# Patient Record
Sex: FEMALE | Race: BLACK OR AFRICAN AMERICAN | NOT HISPANIC OR LATINO | Employment: FULL TIME | URBAN - METROPOLITAN AREA
[De-identification: names, ages, dates, MRNs, and addresses within clinical notes are randomized per-mention and may not be internally consistent; named-entity substitution may affect disease eponyms.]

---

## 2017-01-12 ENCOUNTER — HOSPITAL ENCOUNTER (OUTPATIENT)
Dept: RADIOLOGY | Facility: OTHER | Age: 57
Discharge: HOME | End: 2017-01-12
Attending: FAMILY MEDICINE
Payer: COMMERCIAL

## 2017-01-12 ENCOUNTER — OFFICE VISIT (OUTPATIENT)
Dept: INTERNAL MEDICINE | Facility: CLINIC | Age: 57
End: 2017-01-12
Attending: FAMILY MEDICINE
Payer: COMMERCIAL

## 2017-01-12 ENCOUNTER — TELEPHONE (OUTPATIENT)
Dept: INTERNAL MEDICINE | Facility: CLINIC | Age: 57
End: 2017-01-12

## 2017-01-12 VITALS
HEIGHT: 64 IN | WEIGHT: 157.44 LBS | DIASTOLIC BLOOD PRESSURE: 70 MMHG | BODY MASS INDEX: 26.88 KG/M2 | SYSTOLIC BLOOD PRESSURE: 134 MMHG | OXYGEN SATURATION: 98 % | HEART RATE: 70 BPM

## 2017-01-12 DIAGNOSIS — G89.29 CHRONIC LEFT-SIDED LOW BACK PAIN WITHOUT SCIATICA: ICD-10-CM

## 2017-01-12 DIAGNOSIS — M54.50 CHRONIC LEFT-SIDED LOW BACK PAIN WITHOUT SCIATICA: ICD-10-CM

## 2017-01-12 DIAGNOSIS — G89.29 CHRONIC PAIN OF LEFT KNEE: ICD-10-CM

## 2017-01-12 DIAGNOSIS — M25.562 CHRONIC PAIN OF LEFT KNEE: ICD-10-CM

## 2017-01-12 DIAGNOSIS — E66.3 OVERWEIGHT: ICD-10-CM

## 2017-01-12 DIAGNOSIS — Z00.00 ANNUAL PHYSICAL EXAM: Primary | ICD-10-CM

## 2017-01-12 DIAGNOSIS — G89.29 CHRONIC PAIN OF LEFT KNEE: Primary | ICD-10-CM

## 2017-01-12 DIAGNOSIS — R73.03 PREDIABETES: ICD-10-CM

## 2017-01-12 DIAGNOSIS — M25.562 CHRONIC PAIN OF LEFT KNEE: Primary | ICD-10-CM

## 2017-01-12 PROCEDURE — 73560 X-RAY EXAM OF KNEE 1 OR 2: CPT | Mod: TC,LT

## 2017-01-12 PROCEDURE — 73560 X-RAY EXAM OF KNEE 1 OR 2: CPT | Mod: 26,LT,, | Performed by: RADIOLOGY

## 2017-01-12 PROCEDURE — 99999 PR PBB SHADOW E&M-EST. PATIENT-LVL IV: CPT | Mod: PBBFAC,,, | Performed by: FAMILY MEDICINE

## 2017-01-12 PROCEDURE — 99396 PREV VISIT EST AGE 40-64: CPT | Mod: S$GLB,,, | Performed by: FAMILY MEDICINE

## 2017-01-12 RX ORDER — CYCLOBENZAPRINE HCL 10 MG
5-10 TABLET ORAL NIGHTLY PRN
Qty: 30 TABLET | Refills: 0 | Status: SHIPPED | OUTPATIENT
Start: 2017-01-12 | End: 2017-01-22

## 2017-01-12 RX ORDER — MELOXICAM 15 MG/1
15 TABLET ORAL DAILY PRN
Qty: 30 TABLET | Refills: 0 | Status: SHIPPED | OUTPATIENT
Start: 2017-01-12 | End: 2020-06-12

## 2017-01-12 NOTE — MR AVS SNAPSHOT
[facility]  [address]  [facility] [address]  Phone: [phone]  Fax: [phone]                  Soraya Briggs   2017 9:20 AM   Office Visit    Description:  Female : 1960   Provider:  Marily Coleman MD   Department:  [facility]           Reason for Visit     Establish Care     Knee Pain           Diagnoses this Visit        Comments    Annual physical exam    -  Primary     Prediabetes         Chronic pain of left knee         Overweight         Chronic left-sided low back pain without sciatica                To Do List           Future Appointments        Provider Department Dept Phone    2017 10:15 AM [facility] XR3 450 LB LIMIT [facility] [phone]      Goals (5 Years of Data)     None       These Medications        Disp Refills Start End    meloxicam (MOBIC) 15 MG tablet 30 tablet 0 2017     Take 1 tablet (15 mg total) by mouth daily as needed. - Oral    Pharmacy: [address] - [address] Ph #: [phone]       cyclobenzaprine (FLEXERIL) 10 MG tablet 30 tablet 0 2017    Take 0.5-1 tablets (5-10 mg total) by mouth nightly as needed for Muscle spasms. - Oral    Pharmacy: [address] Ph #: [phone]         [facility]     [facility] On [facility] Nurse Care Line -  Assistance  Registered nurses in the [facility] On Call Center provide clinical advisement, health education, appointment booking, and other advisory services.  Call for this free service at [phone].             Medications           Message regarding Medications     Verify the changes and/or additions to your medication regime listed below are the same as discussed with your clinician today.  If any of these changes or additions are incorrect, please notify your healthcare provider.        START taking these  "NEW medications        Refills    meloxicam (MOBIC) 15 MG tablet 0    Sig: Take 1 tablet (15 mg total) by mouth daily as needed.    Class: Normal    Route: Oral    cyclobenzaprine (FLEXERIL) 10 MG tablet 0    Sig: Take 0.5-1 tablets (5-10 mg total) by mouth nightly as needed for Muscle spasms.    Class: Normal    Route: Oral           Verify that the below list of medications is an accurate representation of the medications you are currently taking.  If none reported, the list may be blank. If incorrect, please contact your healthcare provider. Carry this list with you in case of emergency.           Current Medications     esomeprazole (NEXIUM) 20 MG capsule Take 1 capsule (20 mg total) by mouth before breakfast.    fluticasone (FLONASE) 50 mcg/actuation nasal spray 1 spray by Each Nare route once daily.    latanoprost 0.005 % ophthalmic solution 1 drop every evening.    cyclobenzaprine (FLEXERIL) 10 MG tablet Take 0.5-1 tablets (5-10 mg total) by mouth nightly as needed for Muscle spasms.    meloxicam (MOBIC) 15 MG tablet Take 1 tablet (15 mg total) by mouth daily as needed.           Clinical Reference Information           Vital Signs - Last Recorded  Most recent update: 1/12/2017  9:37 AM by Patricia Nagel MA    BP Pulse Ht Wt LMP SpO2    134/70 (BP Location: Left arm, Patient Position: Sitting, BP Method: Manual) 70 5' 4" (1.626 m) 71.4 kg (157 lb 6.5 oz) 07/21/2006 98%    BMI                27.02 kg/m2          Blood Pressure          Most Recent Value    BP  134/70      Allergies as of 1/12/2017     No Known Allergies      Immunizations Administered on Date of Encounter - 1/12/2017     None      Orders Placed During Today's Visit      Normal Orders This Visit    Ambulatory consult to Nutrition Services     Ambulatory Referral to Physical/Occupational Therapy     Ambulatory Referral to Physical/Occupational Therapy     C. trachomatis/N. gonorrhoeae by AMP DNA Urine     Future Labs/Procedures Expected by " Expires    CBC auto differential  1/12/2017 1/12/2018    Comprehensive metabolic panel  1/12/2017 1/12/2018    Hemoglobin A1c  1/12/2017 1/12/2018    Hepatitis panel, acute  1/12/2017 3/13/2018    HIV-1 and HIV-2 antibodies  1/12/2017 1/12/2018    Lipid panel  1/12/2017 1/12/2018    RPR  1/12/2017 3/13/2018    T4, free  1/12/2017 3/13/2018    TSH  1/12/2017 1/12/2018    Vitamin D  1/12/2017 1/12/2018    X-Ray Knee 1 or 2 View Left  1/12/2017 1/12/2018      Instructions      Understanding Osteoarthritis  There are about 100 different types of arthritis. In general, arthritis means problems with the joints. A joint is a place in the body where two bones meet. Arthritis may also affect other body tissue near the joints including muscles, tendons, and ligaments. And, in some forms of arthritis, the whole body is involved.  Osteoarthritis (OA) is sometimes called degenerative joint disease or wear-and-tear arthritis. It's the most common type of arthritis. In OA, the cartilage wears away. Cartilage covers the ends of bones and acts as a cushion. If enough cartilage wears away, bone rubs against bone. The joint changes in OA cause pain, stiffness, and difficulty movement.    How joints work  The joint help the bones move easily. Cartilage is smooth tissue that cushions the ends of bones, letting them slide against each other. The synovial membrane surrounds the joint. It makes a fluid that lubricates the joint.          When a joint wears out  Through long use or injury, or because of a family tendency, the cartilage can become rough and damaged. It starts to wear unevenly. The ends of the bones then rub together, causing stiffness, pain, and sometimes swelling. Bony spurs may grow, enlarging the joint. The muscles around the joint may weaken  © 3711-1035 [employer]. [address] [place] [address], [place] [address]. All rights reserved. This information is not intended as a substitute for professional medical  care. Always follow your healthcare professional's instructions.        Reducing Knee Pain and Swelling    Many treatments can help reduce pain and swelling in your knee. Your healthcare provider or physical therapist may suggest one or more of the following treatments:  · Icing your knee helps reduce swelling. You may be asked to ice your knee once a day or more. Apply ice for about 15 to 20 minutes at a time, with at least 40 minutes between sessions. Always keep a towel between the ice and your skin.   · Keeping your leg raised above your heart helps excess fluid flow out of your knee joint. This reduces swelling.  · Compression means wrapping an elastic bandage or neoprene sleeve snugly around your knees. This keeps fluid from collecting in your knee joint.  · Electrical stimulation, done by a physical therapist or , can help reduce excess fluid in your knee joint.  · Anti-inflammatory medicines may be prescribed by your healthcare provider. You may take pills or receive injections in your knee.  · Isometric (natividad) exercises strengthen the muscles that support your knee joint. They also help reduce excess fluid in your knee.  · Massage helps fluid drain away from your knee.  © 6512-1349 [employer]. [address] [place] [address], [place] [address]. All rights reserved. This information is not intended as a substitute for professional medical care. Always follow your healthcare professional's instructions.

## 2017-01-12 NOTE — TELEPHONE ENCOUNTER
Please inform xray does not show arthritis, please schedule follow up with orthopedics for further work up

## 2017-01-12 NOTE — PROGRESS NOTES
"Subjective:      Patient ID: Soraya Briggs is a 56 y.o. female.    Chief Complaint: Establish Care and Knee Pain (left since October)    HPI Comments: She is here for annual exam. She does have prediabetes. She had a hard day at work 4 months ago and noticed that her left knee started to swell after that day at work. She is a patient care tech. She did rest for one week and the swelling went down. Recently has noticed swelling and pain in the knee after her work shift. She is lifting about  pounds with assistance.     Knee Pain      Extremity Weakness    Pertinent negatives include no fever.     Review of Systems   Constitutional: Negative for fever.   HENT: Negative.    Respiratory: Negative.    Cardiovascular: Negative for chest pain.   Gastrointestinal: Negative.    Genitourinary: Negative for dysuria.   Musculoskeletal: Positive for extremity weakness.   Neurological: Negative for headaches.     I personally reviewed Past Medical History, Past Surgical history,  Past Social History and Family History    Objective:     Visit Vitals    /70 (BP Location: Left arm, Patient Position: Sitting, BP Method: Manual)    Pulse 70    Ht 5' 4" (1.626 m)    Wt 71.4 kg (157 lb 6.5 oz)    LMP 07/21/2006    SpO2 98%    BMI 27.02 kg/m2       Physical Exam   Constitutional: She is oriented to person, place, and time. She appears well-developed and well-nourished. No distress.   HENT:   Head: Normocephalic and atraumatic.   Right Ear: External ear normal.   Left Ear: External ear normal.   Mouth/Throat: Oropharynx is clear and moist.   Eyes: Conjunctivae and EOM are normal. Pupils are equal, round, and reactive to light. Right eye exhibits no discharge. Left eye exhibits no discharge. No scleral icterus.   Neck: Normal range of motion. Neck supple.   Cardiovascular: Normal rate, regular rhythm, normal heart sounds and intact distal pulses.  Exam reveals no gallop.    No murmur heard.  Pulmonary/Chest: Effort " normal and breath sounds normal. No respiratory distress. She has no wheezes. She has no rales. She exhibits no tenderness.   Abdominal: Soft. Bowel sounds are normal. She exhibits no distension and no mass. There is no tenderness. There is no rebound and no guarding.   Musculoskeletal:        Left knee: She exhibits decreased range of motion and swelling. She exhibits no ecchymosis, no deformity, normal alignment, no LCL laxity, no bony tenderness, normal meniscus and no MCL laxity. No tenderness found. No medial joint line, no lateral joint line, no MCL, no LCL and no patellar tendon tenderness noted.   Neurological: She is alert and oriented to person, place, and time.   Skin: Skin is warm and dry.   Psychiatric: She has a normal mood and affect. Her behavior is normal. Judgment and thought content normal.   Vitals reviewed.      Soraya ANGELES was seen today for establish care and knee pain.    Diagnoses and all orders for this visit:    Annual physical exam  -     Ambulatory consult to Nutrition Services  -     CBC auto differential; Future  -     Comprehensive metabolic panel; Future  -     Lipid panel; Future  -     Hemoglobin A1c; Future  -     Vitamin D; Future  -     TSH; Future  -     T4, free; Future  -     HIV-1 and HIV-2 antibodies; Future  -     RPR; Future  -     Hepatitis panel, acute; Future  -     C. trachomatis/N. gonorrhoeae by AMP DNA Urine    Prediabetes  -     Hemoglobin A1c; Future      Chronic pain of left knee  Chronic left-sided low back pain without sciatica  -     X-Ray Knee 1 or 2 View Left; Future  -     Ambulatory Referral to Physical/Occupational Therapyutrition Services  -   Patient to try knee brace and PT, flexeril and mobic, call in two weeks if no improvement or worsening, consider orthopedics follow up  -     leg brace (KNEE BRACE) Misc; 1 application by Misc.(Non-Drug; Combo Route) route daily as needed.    Overweight  -     Ambulatory consult to Nutrition  Services            Other orders  -     meloxicam (MOBIC) 15 MG tablet; Take 1 tablet (15 mg total) by mouth daily as needed.  -     cyclobenzaprine (FLEXERIL) 10 MG tablet; Take 0.5-1 tablets (5-10 mg total) by mouth nightly as needed for Muscle spasms.

## 2017-01-12 NOTE — PATIENT INSTRUCTIONS
Understanding Osteoarthritis  There are about 100 different types of arthritis. In general, arthritis means problems with the joints. A joint is a place in the body where two bones meet. Arthritis may also affect other body tissue near the joints including muscles, tendons, and ligaments. And, in some forms of arthritis, the whole body is involved.  Osteoarthritis (OA) is sometimes called degenerative joint disease or wear-and-tear arthritis. It's the most common type of arthritis. In OA, the cartilage wears away. Cartilage covers the ends of bones and acts as a cushion. If enough cartilage wears away, bone rubs against bone. The joint changes in OA cause pain, stiffness, and difficulty movement.    How joints work  The joint help the bones move easily. Cartilage is smooth tissue that cushions the ends of bones, letting them slide against each other. The synovial membrane surrounds the joint. It makes a fluid that lubricates the joint.          When a joint wears out  Through long use or injury, or because of a family tendency, the cartilage can become rough and damaged. It starts to wear unevenly. The ends of the bones then rub together, causing stiffness, pain, and sometimes swelling. Bony spurs may grow, enlarging the joint. The muscles around the joint may weaken  © 7979-4909 [employer]. [address] [place] [address], [place] [id number]. All rights reserved. This information is not intended as a substitute for professional medical care. Always follow your healthcare professional's instructions.        Reducing Knee Pain and Swelling    Many treatments can help reduce pain and swelling in your knee. Your healthcare provider or physical therapist may suggest one or more of the following treatments:  · Icing your knee helps reduce swelling. You may be asked to ice your knee once a day or more. Apply ice for about 15 to 20 minutes at a time, with at least 40 minutes between sessions. Always keep a towel  between the ice and your skin.   · Keeping your leg raised above your heart helps excess fluid flow out of your knee joint. This reduces swelling.  · Compression means wrapping an elastic bandage or neoprene sleeve snugly around your knees. This keeps fluid from collecting in your knee joint.  · Electrical stimulation, done by a physical therapist or , can help reduce excess fluid in your knee joint.  · Anti-inflammatory medicines may be prescribed by your healthcare provider. You may take pills or receive injections in your knee.  · Isometric (natividad) exercises strengthen the muscles that support your knee joint. They also help reduce excess fluid in your knee.  · Massage helps fluid drain away from your knee.  © 3696-3017 [employer]. [address] [place] [address], [place] [address]. All rights reserved. This information is not intended as a substitute for professional medical care. Always follow your healthcare professional's instructions.

## 2017-01-13 ENCOUNTER — TELEPHONE (OUTPATIENT)
Dept: INTERNAL MEDICINE | Facility: CLINIC | Age: 57
End: 2017-01-13

## 2017-01-13 RX ORDER — ERGOCALCIFEROL 1.25 MG/1
50000 CAPSULE ORAL
Qty: 12 CAPSULE | Refills: 0 | Status: SHIPPED | OUTPATIENT
Start: 2017-01-13 | End: 2017-02-12

## 2017-01-16 NOTE — TELEPHONE ENCOUNTER
Pt advised of Dr. Ardon's advice, verbalized understanding and has no further questions or concerns at this time.     Appointment scheduled with Orthopedics

## 2017-01-19 ENCOUNTER — HOSPITAL ENCOUNTER (OUTPATIENT)
Dept: RADIOLOGY | Facility: HOSPITAL | Age: 57
Discharge: HOME | End: 2017-01-19
Attending: ORTHOPAEDIC SURGERY
Payer: COMMERCIAL

## 2017-01-19 ENCOUNTER — OFFICE VISIT (OUTPATIENT)
Dept: ORTHOPEDICS | Facility: CLINIC | Age: 57
End: 2017-01-19
Payer: COMMERCIAL

## 2017-01-19 VITALS
HEART RATE: 85 BPM | SYSTOLIC BLOOD PRESSURE: 112 MMHG | WEIGHT: 154.75 LBS | BODY MASS INDEX: 26.42 KG/M2 | HEIGHT: 64 IN | DIASTOLIC BLOOD PRESSURE: 60 MMHG

## 2017-01-19 DIAGNOSIS — M25.562 LEFT KNEE PAIN, UNSPECIFIED CHRONICITY: Primary | ICD-10-CM

## 2017-01-19 DIAGNOSIS — M25.569 KNEE PAIN, UNSPECIFIED CHRONICITY, UNSPECIFIED LATERALITY: ICD-10-CM

## 2017-01-19 PROCEDURE — 1159F MED LIST DOCD IN RCRD: CPT | Mod: S$GLB,,, | Performed by: PHYSICIAN ASSISTANT

## 2017-01-19 PROCEDURE — 73565 X-RAY EXAM OF KNEES: CPT | Mod: TC

## 2017-01-19 PROCEDURE — 99999 PR PBB SHADOW E&M-EST. PATIENT-LVL III: CPT | Mod: PBBFAC,,, | Performed by: PHYSICIAN ASSISTANT

## 2017-01-19 PROCEDURE — 99213 OFFICE O/P EST LOW 20 MIN: CPT | Mod: S$GLB,,, | Performed by: PHYSICIAN ASSISTANT

## 2017-01-19 PROCEDURE — 73565 X-RAY EXAM OF KNEES: CPT | Mod: 26,,, | Performed by: RADIOLOGY

## 2017-01-19 NOTE — MR AVS SNAPSHOT
[facility]  [address]  [facility] [address]  Phone: [phone]                  Soraya Briggs   2017 11:30 AM   Appointment    Description:  Female : 1960   Provider:  Nimisha Bell PA-C   Department:  [facility] - Orthopedics                To Do List           Future Appointments        Provider Department Dept Phone    2017 11:30 AM Nimisha Bell PA-C [facility] [phone]      Goals (5 Years of Data)     None      [facility]     [facility] -  Assistance  Registered nurses in the [facility] On Call Center provide clinical advisement, health education, appointment booking, and other advisory services.  Call for this free service at [phone].             Medications           Message regarding Medications     Verify the changes and/or additions to your medication regime listed below are the same as discussed with your clinician today.  If any of these changes or additions are incorrect, please notify your healthcare provider.             Verify that the below list of medications is an accurate representation of the medications you are currently taking.  If none reported, the list may be blank. If incorrect, please contact your healthcare provider. Carry this list with you in case of emergency.           Current Medications     cyclobenzaprine (FLEXERIL) 10 MG tablet Take 0.5-1 tablets (5-10 mg total) by mouth nightly as needed for Muscle spasms.    ergocalciferol (ERGOCALCIFEROL) 50,000 unit Cap Take 1 capsule (50,000 Units total) by mouth every 7 days.    esomeprazole (NEXIUM) 20 MG capsule Take 1 capsule (20 mg total) by mouth before breakfast.    fluticasone (FLONASE) 50 mcg/actuation nasal spray 1 spray by Each Nare route once daily.    latanoprost 0.005 % ophthalmic solution 1 drop every evening.    leg brace (KNEE BRACE) Misc 1 application by Misc.(Non-Drug; Combo Route) route daily as needed.     meloxicam (MOBIC) 15 MG tablet Take 1 tablet (15 mg total) by mouth daily as needed.           Clinical Reference Information           Vital Signs - Last Recorded     LMP                   07/21/2006           Allergies as of 1/19/2017     No Known Allergies      Immunizations Administered on Date of Encounter - 1/19/2017     None

## 2017-01-19 NOTE — LETTER
January 19, 2017      Marily Coleman MD  [address]  [facility] [address]           [facility]  [address]  [place] [address]  Phone: [phone]          Patient: Soraya Briggs   MR Number: [id number]   YOB: 1960   Date of Visit: 1/19/2017       Dear Dr. Marily Coleman:    Thank you for referring Soraya Briggs to me for evaluation. Attached you will find relevant portions of my assessment and plan of care.    If you have questions, please do not hesitate to call me. I look forward to following Soraya Briggs along with you.    Sincerely,    Nimisha Bell PA-C    Enclosure  CC:  No Recipients    If you would like to receive this communication electronically, please contact [email or web address] or [phone] to request more information on [employer] access.    For providers and/or their staff who would like to refer a patient to [facility], please contact us through our one-stop-shop provider referral line, [facility], at [phone].    If you feel you have received this communication in error or would no longer like to receive these types of communications, please e-mail [email or web address]

## 2017-01-19 NOTE — PROGRESS NOTES
Subjective:      Patient ID: Soraya Briggs is a 56 y.o. female.    Chief Complaint: No chief complaint on file.    HPI  56 year old female presents with chief complaint of intermittent left knee pain since October 2016. She works as a nurse tech. She has intermittent pain and swelling that is worse with increased activity. She reports cracking and her knee giving way. She took mobic once and says it helped. She does not use assistive devices.   Review of Systems   Constitution: Negative for chills, fever and night sweats.   Cardiovascular: Negative for chest pain.   Respiratory: Negative for cough and shortness of breath.    Hematologic/Lymphatic: Does not bruise/bleed easily.   Skin: Negative for color change.   Gastrointestinal: Negative for heartburn.   Genitourinary: Negative for dysuria.   Neurological: Negative for numbness and paresthesias.   Psychiatric/Behavioral: Negative for altered mental status.   Allergic/Immunologic: Negative for persistent infections.         Objective:            Ortho/SPM Exam  General :   alert, appears stated age and cooperative   Gait: Normal. The patient can bear weight on the injured extremity.   Left Lower Extremity  Hip Palpation:  no tenderness over the greater  trochanter   Hip ROM: 100% of normal    Knee Effusion:  None.   Ecchymosis:  none   Knee ROM:  0 to 120 degrees without subpatellar   crepitance.   Patella:  Patella does track normally.  Patellar apprehension test: negative  Patellar compression test: negative   Tenderness: medial joint line   Stability:  Lachman's test: negative  Posterior drawer: negative  Medial collateral ligament: negative  Lateral collateral ligament: negative         Nubia's Test:  negative with no joint line tenderness   Sensation:   intact to light touch   Pulses: normal DP and PT pulses         X-ray: ordered and reviewed by myself.  Standing view the knee shows joint spaces to be maintained.  No bony abnormalities.          Assessment:       Encounter Diagnosis   Name Primary?    Left knee pain, unspecified chronicity Yes          Plan:       Discussed treatment options with patient. She will continue mobic for 2 weeks and as needed after that. Ice and elevate for swelling. HEP given. RTC if symptoms worsen or do not improve.

## 2017-01-20 ENCOUNTER — TELEPHONE (OUTPATIENT)
Dept: INTERNAL MEDICINE | Facility: CLINIC | Age: 57
End: 2017-01-20

## 2017-01-20 NOTE — TELEPHONE ENCOUNTER
Left message for patient     Your complete blood count is normal. You are not anemic.   Your liver, electrolytes, and kidney function are normal.   Your thyroid studies are normal.   You are low risk for a heart attack based on your cholesterol.     Your vitamin D is low. I will send a prescription to the pharmacy for a weekly supplement you will take once weekly for 12 weeks, then after the 12 weeks you will need to take a daily supplement available over the counter of 1000 IU      Please schedule follow up visit to discuss prediabetes diagnosis and treatment plan

## 2017-01-31 ENCOUNTER — OFFICE VISIT (OUTPATIENT)
Dept: INTERNAL MEDICINE | Facility: CLINIC | Age: 57
End: 2017-01-31
Attending: FAMILY MEDICINE
Payer: COMMERCIAL

## 2017-01-31 ENCOUNTER — LAB VISIT (OUTPATIENT)
Dept: LAB | Facility: OTHER | Age: 57
End: 2017-01-31
Attending: FAMILY MEDICINE
Payer: COMMERCIAL

## 2017-01-31 VITALS
BODY MASS INDEX: 26.57 KG/M2 | OXYGEN SATURATION: 97 % | HEART RATE: 82 BPM | HEIGHT: 64 IN | WEIGHT: 155.63 LBS | DIASTOLIC BLOOD PRESSURE: 72 MMHG | SYSTOLIC BLOOD PRESSURE: 122 MMHG

## 2017-01-31 DIAGNOSIS — R73.03 PREDIABETES: ICD-10-CM

## 2017-01-31 DIAGNOSIS — D64.9 NORMOCYTIC ANEMIA: Primary | ICD-10-CM

## 2017-01-31 DIAGNOSIS — D64.9 NORMOCYTIC ANEMIA: ICD-10-CM

## 2017-01-31 LAB
BASOPHILS # BLD AUTO: 0.03 K/UL
BASOPHILS NFR BLD: 0.6 %
DIFFERENTIAL METHOD: ABNORMAL
EOSINOPHIL # BLD AUTO: 0.3 K/UL
EOSINOPHIL NFR BLD: 5.7 %
ERYTHROCYTE [DISTWIDTH] IN BLOOD BY AUTOMATED COUNT: 13.4 %
FERRITIN SERPL-MCNC: 205 NG/ML
FOLATE SERPL-MCNC: 8.9 NG/ML
HCT VFR BLD AUTO: 38.6 %
HGB BLD-MCNC: 12.6 G/DL
IRON SERPL-MCNC: 61 UG/DL
LYMPHOCYTES # BLD AUTO: 2.8 K/UL
LYMPHOCYTES NFR BLD: 52.3 %
MCH RBC QN AUTO: 28.2 PG
MCHC RBC AUTO-ENTMCNC: 32.6 %
MCV RBC AUTO: 86 FL
MONOCYTES # BLD AUTO: 0.7 K/UL
MONOCYTES NFR BLD: 13.1 %
NEUTROPHILS # BLD AUTO: 1.5 K/UL
NEUTROPHILS NFR BLD: 28.1 %
PLATELET # BLD AUTO: 246 K/UL
PMV BLD AUTO: 10.6 FL
RBC # BLD AUTO: 4.47 M/UL
SATURATED IRON: 18 %
TOTAL IRON BINDING CAPACITY: 330 UG/DL
TRANSFERRIN SERPL-MCNC: 223 MG/DL
VIT B12 SERPL-MCNC: 502 PG/ML
WBC # BLD AUTO: 5.43 K/UL

## 2017-01-31 PROCEDURE — 83540 ASSAY OF IRON: CPT

## 2017-01-31 PROCEDURE — 82607 VITAMIN B-12: CPT

## 2017-01-31 PROCEDURE — 85025 COMPLETE CBC W/AUTO DIFF WBC: CPT

## 2017-01-31 PROCEDURE — 82746 ASSAY OF FOLIC ACID SERUM: CPT

## 2017-01-31 PROCEDURE — 83036 HEMOGLOBIN GLYCOSYLATED A1C: CPT

## 2017-01-31 PROCEDURE — 1159F MED LIST DOCD IN RCRD: CPT | Mod: S$GLB,,, | Performed by: FAMILY MEDICINE

## 2017-01-31 PROCEDURE — 99999 PR PBB SHADOW E&M-EST. PATIENT-LVL III: CPT | Mod: PBBFAC,,, | Performed by: FAMILY MEDICINE

## 2017-01-31 PROCEDURE — 99214 OFFICE O/P EST MOD 30 MIN: CPT | Mod: S$GLB,,, | Performed by: FAMILY MEDICINE

## 2017-01-31 PROCEDURE — 82728 ASSAY OF FERRITIN: CPT

## 2017-01-31 PROCEDURE — 36415 COLL VENOUS BLD VENIPUNCTURE: CPT

## 2017-01-31 RX ORDER — METFORMIN HYDROCHLORIDE 500 MG/1
500 TABLET ORAL
Qty: 30 TABLET | Refills: 1 | Status: SHIPPED | OUTPATIENT
Start: 2017-01-31 | End: 2017-05-05 | Stop reason: SDUPTHER

## 2017-01-31 NOTE — MR AVS SNAPSHOT
[facility]  [address]  [facility] [address]  Phone: [phone]  Fax: [phone]                  Soraya Briggs   2017 9:20 AM   Office Visit    Description:  Female : 1960   Provider:  Marily Coleman MD   Department:  [facility]           Reason for Visit     Pre-diabetes           Diagnoses this Visit        Comments    Normocytic anemia    -  Primary     Prediabetes                To Do List           Future Appointments        Provider Department Dept Phone    2017 10:45 AM LAB, [facility] [phone]    3/1/2017 9:30 AM SPECIMEN, [facility] [phone]    3/1/2017 10:00 AM NURSE, [facility] INTERNAL MEDICINE [facility] [phone]      Goals (5 Years of Data)     None      Follow-Up and Disposition     Return in about 4 weeks (around 2017), or if symptoms worsen or fail to improve.       These Medications        Disp Refills Start End    metformin (GLUCOPHAGE) 500 MG tablet 30 tablet 1 2017    Take 1 tablet (500 mg total) by mouth daily with breakfast. - Oral    Pharmacy: [facility] - [address] #: [phone]         [facility]     [facility] On Call Nurse Care Line -  Assistance  Registered nurses in the [facility] provide clinical advisement, health education, appointment booking, and other advisory services.  Call for this free service at [phone].             Medications           Message regarding Medications     Verify the changes and/or additions to your medication regime listed below are the same as discussed with your clinician today.  If any of these changes or additions are incorrect, please notify your healthcare provider.        START taking these NEW medications        Refills    metformin (GLUCOPHAGE) 500 MG tablet 1    Sig: Take 1 tablet (500 mg  "total) by mouth daily with breakfast.    Class: Normal    Route: Oral           Verify that the below list of medications is an accurate representation of the medications you are currently taking.  If none reported, the list may be blank. If incorrect, please contact your healthcare provider. Carry this list with you in case of emergency.           Current Medications     ergocalciferol (ERGOCALCIFEROL) 50,000 unit Cap Take 1 capsule (50,000 Units total) by mouth every 7 days.    esomeprazole (NEXIUM) 20 MG capsule Take 1 capsule (20 mg total) by mouth before breakfast.    fluticasone (FLONASE) 50 mcg/actuation nasal spray 1 spray by Each Nare route once daily.    latanoprost 0.005 % ophthalmic solution 1 drop every evening.    leg brace (KNEE BRACE) Misc 1 application by Misc.(Non-Drug; Combo Route) route daily as needed.    meloxicam (MOBIC) 15 MG tablet Take 1 tablet (15 mg total) by mouth daily as needed.    metformin (GLUCOPHAGE) 500 MG tablet Take 1 tablet (500 mg total) by mouth daily with breakfast.           Clinical Reference Information           Vital Signs - Last Recorded  Most recent update: 1/31/2017  9:42 AM by Rosa M Roman MA    BP Pulse Ht Wt LMP SpO2    122/72 82 5' 4" (1.626 m) 70.6 kg (155 lb 10.3 oz) 07/21/2006 97%    BMI                26.72 kg/m2          Blood Pressure          Most Recent Value    BP  122/72      Allergies as of 1/31/2017     No Known Allergies      Immunizations Administered on Date of Encounter - 1/31/2017     None      Orders Placed During Today's Visit     Future Labs/Procedures Expected by Expires    CBC auto differential  1/31/2017 1/31/2018    Ferritin  1/31/2017 1/31/2018    Folate  1/31/2017 1/31/2018    Hemoglobin A1c  1/31/2017 1/31/2018    Iron and TIBC  1/31/2017 1/31/2018    Occult blood x 1, stool  1/31/2017 1/31/2018    Vitamin B12  1/31/2017 1/31/2018      Instructions      Prediabetes  You have been diagnosed with prediabetes. This means that the level of " sugar (glucose) in your blood is too high. If you have prediabetes, you are at risk for developing type 2 diabetes. Type 2 diabetes is diagnosed when the level of glucose in the blood reaches a certain high level. With prediabetes, it hasnt reached this point yet, but it is higher than normal. It is vital to make lifestyle changes to lower your blood sugar, improve your health, and prevent diabetes. This sheet will tell you more.     Why Worry About Prediabetes?  Prediabetes is a disease where the bodys cells have trouble using glucose in the blood for energy. As a result, too much glucose stays in the blood and can affect how your heart and blood vessels work. Without changes in diet and lifestyle, the problem can get worse. Once you have type 2 diabetes, it is chronic (ongoing) and needs to be managed for the rest of your life. Diabetes can harm the body and your health by damaging organs, such as your eyes and kidneys. It makes you more likely to have heart disease. And it can damage nerves and blood vessels.  Risk Factors For Prediabetes  The exact cause of prediabetes is not clear. But certain risk factors make a person more likely to have it. These include:  · A family history of type 2 diabetes  · Being overweight  · Being over age 40  · Having had gestational diabetes  · Not being physically active  · Being , -American, , , or   Diagnosing Prediabetes  Prediabetes has no symptoms. The only way to find it is with a blood test. You may have had one or both of these blood tests:  · Fasting glucose test. Blood is taken and tested after you have fasted (not eaten) for at least 8 hours. A normal test result is 99 milligrams per deciliter (mg/dL) or lower. Prediabetes is 100 mg/dL to 125 mg/dL. Diabetes is 126 mg/dL or higher.  · Glucose tolerance test. Your blood sugar is measured before and after you drink a very sugary liquid. A normal test result is  139 milligrams per deciliter (mg/dL) or lower. Prediabetes is 140 mg/dL to 199 mg/dL. Diabetes is 200 mg/dL or higher.  · Hemoglobin A1c (HbA1c). Your HbA1c is normal if it is below 5.7%. Prediabetes is 5.7% to 6.4%. Diabetes is 6.5% or higher.   Treating Prediabetes  The best way to treat prediabetes is to lose at least 5% to 7% of your current  weight and be more physically active by getting at least 30 minutes of exercise 5 days a week. These changes help the bodys cells use blood sugar better. Even a small amount of weight loss can help. Work with your health care provider to make a plan to eat well and be more active. Keep in mind that small changes can add up. Other changes in your lifestyle may make you less likely to develop diabetes. Your health care provider can talk with you about these.  Follow-Up  If it is untreated, prediabetes can turn into diabetes. This is a serious health condition. Take steps to stop this from happening. Follow the treatment plan you have been given. You may have your blood glucose tested again in about 12 to 18 months.    © 4391-7599 [employer]. [address] [place] [address], [place] [address]. All rights reserved. This information is not intended as a substitute for professional medical care. Always follow your healthcare professional's instructions.      30 minutes of jogging 5 days a week   A total of 150  Minutes weekly       MyPlate Worksheet: 1,200 Calories  Your calorie needs are about 1,200 calories a day. Below are the U.S. Department of Agriculture (USDA) guidelines for your daily recommended amount of each food group.  Vegetables  1½ cups Fruits  1 cup Grains  4 ounces Dairy  2½ cups Protein  3 ounces   Eat a variety of vegetables each day.  Aim for these amounts each week:  · 1 cup dark green vegetables  · 3 cups red or orange-colored vegetables  · ½ cup dry beans and peas  · 3½ cups starchy vegetables  · 2½ cups other vegetables Eat a variety of fruits each  "day.  Go easy on fruit juices.  Good choices of fruits include:  · Berries  · Bananas  · Apples  · Melon  · Dried fruit  · Frozen fruit  · Canned fruit Choose whole grains whenever you can.  Aim to eat at least 2 ounces of whole grains each day:  · Bread  · Cereal  · Rice  · Pasta  · Potatoes  · Tortillas Choose low-fat or fat-free milk, yogurt, or cheese each day.  Good choices include:  · Low-fat or fat-free milk or chocolate milk  · Low-fat or fat-free yogurt  · Low-fat or fat-free cottage cheese or other reduced-fat cheeses  · Calcium-fortified milk alternatives Choose low-fat or lean meats, poultry, fish and seafood each day.  Vary your protein. Choose more:  · Fish and other seafood  · Lean low-fat meat and poultry  · Eggs  · Beans, peas  · Tofu  · Unsalted nuts and seeds  Choose less high-fat and red meat.   Source: USDA MyPlate, www.choosemyplate.gov  Know your limits on oils (fats) and sugars:  · Your allowance for oils is 17 grams or about 4 teaspoons a day (oil includes vegetable oil, mayonnaise, soft margarine, salad dressing, nuts, olives, avocados, and some fish).  · Limit the extras (solid fats and sugars, also called "empty calories") to 100 calories a day.  · Cut back on salt (sodium). Stay under 2,300 mg sodium a day. If you have a health condition such as heart disease or high blood pressure, your doctor will likely tell you to limit sodium to no more than 1,500 mg a day.  Get moving and be active!  Aim for at least 30 minutes of physical activity most days of the week or 150 minutes of exercise a week.  MyPlate Servings Worksheet: 1,200 calories  This worksheet tells you how many servings you should get each day from each food group, and tells you how much food makes a serving. Use this as a guide as you plan your meals throughout the day. Track your progress daily by writing in what you actually ate.  Food Group  Daily MyPlate Goal  What You Ate Today    Vegetables 3 Half-cups or 3 Servings  One " serving is:  ½ cup cut-up raw or cooked vegetables  1 cup raw, leafy vegetables  ½ baked sweet potato  ½ cup vegetable juice  Note: At meals, fill half your plate with vegetables and fruit.     Fruits 2 Half-cups or 2 Servings  One serving is:  ½ cup fresh, frozen, or canned fruit  1 medium piece of fruit  1 cup of berries or melon  ½ cup dried fruit  ½ cup 100% fruit juice  Note: Make most choices fruit instead of juice.     Grains 4 Servings or 4 Ounces  One serving is:  1 slice bread  1 cup dry cereal  ½ cup cooked rice, pasta, or cereal  1 5-inch tortilla  Note: Choose whole grains for at least half of your servings each day.     Dairy 2 Servings or 2 Cups  One serving is:  1 cup milk  1½ ounces reduced-fat hard cheese  2 ounces processed cheese  1 cup low-fat yogurt  1/3 cup shredded cheese  Note: Choose low-fat or fat-free most often.     Protein 3 Servings or 3 Ounces  One serving is:  1 ounce cooked lean beef, pork, lamb, or ham  1 ounce cooked chicken or turkey (no skin)  1 ounce cooked fish or shellfish (not fried)  1 egg  ¼ cup egg substitute  ½ ounce nuts or seeds  1 tablespoon peanut or almond butter  ¼ cup cooked dry beans or peas  ½ cup tofu  2 tablespoons hummus     © 3632-4304 [employer]. [address] [place] [address]. All rights reserved. This information is not intended as a substitute for professional medical care. Always follow your healthcare professional's instructions.      Metformin Hydrochloride Oral tablet  What is this medicine?  METFORMIN (met FOR min) is used to treat type 2 diabetes. It helps to control blood sugar. Treatment is combined with diet and exercise. This medicine can be used alone or with other medicines for diabetes.  This medicine may be used for other purposes; ask your health care provider or pharmacist if you have questions.  What should I tell my health care provider before I take this medicine?  They need to know if you have any of these  conditions:  · anemia  · frequently drink alcohol-containing beverages  · become easily dehydrated  · heart attack  · heart failure that is treated with medications  · kidney disease  · liver disease  · polycystic ovary syndrome  · serious infection or injury  · vomiting  · an unusual or allergic reaction to metformin, other medicines, foods, dyes, or preservatives  · pregnant or trying to get pregnant  · breast-feeding  How should I use this medicine?  Take this medicine by mouth. Take it with meals. Swallow the tablets with a drink of water. Follow the directions on the prescription label. Take your medicine at regular intervals. Do not take your medicine more often than directed.  Talk to your pediatrician regarding the use of this medicine in children. While this drug may be prescribed for children as young as 10 years of age for selected conditions, precautions do apply.  Overdosage: If you think you have taken too much of this medicine contact a poison control center or emergency room at once.  NOTE: This medicine is only for you. Do not share this medicine with others.  What if I miss a dose?  If you miss a dose, take it as soon as you can. If it is almost time for your next dose, take only that dose. Do not take double or extra doses.  What may interact with this medicine?  Do not take this medicine with any of the following medications:  · dofetilide  · gatifloxacin  · certain contrast medicines given before X-rays, CT scans, MRI, or other procedures  This medicine may also interact with the following medications:  · acetazolamide  · certain medicines for HIV infection or hepatitis, like adefovir, emtricitabine, entecavir,lamivudine, or tenofovir  · cimetidine  · crizotinib  · digoxin  · diuretics  · female hormones, like estrogens or progestins and birth control pills  · glycopyrrolate  · isoniazid  · lamotrigine  · medicines for blood pressure, heart disease, irregular heart  beat  · memantine  · midodrine  · methazolamide  · morphine  · nicotinic acid  · phenothiazines like chlorpromazine, mesoridazine, prochlorperazine, thioridazine  · phenytoin  · procainamide  · propantheline  · quinidine  · quinine  · ranitidine  · ranolazine  · steroid medicines like prednisone or cortisone  · stimulant medicines for attention disorders, weight loss, or to stay awake  · thyroid medicines  · topiramate  · trimethoprim  · trospium  · vancomycin  · vandetanib  · zonisamide  This list may not describe all possible interactions. Give your health care provider a list of all the medicines, herbs, non-prescription drugs, or dietary supplements you use. Also tell them if you smoke, drink alcohol, or use illegal drugs. Some items may interact with your medicine.  What should I watch for while using this medicine?  Visit your doctor or health care professional for regular checks on your progress.  A test called the HbA1C (A1C) will be monitored. This is a simple blood test. It measures your blood sugar control over the last 2 to 3 months. You will receive this test every 3 to 6 months.  Learn how to check your blood sugar. Learn the symptoms of low and high blood sugar and how to manage them.  Always carry a quick-source of sugar with you in case you have symptoms of low blood sugar. Examples include hard sugar candy or glucose tablets. Make sure others know that you can choke if you eat or drink when you develop serious symptoms of low blood sugar, such as seizures or unconsciousness. They must get medical help at once.  Tell your doctor or health care professional if you have high blood sugar. You might need to change the dose of your medicine. If you are sick or exercising more than usual, you might need to change the dose of your medicine.  Do not skip meals. Ask your doctor or health care professional if you should avoid alcohol. Many nonprescription cough and cold products contain sugar or alcohol.  These can affect blood sugar.  This medicine may cause ovulation in premenopausal women who do not have regular monthly periods. This may increase your chances of becoming pregnant. You should not take this medicine if you become pregnant or think you may be pregnant. Talk with your doctor or health care professional about your birth control options while taking this medicine. Contact your doctor or health care professional right away if think you are pregnant.  If you are going to need surgery, a MRI, CT scan, or other procedure, tell your doctor that you are taking this medicine. You may need to stop taking this medicine before the procedure.  Wear a medical ID bracelet or chain, and carry a card that describes your disease and details of your medicine and dosage times.  What side effects may I notice from receiving this medicine?  Side effects that you should report to your doctor or health care professional as soon as possible:  · allergic reactions like skin rash, itching or hives, swelling of the face, lips, or tongue  · breathing problems  · feeling faint or lightheaded, falls  · muscle aches or pains  · signs and symptoms of low blood sugar such as feeling anxious, confusion, dizziness, increased hunger, unusually weak or tired, sweating, shakiness, cold, irritable, headache, blurred vision, fast heartbeat, loss of consciousness  · slow or irregular heartbeat  · unusual stomach pain or discomfort  · unusually tired or weak  Side effects that usually do not require medical attention (report to your doctor or health care professional if they continue or are bothersome):  · diarrhea  · headache  · heartburn  · metallic taste in mouth  · nausea  · stomach gas, upset  This list may not describe all possible side effects. Call your doctor for medical advice about side effects. You may report side effects to FDA at 2-016-FDA-6518.  Where should I keep my medicine?  Keep out of the reach of children.  Store at room  temperature between 15 and 30 degrees C (59 and 86 degrees F). Protect from moisture and light. Throw away any unused medicine after the expiration date.  NOTE:This sheet is a summary. It may not cover all possible information. If you have questions about this medicine, talk to your doctor, pharmacist, or health care provider. Copyright© 2016 [employer]

## 2017-01-31 NOTE — PROGRESS NOTES
"Subjective:      Patient ID: Soraya Briggs is a 56 y.o. female.    Chief Complaint: Pre-diabetes    HPI Comments: She is here for prediabetes management and anemia. She has not met with the nutritionist.     Review of Systems   Constitutional: Negative.    Respiratory: Negative.    Cardiovascular: Negative.    Gastrointestinal: Negative.    Genitourinary: Negative.      I personally reviewed Past Medical History, Past Surgical history,  Past Social History and Family History    Objective:     Visit Vitals    /72    Pulse 82    Ht 5' 4" (1.626 m)    Wt 70.6 kg (155 lb 10.3 oz)    LMP 07/21/2006    SpO2 97%    BMI 26.72 kg/m2       Physical Exam   Constitutional: She is oriented to person, place, and time. She appears well-developed and well-nourished. No distress.   HENT:   Head: Normocephalic and atraumatic.   Right Ear: External ear normal.   Left Ear: External ear normal.   Mouth/Throat: Oropharynx is clear and moist.   Eyes: Conjunctivae and EOM are normal. Pupils are equal, round, and reactive to light. Right eye exhibits no discharge. Left eye exhibits no discharge. No scleral icterus.   Neck: Normal range of motion. Neck supple.   Cardiovascular: Normal rate, regular rhythm, normal heart sounds and intact distal pulses.  Exam reveals no gallop.    No murmur heard.  Pulmonary/Chest: Effort normal and breath sounds normal. No respiratory distress. She has no wheezes. She has no rales. She exhibits no tenderness.   Neurological: She is alert and oriented to person, place, and time.   Vitals reviewed.      Soraya ANGELES was seen today for pre-diabetes.    Diagnoses and all orders for this visit:    Normocytic anemia  -     CBC auto differential; Future  -  -     Ferritin; Future  -     Iron and TIBC; Future  -     Occult blood x 1, stool; Future    Prediabetes  -  Will start metformin, monthly weight check and follow up with nutritionist     Other orders  -     metformin (GLUCOPHAGE) 500 MG tablet; " Take 1 tablet (500 mg total) by mouth daily with breakfast.

## 2017-01-31 NOTE — PATIENT INSTRUCTIONS
Prediabetes  You have been diagnosed with prediabetes. This means that the level of sugar (glucose) in your blood is too high. If you have prediabetes, you are at risk for developing type 2 diabetes. Type 2 diabetes is diagnosed when the level of glucose in the blood reaches a certain high level. With prediabetes, it hasnt reached this point yet, but it is higher than normal. It is vital to make lifestyle changes to lower your blood sugar, improve your health, and prevent diabetes. This sheet will tell you more.     Why Worry About Prediabetes?  Prediabetes is a disease where the bodys cells have trouble using glucose in the blood for energy. As a result, too much glucose stays in the blood and can affect how your heart and blood vessels work. Without changes in diet and lifestyle, the problem can get worse. Once you have type 2 diabetes, it is chronic (ongoing) and needs to be managed for the rest of your life. Diabetes can harm the body and your health by damaging organs, such as your eyes and kidneys. It makes you more likely to have heart disease. And it can damage nerves and blood vessels.  Risk Factors For Prediabetes  The exact cause of prediabetes is not clear. But certain risk factors make a person more likely to have it. These include:  · A family history of type 2 diabetes  · Being overweight  · Being over age 40  · Having had gestational diabetes  · Not being physically active  · Being , -American, , , or   Diagnosing Prediabetes  Prediabetes has no symptoms. The only way to find it is with a blood test. You may have had one or both of these blood tests:  · Fasting glucose test. Blood is taken and tested after you have fasted (not eaten) for at least 8 hours. A normal test result is 99 milligrams per deciliter (mg/dL) or lower. Prediabetes is 100 mg/dL to 125 mg/dL. Diabetes is 126 mg/dL or higher.  · Glucose tolerance test. Your blood sugar  is measured before and after you drink a very sugary liquid. A normal test result is 139 milligrams per deciliter (mg/dL) or lower. Prediabetes is 140 mg/dL to 199 mg/dL. Diabetes is 200 mg/dL or higher.  · Hemoglobin A1c (HbA1c). Your HbA1c is normal if it is below 5.7%. Prediabetes is 5.7% to 6.4%. Diabetes is 6.5% or higher.   Treating Prediabetes  The best way to treat prediabetes is to lose at least 5% to 7% of your current  weight and be more physically active by getting at least 30 minutes of exercise 5 days a week. These changes help the bodys cells use blood sugar better. Even a small amount of weight loss can help. Work with your health care provider to make a plan to eat well and be more active. Keep in mind that small changes can add up. Other changes in your lifestyle may make you less likely to develop diabetes. Your health care provider can talk with you about these.  Follow-Up  If it is untreated, prediabetes can turn into diabetes. This is a serious health condition. Take steps to stop this from happening. Follow the treatment plan you have been given. You may have your blood glucose tested again in about 12 to 18 months.    © 4872-1255 [employer]. [address] [place] [address], [place] [address]. All rights reserved. This information is not intended as a substitute for professional medical care. Always follow your healthcare professional's instructions.      30 minutes of jogging 5 days a week   A total of 150  Minutes weekly       MyPlate Worksheet: 1,200 Calories  Your calorie needs are about 1,200 calories a day. Below are the U.S. Department of Agriculture (USDA) guidelines for your daily recommended amount of each food group.  Vegetables  1½ cups Fruits  1 cup Grains  4 ounces Dairy  2½ cups Protein  3 ounces   Eat a variety of vegetables each day.  Aim for these amounts each week:  · 1 cup dark green vegetables  · 3 cups red or orange-colored vegetables  · ½ cup dry beans and  "peas  · 3½ cups starchy vegetables  · 2½ cups other vegetables Eat a variety of fruits each day.  Go easy on fruit juices.  Good choices of fruits include:  · Berries  · Bananas  · Apples  · Melon  · Dried fruit  · Frozen fruit  · Canned fruit Choose whole grains whenever you can.  Aim to eat at least 2 ounces of whole grains each day:  · Bread  · Cereal  · Rice  · Pasta  · Potatoes  · Tortillas Choose low-fat or fat-free milk, yogurt, or cheese each day.  Good choices include:  · Low-fat or fat-free milk or chocolate milk  · Low-fat or fat-free yogurt  · Low-fat or fat-free cottage cheese or other reduced-fat cheeses  · Calcium-fortified milk alternatives Choose low-fat or lean meats, poultry, fish and seafood each day.  Vary your protein. Choose more:  · Fish and other seafood  · Lean low-fat meat and poultry  · Eggs  · Beans, peas  · Tofu  · Unsalted nuts and seeds  Choose less high-fat and red meat.   Source: USDA MyPlate, www.choosemyplate.gov  Know your limits on oils (fats) and sugars:  · Your allowance for oils is 17 grams or about 4 teaspoons a day (oil includes vegetable oil, mayonnaise, soft margarine, salad dressing, nuts, olives, avocados, and some fish).  · Limit the extras (solid fats and sugars, also called "empty calories") to 100 calories a day.  · Cut back on salt (sodium). Stay under 2,300 mg sodium a day. If you have a health condition such as heart disease or high blood pressure, your doctor will likely tell you to limit sodium to no more than 1,500 mg a day.  Get moving and be active!  Aim for at least 30 minutes of physical activity most days of the week or 150 minutes of exercise a week.  MyPlate Servings Worksheet: 1,200 calories  This worksheet tells you how many servings you should get each day from each food group, and tells you how much food makes a serving. Use this as a guide as you plan your meals throughout the day. Track your progress daily by writing in what you actually " ate.  Food Group  Daily MyPlate Goal  What You Ate Today    Vegetables 3 Half-cups or 3 Servings  One serving is:  ½ cup cut-up raw or cooked vegetables  1 cup raw, leafy vegetables  ½ baked sweet potato  ½ cup vegetable juice  Note: At meals, fill half your plate with vegetables and fruit.     Fruits 2 Half-cups or 2 Servings  One serving is:  ½ cup fresh, frozen, or canned fruit  1 medium piece of fruit  1 cup of berries or melon  ½ cup dried fruit  ½ cup 100% fruit juice  Note: Make most choices fruit instead of juice.     Grains 4 Servings or 4 Ounces  One serving is:  1 slice bread  1 cup dry cereal  ½ cup cooked rice, pasta, or cereal  1 5-inch tortilla  Note: Choose whole grains for at least half of your servings each day.     Dairy 2 Servings or 2 Cups  One serving is:  1 cup milk  1½ ounces reduced-fat hard cheese  2 ounces processed cheese  1 cup low-fat yogurt  1/3 cup shredded cheese  Note: Choose low-fat or fat-free most often.     Protein 3 Servings or 3 Ounces  One serving is:  1 ounce cooked lean beef, pork, lamb, or ham  1 ounce cooked chicken or turkey (no skin)  1 ounce cooked fish or shellfish (not fried)  1 egg  ¼ cup egg substitute  ½ ounce nuts or seeds  1 tablespoon peanut or almond butter  ¼ cup cooked dry beans or peas  ½ cup tofu  2 tablespoons hummus     © 1612-9796 [employer]. [address] [place] [address], [place] [address]. All rights reserved. This information is not intended as a substitute for professional medical care. Always follow your healthcare professional's instructions.      Metformin Hydrochloride Oral tablet  What is this medicine?  METFORMIN (met FOR min) is used to treat type 2 diabetes. It helps to control blood sugar. Treatment is combined with diet and exercise. This medicine can be used alone or with other medicines for diabetes.  This medicine may be used for other purposes; ask your health care provider or pharmacist if you have questions.  What should I  tell my health care provider before I take this medicine?  They need to know if you have any of these conditions:  · anemia  · frequently drink alcohol-containing beverages  · become easily dehydrated  · heart attack  · heart failure that is treated with medications  · kidney disease  · liver disease  · polycystic ovary syndrome  · serious infection or injury  · vomiting  · an unusual or allergic reaction to metformin, other medicines, foods, dyes, or preservatives  · pregnant or trying to get pregnant  · breast-feeding  How should I use this medicine?  Take this medicine by mouth. Take it with meals. Swallow the tablets with a drink of water. Follow the directions on the prescription label. Take your medicine at regular intervals. Do not take your medicine more often than directed.  Talk to your pediatrician regarding the use of this medicine in children. While this drug may be prescribed for children as young as 10 years of age for selected conditions, precautions do apply.  Overdosage: If you think you have taken too much of this medicine contact a poison control center or emergency room at once.  NOTE: This medicine is only for you. Do not share this medicine with others.  What if I miss a dose?  If you miss a dose, take it as soon as you can. If it is almost time for your next dose, take only that dose. Do not take double or extra doses.  What may interact with this medicine?  Do not take this medicine with any of the following medications:  · dofetilide  · gatifloxacin  · certain contrast medicines given before X-rays, CT scans, MRI, or other procedures  This medicine may also interact with the following medications:  · acetazolamide  · certain medicines for HIV infection or hepatitis, like adefovir, emtricitabine, entecavir,lamivudine, or tenofovir  · cimetidine  · crizotinib  · digoxin  · diuretics  · female hormones, like estrogens or progestins and birth control  pills  · glycopyrrolate  · isoniazid  · lamotrigine  · medicines for blood pressure, heart disease, irregular heart beat  · memantine  · midodrine  · methazolamide  · morphine  · nicotinic acid  · phenothiazines like chlorpromazine, mesoridazine, prochlorperazine, thioridazine  · phenytoin  · procainamide  · propantheline  · quinidine  · quinine  · ranitidine  · ranolazine  · steroid medicines like prednisone or cortisone  · stimulant medicines for attention disorders, weight loss, or to stay awake  · thyroid medicines  · topiramate  · trimethoprim  · trospium  · vancomycin  · vandetanib  · zonisamide  This list may not describe all possible interactions. Give your health care provider a list of all the medicines, herbs, non-prescription drugs, or dietary supplements you use. Also tell them if you smoke, drink alcohol, or use illegal drugs. Some items may interact with your medicine.  What should I watch for while using this medicine?  Visit your doctor or health care professional for regular checks on your progress.  A test called the HbA1C (A1C) will be monitored. This is a simple blood test. It measures your blood sugar control over the last 2 to 3 months. You will receive this test every 3 to 6 months.  Learn how to check your blood sugar. Learn the symptoms of low and high blood sugar and how to manage them.  Always carry a quick-source of sugar with you in case you have symptoms of low blood sugar. Examples include hard sugar candy or glucose tablets. Make sure others know that you can choke if you eat or drink when you develop serious symptoms of low blood sugar, such as seizures or unconsciousness. They must get medical help at once.  Tell your doctor or health care professional if you have high blood sugar. You might need to change the dose of your medicine. If you are sick or exercising more than usual, you might need to change the dose of your medicine.  Do not skip meals. Ask your doctor or health care  professional if you should avoid alcohol. Many nonprescription cough and cold products contain sugar or alcohol. These can affect blood sugar.  This medicine may cause ovulation in premenopausal women who do not have regular monthly periods. This may increase your chances of becoming pregnant. You should not take this medicine if you become pregnant or think you may be pregnant. Talk with your doctor or health care professional about your birth control options while taking this medicine. Contact your doctor or health care professional right away if think you are pregnant.  If you are going to need surgery, a MRI, CT scan, or other procedure, tell your doctor that you are taking this medicine. You may need to stop taking this medicine before the procedure.  Wear a medical ID bracelet or chain, and carry a card that describes your disease and details of your medicine and dosage times.  What side effects may I notice from receiving this medicine?  Side effects that you should report to your doctor or health care professional as soon as possible:  · allergic reactions like skin rash, itching or hives, swelling of the face, lips, or tongue  · breathing problems  · feeling faint or lightheaded, falls  · muscle aches or pains  · signs and symptoms of low blood sugar such as feeling anxious, confusion, dizziness, increased hunger, unusually weak or tired, sweating, shakiness, cold, irritable, headache, blurred vision, fast heartbeat, loss of consciousness  · slow or irregular heartbeat  · unusual stomach pain or discomfort  · unusually tired or weak  Side effects that usually do not require medical attention (report to your doctor or health care professional if they continue or are bothersome):  · diarrhea  · headache  · heartburn  · metallic taste in mouth  · nausea  · stomach gas, upset  This list may not describe all possible side effects. Call your doctor for medical advice about side effects. You may report side  effects to FDA at 1-144-FDA-9768.  Where should I keep my medicine?  Keep out of the reach of children.  Store at room temperature between 15 and 30 degrees C (59 and 86 degrees F). Protect from moisture and light. Throw away any unused medicine after the expiration date.  NOTE:This sheet is a summary. It may not cover all possible information. If you have questions about this medicine, talk to your doctor, pharmacist, or health care provider. Copyright© 2016 [employer]

## 2017-02-01 ENCOUNTER — TELEPHONE (OUTPATIENT)
Dept: INTERNAL MEDICINE | Facility: CLINIC | Age: 57
End: 2017-02-01

## 2017-02-01 DIAGNOSIS — R73.03 PREDIABETES: Primary | ICD-10-CM

## 2017-02-01 LAB
ESTIMATED AVG GLUCOSE: 128 MG/DL
HBA1C MFR BLD HPLC: 6.1 %

## 2017-03-10 ENCOUNTER — TELEPHONE (OUTPATIENT)
Dept: INTERNAL MEDICINE | Facility: CLINIC | Age: 57
End: 2017-03-10

## 2017-03-10 ENCOUNTER — CLINICAL SUPPORT (OUTPATIENT)
Dept: INTERNAL MEDICINE | Facility: CLINIC | Age: 57
End: 2017-03-10
Payer: COMMERCIAL

## 2017-03-10 ENCOUNTER — LAB VISIT (OUTPATIENT)
Dept: LAB | Facility: OTHER | Age: 57
End: 2017-03-10
Attending: FAMILY MEDICINE
Payer: COMMERCIAL

## 2017-03-10 VITALS — BODY MASS INDEX: 25.92 KG/M2 | WEIGHT: 151 LBS

## 2017-03-10 DIAGNOSIS — D64.9 NORMOCYTIC ANEMIA: ICD-10-CM

## 2017-03-10 DIAGNOSIS — R73.03 PREDIABETES: ICD-10-CM

## 2017-03-10 PROCEDURE — 82272 OCCULT BLD FECES 1-3 TESTS: CPT

## 2017-03-10 NOTE — TELEPHONE ENCOUNTER
Patient came in for NV. Patient weight was 68.5kg. Please advise if there are any recommendations.

## 2017-03-11 LAB — OB PNL STL: NEGATIVE

## 2017-03-12 ENCOUNTER — TELEPHONE (OUTPATIENT)
Dept: INTERNAL MEDICINE | Facility: CLINIC | Age: 57
End: 2017-03-12

## 2017-03-12 NOTE — TELEPHONE ENCOUNTER
Please inform her weight has improved please keep up the great work   Please inform stool study is negative for blood

## 2017-03-14 NOTE — TELEPHONE ENCOUNTER
----- Message from Michelle Man sent at 3/13/2017  4:39 PM CDT -----  Contact: pt  x_  1st Request  _  2nd Request  _  3rd Request      Who:pt    Why: pt returning call back     What Number to Call Back: [phone]    When to Expect a call back: (Before the end of the day)   -- if call after 3:00 call back will be tomorrow.

## 2017-05-05 ENCOUNTER — LAB VISIT (OUTPATIENT)
Dept: LAB | Facility: OTHER | Age: 57
End: 2017-05-05
Attending: FAMILY MEDICINE
Payer: COMMERCIAL

## 2017-05-05 ENCOUNTER — OFFICE VISIT (OUTPATIENT)
Dept: INTERNAL MEDICINE | Facility: CLINIC | Age: 57
End: 2017-05-05
Attending: FAMILY MEDICINE
Payer: COMMERCIAL

## 2017-05-05 VITALS
DIASTOLIC BLOOD PRESSURE: 88 MMHG | SYSTOLIC BLOOD PRESSURE: 126 MMHG | WEIGHT: 153.69 LBS | BODY MASS INDEX: 26.24 KG/M2 | HEIGHT: 64 IN | HEART RATE: 58 BPM | OXYGEN SATURATION: 99 %

## 2017-05-05 DIAGNOSIS — H40.9 GLAUCOMA, UNSPECIFIED GLAUCOMA, UNSPECIFIED LATERALITY: ICD-10-CM

## 2017-05-05 DIAGNOSIS — E55.9 VITAMIN D DEFICIENCY: ICD-10-CM

## 2017-05-05 DIAGNOSIS — R73.03 PREDIABETES: Primary | ICD-10-CM

## 2017-05-05 DIAGNOSIS — E66.3 OVERWEIGHT: ICD-10-CM

## 2017-05-05 DIAGNOSIS — R73.03 PREDIABETES: ICD-10-CM

## 2017-05-05 LAB — 25(OH)D3+25(OH)D2 SERPL-MCNC: 28 NG/ML

## 2017-05-05 PROCEDURE — 99213 OFFICE O/P EST LOW 20 MIN: CPT | Mod: S$GLB,,, | Performed by: FAMILY MEDICINE

## 2017-05-05 PROCEDURE — 83036 HEMOGLOBIN GLYCOSYLATED A1C: CPT

## 2017-05-05 PROCEDURE — 36415 COLL VENOUS BLD VENIPUNCTURE: CPT

## 2017-05-05 PROCEDURE — 99999 PR PBB SHADOW E&M-EST. PATIENT-LVL III: CPT | Mod: PBBFAC,,, | Performed by: FAMILY MEDICINE

## 2017-05-05 PROCEDURE — 1160F RVW MEDS BY RX/DR IN RCRD: CPT | Mod: S$GLB,,, | Performed by: FAMILY MEDICINE

## 2017-05-05 PROCEDURE — 82306 VITAMIN D 25 HYDROXY: CPT

## 2017-05-05 RX ORDER — METFORMIN HYDROCHLORIDE 500 MG/1
500 TABLET ORAL
Qty: 90 TABLET | Refills: 1 | Status: SHIPPED | OUTPATIENT
Start: 2017-05-05 | End: 2020-06-12

## 2017-05-05 NOTE — PROGRESS NOTES
"Subjective:      Patient ID: Soraya Briggs is a 56 y.o. female.    Chief Complaint: Diabetes (3 mth )    HPI Comments: She is here for prediabetes follow up. She takes her metformin once a twice a week. She has had about a 2 pounds weight loss.     Breakfast  Peanut butter crackers  Grits/eggs/sausage       Lunch   Chicken Tenders  Fried Fish    Dinner  Chicken Tenders  Fried Fish     Drinks  Water 8-16 oz     Review of Systems   Respiratory: Negative.    Cardiovascular: Negative.    Gastrointestinal: Negative.    Genitourinary: Negative.      I personally reviewed Past Medical History, Past Surgical history,  Past Social History and Family History    Objective:   /88 (BP Location: Left arm, Patient Position: Sitting, BP Method: Manual)  Pulse (!) 58  Ht 5' 4" (1.626 m)  Wt 69.7 kg (153 lb 10.6 oz)  LMP 07/21/2006  SpO2 99%  BMI 26.38 kg/m2    Physical Exam   Constitutional: She is oriented to person, place, and time. She appears well-developed and well-nourished. No distress.   HENT:   Head: Normocephalic.   Right Ear: External ear normal.   Left Ear: External ear normal.   Mouth/Throat: Oropharynx is clear and moist.   Eyes: Conjunctivae and EOM are normal. Pupils are equal, round, and reactive to light. Right eye exhibits no discharge. Left eye exhibits no discharge. No scleral icterus.   Neck: Normal range of motion. Neck supple.   Cardiovascular: Normal rate, regular rhythm, normal heart sounds and intact distal pulses.  Exam reveals no gallop.    No murmur heard.  Pulmonary/Chest: Effort normal and breath sounds normal. No respiratory distress. She has no wheezes. She has no rales. She exhibits no tenderness.   Abdominal: Soft. Bowel sounds are normal. She exhibits no distension and no mass. There is no tenderness. There is no rebound and no guarding.   Musculoskeletal: Normal range of motion.   Neurological: She is alert and oriented to person, place, and time.   Skin: Skin is warm and dry. "   Psychiatric: She has a normal mood and affect. Her behavior is normal. Judgment and thought content normal.   Vitals reviewed.      Soraya ANGELES was seen today for diabetes.    Diagnoses and all orders for this visit:    Prediabetes  -patient to continue metformin   -     Ambulatory consult to Nutrition Services    Vitamin D deficiency  -     VITAMIN D; Future    Overweight  -     Ambulatory consult to Nutrition Services    Glaucoma, unspecified glaucoma, unspecified laterality  -     Ambulatory consult to Optometry    Other orders  -     Cancel: Lipid panel; Future  -     Cancel: CBC auto differential; Future  -     Cancel: Comprehensive metabolic panel; Future  -     Cancel: TSH; Future  -     Cancel: Hemoglobin A1c; Future  -     Cancel: Microalbumin/creatinine urine ratio  -     metformin (GLUCOPHAGE) 500 MG tablet; Take 1 tablet (500 mg total) by mouth daily with breakfast.

## 2017-05-05 NOTE — PATIENT INSTRUCTIONS
Your test results will be communicated to you via: My [facility], Telephone or Letter.  If you have not received your test results within one week. Please contact the clinic.      80 fl oz water daily     No soda, sweet tea, juices or lemonade     Continue with exercise. Add some type of resistance training 2-3 days a week. These can be body weight exercises, light weight or elastic bands. [employer] and [employer] are great sources for free work out plans and videos.      3 meals a day made up of the following:  Unlimited green vegetables, tomatoes, mushrooms, spaghetti squash, cauliflower, meat, poultry, seafood, eggs and hard cheeses.   Avoid fried foods  Dressings, seasonings, condiments, etc should have less than 2 g sugars.   beans or nuts can have 1 x a day.   1-2 servings of citrus fruits, berries, pineapple or melon a day (1/2 cup)  Milk and plain yogurt     [email or web address] Moderate carb intake.

## 2017-05-05 NOTE — MR AVS SNAPSHOT
[facility]  [address]  [facility] [address]  Phone: [phone]  Fax: [phone]                  Soraya Briggs   2017 12:40 PM   Office Visit    Description:  Female : 1960   Provider:  Marily Coleman MD   Department:  [facility]           Reason for Visit     Diabetes           Diagnoses this Visit        Comments    Prediabetes    -  Primary     Vitamin D deficiency         Overweight         Glaucoma, unspecified glaucoma, unspecified laterality                To Do List           Future Appointments        Provider Department Dept Phone    2017 12:40 PM Marily Coleman MD [facility] [phone]    2017 7:00 AM LAB, SAME DAY [facility] [phone]    5/10/2017 9:15 AM Priscila Lomeli OD [facility] Opt[facility] [phone]      Goals (5 Years of Data)     None      Follow-Up and Disposition     Return in about 4 weeks (around 2017), or if symptoms worsen or fail to improve.       These Medications        Disp Refills Start End    metformin (GLUCOPHAGE) 500 MG tablet 90 tablet 1 2017    Take 1 tablet (500 mg total) by mouth daily with breakfast. - Oral    Pharmacy: [facility] - [address] Ph #: [phone]         [facility]     [facility] Nurse Care Line -  Assistance  Unless otherwise directed by your provider, please contact [facility], our nurse care line that is available for / assistance.     Registered nurses in the [facility] Center provide: appointment scheduling, clinical advisement, health education, and other advisory services.  Call: [phone] (toll free)               Medications           Message regarding Medications     Verify the changes and/or additions to your medication regime listed below are the same as discussed with your clinician today.  If any of these  "changes or additions are incorrect, please notify your healthcare provider.             Verify that the below list of medications is an accurate representation of the medications you are currently taking.  If none reported, the list may be blank. If incorrect, please contact your healthcare provider. Carry this list with you in case of emergency.           Current Medications     esomeprazole (NEXIUM) 20 MG capsule Take 1 capsule (20 mg total) by mouth before breakfast.    fluticasone (FLONASE) 50 mcg/actuation nasal spray 1 spray by Each Nare route once daily.    latanoprost 0.005 % ophthalmic solution 1 drop every evening.    leg brace (KNEE BRACE) Misc 1 application by Misc.(Non-Drug; Combo Route) route daily as needed.    meloxicam (MOBIC) 15 MG tablet Take 1 tablet (15 mg total) by mouth daily as needed.    metformin (GLUCOPHAGE) 500 MG tablet Take 1 tablet (500 mg total) by mouth daily with breakfast.           Clinical Reference Information           Your Vitals Were     BP Pulse Height Weight Last Period SpO2    126/88 (BP Location: Left arm, Patient Position: Sitting, BP Method: Manual) 58 5' 4" (1.626 m) 69.7 kg (153 lb 10.6 oz) 07/21/2006 99%    BMI                26.38 kg/m2          Blood Pressure          Most Recent Value    BP  126/88      Allergies as of 5/5/2017     No Known Allergies      Immunizations Administered on Date of Encounter - 5/5/2017     None      Orders Placed During Today's Visit      Normal Orders This Visit    Ambulatory consult to Nutrition Services     Ambulatory consult to Optometry     Future Labs/Procedures Expected by Expires    VITAMIN D  5/5/2017 7/4/2018      Instructions    Your test results will be communicated to you via: [facility], Telephone or Letter.  If you have not received your test results within one week. Please contact the clinic.      80 fl oz water daily     No soda, sweet tea, juices or lemonade     Continue with exercise. Add some type of resistance " training 2-3 days a week. These can be body weight exercises, light weight or elastic bands. [employer] and [employer] are great sources for free work out plans and videos.      3 meals a day made up of the following:  Unlimited green vegetables, tomatoes, mushrooms, spaghetti squash, cauliflower, meat, poultry, seafood, eggs and hard cheeses.   Avoid fried foods  Dressings, seasonings, condiments, etc should have less than 2 g sugars.   beans or nuts can have 1 x a day.   1-2 servings of citrus fruits, berries, pineapple or melon a day (1/2 cup)  Milk and plain yogurt     [email or web address] Moderate carb intake.               Language Assistance Services     ATTENTION: Language assistance services are available, free of charge. Please call [phone].      ATENCIÓN: Si lele cruz, tiene a gómez disposición servicios gratuitos de asistencia lingüística. Llame al [phone].     CHÚ Ý: N?u b?n nói Ti?ng Vi?t, có các d?ch v? h? tr? ngôn ng? mi?n phí dành cho b?n. G?i s? [phone].         [facility] complies with applicable Federal civil rights laws and does not discriminate on the basis of race, color, national origin, age, disability, or sex.

## 2017-05-06 LAB
ESTIMATED AVG GLUCOSE: 131 MG/DL
HBA1C MFR BLD HPLC: 6.2 %

## 2017-05-07 ENCOUNTER — TELEPHONE (OUTPATIENT)
Dept: INTERNAL MEDICINE | Facility: CLINIC | Age: 57
End: 2017-05-07

## 2017-05-07 NOTE — TELEPHONE ENCOUNTER
Please inform prediabetes has worsened, we will need to focus on diet and lifestyle modification as previously discussed, also she needs to continue with a vitamin D supplements daily available over the counter of 1000IU

## 2017-05-10 ENCOUNTER — TELEPHONE (OUTPATIENT)
Dept: INTERNAL MEDICINE | Facility: CLINIC | Age: 57
End: 2017-05-10

## 2017-05-10 ENCOUNTER — OFFICE VISIT (OUTPATIENT)
Dept: OPTOMETRY | Facility: CLINIC | Age: 57
End: 2017-05-10
Attending: FAMILY MEDICINE
Payer: COMMERCIAL

## 2017-05-10 DIAGNOSIS — H52.13 MYOPIA WITH PRESBYOPIA OF BOTH EYES: ICD-10-CM

## 2017-05-10 DIAGNOSIS — E11.9 TYPE 2 DIABETES MELLITUS WITHOUT OPHTHALMIC MANIFESTATIONS: Primary | ICD-10-CM

## 2017-05-10 DIAGNOSIS — H25.13 NUCLEAR SCLEROSIS, BILATERAL: ICD-10-CM

## 2017-05-10 DIAGNOSIS — H40.1130 PRIMARY OPEN ANGLE GLAUCOMA OF BOTH EYES, UNSPECIFIED GLAUCOMA STAGE: ICD-10-CM

## 2017-05-10 DIAGNOSIS — H52.4 MYOPIA WITH PRESBYOPIA OF BOTH EYES: ICD-10-CM

## 2017-05-10 PROCEDURE — 76514 ECHO EXAM OF EYE THICKNESS: CPT | Mod: S$GLB,,, | Performed by: OPTOMETRIST

## 2017-05-10 PROCEDURE — 92004 COMPRE OPH EXAM NEW PT 1/>: CPT | Mod: S$GLB,,, | Performed by: OPTOMETRIST

## 2017-05-10 PROCEDURE — 99999 PR PBB SHADOW E&M-EST. PATIENT-LVL I: CPT | Mod: PBBFAC,,, | Performed by: OPTOMETRIST

## 2017-05-10 RX ORDER — LATANOPROST 50 UG/ML
1 SOLUTION/ DROPS OPHTHALMIC NIGHTLY
Qty: 3 BOTTLE | Refills: 3 | Status: SHIPPED | OUTPATIENT
Start: 2017-05-10 | End: 2020-09-15

## 2017-05-10 NOTE — TELEPHONE ENCOUNTER
Left voicemail on both numbers provided for pt with instructions to call office back in regards to medical advice.

## 2017-05-10 NOTE — PROGRESS NOTES
HPI     Patient's last dilated exam was: over a year  Pt states: dx apprx 3 years ago w/ glaucoma. Using Latanaprost ou, ran out   apprx 4 months ago. Wanted to establish care at [facility]. Floaters od,   longstanding. Patient denies flashes, pain and double vision. No change in   vision. Occasional pressure pain in her left eye.   Hemoglobin A1C       Date                     Value               Ref Range             Status                05/05/2017               6.2                 4.5 - 6.2 %           Final                     Last edited by Catalina Engle, PCT on 5/10/2017  9:25 AM.     ROS     Negative for: Constitutional, Gastrointestinal, Neurological, Skin,   Genitourinary, Musculoskeletal, HENT, Endocrine, Cardiovascular, Eyes,   Respiratory, Psychiatric, Allergic/Imm, Heme/Lymph    Last edited by Priscila Lomeli, OD on 5/10/2017 10:19 AM. (History)        Assessment /Plan     For exam results, see Encounter Report.    Type 2 diabetes mellitus without ophthalmic manifestations    Nuclear sclerosis, bilateral    Primary open angle glaucoma of both eyes, unspecified glaucoma stage  -     latanoprost 0.005 % ophthalmic solution; Place 1 drop into both eyes every evening.  Dispense: 3 Bottle; Refill: 3  -     Sharif Visual Field - OU - Extended - Both Eyes; Future    Myopia with presbyopia of both eyes              1.  No retinopathy--monitor yearly.  BS control.  2.  Educated on cataracts and affects on vision.  Monitor.  3.  Continue with Latanoprost QHS OU.  RTC 3 months for iop check, hvf, and oct.  4.  Bifocal rx given

## 2017-05-10 NOTE — LETTER
May 10, 2017      Marily Coleman MD  [address]  [place] [address]           [facility]  [address]  [place] [address]  Phone: [phone]  Fax: [phone]          Patient: Soraya Briggs   MR Number: [id number]   YOB: 1960   Date of Visit: 5/10/2017       Dear Dr. Marily Coleman:    Thank you for referring Soraya Briggs to me for evaluation. Attached you will find relevant portions of my assessment and plan of care.    If you have questions, please do not hesitate to call me. I look forward to following Soraya Briggs along with you.    Sincerely,    Priscila Lomeli, OD    Enclosure  CC:  No Recipients    If you would like to receive this communication electronically, please contact [email or web address] or [phone] to request more information on [employer] [employer] access.    For providers and/or their staff who would like to refer a patient to [facility], please contact us through our one-stop-shop provider referral line, [facility], at [phone].    If you feel you have received this communication in error or would no longer like to receive these types of communications, please e-mail [email or web address]

## 2017-05-10 NOTE — TELEPHONE ENCOUNTER
----- Message from Betty Perez sent at 5/10/2017  2:47 PM CDT -----  Contact: Self  X   1st Request  _  2nd Request  _  3rd Request        Who: LUIS HARPER [[id number]]    Why: Pt states she is returning a call to the clinical team. Please call, thanks!    What Number to Call Back: [phone]    When to Expect a call back: (Before the end of the day)   -- if the call is after 12:00, the call back will be tomorrow.      5:39 PM     Attempted to return patient's call. Left vm and mailed results.

## 2017-07-11 ENCOUNTER — TELEPHONE (OUTPATIENT)
Dept: INTERNAL MEDICINE | Facility: CLINIC | Age: 57
End: 2017-07-11

## 2017-07-11 DIAGNOSIS — R23.8 SCALP IRRITATION: Primary | ICD-10-CM

## 2017-07-11 DIAGNOSIS — D22.9 NUMEROUS MOLES: ICD-10-CM

## 2017-07-11 NOTE — TELEPHONE ENCOUNTER
----- Message from Divya Coates sent at 7/10/2017  3:09 PM CDT -----  Contact: pt  _  1st Request  _  2nd Request  _  3rd Request        Who: pt    Why: pt needs to speak to the nurse regarding making a dermatologist appt. Please call the pt     What Number to Call Back:[phone]    When to Expect a call back: (With in 24 hours)    8:58 AM  Patient is requesting referral to Derm for scalp irritation and numerous moles. Referral has been pended for approval.

## 2017-07-11 NOTE — TELEPHONE ENCOUNTER
appt has been scheduled with dermatology. Pt demonstrated verbal understanding of information and had no further questions or concerns at this time.

## 2017-08-10 ENCOUNTER — OFFICE VISIT (OUTPATIENT)
Dept: INTERNAL MEDICINE | Facility: CLINIC | Age: 57
End: 2017-08-10
Attending: INTERNAL MEDICINE
Payer: COMMERCIAL

## 2017-08-10 ENCOUNTER — CLINICAL SUPPORT (OUTPATIENT)
Dept: OPHTHALMOLOGY | Facility: CLINIC | Age: 57
End: 2017-08-10
Payer: COMMERCIAL

## 2017-08-10 ENCOUNTER — OFFICE VISIT (OUTPATIENT)
Dept: OPTOMETRY | Facility: CLINIC | Age: 57
End: 2017-08-10
Payer: COMMERCIAL

## 2017-08-10 VITALS
DIASTOLIC BLOOD PRESSURE: 78 MMHG | SYSTOLIC BLOOD PRESSURE: 136 MMHG | HEART RATE: 79 BPM | BODY MASS INDEX: 26.42 KG/M2 | WEIGHT: 154.75 LBS | HEIGHT: 64 IN

## 2017-08-10 DIAGNOSIS — H40.1131 PRIMARY OPEN ANGLE GLAUCOMA OF BOTH EYES, MILD STAGE: Primary | ICD-10-CM

## 2017-08-10 DIAGNOSIS — R07.89 LEFT-SIDED CHEST WALL PAIN: ICD-10-CM

## 2017-08-10 DIAGNOSIS — Z12.39 BREAST CANCER SCREENING: Primary | ICD-10-CM

## 2017-08-10 DIAGNOSIS — H40.1130 PRIMARY OPEN ANGLE GLAUCOMA OF BOTH EYES, UNSPECIFIED GLAUCOMA STAGE: ICD-10-CM

## 2017-08-10 PROCEDURE — 92012 INTRM OPH EXAM EST PATIENT: CPT | Mod: S$GLB,,, | Performed by: OPTOMETRIST

## 2017-08-10 PROCEDURE — 92133 CPTRZD OPH DX IMG PST SGM ON: CPT | Mod: S$GLB,,, | Performed by: OPTOMETRIST

## 2017-08-10 PROCEDURE — 92020 GONIOSCOPY: CPT | Mod: S$GLB,,, | Performed by: OPTOMETRIST

## 2017-08-10 PROCEDURE — 99999 PR PBB SHADOW E&M-EST. PATIENT-LVL III: CPT | Mod: PBBFAC,,, | Performed by: INTERNAL MEDICINE

## 2017-08-10 PROCEDURE — 3008F BODY MASS INDEX DOCD: CPT | Mod: S$GLB,,, | Performed by: INTERNAL MEDICINE

## 2017-08-10 PROCEDURE — 92083 EXTENDED VISUAL FIELD XM: CPT | Mod: S$GLB,,, | Performed by: OPTOMETRIST

## 2017-08-10 PROCEDURE — 99213 OFFICE O/P EST LOW 20 MIN: CPT | Mod: S$GLB,,, | Performed by: INTERNAL MEDICINE

## 2017-08-10 PROCEDURE — 99999 PR PBB SHADOW E&M-EST. PATIENT-LVL II: CPT | Mod: PBBFAC,,, | Performed by: OPTOMETRIST

## 2017-08-10 RX ORDER — IBUPROFEN 600 MG/1
600 TABLET ORAL 3 TIMES DAILY
Qty: 45 TABLET | Refills: 0 | Status: SHIPPED | OUTPATIENT
Start: 2017-08-10 | End: 2020-09-15

## 2017-08-10 RX ORDER — CYCLOBENZAPRINE HCL 5 MG
5 TABLET ORAL 3 TIMES DAILY PRN
Qty: 30 TABLET | Refills: 0 | Status: SHIPPED | OUTPATIENT
Start: 2017-08-10 | End: 2017-08-20

## 2017-08-10 NOTE — LETTER
August 10, 2017      [facility]  [address]  [facility] [address]  Phone: [phone]  Fax: [phone]       Patient: Soraya Briggs   YOB: 1960  Date of Visit: 08/10/2017    To Whom It May Concern:    Raul Briggs  was at [facility] on 08/10/2017. She may return to work with no restrictions. If you have any questions or concerns, or if I can be of further assistance, please do not hesitate to contact me.    Sincerely,    Lisbet Chapa MA

## 2017-08-10 NOTE — PROGRESS NOTES
HPI     Glaucoma    Additional comments: 3 month IOP ck, HVF, OCT           Comments   Last eye exam was 5/10/17 with Dr. Lomeli.  Patient states no vision changes since last exam. Occasionally sees   floaters OU-no new ones. OU sometimes hurt from dryness but AT's help.  Patient denies diplopia, headaches, flashes, and pain.    AT's prn OU  Latanoprost QHS OU       Last edited by Marta Law on 8/10/2017 10:16 AM. (History)            Assessment /Plan     For exam results, see Encounter Report.    Primary open angle glaucoma of both eyes, mild stage  -     OCT - Optic Nerve          1.  Continue Latanoprost QHS OU.    HVF: 8/10/17(Normal OU)  OCT: 8/10/17(Thinning TI OU)  DFE: 5/10/17  Photos:  Gonio: 8/10/17  Pachy: 525 OD, 533 OS  Initial IOPs:  12 OD, 12 OS(off drops x 4 months)  MHx: DM  FHx: Mother, Sister             RTC 4 months for IOP check.

## 2017-08-10 NOTE — PROGRESS NOTES
"Subjective:       Patient ID: Soraya Briggs is a 56 y.o. female.    Chief Complaint: Chest Pain (under Lt breast)    Here for urgent visit    57yo F with PMHx of preDM, former smoker who presents with a Hx of 6 days ago she developed left sided chest pain directly under left breast that began Friday night and noticed that the under wire of her bra was aggravating area. Hurts to inhale too hard and when sweeping. She denies CP with walking up stairs or other strenuous activity and it requires movement of torso, bending or twisting to trigger pain. She denies cough, F/C, SOB at rest, GRIFFITHS, dizziness,  Off weekedn and did not go to work so that it does not worsen things.         Review of Systems    Objective:      Vitals:    08/10/17 1414   BP: 136/78   Pulse: 79   Weight: 70.2 kg (154 lb 12.2 oz)   Height: 5' 4" (1.626 m)      Physical Exam   Constitutional: She is oriented to person, place, and time. She appears well-developed and well-nourished. No distress.   HENT:   Head: Normocephalic and atraumatic.   Mouth/Throat: Oropharynx is clear and moist. No oropharyngeal exudate.   Eyes: Conjunctivae and EOM are normal. Pupils are equal, round, and reactive to light. No scleral icterus.   Neck: No thyromegaly present.   Cardiovascular: Normal rate, regular rhythm and normal heart sounds.    No murmur heard.  Pulmonary/Chest: Effort normal and breath sounds normal. No respiratory distress. She has no decreased breath sounds. She has no wheezes. She has no rales. She exhibits tenderness. She exhibits no edema. Left breast exhibits no skin change.   Abdominal: Soft. She exhibits no distension. There is no tenderness.   Musculoskeletal: She exhibits no edema or tenderness.   Lymphadenopathy:     She has no cervical adenopathy.   Neurological: She is alert and oriented to person, place, and time.   Skin: Skin is warm and dry.   Psychiatric: She has a normal mood and affect. Her behavior is normal.       Assessment:     "   1. Breast cancer screening    2. Left-sided chest wall pain        Plan:       Soraya ANGELES was seen today for chest pain.    Diagnoses and all orders for this visit:    Left-sided chest wall pain  -pain atypical and reproducible  -     cyclobenzaprine (FLEXERIL) 5 MG tablet; Take 1 tablet (5 mg total) by mouth 3 (three) times daily as needed for Muscle spasms.  -     ibuprofen (ADVIL,MOTRIN) 600 MG tablet; Take 1 tablet (600 mg total) by mouth 3 (three) times daily.  Office /ED prompts discusse         Side effects of medication(s) were discussed in detail and patient voiced understanding.  Patient will call back for any issues or complications.

## 2017-09-19 ENCOUNTER — HOSPITAL ENCOUNTER (OUTPATIENT)
Dept: RADIOLOGY | Facility: OTHER | Age: 57
Discharge: HOME | End: 2017-09-19
Attending: INTERNAL MEDICINE
Payer: COMMERCIAL

## 2017-09-19 ENCOUNTER — INITIAL CONSULT (OUTPATIENT)
Dept: DERMATOLOGY | Facility: CLINIC | Age: 57
End: 2017-09-19
Payer: COMMERCIAL

## 2017-09-19 DIAGNOSIS — L21.9 SEBORRHEIC DERMATITIS: Primary | ICD-10-CM

## 2017-09-19 DIAGNOSIS — L82.1 SEBORRHEIC KERATOSES: ICD-10-CM

## 2017-09-19 DIAGNOSIS — Z12.31 VISIT FOR SCREENING MAMMOGRAM: ICD-10-CM

## 2017-09-19 PROCEDURE — 77063 BREAST TOMOSYNTHESIS BI: CPT | Mod: 26,,, | Performed by: RADIOLOGY

## 2017-09-19 PROCEDURE — 99999 PR PBB SHADOW E&M-EST. PATIENT-LVL II: CPT | Mod: PBBFAC,,, | Performed by: DERMATOLOGY

## 2017-09-19 PROCEDURE — 77067 SCR MAMMO BI INCL CAD: CPT | Mod: TC

## 2017-09-19 PROCEDURE — 99202 OFFICE O/P NEW SF 15 MIN: CPT | Mod: S$GLB,,, | Performed by: DERMATOLOGY

## 2017-09-19 PROCEDURE — 77067 SCR MAMMO BI INCL CAD: CPT | Mod: 26,,, | Performed by: RADIOLOGY

## 2017-09-19 PROCEDURE — 3008F BODY MASS INDEX DOCD: CPT | Mod: S$GLB,,, | Performed by: DERMATOLOGY

## 2017-09-19 RX ORDER — FLUOCINONIDE TOPICAL SOLUTION USP, 0.05% 0.5 MG/ML
SOLUTION TOPICAL DAILY
Qty: 60 ML | Refills: 1 | Status: SHIPPED | OUTPATIENT
Start: 2017-09-19 | End: 2020-09-15

## 2017-09-19 RX ORDER — KETOCONAZOLE 20 MG/ML
SHAMPOO, SUSPENSION TOPICAL
Qty: 120 ML | Refills: 5 | Status: SHIPPED | OUTPATIENT
Start: 2017-09-21 | End: 2020-09-15

## 2017-09-19 NOTE — LETTER
September 19, 2017      Marily Coleman MD  [address]  [facility] [address]           [facility]  [address]  [facility] [address]  Phone: [phone]  Fax: [phone]          Patient: Soraya Elmore   MR Number: [id number]   YOB: 1960   Date of Visit: 9/19/2017       Dear Dr. Marily Coleman:    Thank you for referring Soraya Elmore to me for evaluation. Attached you will find relevant portions of my assessment and plan of care.    If you have questions, please do not hesitate to call me. I look forward to following Soraya Elmore along with you.    Sincerely,    Ilana Snyder MD    Enclosure  CC:  No Recipients    If you would like to receive this communication electronically, please contact [email or web address] or [phone] to request more information on [employer] Link access.    For providers and/or their staff who would like to refer a patient to [facility], please contact us through our one-stop-shop provider referral line, [facility], at [phone].    If you feel you have received this communication in error or would no longer like to receive these types of communications, please e-mail [email or web address]

## 2017-09-19 NOTE — PROGRESS NOTES
"  Subjective:       Patient ID:  Soraya Elmore is a 56 y.o. female who presents for   Chief Complaint   Patient presents with    Lesion     Lesion  - Initial  Affected locations: abdomen and back  Duration: 3 years  Signs / symptoms: itching  Severity: mild to moderate  Timing: constant  Aggravated by: nothing  Relieving factors/Treatments tried: nothing  Improvement on treatment: no relief    Also c/o painful scalp.  Relieved by "pain pill"  Washes hair every 3 days; Intermittent flares; hurts worse when she wears hair in ponytail or a bun  Denies personal or family h/o skin cancer    Past Medical History:   Diagnosis Date    Diabetes mellitus     Gastroenteritis     Glaucoma (increased eye pressure)      Review of Systems   Skin: Positive for itching and rash. Negative for tendency to form keloidal scars.   Hematologic/Lymphatic: Does not bruise/bleed easily.        Objective:    Physical Exam   Constitutional: She appears well-developed and well-nourished.   Neurological: She is alert and oriented to person, place, and time.   Psychiatric: She has a normal mood and affect.   Skin:   Areas Examined (abnormalities noted in diagram):   Scalp / Hair Palpated and Inspected  Head / Face Inspection Performed  Neck Inspection Performed  Chest / Axilla Inspection Performed  Abdomen Inspection Performed  Back Inspection Performed  RUE Inspected  LUE Inspection Performed                   Diagram Legend     Erythematous scaling macule/papule c/w actinic keratosis       Vascular papule c/w angioma      Pigmented verrucoid papule/plaque c/w seborrheic keratosis      Yellow umbilicated papule c/w sebaceous hyperplasia      Irregularly shaped tan macule c/w lentigo     1-2 mm smooth white papules consistent with Milia      Movable subcutaneous cyst with punctum c/w epidermal inclusion cyst      Subcutaneous movable cyst c/w pilar cyst      Firm pink to brown papule c/w dermatofibroma      Pedunculated fleshy " papule(s) c/w skin tag(s)      Evenly pigmented macule c/w junctional nevus     Mildly variegated pigmented, slightly irregular-bordered macule c/w mildly atypical nevus      Flesh colored to evenly pigmented papule c/w intradermal nevus       Pink pearly papule/plaque c/w basal cell carcinoma      Erythematous hyperkeratotic cursted plaque c/w SCC      Surgical scar with no sign of skin cancer recurrence      Open and closed comedones      Inflammatory papules and pustules      Verrucoid papule consistent consistent with wart     Erythematous eczematous patches and plaques     Dystrophic onycholytic nail with subungual debris c/w onychomycosis     Umbilicated papule    Erythematous-base heme-crusted tan verrucoid plaque consistent with inflamed seborrheic keratosis     Erythematous Silvery Scaling Plaque c/w Psoriasis     See annotation      Assessment / Plan:        Seborrheic dermatitis  AAD brochure provided  I discussed the side effects of topical steroids including atrophy, telangiectasias, striae.  Avoid use on the face and contact with the eyes    -     fluocinonide (LIDEX) 0.05 % external solution; Apply topically once daily.  Dispense: 60 mL; Refill: 1  -     ketoconazole (NIZORAL) 2 % shampoo; Apply topically twice a week. Ok to alternate with normal shampoo  Dispense: 120 mL; Refill: 5  Discussed that her discomfort of her scalp is likely due to the tight hair style.  Avoid tight hair styles     Seborrheic keratoses  These are benign inherited growths without a malignant potential. Reassurance given to patient. No treatment is necessary.   AAD brochure provided           Return in about 2 months (around 11/19/2017).

## 2017-11-18 ENCOUNTER — HOSPITAL ENCOUNTER (EMERGENCY)
Facility: OTHER | Age: 57
Discharge: HOME | End: 2017-11-19
Attending: EMERGENCY MEDICINE
Payer: COMMERCIAL

## 2017-11-18 DIAGNOSIS — R19.7 ABDOMINAL PAIN, VOMITING, AND DIARRHEA: Primary | ICD-10-CM

## 2017-11-18 DIAGNOSIS — R10.9 ABDOMINAL PAIN, VOMITING, AND DIARRHEA: Primary | ICD-10-CM

## 2017-11-18 DIAGNOSIS — R11.10 ABDOMINAL PAIN, VOMITING, AND DIARRHEA: Primary | ICD-10-CM

## 2017-11-18 LAB
BILIRUB UR QL STRIP: ABNORMAL
CLARITY UR: CLEAR
COLOR UR: YELLOW
GLUCOSE UR QL STRIP: NEGATIVE
HGB UR QL STRIP: ABNORMAL
KETONES UR QL STRIP: ABNORMAL
LEUKOCYTE ESTERASE UR QL STRIP: NEGATIVE
NITRITE UR QL STRIP: NEGATIVE
PH UR STRIP: 7 [PH] (ref 5–8)
PROT UR QL STRIP: ABNORMAL
SP GR UR STRIP: 1.01 (ref 1–1.03)
URN SPEC COLLECT METH UR: ABNORMAL
UROBILINOGEN UR STRIP-ACNC: NEGATIVE EU/DL

## 2017-11-18 PROCEDURE — 96372 THER/PROPH/DIAG INJ SC/IM: CPT

## 2017-11-18 PROCEDURE — 25000003 PHARM REV CODE 250: Performed by: EMERGENCY MEDICINE

## 2017-11-18 PROCEDURE — 81003 URINALYSIS AUTO W/O SCOPE: CPT

## 2017-11-18 PROCEDURE — 99284 EMERGENCY DEPT VISIT MOD MDM: CPT | Mod: 25

## 2017-11-18 PROCEDURE — 63600175 PHARM REV CODE 636 W HCPCS: Performed by: EMERGENCY MEDICINE

## 2017-11-18 RX ORDER — ONDANSETRON 4 MG/1
4 TABLET, ORALLY DISINTEGRATING ORAL
Status: COMPLETED | OUTPATIENT
Start: 2017-11-18 | End: 2017-11-18

## 2017-11-18 RX ORDER — DICYCLOMINE HYDROCHLORIDE 10 MG/ML
20 INJECTION INTRAMUSCULAR
Status: COMPLETED | OUTPATIENT
Start: 2017-11-18 | End: 2017-11-18

## 2017-11-18 RX ADMIN — ONDANSETRON 4 MG: 4 TABLET, ORALLY DISINTEGRATING ORAL at 11:11

## 2017-11-18 RX ADMIN — DICYCLOMINE HYDROCHLORIDE 20 MG: 10 INJECTION INTRAMUSCULAR at 11:11

## 2017-11-19 VITALS
DIASTOLIC BLOOD PRESSURE: 53 MMHG | RESPIRATION RATE: 19 BRPM | SYSTOLIC BLOOD PRESSURE: 118 MMHG | TEMPERATURE: 99 F | BODY MASS INDEX: 25.61 KG/M2 | HEART RATE: 81 BPM | HEIGHT: 64 IN | OXYGEN SATURATION: 100 % | WEIGHT: 150 LBS

## 2017-11-19 RX ORDER — DICYCLOMINE HYDROCHLORIDE 20 MG/1
20 TABLET ORAL 2 TIMES DAILY
Qty: 20 TABLET | Refills: 0 | Status: SHIPPED | OUTPATIENT
Start: 2017-11-19 | End: 2017-12-19

## 2017-11-19 RX ORDER — ONDANSETRON 4 MG/1
4 TABLET, ORALLY DISINTEGRATING ORAL EVERY 6 HOURS PRN
Qty: 30 TABLET | Refills: 0 | Status: SHIPPED | OUTPATIENT
Start: 2017-11-19 | End: 2017-11-26

## 2017-11-19 NOTE — ED PROVIDER NOTES
Encounter Date: 11/18/2017    SCRIBE #1 NOTE: I, Viola Snyder, am scribing for, and in the presence of, Dr. Kumari .       History     Chief Complaint   Patient presents with    Abdominal Pain     lower abdominal burning, with several episodes of vomiting.      Time seen by provider: 11:03 PM    This is a 57 y.o. female who presents with complaint of  intermittent abdominal pain that began approximately seven hours ago.  Patient reports lower abdominal, nausea, vomiting, but denies fever, chills, congestion, cough, chest pain, blurred vision, myalgias, dysuria, rash, or headache. She describes the abdominal pain as cramping and burning. She reported taking antacids for symptoms with little relief. She has sick contacts with similar symptoms.       The history is provided by the patient.     Review of patient's allergies indicates:  No Known Allergies  Past Medical History:   Diagnosis Date    Diabetes mellitus     Gastroenteritis     Glaucoma (increased eye pressure)      Past Surgical History:   Procedure Laterality Date    COLONOSCOPY N/A 6/7/2016    Procedure: COLONOSCOPY;  Surgeon: Katerine Ramsey MD;  Location: [address];  Service: Endoscopy;  Laterality: N/A;    OOPHORECTOMY  1999    Unilateral oophorectomy     Family History   Problem Relation Age of Onset    Breast cancer Cousin 67    Hypertension Mother     Glaucoma Mother     Cataracts Mother     Diabetes Sister     Glaucoma Sister     Diabetes Sister     Colon cancer Neg Hx     Ovarian cancer Neg Hx     Stroke Neg Hx      Social History   Substance Use Topics    Smoking status: Former Smoker     Packs/day: 0.10     Years: 15.00     Types: Cigarettes     Quit date: 7/21/2005    Smokeless tobacco: Never Used    Alcohol use No     Review of Systems   Constitutional: Negative for chills and fever.   HENT: Negative for congestion.    Eyes: Negative for visual disturbance.   Respiratory: Negative for cough.    Cardiovascular:  Negative for chest pain.   Gastrointestinal: Positive for abdominal pain, diarrhea, nausea and vomiting.   Genitourinary: Negative for dysuria.   Musculoskeletal: Negative for myalgias.   Skin: Negative for rash.   Neurological: Negative for headaches.   Psychiatric/Behavioral: Negative for confusion.       Physical Exam     Initial Vitals [11/18/17 2221]   BP Pulse Resp Temp SpO2   126/63 96 18 98.9 °F (37.2 °C) 98 %      MAP       84         Physical Exam    Nursing note and vitals reviewed.  Constitutional: She appears well-developed and well-nourished. She is not diaphoretic. No distress.   HENT:   Head: Normocephalic and atraumatic.   Eyes: Conjunctivae and EOM are normal.   Neck: Normal range of motion.   Cardiovascular: Normal rate, regular rhythm, normal heart sounds and intact distal pulses. Exam reveals no gallop and no friction rub.    No murmur heard.  Pulmonary/Chest: Breath sounds normal. No stridor. No respiratory distress. She has no wheezes. She has no rhonchi. She has no rales.   Abdominal: Soft. Bowel sounds are normal. She exhibits no distension. There is no tenderness. There is no rebound and no guarding.   Musculoskeletal: Normal range of motion. She exhibits no edema or tenderness.   Neurological: She is alert and oriented to person, place, and time. She has normal strength. No cranial nerve deficit.   Skin: Skin is warm and dry. Capillary refill takes less than 2 seconds. No rash noted.   Psychiatric: She has a normal mood and affect.         ED Course   Procedures  Labs Reviewed   URINALYSIS - Abnormal; Notable for the following:        Result Value    Protein, UA Trace (*)     Ketones, UA Trace (*)     Bilirubin (UA) 1+ (*)     Occult Blood UA Trace (*)     All other components within normal limits             Medical Decision Making:   ED Management:  57-year-old female lower abdominal pain and vomiting and diarrhea.  We've had multiple people seen for the same symptoms as I suspect is a  virus.  Patient's vital signs are otherwise normal.  Her exam is benign.  Do not suspect acute appendicitis or diverticulitis.  Do not feel any blood work or CT scan is indicated at this time.  Urinalysis was reviewed which is negative.  Discharged patient home with Bentyl as well as Zofran.              Attending Attestation:           Physician Attestation for Scribe:  Physician Attestation Statement for Scribe #1: I, Dr. Kumari , reviewed documentation, as scribed by Viola Snyder  in my presence, and it is both accurate and complete.                 ED Course      Clinical Impression:     1. Abdominal pain, vomiting, and diarrhea                                 Emmett Kumari, DO  11/19/17 0124

## 2017-11-19 NOTE — ED NOTES
Pt complains of abd pain with emesis and diarrhea starting today. Abd is soft no specific tenderness in any specific area

## 2018-02-06 ENCOUNTER — TELEPHONE (OUTPATIENT)
Dept: DERMATOLOGY | Facility: CLINIC | Age: 58
End: 2018-02-06

## 2018-02-06 NOTE — TELEPHONE ENCOUNTER
Returned pt call. Pt states she has medicaid now and will need to reschedule. Informed next available in 5/22 with a NP. Also gave phone numbers to outside dermatologist that accept medicaid.    ----- Message from Mansi Springer sent at 2/6/2018  2:17 PM CST -----  Contact: patient  Please call above patient need to speak with the nurse missed a call from the office

## 2019-06-03 ENCOUNTER — OFFICE VISIT (OUTPATIENT)
Dept: INTERNAL MEDICINE | Facility: CLINIC | Age: 59
End: 2019-06-03
Attending: FAMILY MEDICINE
Payer: COMMERCIAL

## 2019-06-03 ENCOUNTER — LAB VISIT (OUTPATIENT)
Dept: LAB | Facility: OTHER | Age: 59
End: 2019-06-03
Attending: FAMILY MEDICINE
Payer: COMMERCIAL

## 2019-06-03 VITALS
WEIGHT: 158.94 LBS | SYSTOLIC BLOOD PRESSURE: 138 MMHG | DIASTOLIC BLOOD PRESSURE: 70 MMHG | HEIGHT: 64 IN | BODY MASS INDEX: 27.13 KG/M2 | HEART RATE: 75 BPM | OXYGEN SATURATION: 99 %

## 2019-06-03 DIAGNOSIS — Z00.00 ANNUAL PHYSICAL EXAM: ICD-10-CM

## 2019-06-03 DIAGNOSIS — R73.03 PREDIABETES: ICD-10-CM

## 2019-06-03 DIAGNOSIS — K21.9 GASTROESOPHAGEAL REFLUX DISEASE, ESOPHAGITIS PRESENCE NOT SPECIFIED: ICD-10-CM

## 2019-06-03 DIAGNOSIS — Z00.00 ANNUAL PHYSICAL EXAM: Primary | ICD-10-CM

## 2019-06-03 DIAGNOSIS — Z12.31 VISIT FOR SCREENING MAMMOGRAM: ICD-10-CM

## 2019-06-03 DIAGNOSIS — H25.13 NUCLEAR SCLEROSIS OF BOTH EYES: ICD-10-CM

## 2019-06-03 LAB
25(OH)D3+25(OH)D2 SERPL-MCNC: 33 NG/ML (ref 30–96)
ALBUMIN SERPL BCP-MCNC: 4 G/DL (ref 3.5–5.2)
ALP SERPL-CCNC: 83 U/L (ref 55–135)
ALT SERPL W/O P-5'-P-CCNC: 20 U/L (ref 10–44)
ANION GAP SERPL CALC-SCNC: 4 MMOL/L (ref 8–16)
AST SERPL-CCNC: 20 U/L (ref 10–40)
BASOPHILS # BLD AUTO: 0.02 K/UL (ref 0–0.2)
BASOPHILS NFR BLD: 0.4 % (ref 0–1.9)
BILIRUB SERPL-MCNC: 0.3 MG/DL (ref 0.1–1)
BUN SERPL-MCNC: 13 MG/DL (ref 6–20)
CALCIUM SERPL-MCNC: 10.3 MG/DL (ref 8.7–10.5)
CHLORIDE SERPL-SCNC: 105 MMOL/L (ref 95–110)
CHOLEST SERPL-MCNC: 176 MG/DL (ref 120–199)
CHOLEST/HDLC SERPL: 4.3 {RATIO} (ref 2–5)
CO2 SERPL-SCNC: 32 MMOL/L (ref 23–29)
CREAT SERPL-MCNC: 0.7 MG/DL (ref 0.5–1.4)
DIFFERENTIAL METHOD: ABNORMAL
EOSINOPHIL # BLD AUTO: 0.2 K/UL (ref 0–0.5)
EOSINOPHIL NFR BLD: 3.8 % (ref 0–8)
ERYTHROCYTE [DISTWIDTH] IN BLOOD BY AUTOMATED COUNT: 13.4 % (ref 11.5–14.5)
EST. GFR  (AFRICAN AMERICAN): >60 ML/MIN/1.73 M^2
EST. GFR  (NON AFRICAN AMERICAN): >60 ML/MIN/1.73 M^2
ESTIMATED AVG GLUCOSE: 123 MG/DL (ref 68–131)
FERRITIN SERPL-MCNC: 214 NG/ML (ref 20–300)
GLUCOSE SERPL-MCNC: 91 MG/DL (ref 70–110)
HBA1C MFR BLD HPLC: 5.9 % (ref 4–5.6)
HCT VFR BLD AUTO: 38.6 % (ref 37–48.5)
HDLC SERPL-MCNC: 41 MG/DL (ref 40–75)
HDLC SERPL: 23.3 % (ref 20–50)
HGB BLD-MCNC: 12.3 G/DL (ref 12–16)
IRON SERPL-MCNC: 60 UG/DL (ref 30–160)
LDLC SERPL CALC-MCNC: 115.6 MG/DL (ref 63–159)
LYMPHOCYTES # BLD AUTO: 2.8 K/UL (ref 1–4.8)
LYMPHOCYTES NFR BLD: 51.3 % (ref 18–48)
MCH RBC QN AUTO: 27.9 PG (ref 27–31)
MCHC RBC AUTO-ENTMCNC: 31.9 G/DL (ref 32–36)
MCV RBC AUTO: 88 FL (ref 82–98)
MONOCYTES # BLD AUTO: 0.6 K/UL (ref 0.3–1)
MONOCYTES NFR BLD: 11.3 % (ref 4–15)
NEUTROPHILS # BLD AUTO: 1.8 K/UL (ref 1.8–7.7)
NEUTROPHILS NFR BLD: 32.8 % (ref 38–73)
NONHDLC SERPL-MCNC: 135 MG/DL
PLATELET # BLD AUTO: 319 K/UL (ref 150–350)
PMV BLD AUTO: 10.1 FL (ref 9.2–12.9)
POTASSIUM SERPL-SCNC: 4 MMOL/L (ref 3.5–5.1)
PROT SERPL-MCNC: 7.1 G/DL (ref 6–8.4)
RBC # BLD AUTO: 4.41 M/UL (ref 4–5.4)
SATURATED IRON: 17 % (ref 20–50)
SODIUM SERPL-SCNC: 141 MMOL/L (ref 136–145)
T4 FREE SERPL-MCNC: 1.02 NG/DL (ref 0.71–1.51)
TOTAL IRON BINDING CAPACITY: 348 UG/DL (ref 250–450)
TRANSFERRIN SERPL-MCNC: 235 MG/DL (ref 200–375)
TRIGL SERPL-MCNC: 97 MG/DL (ref 30–150)
TSH SERPL DL<=0.005 MIU/L-ACNC: 2.23 UIU/ML (ref 0.4–4)
VIT B12 SERPL-MCNC: 916 PG/ML (ref 210–950)
WBC # BLD AUTO: 5.5 K/UL (ref 3.9–12.7)

## 2019-06-03 PROCEDURE — 99999 PR PBB SHADOW E&M-EST. PATIENT-LVL IV: CPT | Mod: PBBFAC,,, | Performed by: FAMILY MEDICINE

## 2019-06-03 PROCEDURE — 87491 CHLMYD TRACH DNA AMP PROBE: CPT

## 2019-06-03 PROCEDURE — 84439 ASSAY OF FREE THYROXINE: CPT

## 2019-06-03 PROCEDURE — 84443 ASSAY THYROID STIM HORMONE: CPT

## 2019-06-03 PROCEDURE — 83540 ASSAY OF IRON: CPT

## 2019-06-03 PROCEDURE — 82607 VITAMIN B-12: CPT

## 2019-06-03 PROCEDURE — 88175 CYTOPATH C/V AUTO FLUID REDO: CPT

## 2019-06-03 PROCEDURE — 85025 COMPLETE CBC W/AUTO DIFF WBC: CPT

## 2019-06-03 PROCEDURE — 82728 ASSAY OF FERRITIN: CPT

## 2019-06-03 PROCEDURE — 80053 COMPREHEN METABOLIC PANEL: CPT

## 2019-06-03 PROCEDURE — 99396 PR PREVENTIVE VISIT,EST,40-64: ICD-10-PCS | Mod: S$GLB,,, | Performed by: FAMILY MEDICINE

## 2019-06-03 PROCEDURE — 83036 HEMOGLOBIN GLYCOSYLATED A1C: CPT

## 2019-06-03 PROCEDURE — 99396 PREV VISIT EST AGE 40-64: CPT | Mod: S$GLB,,, | Performed by: FAMILY MEDICINE

## 2019-06-03 PROCEDURE — 99999 PR PBB SHADOW E&M-EST. PATIENT-LVL IV: ICD-10-PCS | Mod: PBBFAC,,, | Performed by: FAMILY MEDICINE

## 2019-06-03 PROCEDURE — 36415 COLL VENOUS BLD VENIPUNCTURE: CPT

## 2019-06-03 PROCEDURE — 82306 VITAMIN D 25 HYDROXY: CPT

## 2019-06-03 PROCEDURE — 80061 LIPID PANEL: CPT

## 2019-06-03 PROCEDURE — 87624 HPV HI-RISK TYP POOLED RSLT: CPT

## 2019-06-03 PROCEDURE — 86592 SYPHILIS TEST NON-TREP QUAL: CPT

## 2019-06-03 NOTE — PATIENT INSTRUCTIONS
80 fl oz water daily     Meal Ideas for Regular Bariatric Diet  *Recipes and products available at [email or web address]      Breakfast: (15-20g protein)    - Egg white omelet: 2 egg whites or ½ cup Egg Beaters. (Optional proteins: cheese, shrimp, black beans, chicken, sliced turkey) (Optional veggies: tomatoes, salsa, spinach, mushrooms, onions, green peppers, or small slice avocado)     - Egg and sausage: 1 egg or ¼ cup Egg Beaters (any variety), with 1 margi or 2 links of Turkey sausage or Veggie breakfast sausage (Dezineforce or Humacyte)    - Crust-less breakfast quiche: To make a glass pie dish, mix 4oz part skim Ricotta, 1 cup skim milk, and 2 eggs as your base. Add protein: shredded cheese, sliced lean ham or turkey, turkey hernandez/sausage. Add veggies: tomato, onion, green onion, mushroom, green pepper, spinach, etc.    - Yogurt parfait: Mix 1 - 6oz container Dannon Light N Fit vanilla yogurt, with ¼ cup Kashi Go Lean cereal    - Cottage cheese and fruit: ½ cup part-skim cottage cheese or ricotta cheese topped with fresh fruit or sugar free preserves     - Ynes Ana's Vanilla Egg custard* (add 2 Tbsp instant coffee granules to make Cappuccino Custard*)    - Hi-Protein café latte (skim milk, decaf coffee, 1 scoop protein powder). Optional to add Sugar free syrup or extract flavoring.    Lunch: (20-30g protein)    - ½ cup Black bean soup (Homemade or Progresso), with ¼ cup shredded low-fat cheese. Top with chopped tomato or fresh salsa.     - Lean deli turkey breast and low-fat sliced cheese, mustard or light ying to moisten, rolled up together, or wrapped in a James lettuce leaf    - Chicken salad made from dinner leftovers, moisten with low-fat salad dressing or light ying. Also try leftover salmon, shrimp, tuna or boiled eggs. Serve ½ cup over dark green salad    - Fat-free canned refried beans, topped with ¼ cup shredded low-fat cheese. Top with chopped tomato or fresh salsa.     - Greek salad: Top  mixed greens with 1-2oz grilled chicken, tomatoes, red onions, 2-3 kalamata olives, and sprinkle lightly with feta cheese. Spritz with Balsamic vinegar to taste.     - Crust-less lunch quiche: To make a glass pie dish, mix 4oz part skim Ricotta, 1 cup skim milk, and 2 eggs as your base. Add protein: shredded cheese, sliced lean ham or turkey, shrimp, chicken. Add veggies: tomato, onion, green onion, mushroom, green pepper, spinach, artichoke, broccoli, etc.    - Pizza bake: tomato sauce, low-fat shredded mozzarella and turkey pepperoni or Barry hernandez. Add any veggies.    - Cucumber crab bites: Spread ¼ cup crab dip (lump crabmeat + light cream cheese and green onions) over sliced cucumber.     - Chicken with light spinach and artichoke dip*: Puree in : 6oz cooked and drained spinach, 2 cloves garlic, 1 can cannelloni beans, ½ cup chopped green onions, 1 can drained artichoke hearts (not marinated in oil), lemon juice and basil. Mix in 2oz chopped up chicken.    Supper: (20-30g protein)    - Serve grilled fish over dark green salad tossed with low-fat dressing, served with grilled asparagus queen     - Rotisserie chicken salad: served with sliced strawberries, walnuts, fat-free feta cheese crumbles and 1 tbsp Kilgores Own Light Raspberry [place] Vinaigrette    - Shrimp cocktail: Dip cold boiled shrimp in homemade low-sugar cocktail sauce (1/2 cup Cande One Carb ketchup, 2 tbsp horseradish, 1/4 tsp hot sauce, 1 tsp Worcestershire sauce, 1 tbsp freshly-squeezed lemon juice). Serve with dark green salad, walnuts, and crumbled blue cheese drizzled with olive oil and Balsamic vinegar    - Tuna Melt: Spread tuna salad onto 2 thick slices of tomato. Top with low-fat cheese and broil until cheese is melted. May also be made with chicken salad of shrimp salad. [place] with different types of cheeses.    - Homemade low-fat Chili using extra lean ground beef or ground turkey. Top with shredded cheese and  salsa as desired. May add dollop fat-free sour cream if desired    - Dinner Omelet with shrimp or chicken and onion, green peppers and chives.    - No noodle lasagna: Use sliced zucchini or eggplant in place of noodles.  Layer with part skim ricotta cheese and low sugar meat sauce (use very lean ground beef or ground turkey).    - Mexican chicken bake: Bake chunks of chicken breast or thigh with taco seasoning, Pace brand enchilada sauce, green onions and low-fat cheese. Serve with ¼ cup black beans or fat free refried beans topped with chopped tomatoes or salsa.    - Ilda frozen meatballs, simmered in Classico Marinara sauce. Different flavors of salsa or spaghetti sauce create different dishes! Sprinkle with parmesan cheese. Serve with grilled or steamed veggies, or a dark green salad.    - Simmer boneless skinless chicken thigh chunks in Classico Marinara sauce or roasted salsa until tender with chopped onion, bell pepper, garlic, mushrooms, spinach, etc.     - Hamburger, without the bun, dressed the way you like. Served with grilled or steamed veggies.    - Eggplant parmesan: Bake slices of eggplant at 350 degrees for 15 minutes. Layer tomato sauce, sliced eggplant and low-fat mozzarella cheese in a baking dish and cover with foil. Bake 30-40 more minutes or until bubbly. Uncover and bake at 400 degrees for about 15 more minutes, or until top is slightly crisp.    - Fish tacos: grilled/baked white fish, wrapped in James lettuce leaf, topped with salsa, shredded low-fat cheese, and light coleslaw.    Snacks: (100-200 calories; >5g protein)    - 1 low-fat cheese stick with 8 cherry tomatoes or 1 serving fresh fruit  - 4 thin slices fat-free turkey breast and 1 slice low-fat cheese  - 4 thin slices fat-free honey ham with wedge of melon  - 1/4 cup unsalted nuts with ½ cup fruit  - 6-oz container Dannon Light n Fit vanilla yogurt, topped with 1oz unsalted nuts         - apple, celery or baby carrots spread  with 2 Tbsp natural peanut butter or almond butter   - apple slices with 1 oz slice low-fat cheese  - celery, cucumber, bell pepper or baby carrots dipped in ¼ cup hummus bean spread or light spinach and artichoke dip (*recipe in lunch section)  - 100 calorie bag microwave light popcorn with 3 tbsp grated parmesan cheese  - Tyler Links Beef Steak - 14g protein! (similar to beef jerky)  - 2 wedges Laughing Cow - Light Herb & Garlic Cheese with sliced cucumber or green bell pepper  - 1/2 cup low-fat cottage cheese with ¼ cup fruit or ¼ cup salsa  - RTD Protein drinks: Atkins, Low Carb Slim Fast, EAS light, Muscle Milk Light, etc.  - Homemade Protein drinks: GNC Soy95, Isopure, Nectar, UNJURY, Whey Gourmet, etc. Mix 1 scoop powder with 8oz skim/1% milk or light soymilk.  - Protein bars: Atkins, EAS, Pure Protein, Think Thin, Detour, etc. Must have 0-4 grams sugar - Read the label.    Takeout Options: No more than twice/week  Deli - Salads (no pasta or rice), meats, cheeses. Roasted chicken. Lox (salmon)    Mexican - Platters which don't include tortillas, chips, or rice. Go easy on the beans. Example: Fajitas without the tortillas. Ask the  not to bring chips to the table if they are too tempting.    Greek - Meat or fish and vegetable, but no bread or rice. Including hummus, baba ganoush, etc, is OK. Most sit-down Greek restaurants can provide you with cucumber slices for dipping instead of angel bread.    Fast Food (Avoid as much as possible) - Salads (no croutons and limit salad dressing to 2 tbsp), grilled chicken sandwich without the bun and ask for no ying. Staceys low fat chili or Taco Bell pintos and cheese.    BBQ - The meats are fine if you ask for sauces on the side, but most of the traditional side dishes are loaded with carbs. Anselmo slaw, baked beans and BBQ sauce are typically made with sugar.    Chinese - Nothing deep-fried, no rice or noodles. Many Chinese sauces have starch and sugar in them, so  you'll have to use your judgement. If you find that these sauces trigger cravings, or cause Dumping, you can ask for the sauce to be made without sugar or just use soy sauce.

## 2019-06-03 NOTE — PROGRESS NOTES
"Subjective:      Patient ID: Soraya Elmore is a 58 y.o. female.    Chief Complaint: Annual Exam and Numbness (toes, occasionally)    HPI   Patient here today for annual exam. A few weeks of tingling in both toes a few seconds at a time once a day for the last month, no pain in her back.      Breakfast  Croissants   Grits and egg  Sausage biscuit     Lunch   Tuna pack     Snacks  Haylee cakes     Review of Systems   Constitutional: Negative for activity change, appetite change, chills, diaphoresis, fatigue, fever and unexpected weight change.   HENT: Negative for congestion, ear discharge, ear pain, hearing loss, postnasal drip, rhinorrhea, sinus pressure and sore throat.    Respiratory: Negative for cough, shortness of breath and wheezing.    Cardiovascular: Negative for chest pain.   Gastrointestinal: Negative for abdominal pain, constipation, diarrhea, nausea and vomiting.   Genitourinary: Negative for dysuria and frequency.   Musculoskeletal: Negative.    Psychiatric/Behavioral: Negative for suicidal ideas.     I personally reviewed Past Medical History, Past Surgical history,  Past Social History and Family History      Objective:   /70   Pulse 75   Ht 5' 4" (1.626 m)   Wt 72.1 kg (158 lb 15.2 oz)   LMP 07/21/2006   SpO2 99%   BMI 27.28 kg/m²     Physical Exam   Constitutional: She is oriented to person, place, and time. She appears well-developed and well-nourished. No distress.   HENT:   Head: Normocephalic and atraumatic.   Right Ear: Hearing, tympanic membrane, external ear and ear canal normal.   Left Ear: Hearing, tympanic membrane, external ear and ear canal normal.   Nose: Nose normal.   Mouth/Throat: Uvula is midline and oropharynx is clear and moist. No oropharyngeal exudate.   Eyes: Pupils are equal, round, and reactive to light. Conjunctivae and EOM are normal. Right eye exhibits no discharge. Left eye exhibits no discharge. No scleral icterus.   Neck: Normal range of motion. Neck " supple.   Cardiovascular: Normal rate, regular rhythm, normal heart sounds and intact distal pulses. Exam reveals no gallop.   No murmur heard.  Pulses:       Dorsalis pedis pulses are 2+ on the right side, and 2+ on the left side.        Posterior tibial pulses are 2+ on the right side, and 2+ on the left side.   Pulmonary/Chest: Effort normal and breath sounds normal. No respiratory distress. She has no wheezes. She has no rales. She exhibits no tenderness.   Abdominal: Soft. Bowel sounds are normal. She exhibits no distension and no mass. There is no tenderness. There is no rebound and no guarding.   Genitourinary: Vagina normal. No labial fusion. There is no rash, tenderness, lesion or injury on the right labia. There is no rash, tenderness, lesion or injury on the left labia. Cervix exhibits no motion tenderness, no discharge and no friability. Right adnexum displays no mass, no tenderness and no fullness. Left adnexum displays no mass, no tenderness and no fullness.   Musculoskeletal:        Right foot: There is normal range of motion and no deformity.        Left foot: There is normal range of motion and no deformity.   Feet:   Right Foot:   Protective Sensation: 6 sites tested. 6 sites sensed.   Skin Integrity: Negative for ulcer or blister.   Left Foot:   Protective Sensation: 6 sites tested. 6 sites sensed.   Skin Integrity: Negative for ulcer or blister.   Neurological: She is alert and oriented to person, place, and time.   Skin: Skin is warm and dry.   Vitals reviewed.      1. Annual physical exam    2. Prediabetes    3. Gastroesophageal reflux disease, esophagitis presence not specified    4. Nuclear sclerosis of both eyes    5. Visit for screening mammogram        1. labs   2. Check labs and start metformin if elevated  3. Discussed dietary changes   4. Schedule eye exam  5. Schedule     Orders Placed This Encounter   Procedures    HPV High Risk Genotypes, PCR    C. trachomatis/N. gonorrhoeae by AMP  DNA    Mammo Digital Screening Bilat w/ Blayne    US Soft Tissue Head Neck Thyroid    CBC auto differential    Comprehensive metabolic panel    Hemoglobin A1c    Lipid panel    Iron and TIBC    Ferritin    TSH    T4, free    Vitamin B12    Vitamin D    CBC auto differential    Comprehensive metabolic panel    Hemoglobin A1c    Iron and TIBC    Ferritin    Lipid panel    Vitamin D    Vitamin B12    TSH    T4, free    RPR    Ambulatory consult to Optometry     Medications Ordered This Encounter   Medications    Bifidobacterium infantis (ALIGN) 4 mg Cap     Sig: Take 1 capsule (4 mg total) by mouth once daily.     Dispense:  90 capsule     Refill:  3

## 2019-06-05 ENCOUNTER — TELEPHONE (OUTPATIENT)
Dept: INTERNAL MEDICINE | Facility: CLINIC | Age: 59
End: 2019-06-05

## 2019-06-05 LAB
C TRACH DNA SPEC QL NAA+PROBE: NOT DETECTED
N GONORRHOEA DNA SPEC QL NAA+PROBE: NOT DETECTED
RPR SER QL: NORMAL

## 2019-06-05 NOTE — TELEPHONE ENCOUNTER
Left message on voicemail.    ----- Message from Marily Coleman MD sent at 6/5/2019  9:34 AM CDT -----  Sugar control is stable, labs otherwise stable we will review at her follow up visit

## 2019-06-07 LAB
HPV HR 12 DNA CVX QL NAA+PROBE: NEGATIVE
HPV16 AG SPEC QL: NEGATIVE
HPV18 DNA SPEC QL NAA+PROBE: NEGATIVE

## 2019-06-10 ENCOUNTER — TELEPHONE (OUTPATIENT)
Dept: INTERNAL MEDICINE | Facility: CLINIC | Age: 59
End: 2019-06-10

## 2019-06-10 NOTE — TELEPHONE ENCOUNTER
Spoke with pt she had a verbal understanding.    ----- Message from Marily Coleman MD sent at 6/9/2019  4:40 PM CDT -----  Negative pap smear

## 2019-06-11 ENCOUNTER — HOSPITAL ENCOUNTER (OUTPATIENT)
Dept: RADIOLOGY | Facility: OTHER | Age: 59
Discharge: HOME | End: 2019-06-11
Attending: FAMILY MEDICINE
Payer: COMMERCIAL

## 2019-06-11 ENCOUNTER — TELEPHONE (OUTPATIENT)
Dept: INTERNAL MEDICINE | Facility: CLINIC | Age: 59
End: 2019-06-11

## 2019-06-11 DIAGNOSIS — Z12.31 VISIT FOR SCREENING MAMMOGRAM: ICD-10-CM

## 2019-06-11 DIAGNOSIS — Z00.00 ANNUAL PHYSICAL EXAM: ICD-10-CM

## 2019-06-11 PROCEDURE — 77067 MAMMO DIGITAL SCREENING BILAT WITH TOMOSYNTHESIS_CAD: ICD-10-PCS | Mod: 26,,, | Performed by: RADIOLOGY

## 2019-06-11 PROCEDURE — 77067 SCR MAMMO BI INCL CAD: CPT | Mod: TC

## 2019-06-11 PROCEDURE — 77063 BREAST TOMOSYNTHESIS BI: CPT | Mod: 26,,, | Performed by: RADIOLOGY

## 2019-06-11 PROCEDURE — 77067 SCR MAMMO BI INCL CAD: CPT | Mod: 26,,, | Performed by: RADIOLOGY

## 2019-06-11 PROCEDURE — 76536 US EXAM OF HEAD AND NECK: CPT | Mod: 26,,, | Performed by: RADIOLOGY

## 2019-06-11 PROCEDURE — 76536 US EXAM OF HEAD AND NECK: CPT | Mod: TC

## 2019-06-11 PROCEDURE — 76536 US SOFT TISSUE HEAD NECK THYROID: ICD-10-PCS | Mod: 26,,, | Performed by: RADIOLOGY

## 2019-06-11 PROCEDURE — 77063 MAMMO DIGITAL SCREENING BILAT WITH TOMOSYNTHESIS_CAD: ICD-10-PCS | Mod: 26,,, | Performed by: RADIOLOGY

## 2019-06-11 NOTE — TELEPHONE ENCOUNTER
Spoke with pt she had a verbal understanding    ----- Message from Zulema Peña sent at 6/11/2019  4:33 PM CDT -----  ..Type:  Patient Returning Call    Who Called: pt     Who Left Message for Patient: Verena    Does the patient know what this is regarding?:    Would the patient rather a call back or a response via [facility]? Call back     Best Call Back Number: [phone]    Additional Information:

## 2019-06-11 NOTE — TELEPHONE ENCOUNTER
Left message on voicemail  ----- Message from Marily Coleman MD sent at 6/11/2019  1:05 PM CDT -----  Normal thyroid ultrsound

## 2019-06-12 ENCOUNTER — TELEPHONE (OUTPATIENT)
Dept: INTERNAL MEDICINE | Facility: CLINIC | Age: 59
End: 2019-06-12

## 2019-06-12 NOTE — TELEPHONE ENCOUNTER
Spoke with pt she had a verbal understanding  ----- Message from Marily Coleman MD sent at 6/11/2019  8:02 PM CDT -----  Your mammogram is normal. We will repeat in one year.

## 2019-07-18 ENCOUNTER — OFFICE VISIT (OUTPATIENT)
Dept: INTERNAL MEDICINE | Facility: CLINIC | Age: 59
End: 2019-07-18
Attending: FAMILY MEDICINE
Payer: COMMERCIAL

## 2019-07-18 ENCOUNTER — IMMUNIZATION (OUTPATIENT)
Dept: PHARMACY | Facility: CLINIC | Age: 59
End: 2019-07-18
Payer: COMMERCIAL

## 2019-07-18 VITALS
HEART RATE: 55 BPM | WEIGHT: 153.69 LBS | OXYGEN SATURATION: 99 % | DIASTOLIC BLOOD PRESSURE: 72 MMHG | HEIGHT: 64 IN | BODY MASS INDEX: 26.24 KG/M2 | SYSTOLIC BLOOD PRESSURE: 122 MMHG

## 2019-07-18 DIAGNOSIS — R73.03 PREDIABETES: ICD-10-CM

## 2019-07-18 DIAGNOSIS — L60.0 INGROWN TOENAIL: ICD-10-CM

## 2019-07-18 DIAGNOSIS — B35.1 ONYCHOMYCOSIS: Primary | ICD-10-CM

## 2019-07-18 PROCEDURE — 99999 PR PBB SHADOW E&M-EST. PATIENT-LVL IV: ICD-10-PCS | Mod: PBBFAC,,, | Performed by: FAMILY MEDICINE

## 2019-07-18 PROCEDURE — 99214 OFFICE O/P EST MOD 30 MIN: CPT | Mod: S$GLB,,, | Performed by: FAMILY MEDICINE

## 2019-07-18 PROCEDURE — 3008F BODY MASS INDEX DOCD: CPT | Mod: CPTII,S$GLB,, | Performed by: FAMILY MEDICINE

## 2019-07-18 PROCEDURE — 3008F PR BODY MASS INDEX (BMI) DOCUMENTED: ICD-10-PCS | Mod: CPTII,S$GLB,, | Performed by: FAMILY MEDICINE

## 2019-07-18 PROCEDURE — 99214 PR OFFICE/OUTPT VISIT, EST, LEVL IV, 30-39 MIN: ICD-10-PCS | Mod: S$GLB,,, | Performed by: FAMILY MEDICINE

## 2019-07-18 PROCEDURE — 99999 PR PBB SHADOW E&M-EST. PATIENT-LVL IV: CPT | Mod: PBBFAC,,, | Performed by: FAMILY MEDICINE

## 2019-07-18 RX ORDER — TERBINAFINE HYDROCHLORIDE 250 MG/1
250 TABLET ORAL DAILY
Qty: 90 TABLET | Refills: 0 | Status: SHIPPED | OUTPATIENT
Start: 2019-07-18 | End: 2019-08-17

## 2019-07-18 NOTE — PATIENT INSTRUCTIONS
Terbinafine tablets  What is this medicine?  TERBINAFINE (TER bin a feen) is an antifungal medicine. It is used to treat certain kinds of fungal or yeast infections.  How should I use this medicine?  Take this medicine by mouth with a full glass of water. Follow the directions on the prescription label. You can take this medicine with food or on an empty stomach. Take your medicine at regular intervals. Do not take your medicine more often than directed. Do not skip doses or stop your medicine early even if you feel better. Do not stop taking except on your doctor's advice. Talk to your pediatrician regarding the use of this medicine in children. Special care may be needed.  What side effects may I notice from receiving this medicine?  Side effects that you should report to your doctor or health care professional as soon as possible:  · allergic reactions like skin rash or hives, swelling of the face, lips, or tongue  · changes in vision  · dark urine  · fever or infection  · general ill feeling or flu-like symptoms  · light-colored stools  · loss of appetite, nausea  · redness, blistering, peeling or loosening of the skin, including inside the mouth  · right upper belly pain  · unusually weak or tired  · yellowing of the eyes or skin  Side effects that usually do not require medical attention (report to your doctor or health care professional if they continue or are bothersome):  · changes in taste  · diarrhea  · hair loss  · muscle or joint pain  · stomach gas  · stomach upset  What may interact with this medicine?  Do not take this medicine with any of the following medications:  · thioridazine  This medicine may also interact with the following medications:  · beta-blockers  · caffeine  · cimetidine  · cyclosporine  · medicines for depression, anxiety, or psychotic disturbances  · medicines for fungal infections like fluconazole and ketoconazole  · medicines for irregular heartbeat like amiodarone, flecainide  and propafenone  · rifampin  · warfarin  What if I miss a dose?  If you miss a dose, take it as soon as you can. If it is almost time for your next dose, take only that dose. Do not take double or extra doses.  Where should I keep my medicine?  Keep out of the reach of children.  Store at room temperature below 25 degrees C (77 degrees F). Protect from light. Throw away any unused medicine after the expiration date.  What should I tell my health care provider before I take this medicine?  They need to know if you have any of these conditions:  · drink alcoholic beverages  · kidney disease  · liver disease  · an unusual or allergic reaction to terbinafine, other medicines, foods, dyes, or preservatives  · pregnant or trying to get pregnant  · breast-feeding  What should I watch for while using this medicine?  Visit your doctor or health care provider regularly. Tell your doctor right away if you have nausea or vomiting, loss of appetite, stomach pain on your right upper side, yellow skin, dark urine, light stools, or are over tired. Some fungal infections need many weeks or months of treatment to cure. If you are taking this medicine for a long time, you will need to have important blood work done.  NOTE:This sheet is a summary. It may not cover all possible information. If you have questions about this medicine, talk to your doctor, pharmacist, or health care provider. Copyright© 2017 [employer]

## 2019-07-18 NOTE — PROGRESS NOTES
"Subjective:      Patient ID: Soraya Elmore is a 58 y.o. female.    Chief Complaint: Follow-up    HPI   Patient here today for prediabetes follow up. She is drinking about 24 fl oz water daily. She has cut down on her bread.  She is currently walking a lot. She notices her nails are thicker and bother her at times.     Breakfast  Pears  Scrambled eggs  Cheerios   Lavon crackers     Lunch/dinner   veg and chicken soup   Chicken   Turkey   Cheese   Salad   Meatballs   Rice Zachary package           Review of Systems   Constitutional: Negative for activity change, appetite change, chills, diaphoresis, fatigue, fever and unexpected weight change.   HENT: Negative for congestion, ear discharge, ear pain, hearing loss, postnasal drip, rhinorrhea, sinus pressure and sore throat.    Respiratory: Negative for cough, shortness of breath and wheezing.    Cardiovascular: Negative for chest pain.   Gastrointestinal: Negative for abdominal pain, constipation, diarrhea, nausea and vomiting.   Genitourinary: Negative for dysuria and frequency.   Musculoskeletal: Negative.    Psychiatric/Behavioral: Negative for suicidal ideas.     I personally reviewed Past Medical History, Past Surgical history,  Past Social History and Family History      Objective:   /72 (BP Location: Left arm, Patient Position: Sitting)   Pulse (!) 55   Ht 5' 4" (1.626 m)   Wt 69.7 kg (153 lb 10.6 oz)   LMP 07/21/2006   SpO2 99%   BMI 26.38 kg/m²     Physical Exam   Constitutional: She is oriented to person, place, and time. She appears well-developed and well-nourished. No distress.   HENT:   Head: Normocephalic and atraumatic.   Right Ear: Hearing, tympanic membrane, external ear and ear canal normal.   Left Ear: Hearing, tympanic membrane, external ear and ear canal normal.   Nose: Nose normal.   Mouth/Throat: Uvula is midline and oropharynx is clear and moist. No oropharyngeal exudate.   Eyes: Pupils are equal, round, and reactive to light. " Conjunctivae and EOM are normal. Right eye exhibits no discharge. Left eye exhibits no discharge. No scleral icterus.   Neck: Normal range of motion. Neck supple.   Cardiovascular: Normal rate, regular rhythm, normal heart sounds and intact distal pulses. Exam reveals no gallop.   No murmur heard.  Pulmonary/Chest: Effort normal and breath sounds normal. No respiratory distress. She has no wheezes. She has no rales. She exhibits no tenderness.   Abdominal: Soft. Bowel sounds are normal. She exhibits no distension and no mass. There is no tenderness. There is no rebound and no guarding.   Neurological: She is alert and oriented to person, place, and time.   Skin: Skin is warm and dry.   Vitals reviewed.      1. Onychomycosis    2. Ingrown toenail    3. Prediabetes      1-2. Reviewed lamisil side effects and schedule podiatry follow up to help with nails   3. stable, cont current regimen recheck in 2 months        Orders Placed This Encounter   Procedures    Ambulatory consult to Podiatry     Medications Ordered This Encounter   Medications    terbinafine HCl (LAMISIL) 250 mg tablet     Sig: Take 1 tablet (250 mg total) by mouth once daily.     Dispense:  90 tablet     Refill:  0       The 10-year ASCVD risk score (Aurelioearnestine CAMPOS Jr., et al., 2013) is: 3.8%    Values used to calculate the score:      Age: 58 years      Sex: Female      Is Non- : Yes      Diabetic: No      Tobacco smoker: No      Systolic Blood Pressure: 122 mmHg      Is BP treated: No      HDL Cholesterol: 41 mg/dL      Total Cholesterol: 176 mg/dL

## 2019-07-30 ENCOUNTER — OFFICE VISIT (OUTPATIENT)
Dept: PODIATRY | Facility: CLINIC | Age: 59
End: 2019-07-30
Attending: FAMILY MEDICINE
Payer: COMMERCIAL

## 2019-07-30 VITALS — HEIGHT: 64 IN | WEIGHT: 153 LBS | BODY MASS INDEX: 26.12 KG/M2

## 2019-07-30 DIAGNOSIS — B35.1 DERMATOPHYTOSIS OF NAIL: ICD-10-CM

## 2019-07-30 DIAGNOSIS — M79.671 BILATERAL FOOT PAIN: Primary | ICD-10-CM

## 2019-07-30 DIAGNOSIS — M79.672 BILATERAL FOOT PAIN: Primary | ICD-10-CM

## 2019-07-30 DIAGNOSIS — L85.1 PLANTAR POROKERATOSIS, ACQUIRED: ICD-10-CM

## 2019-07-30 PROCEDURE — 99204 OFFICE O/P NEW MOD 45 MIN: CPT | Mod: 25,S$GLB,, | Performed by: PODIATRIST

## 2019-07-30 PROCEDURE — 11721 DEBRIDE NAIL 6 OR MORE: CPT | Mod: S$GLB,,, | Performed by: PODIATRIST

## 2019-07-30 PROCEDURE — 99999 PR PBB SHADOW E&M-EST. PATIENT-LVL II: ICD-10-PCS | Mod: PBBFAC,,, | Performed by: PODIATRIST

## 2019-07-30 PROCEDURE — 99204 PR OFFICE/OUTPT VISIT, NEW, LEVL IV, 45-59 MIN: ICD-10-PCS | Mod: 25,S$GLB,, | Performed by: PODIATRIST

## 2019-07-30 PROCEDURE — 3008F PR BODY MASS INDEX (BMI) DOCUMENTED: ICD-10-PCS | Mod: CPTII,S$GLB,, | Performed by: PODIATRIST

## 2019-07-30 PROCEDURE — 3008F BODY MASS INDEX DOCD: CPT | Mod: CPTII,S$GLB,, | Performed by: PODIATRIST

## 2019-07-30 PROCEDURE — 11721 ROUTINE FOOT CARE: ICD-10-PCS | Mod: S$GLB,,, | Performed by: PODIATRIST

## 2019-07-30 PROCEDURE — 99999 PR PBB SHADOW E&M-EST. PATIENT-LVL II: CPT | Mod: PBBFAC,,, | Performed by: PODIATRIST

## 2019-07-30 NOTE — LETTER
July 30, 2019      Marily Coleman MD  [address]  [facility] [address]           [facility]  [address] [address]  Phone: [phone]  Fax: [phone]          Patient: Soraya Elmore   MR Number: [id number]   YOB: 1960   Date of Visit: 7/30/2019       Dear Dr. Marily Coleman:    Thank you for referring Soraya Elmore to me for evaluation. Attached you will find relevant portions of my assessment and plan of care.    If you have questions, please do not hesitate to call me. I look forward to following Soraya Elmore along with you.    Sincerely,    Tala Vigil DPM    Enclosure  CC:  No Recipients    If you would like to receive this communication electronically, please contact [email or web address] or [phone] to request more information on [employer] Link access.    For providers and/or their staff who would like to refer a patient to [facility], please contact us through our one-stop-shop provider referral line, [facility] [place], at [phone].    If you feel you have received this communication in error or would no longer like to receive these types of communications, please e-mail [email or web address]

## 2019-07-30 NOTE — PROGRESS NOTES
Chief Complaint   Patient presents with    Diabetes Mellitus     A1C 6/3/19 5.9   PCP last seen on 7/18/19    Nail Problem      nail fungus      pain on bilateral great toe     Callouses     bilateral bottom of foot              HPI:   The patient is a 58 y.o.  female  who presents for bilateral foot pain due to thickened toenails and calluses.  She was prescribed terbinafine for her nails.   She reports no acute trauma to her nails.  Pain is worse with direct pressure.    She also has calluses on the bottom of her heels.   No self treatment yet.         Primary care doctor is: Marily Coleman MD  Chief Complaint   Patient presents with    Diabetes Mellitus     A1C 6/3/19 5.9   PCP last seen on 7/18/19    Nail Problem      nail fungus      pain on bilateral great toe     Callouses     bilateral bottom of foot          Patient Active Problem List   Diagnosis    Nuclear sclerosis of both eyes    Prediabetes           Current Outpatient Medications on File Prior to Visit   Medication Sig Dispense Refill    Bifidobacterium infantis (ALIGN) 4 mg Cap Take 1 capsule (4 mg total) by mouth once daily. 90 capsule 3    fluocinonide (LIDEX) 0.05 % external solution Apply topically once daily. 60 mL 1    fluticasone (FLONASE) 50 mcg/actuation nasal spray 1 spray by Each Nare route once daily. 16 g 11    ibuprofen (ADVIL,MOTRIN) 600 MG tablet Take 1 tablet (600 mg total) by mouth 3 (three) times daily. 45 tablet 0    ketoconazole (NIZORAL) 2 % shampoo Apply topically twice a week. Ok to alternate with normal shampoo 120 mL 5    latanoprost 0.005 % ophthalmic solution Place 1 drop into both eyes every evening. 3 Bottle 3    leg brace (KNEE BRACE) Misc 1 application by Misc.(Non-Drug; Combo Route) route daily as needed. 1 each 0    meloxicam (MOBIC) 15 MG tablet Take 1 tablet (15 mg total) by mouth daily as needed. 30 tablet 0    esomeprazole (NEXIUM) 20 MG capsule Take 1 capsule (20 mg total) by mouth before  breakfast. 30 capsule 1    metformin (GLUCOPHAGE) 500 MG tablet Take 1 tablet (500 mg total) by mouth daily with breakfast. 90 tablet 1    terbinafine HCl (LAMISIL) 250 mg tablet Take 1 tablet (250 mg total) by mouth once daily. 90 tablet 0     No current facility-administered medications on file prior to visit.            Review of patient's allergies indicates:  No Known Allergies        Social History     Socioeconomic History    Marital status: Legally      Spouse name: Not on file    Number of children: Not on file    Years of education: Not on file    Highest education level: Not on file   Occupational History    Occupation: patient care tech    Social Needs    Financial resource strain: Not on file    Food insecurity:     Worry: Not on file     Inability: Not on file    Transportation needs:     Medical: Not on file     Non-medical: Not on file   Tobacco Use    Smoking status: Former Smoker     Packs/day: 0.10     Years: 15.00     Pack years: 1.50     Types: Cigarettes     Last attempt to quit: 2005     Years since quittin.0    Smokeless tobacco: Never Used   Substance and Sexual Activity    Alcohol use: No     Alcohol/week: 0.0 oz    Drug use: No    Sexual activity: Yes     Partners: Male     Birth control/protection: Post-menopausal   Lifestyle    Physical activity:     Days per week: Not on file     Minutes per session: Not on file    Stress: Not on file   Relationships    Social connections:     Talks on phone: Not on file     Gets together: Not on file     Attends Religion service: Not on file     Active member of club or organization: Not on file     Attends meetings of clubs or organizations: Not on file     Relationship status: Not on file   Other Topics Concern    Not on file   Social History Narrative    Not on file           ROS:  General ROS: negative  Respiratory ROS: no cough, shortness of breath, or wheezing  Cardiovascular ROS: no chest pain or dyspnea  "on exertion  Musculoskeletal ROS: negative  Neurological ROS:   negative for - impaired coordination/balance or numbness/tingling  Dermatological ROS: negative      LAST HbA1c:   Lab Results   Component Value Date    HGBA1C 5.9 (H) 06/03/2019           EXAM:   Vitals:    07/30/19 0949   Weight: 69.4 kg (153 lb)   Height: 5' 4" (1.626 m)       General: alert, no distress, cooperative    Vascular:   Dorsalis Pedis:  present   Posterior Tibial:  present  Capillary refill time:  3 seconds  Temperature of toes warm to touch  Edema: Absent bilaterally      Neurological:     Sharp touch:  normal  Light touch: normal  Tinels Sign:  Absent  Mulders Click:   Absent  Buford:  intact x 10      Dermatological:   Skin: warm and dry;  Plantar porokeratosis sub heels bilateral  Does not appear verrucous.   Wounds/Ulcers:  Absent  Bruising:  Absent  Erythema:  Absent  Toenails:  Elongated by 1-10mm, thickened by 1-5mm and Greenish and Brownish in color,  with subungual debris.   Absent paronychia      Musculoskeletal:   Metatarsophalangeal range of motion:   full range of motion  Subtalar joint range of motion: full range of motion  Ankle joint range of motion:  full range of motion  Bunions:  Present  Hammertoes: Present               ASSESSMENT/PLAN:          Problem List Items Addressed This Visit     None      Visit Diagnoses     Bilateral foot pain    -  Primary    Plantar porokeratosis, acquired        Dermatophytosis of nail                I counseled the patient on the patient's conditions, their implications and medical management.     Shoe inspection.    Patient instructed on proper foot hygiene. We discussed wearing proper shoe gear, daily foot inspections, never walking without protective shoe gear, never putting sharp instruments to feet.    Shoe and activity modification as needed for relief.     Consider Kerasal Nail.  Available OTC.  Use daily for at least 6-8 months on the toenails.     Consider Urea 40% to dry skin " daily .      Routine Foot Care  Date/Time: 7/30/2019 10:16 AM  Performed by: Tala Vigil DPM  Authorized by: Tala Vigil DPM     Consent Done?:  Yes (Verbal)    Nail Care Type:  Debride  Location(s): All  (Left 1st Toe, Left 3rd Toe, Left 2nd Toe, Left 4th Toe, Left 5th Toe, Right 1st Toe, Right 2nd Toe, Right 3rd Toe, Right 4th Toe and Right 5th Toe)  Patient tolerance:  Patient tolerated the procedure well with no immediate complications     With patient's permission, the toenails mentioned above were aggressively reduced and debrided using a nail nipper, removing all offending nail and debris. The patient will continue to monitor the areas daily, inspect the feet, wear protective shoe gear when ambulatory, and moisturizer to maintain skin integrity.                 Tala Vigil DPM  NPI: [id number]       [facility] - PODIATRY  [address] [place] [id number]  Dept: [phone]  Dept Fax: [phone]

## 2019-09-17 ENCOUNTER — IMMUNIZATION (OUTPATIENT)
Dept: PHARMACY | Facility: CLINIC | Age: 59
End: 2019-09-17
Payer: COMMERCIAL

## 2019-11-05 ENCOUNTER — LAB VISIT (OUTPATIENT)
Dept: LAB | Facility: OTHER | Age: 59
End: 2019-11-05
Attending: FAMILY MEDICINE
Payer: COMMERCIAL

## 2019-11-05 DIAGNOSIS — Z00.00 ANNUAL PHYSICAL EXAM: ICD-10-CM

## 2019-11-05 LAB
25(OH)D3+25(OH)D2 SERPL-MCNC: 17 NG/ML (ref 30–96)
ALBUMIN SERPL BCP-MCNC: 4 G/DL (ref 3.5–5.2)
ALP SERPL-CCNC: 82 U/L (ref 55–135)
ALT SERPL W/O P-5'-P-CCNC: 19 U/L (ref 10–44)
ANION GAP SERPL CALC-SCNC: 8 MMOL/L (ref 8–16)
AST SERPL-CCNC: 24 U/L (ref 10–40)
BASOPHILS # BLD AUTO: 0.04 K/UL (ref 0–0.2)
BASOPHILS NFR BLD: 0.7 % (ref 0–1.9)
BILIRUB SERPL-MCNC: 0.7 MG/DL (ref 0.1–1)
BUN SERPL-MCNC: 13 MG/DL (ref 6–20)
CALCIUM SERPL-MCNC: 8.8 MG/DL (ref 8.7–10.5)
CHLORIDE SERPL-SCNC: 105 MMOL/L (ref 95–110)
CHOLEST SERPL-MCNC: 169 MG/DL (ref 120–199)
CHOLEST/HDLC SERPL: 3.8 {RATIO} (ref 2–5)
CO2 SERPL-SCNC: 28 MMOL/L (ref 23–29)
CREAT SERPL-MCNC: 0.7 MG/DL (ref 0.5–1.4)
DIFFERENTIAL METHOD: ABNORMAL
EOSINOPHIL # BLD AUTO: 0.2 K/UL (ref 0–0.5)
EOSINOPHIL NFR BLD: 4.4 % (ref 0–8)
ERYTHROCYTE [DISTWIDTH] IN BLOOD BY AUTOMATED COUNT: 13.5 % (ref 11.5–14.5)
EST. GFR  (AFRICAN AMERICAN): >60 ML/MIN/1.73 M^2
EST. GFR  (NON AFRICAN AMERICAN): >60 ML/MIN/1.73 M^2
ESTIMATED AVG GLUCOSE: 126 MG/DL (ref 68–131)
FERRITIN SERPL-MCNC: 235 NG/ML (ref 20–300)
GLUCOSE SERPL-MCNC: 108 MG/DL (ref 70–110)
HBA1C MFR BLD HPLC: 6 % (ref 4–5.6)
HCT VFR BLD AUTO: 39.7 % (ref 37–48.5)
HDLC SERPL-MCNC: 45 MG/DL (ref 40–75)
HDLC SERPL: 26.6 % (ref 20–50)
HGB BLD-MCNC: 12.4 G/DL (ref 12–16)
IMM GRANULOCYTES # BLD AUTO: 0.01 K/UL (ref 0–0.04)
IMM GRANULOCYTES NFR BLD AUTO: 0.2 % (ref 0–0.5)
IRON SERPL-MCNC: 108 UG/DL (ref 30–160)
LDLC SERPL CALC-MCNC: 113.8 MG/DL (ref 63–159)
LYMPHOCYTES # BLD AUTO: 2.9 K/UL (ref 1–4.8)
LYMPHOCYTES NFR BLD: 53 % (ref 18–48)
MCH RBC QN AUTO: 26.9 PG (ref 27–31)
MCHC RBC AUTO-ENTMCNC: 31.2 G/DL (ref 32–36)
MCV RBC AUTO: 86 FL (ref 82–98)
MONOCYTES # BLD AUTO: 0.5 K/UL (ref 0.3–1)
MONOCYTES NFR BLD: 9.9 % (ref 4–15)
NEUTROPHILS # BLD AUTO: 1.7 K/UL (ref 1.8–7.7)
NEUTROPHILS NFR BLD: 31.8 % (ref 38–73)
NONHDLC SERPL-MCNC: 124 MG/DL
NRBC BLD-RTO: 0 /100 WBC
PLATELET # BLD AUTO: 242 K/UL (ref 150–350)
PMV BLD AUTO: 10.2 FL (ref 9.2–12.9)
POTASSIUM SERPL-SCNC: 3.9 MMOL/L (ref 3.5–5.1)
PROT SERPL-MCNC: 7.1 G/DL (ref 6–8.4)
RBC # BLD AUTO: 4.61 M/UL (ref 4–5.4)
SATURATED IRON: 30 % (ref 20–50)
SODIUM SERPL-SCNC: 141 MMOL/L (ref 136–145)
T4 FREE SERPL-MCNC: 0.88 NG/DL (ref 0.71–1.51)
TOTAL IRON BINDING CAPACITY: 361 UG/DL (ref 250–450)
TRANSFERRIN SERPL-MCNC: 244 MG/DL (ref 200–375)
TRIGL SERPL-MCNC: 51 MG/DL (ref 30–150)
TSH SERPL DL<=0.005 MIU/L-ACNC: 1.09 UIU/ML (ref 0.4–4)
VIT B12 SERPL-MCNC: 688 PG/ML (ref 210–950)
WBC # BLD AUTO: 5.45 K/UL (ref 3.9–12.7)

## 2019-11-05 PROCEDURE — 82607 VITAMIN B-12: CPT

## 2019-11-05 PROCEDURE — 82306 VITAMIN D 25 HYDROXY: CPT

## 2019-11-05 PROCEDURE — 36415 COLL VENOUS BLD VENIPUNCTURE: CPT

## 2019-11-05 PROCEDURE — 83036 HEMOGLOBIN GLYCOSYLATED A1C: CPT

## 2019-11-05 PROCEDURE — 80061 LIPID PANEL: CPT

## 2019-11-05 PROCEDURE — 85025 COMPLETE CBC W/AUTO DIFF WBC: CPT

## 2019-11-05 PROCEDURE — 84443 ASSAY THYROID STIM HORMONE: CPT

## 2019-11-05 PROCEDURE — 83540 ASSAY OF IRON: CPT

## 2019-11-05 PROCEDURE — 82728 ASSAY OF FERRITIN: CPT

## 2019-11-05 PROCEDURE — 84439 ASSAY OF FREE THYROXINE: CPT

## 2019-11-05 PROCEDURE — 80053 COMPREHEN METABOLIC PANEL: CPT

## 2019-11-06 ENCOUNTER — TELEPHONE (OUTPATIENT)
Dept: INTERNAL MEDICINE | Facility: CLINIC | Age: 59
End: 2019-11-06

## 2019-11-06 RX ORDER — ASPIRIN 325 MG
50000 TABLET, DELAYED RELEASE (ENTERIC COATED) ORAL WEEKLY
Qty: 12 CAPSULE | Refills: 3 | Status: SHIPPED | OUTPATIENT
Start: 2019-11-06

## 2019-11-14 ENCOUNTER — TELEPHONE (OUTPATIENT)
Dept: INTERNAL MEDICINE | Facility: CLINIC | Age: 59
End: 2019-11-14

## 2019-11-14 NOTE — TELEPHONE ENCOUNTER
----- Message from Jodi Goldberg sent at 11/14/2019 11:08 AM CST -----  Contact: LUIS REDMAN  Name of Who is Calling: LUIS REDMAN      What is the request in detail: Patient is reqesting a call back regarding questions about her lab results. Please contact to further advise.      Can the clinic reply by [facility]: NO      What [facility]: [phone]

## 2019-11-14 NOTE — TELEPHONE ENCOUNTER
Spoke to Ms. Briggs and informed her that her Vit D is low and a Rx was sent to the pharmacy Nov 6, 2019.  Patient states understanding with no further questions or concerns.

## 2019-11-14 NOTE — TELEPHONE ENCOUNTER
----- Message from Kaitlin White sent at 11/14/2019  2:03 PM CST -----  Contact: Pt   Type:  Patient Returning Call    Who Called: LUIS REDMAN [[id number]]    Who Left Message for Patient: Mari     Does the patient know what this is regarding?: Yes     Best Call Back Number:[phone]    Additional Information:

## 2019-11-14 NOTE — TELEPHONE ENCOUNTER
----- Message from Jodi Goldberg sent at 11/14/2019  9:49 AM CST -----  Contact: LUIS RICE   Name of Who is Calling: LUIS RICE       What is the request in detail: Patient is requesting a call back to verify a prescription (NOT SEEN ON FILE). Please contact to further advise.      Can the clinic reply by [facility]: NO      What Number to Call Back if not in [facility]: [phone]

## 2020-02-10 ENCOUNTER — TELEPHONE (OUTPATIENT)
Dept: INTERNAL MEDICINE | Facility: CLINIC | Age: 60
End: 2020-02-10

## 2020-02-10 NOTE — TELEPHONE ENCOUNTER
lmovm  ----- Message from Eleonora Arnold sent at 2/7/2020  2:18 PM CST -----  Contact: LUIS REMDAN [[id number]]  Name of Who is Calling:      What is the request in detail: Patient would like a call back regarding a sooner appointment first available..... Please contact to further discuss and advise       Can the clinic reply by [facility]: no       [facility]: [phone]

## 2020-03-03 ENCOUNTER — TELEPHONE (OUTPATIENT)
Dept: INTERNAL MEDICINE | Facility: CLINIC | Age: 60
End: 2020-03-03

## 2020-03-05 ENCOUNTER — TELEPHONE (OUTPATIENT)
Dept: INTERNAL MEDICINE | Facility: CLINIC | Age: 60
End: 2020-03-05

## 2020-03-16 ENCOUNTER — TELEPHONE (OUTPATIENT)
Dept: INTERNAL MEDICINE | Facility: CLINIC | Age: 60
End: 2020-03-16

## 2020-04-15 ENCOUNTER — TELEPHONE (OUTPATIENT)
Dept: INTERNAL MEDICINE | Facility: CLINIC | Age: 60
End: 2020-04-15

## 2020-04-15 NOTE — TELEPHONE ENCOUNTER
Spoke with pt and provided [facility] number to get signed up for portal.    ----- Message from Coco Iqbal, Patient Care Assistant sent at 4/15/2020  2:39 PM CDT -----  Contact: LUIS REDMAN [[id number]]  Name of Who is Calling: LUIS REDMAN [[id number]]    What is the request in detail: Requesting a call back in regards of coughing and throat irritations no other symptoms reported. 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 Please contact to further discuss and advise      Can the clinic reply by [facility]: No    What Number to Call Back if not in [facility]:   [phone]

## 2020-04-16 ENCOUNTER — TELEPHONE (OUTPATIENT)
Dept: PRIMARY CARE CLINIC | Facility: CLINIC | Age: 60
End: 2020-04-16

## 2020-04-16 ENCOUNTER — OFFICE VISIT (OUTPATIENT)
Dept: INTERNAL MEDICINE | Facility: CLINIC | Age: 60
End: 2020-04-16
Attending: FAMILY MEDICINE
Payer: COMMERCIAL

## 2020-04-16 DIAGNOSIS — K21.9 GASTROESOPHAGEAL REFLUX DISEASE, ESOPHAGITIS PRESENCE NOT SPECIFIED: ICD-10-CM

## 2020-04-16 DIAGNOSIS — R05.9 COUGH: Primary | ICD-10-CM

## 2020-04-16 DIAGNOSIS — Z91.09 ENVIRONMENTAL ALLERGIES: ICD-10-CM

## 2020-04-16 PROCEDURE — 99213 PR OFFICE/OUTPT VISIT, EST, LEVL III, 20-29 MIN: ICD-10-PCS | Mod: 95,,, | Performed by: FAMILY MEDICINE

## 2020-04-16 PROCEDURE — 99213 OFFICE O/P EST LOW 20 MIN: CPT | Mod: 95,,, | Performed by: FAMILY MEDICINE

## 2020-04-16 RX ORDER — FLUTICASONE PROPIONATE 50 MCG
1 SPRAY, SUSPENSION (ML) NASAL DAILY
Qty: 16 G | Refills: 11 | OUTPATIENT
Start: 2020-04-16 | End: 2020-10-09

## 2020-04-16 RX ORDER — PANTOPRAZOLE SODIUM 40 MG/1
40 TABLET, DELAYED RELEASE ORAL DAILY
Qty: 30 TABLET | Refills: 0 | OUTPATIENT
Start: 2020-04-16 | End: 2020-10-09

## 2020-04-16 RX ORDER — BENZONATATE 100 MG/1
100 CAPSULE ORAL 3 TIMES DAILY PRN
Qty: 30 CAPSULE | Refills: 0 | Status: SHIPPED | OUTPATIENT
Start: 2020-04-16 | End: 2020-04-26

## 2020-04-16 RX ORDER — AZELASTINE 1 MG/ML
1 SPRAY, METERED NASAL 2 TIMES DAILY
Qty: 30 ML | Refills: 3 | Status: SHIPPED | OUTPATIENT
Start: 2020-04-16 | End: 2022-03-08 | Stop reason: SDUPTHER

## 2020-04-16 NOTE — PROGRESS NOTES
Subjective:      Patient ID: Soraya Elmore is a 59 y.o. female.    Chief Complaint: Cough    HPI   Patient here today for cough and throat irritation and this started about one week ago and it has been intermittent not constant. It is a dry cough. She denies fevers, chills, wheezing or sob with it.  It is not painful with eating and drinking. She has been using cough syrup which has helped. She has noticed some increase in her reflux burning in the stomach and has improved with pepto. She denies blood or black tarry stools. She is currently not taking any nsaids. She is a patient care tech.     The patient location is:  Patient Home   The chief complaint leading to consultation is: cough  Visit type: Virtual visit with synchronous audio and video  Total time spent with patient: 15  Each patient to whom he or she provides medical services by telemedicine is:  (1) informed of the relationship between the physician and patient and the respective role of any other health care provider with respect to management of the patient; and (2) notified that he or she may decline to receive medical services by telemedicine and may withdraw from such care at any time.    Review of Systems   Constitutional: Negative.    HENT: Positive for postnasal drip and rhinorrhea.    Respiratory: Positive for cough.    Cardiovascular: Negative.    Gastrointestinal: Negative.      I personally reviewed Past Medical History, Past Surgical history,  Past Social History and Family History    Physical Exam   Constitutional: She is oriented to person, place, and time.   Neurological: She is alert and oriented to person, place, and time.       1. Cough    2. Environmental allergies    3. Gastroesophageal reflux disease, esophagitis presence not specified        1/2. covid r/o, supportive care, in person evaluation with any changes or worsening of symptoms  3. Will start course of protonix for two weeks and she will follow up if her reflux  does not improve        Orders Placed This Encounter   Procedures    COVID-19 Routine Screening     Medications Ordered This Encounter   Medications    azelastine (ASTELIN) 137 mcg (0.1 %) nasal spray     Si spray (137 mcg total) by Nasal route 2 (two) times daily.     Dispense:  30 mL     Refill:  3    benzonatate (TESSALON) 100 MG capsule     Sig: Take 1 capsule (100 mg total) by mouth 3 (three) times daily as needed for Cough.     Dispense:  30 capsule     Refill:  0    fluticasone propionate (FLONASE) 50 mcg/actuation nasal spray     Si spray (50 mcg total) by Each Nostril route once daily.     Dispense:  16 g     Refill:  11    pantoprazole (PROTONIX) 40 MG tablet     Sig: Take 1 tablet (40 mg total) by mouth once daily.     Dispense:  30 tablet     Refill:  0

## 2020-04-17 ENCOUNTER — VITALS (OUTPATIENT)
Dept: INTERNAL MEDICINE | Facility: CLINIC | Age: 60
End: 2020-04-17
Attending: FAMILY MEDICINE
Payer: COMMERCIAL

## 2020-04-17 VITALS — OXYGEN SATURATION: 97 % | HEART RATE: 93 BPM

## 2020-04-17 DIAGNOSIS — R05.9 COUGH: ICD-10-CM

## 2020-04-17 PROCEDURE — 99999 PR PBB SHADOW E&M-EST. PATIENT-LVL I: ICD-10-PCS | Mod: PBBFAC,,,

## 2020-04-17 PROCEDURE — 99999 PR PBB SHADOW E&M-EST. PATIENT-LVL I: CPT | Mod: PBBFAC,,,

## 2020-04-17 NOTE — PROGRESS NOTES
[facility] INTERNAL MEDICINE  Drive Thru Respiratory Check     Date: 04/17/2020    Referred by: Marily Coleman MD     Narrative of symptoms: Patient with history of chest discomfort, not currently, cough, itchy throat. 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         Vitals upon arrival:  Vitals:    04/17/20 1201   Pulse: 93   SpO2: 97%        Patient Assessment:  No acute distress comfortable. Able to tolerate walk test without difficulty. Able to speak in complete sentences without distress. Appears stable on room air.    Walk Test:             SpO2 post walk test: 97            Pulse post walk test: 101            Respirations post walk test:      Plan: Discharged in stable condition with instructions and ED prompts. Advised to discuss worsening/continued symptoms with PCP. Chart CC'd to PCP and ordering physician. [place] [facility] [place]

## 2020-04-21 ENCOUNTER — TELEPHONE (OUTPATIENT)
Dept: INTERNAL MEDICINE | Facility: CLINIC | Age: 60
End: 2020-04-21

## 2020-04-21 NOTE — TELEPHONE ENCOUNTER
"Attempted to call pt. No answer, LVM informing pt that  wanted her to know " Negative for covid 19" and to give the office a call back with any additional questions or concerns.      "

## 2020-04-21 NOTE — TELEPHONE ENCOUNTER
Spoke with pt, let her know she needs to find a new pcp due to Dr. Coleman switching practices. She would like to know what the 2 different nasal sprays do. Does she use them both? (flonase and astelin). Patient had a verbal understanding of covid result.    ----- Message from Coco Iqbal, Patient Care Assistant sent at 4/21/2020  2:20 PM CDT -----  Contact: LUIS REDMAN [[id number]]  Name of Who is Calling: LUIS REDMNA [[id number]]    What is the request in detail: Requesting a call back in regards of receiving the same medications and requesting test results. Please contact to further discuss and advise      Can the clinic reply by [facility]: No    What Number to Call Back [facility] not [facility]:   [phone]

## 2020-06-04 ENCOUNTER — HOSPITAL ENCOUNTER (EMERGENCY)
Facility: OTHER | Age: 60
Discharge: HOME | End: 2020-06-04
Attending: EMERGENCY MEDICINE
Payer: COMMERCIAL

## 2020-06-04 VITALS
RESPIRATION RATE: 19 BRPM | TEMPERATURE: 98 F | SYSTOLIC BLOOD PRESSURE: 138 MMHG | HEIGHT: 64 IN | DIASTOLIC BLOOD PRESSURE: 63 MMHG | BODY MASS INDEX: 25.61 KG/M2 | HEART RATE: 75 BPM | WEIGHT: 150 LBS | OXYGEN SATURATION: 99 %

## 2020-06-04 DIAGNOSIS — R07.9 CHEST PAIN: Primary | ICD-10-CM

## 2020-06-04 LAB
ALBUMIN SERPL BCP-MCNC: 4.2 G/DL (ref 3.5–5.2)
ALP SERPL-CCNC: 78 U/L (ref 55–135)
ALT SERPL W/O P-5'-P-CCNC: 22 U/L (ref 10–44)
ANION GAP SERPL CALC-SCNC: 11 MMOL/L (ref 8–16)
AST SERPL-CCNC: 22 U/L (ref 10–40)
BASOPHILS # BLD AUTO: 0.05 K/UL (ref 0–0.2)
BASOPHILS NFR BLD: 0.8 % (ref 0–1.9)
BILIRUB SERPL-MCNC: 0.4 MG/DL (ref 0.1–1)
BNP SERPL-MCNC: 15 PG/ML (ref 0–99)
BUN SERPL-MCNC: 11 MG/DL (ref 6–20)
CALCIUM SERPL-MCNC: 9.3 MG/DL (ref 8.7–10.5)
CHLORIDE SERPL-SCNC: 103 MMOL/L (ref 95–110)
CO2 SERPL-SCNC: 26 MMOL/L (ref 23–29)
CREAT SERPL-MCNC: 0.7 MG/DL (ref 0.5–1.4)
DIFFERENTIAL METHOD: ABNORMAL
EOSINOPHIL # BLD AUTO: 0.3 K/UL (ref 0–0.5)
EOSINOPHIL NFR BLD: 4 % (ref 0–8)
ERYTHROCYTE [DISTWIDTH] IN BLOOD BY AUTOMATED COUNT: 13.4 % (ref 11.5–14.5)
EST. GFR  (AFRICAN AMERICAN): >60 ML/MIN/1.73 M^2
EST. GFR  (NON AFRICAN AMERICAN): >60 ML/MIN/1.73 M^2
GLUCOSE SERPL-MCNC: 109 MG/DL (ref 70–110)
HCT VFR BLD AUTO: 40.9 % (ref 37–48.5)
HGB BLD-MCNC: 12.8 G/DL (ref 12–16)
IMM GRANULOCYTES # BLD AUTO: 0.02 K/UL (ref 0–0.04)
IMM GRANULOCYTES NFR BLD AUTO: 0.3 % (ref 0–0.5)
INR PPP: 0.9 (ref 0.8–1.2)
LYMPHOCYTES # BLD AUTO: 3.5 K/UL (ref 1–4.8)
LYMPHOCYTES NFR BLD: 54.6 % (ref 18–48)
MCH RBC QN AUTO: 27.5 PG (ref 27–31)
MCHC RBC AUTO-ENTMCNC: 31.3 G/DL (ref 32–36)
MCV RBC AUTO: 88 FL (ref 82–98)
MONOCYTES # BLD AUTO: 0.7 K/UL (ref 0.3–1)
MONOCYTES NFR BLD: 10.8 % (ref 4–15)
NEUTROPHILS # BLD AUTO: 1.9 K/UL (ref 1.8–7.7)
NEUTROPHILS NFR BLD: 29.5 % (ref 38–73)
NRBC BLD-RTO: 0 /100 WBC
PLATELET # BLD AUTO: 236 K/UL (ref 150–350)
PMV BLD AUTO: 9.8 FL (ref 9.2–12.9)
POTASSIUM SERPL-SCNC: 3.9 MMOL/L (ref 3.5–5.1)
PROT SERPL-MCNC: 7.3 G/DL (ref 6–8.4)
PROTHROMBIN TIME: 10 SEC (ref 9–12.5)
RBC # BLD AUTO: 4.65 M/UL (ref 4–5.4)
SODIUM SERPL-SCNC: 140 MMOL/L (ref 136–145)
TROPONIN I SERPL DL<=0.01 NG/ML-MCNC: <0.006 NG/ML (ref 0–0.03)
TROPONIN I SERPL DL<=0.01 NG/ML-MCNC: <0.006 NG/ML (ref 0–0.03)
WBC # BLD AUTO: 6.47 K/UL (ref 3.9–12.7)

## 2020-06-04 PROCEDURE — 80053 COMPREHEN METABOLIC PANEL: CPT

## 2020-06-04 PROCEDURE — 36000 PLACE NEEDLE IN VEIN: CPT

## 2020-06-04 PROCEDURE — 93010 ELECTROCARDIOGRAM REPORT: CPT | Mod: ,,, | Performed by: INTERNAL MEDICINE

## 2020-06-04 PROCEDURE — 93010 EKG 12-LEAD: ICD-10-PCS | Mod: 76,,, | Performed by: INTERNAL MEDICINE

## 2020-06-04 PROCEDURE — 25000003 PHARM REV CODE 250: Performed by: EMERGENCY MEDICINE

## 2020-06-04 PROCEDURE — 85025 COMPLETE CBC W/AUTO DIFF WBC: CPT

## 2020-06-04 PROCEDURE — 83880 ASSAY OF NATRIURETIC PEPTIDE: CPT

## 2020-06-04 PROCEDURE — 84484 ASSAY OF TROPONIN QUANT: CPT

## 2020-06-04 PROCEDURE — 93005 ELECTROCARDIOGRAM TRACING: CPT

## 2020-06-04 PROCEDURE — 85610 PROTHROMBIN TIME: CPT

## 2020-06-04 PROCEDURE — 99285 EMERGENCY DEPT VISIT HI MDM: CPT | Mod: 25

## 2020-06-04 RX ORDER — LIDOCAINE HYDROCHLORIDE 20 MG/ML
10 SOLUTION OROPHARYNGEAL
Status: DISCONTINUED | OUTPATIENT
Start: 2020-06-04 | End: 2020-06-04

## 2020-06-04 RX ORDER — LIDOCAINE HYDROCHLORIDE 20 MG/ML
10 SOLUTION OROPHARYNGEAL
Status: COMPLETED | OUTPATIENT
Start: 2020-06-04 | End: 2020-06-04

## 2020-06-04 RX ORDER — MAG HYDROX/ALUMINUM HYD/SIMETH 200-200-20
30 SUSPENSION, ORAL (FINAL DOSE FORM) ORAL
Status: COMPLETED | OUTPATIENT
Start: 2020-06-04 | End: 2020-06-04

## 2020-06-04 RX ADMIN — LIDOCAINE HYDROCHLORIDE 10 ML: 20 SOLUTION ORAL; TOPICAL at 02:06

## 2020-06-04 RX ADMIN — ALUMINUM HYDROXIDE, MAGNESIUM HYDROXIDE, AND SIMETHICONE 30 ML: 200; 200; 20 SUSPENSION ORAL at 02:06

## 2020-06-04 NOTE — DISCHARGE INSTRUCTIONS
Please return to the ER if you have worsening chest pain, difficulty breathing, fevers, altered mental status, dizziness, weakness, or any other concerns.      Follow up with your primary care physician.      You did have some fluid at the base of the left lung on your x-ray.  You may need to have your x-ray repeated with your primary care physician.

## 2020-06-04 NOTE — ED PROVIDER NOTES
"Encounter Date: 6/4/2020    SCRIBE #1 NOTE: I, Duyen Tirado, am scribing for, and in the presence of, Dr. Hinojosa.       History     Chief Complaint   Patient presents with    Abdominal Pain     c/o midsternal "burning or fullness feeling w/nausea," onset ~1hr PTA ED. Also reports L side pain d/t nonproductive cough today. Hx of GERD, took Maalox ~30 mins PTA ED w/minimal relief of symptoms. Denies SOB, fever/chills, diaphoresis, or other symptoms. Pt in NAD.     Time seen by provider: 2:20 AM    This is a 59 y.o. female who presents with complaint of abdominal pain that began yesterday. Patient describes the pain as "burning" with associated nausea and "gas pains." Patient notes that pain worsened about one hour ago with associated "fullness in her chest." She states she "felt like she was going to faint." She notes that these symptoms are similar to her previous "gas pains" but are more intense. She denies vomiting, diarrhea, fever, or SOB. Patient denies history of HTN, DM, HLD, or tobacco use.  She has not had a stress test before.    The history is provided by the patient.     Review of patient's allergies indicates:  No Known Allergies  Past Medical History:   Diagnosis Date    Diabetes mellitus     Gastroenteritis     Glaucoma (increased eye pressure)     Prediabetes 6/3/2019     Past Surgical History:   Procedure Laterality Date    COLONOSCOPY N/A 6/7/2016    Procedure: COLONOSCOPY;  Surgeon: Katerine Ramsey MD;  Location: [address]);  Service: Endoscopy;  Laterality: N/A;    OOPHORECTOMY  1999    Unilateral oophorectomy     Family History   Problem Relation Age of Onset    Breast cancer Cousin 67    Hypertension Mother     Glaucoma Mother     Cataracts Mother     Diabetes Sister     Glaucoma Sister     Diabetes Sister     Colon cancer Neg Hx     Ovarian cancer Neg Hx     Stroke Neg Hx      Social History     Tobacco Use    Smoking status: Former Smoker     Packs/day: 0.10     " Years: 15.00     Pack years: 1.50     Types: Cigarettes     Last attempt to quit: 2005     Years since quittin.8    Smokeless tobacco: Never Used   Substance Use Topics    Alcohol use: No     Alcohol/week: 0.0 standard drinks    Drug use: No     Review of Systems   Constitutional: Negative for fever.   HENT: Negative for sore throat.    Respiratory: Negative for shortness of breath.    Cardiovascular: Positive for chest pain.   Gastrointestinal: Positive for abdominal pain and nausea. Negative for diarrhea and vomiting.   Genitourinary: Negative for dysuria.   Musculoskeletal: Negative for back pain.   Skin: Negative for rash.   Neurological: Positive for dizziness. Negative for weakness, light-headedness and headaches.   Hematological: Does not bruise/bleed easily.       Physical Exam     Initial Vitals [20 0212]   BP Pulse Resp Temp SpO2   135/68 102 18 98 °F (36.7 °C) 100 %      MAP       --         Physical Exam    Nursing note and vitals reviewed.  Constitutional: She appears well-developed and well-nourished.   HENT:   Head: Normocephalic and atraumatic.   Eyes: Conjunctivae and EOM are normal.   Neck: Normal range of motion. Neck supple.   Cardiovascular: Normal rate, regular rhythm, normal heart sounds and normal pulses.   Pulmonary/Chest: Breath sounds normal. No respiratory distress.   Abdominal: Soft. There is no tenderness. There is no rebound and no guarding.   Musculoskeletal: Normal range of motion.   Neurological: She is alert and oriented to person, place, and time. She has normal strength. No cranial nerve deficit.   Ambulatory with steady gait   Skin: Skin is warm and dry.   Psychiatric: She has a normal mood and affect. Her behavior is normal. Thought content normal.         ED Course   Procedures  Labs Reviewed   CBC W/ AUTO DIFFERENTIAL - Abnormal; Notable for the following components:       Result Value    Mean Corpuscular Hemoglobin Conc 31.3 (*)     Gran% 29.5 (*)      Lymph% 54.6 (*)     All other components within normal limits   COMPREHENSIVE METABOLIC PANEL   TROPONIN I   PROTIME-INR   B-TYPE NATRIURETIC PEPTIDE   TROPONIN I     EKG Readings: (Independently Interpreted)   2:55 AM: Rate of 81. Normal sinus rhythm. Normal axis. Normal intervals. No ST or ischemic changes.     5:47AM:  Rate of 90.  Normal sinus rhythm.  Normal axis.  Normal intervals.  No ST or ischemic changes.         Imaging Results          X-Ray Chest AP Portable (Final result)  Result time 06/04/20 03:11:41    Final result by Toribio Salinas MD (06/04/20 03:11:41)                 Impression:      Mild opacity at the left costophrenic angle may relate to chronic parenchymal/pleural change or a small amount of pleural fluid, follow-up is recommended.  There is no additional radiographic evidence for acute intrathoracic process.      Electronically signed by: Toribio Salinas  Date:    06/04/2020  Time:    03:11             Narrative:    EXAMINATION:  XR CHEST AP PORTABLE    CLINICAL HISTORY:  Chest pain, unspecified    TECHNIQUE:  Single frontal view of the chest was performed.    COMPARISON:  None    FINDINGS:  Single portable chest view is submitted.  Mild aortic atherosclerotic change noted.  The cardiomediastinal silhouette appears appropriate.  There is mild blunting of the left costophrenic angle, this could relate to chronic parenchymal and pleural change, the possibility of a small amount of pleural fluid is a consideration as well.  Follow-up is recommended.  There is no large pleural effusion, there is no additional evidence for confluent infiltrate or consolidation, or pneumothorax.  The osseous structures demonstrate chronic change.                              X-Rays:   Independently Interpreted Readings:   Chest X-Ray: Trachea midline.  No cardiomegaly.  Blunting of the left costophrenic angle.  No infiltrate or edema.     Medical Decision Making:   History:   Old Medical Records: I decided to  "obtain old medical records.  Old Records Summarized: other records and records from clinic visits.  Initial Assessment:   2:20AM:  Patient is a 51-year-old female who presents to the emergency department with chest fullness," along with burning epigastric pain, and nausea.  Patient appears well, nontoxic.  She attributes this to gas pains, though I do feel that a cardiac etiology should be considered.  Will plan for labs, EKG, will continue to follow and reassess.  Independently Interpreted Test(s):   I have ordered and independently interpreted X-rays - see prior notes.  I have ordered and independently interpreted EKG Reading(s) - see prior notes  Clinical Tests:   Lab Tests: Ordered and Reviewed  Radiological Study: Ordered and Reviewed  Medical Tests: Reviewed and Ordered    4:40AM:  Patient feeling much better.  Her troponin and EKG are both negative.  Will plan for repeat troponin and EKG.  I updated the patient regarding results along with plan for repeat labs and EKG.  Agreeable to plan and all questions answered.    5:56 AM:  Repeat troponin and EKG are unremarkable.  Patient continues to feel well.  I do not feel that further workup in the emergency room is indicated at this time.  Will have her follow up closely with her primary care physician.  I have discussed with the pt ED return warnings and need for close PCP f/u.  Pt agreeable to plan and all questions answered.  I feel that pt is stable for discharge and management as an outpatient and no further intervention is needed at this time.  Pt is comfortable returning to the ED if needed.  Will DC home in stable condition.                Scribe Attestation:   Scribe #1: I performed the above scribed service and the documentation accurately describes the services I performed. I attest to the accuracy of the note.    Attending Attestation:           Physician Attestation for Scribe:  Physician Attestation Statement for Scribe #1: I, Dr. Hinojosa, reviewed " documentation, as scribed by Duyen Tirado in my presence, and it is both accurate and complete.                               Clinical Impression:     1. Chest pain                ED Disposition Condition    Discharge Stable        ED Prescriptions     None        Follow-up Information     Follow up With Specialties Details Why Contact Info    Marily Coleman MD Family Medicine   [address] [facility] [address]  [phone]                                       Natalie Hinojosa MD  06/04/20 0602

## 2020-06-11 NOTE — PROGRESS NOTES
Subjective:       Patient ID: Soraya Elmore is a 59 y.o. female.    Chief Complaint: Hospital Follow Up    Pt here for f/u from ED where she was seen 1 week ago for epigastric abd pain and lateral L chest pain inferior to axilla. Record reviewed. She has had similar pain in past which she has attributed to gas pain but had no relief with maalox so went to ED. Cardiac w/u was unrevealing. She denies any radiation of pain to neck/arm/back and no associated sob/wheezing/palpitations/n/v/diaphoresis. It was not related to exertion and occurred at rest. No changes in BMs. No fever. There was note made of blunted L costophrenic angle on CXR, questionable for chronic changes or perhaps small effusion. She denies any significant pulm history and also denies currently any fever/cough/sob/wheezing as above. She is no longer having any pain and now only has bloating with belching. She has protonix but has only been taking prn. Maalox helps.     Review of Systems   Constitutional: Negative for fatigue, fever and unexpected weight change.   Eyes: Negative for visual disturbance.   Respiratory: Negative for shortness of breath.    Cardiovascular: Negative for chest pain.   Gastrointestinal: Negative for blood in stool, constipation, diarrhea and vomiting.   Genitourinary: Negative for dysuria and frequency.   Neurological: Negative for numbness and headaches.   Psychiatric/Behavioral: Negative for dysphoric mood.       Objective:      Physical Exam   Constitutional: She is oriented to person, place, and time. She appears well-developed and well-nourished.   HENT:   Head: Normocephalic and atraumatic.   Eyes: Pupils are equal, round, and reactive to light. EOM are normal.   Neck: Carotid bruit is not present. No thyroid mass and no thyromegaly present.   Cardiovascular: Normal rate, regular rhythm, S1 normal, S2 normal and normal heart sounds.   No murmur heard.  Pulmonary/Chest: Effort normal and breath sounds normal. She  has no wheezes.   Abdominal: Soft. Bowel sounds are normal. She exhibits no distension and no mass. There is no hepatosplenomegaly. There is no tenderness.   Lymphadenopathy:     She has no cervical adenopathy.   Neurological: She is alert and oriented to person, place, and time. No cranial nerve deficit.   Psychiatric: She has a normal mood and affect. Judgment normal.       Assessment:       1. Dyspepsia        Plan:       1. Take protonix daily for 6-8 weeks  2. Ok to use maalox rn  3. If not continuing to improve then GI referral  4. ED prompts d/w pt and she understood

## 2020-06-12 ENCOUNTER — OFFICE VISIT (OUTPATIENT)
Dept: INTERNAL MEDICINE | Facility: CLINIC | Age: 60
End: 2020-06-12
Payer: COMMERCIAL

## 2020-06-12 ENCOUNTER — TELEPHONE (OUTPATIENT)
Dept: INTERNAL MEDICINE | Facility: CLINIC | Age: 60
End: 2020-06-12

## 2020-06-12 VITALS
OXYGEN SATURATION: 100 % | BODY MASS INDEX: 27.59 KG/M2 | DIASTOLIC BLOOD PRESSURE: 72 MMHG | SYSTOLIC BLOOD PRESSURE: 106 MMHG | WEIGHT: 161.63 LBS | HEART RATE: 64 BPM | HEIGHT: 64 IN

## 2020-06-12 DIAGNOSIS — Z12.31 ENCOUNTER FOR SCREENING MAMMOGRAM FOR BREAST CANCER: Primary | ICD-10-CM

## 2020-06-12 DIAGNOSIS — R10.13 DYSPEPSIA: Primary | ICD-10-CM

## 2020-06-12 PROCEDURE — 3008F PR BODY MASS INDEX (BMI) DOCUMENTED: ICD-10-PCS | Mod: CPTII,S$GLB,, | Performed by: INTERNAL MEDICINE

## 2020-06-12 PROCEDURE — 99999 PR PBB SHADOW E&M-EST. PATIENT-LVL III: CPT | Mod: PBBFAC,,, | Performed by: INTERNAL MEDICINE

## 2020-06-12 PROCEDURE — 99213 OFFICE O/P EST LOW 20 MIN: CPT | Mod: S$GLB,,, | Performed by: INTERNAL MEDICINE

## 2020-06-12 PROCEDURE — 99999 PR PBB SHADOW E&M-EST. PATIENT-LVL III: ICD-10-PCS | Mod: PBBFAC,,, | Performed by: INTERNAL MEDICINE

## 2020-06-12 PROCEDURE — 99213 PR OFFICE/OUTPT VISIT, EST, LEVL III, 20-29 MIN: ICD-10-PCS | Mod: S$GLB,,, | Performed by: INTERNAL MEDICINE

## 2020-06-12 PROCEDURE — 3008F BODY MASS INDEX DOCD: CPT | Mod: CPTII,S$GLB,, | Performed by: INTERNAL MEDICINE

## 2020-06-16 ENCOUNTER — HOSPITAL ENCOUNTER (OUTPATIENT)
Dept: RADIOLOGY | Facility: OTHER | Age: 60
Discharge: HOME | End: 2020-06-16
Attending: FAMILY MEDICINE
Payer: COMMERCIAL

## 2020-06-16 DIAGNOSIS — Z12.31 ENCOUNTER FOR SCREENING MAMMOGRAM FOR BREAST CANCER: ICD-10-CM

## 2020-06-16 PROCEDURE — 77063 BREAST TOMOSYNTHESIS BI: CPT | Mod: 26,,, | Performed by: RADIOLOGY

## 2020-06-16 PROCEDURE — 77067 SCR MAMMO BI INCL CAD: CPT | Mod: 26,,, | Performed by: RADIOLOGY

## 2020-06-16 PROCEDURE — 77067 MAMMO DIGITAL SCREENING BILAT WITH TOMOSYNTHESIS_CAD: ICD-10-PCS | Mod: 26,,, | Performed by: RADIOLOGY

## 2020-06-16 PROCEDURE — 77063 MAMMO DIGITAL SCREENING BILAT WITH TOMOSYNTHESIS_CAD: ICD-10-PCS | Mod: 26,,, | Performed by: RADIOLOGY

## 2020-06-16 PROCEDURE — 77067 SCR MAMMO BI INCL CAD: CPT | Mod: TC

## 2020-07-07 ENCOUNTER — OFFICE VISIT (OUTPATIENT)
Dept: PODIATRY | Facility: CLINIC | Age: 60
End: 2020-07-07
Payer: COMMERCIAL

## 2020-07-07 VITALS
SYSTOLIC BLOOD PRESSURE: 138 MMHG | HEART RATE: 103 BPM | BODY MASS INDEX: 27.49 KG/M2 | HEIGHT: 64 IN | WEIGHT: 161 LBS | DIASTOLIC BLOOD PRESSURE: 70 MMHG

## 2020-07-07 DIAGNOSIS — B35.1 ONYCHOMYCOSIS DUE TO DERMATOPHYTE: ICD-10-CM

## 2020-07-07 DIAGNOSIS — M72.2 PLANTAR FASCIITIS: Primary | ICD-10-CM

## 2020-07-07 DIAGNOSIS — M79.671 FOOT PAIN, RIGHT: ICD-10-CM

## 2020-07-07 DIAGNOSIS — M24.573 EQUINUS CONTRACTURE OF ANKLE: ICD-10-CM

## 2020-07-07 PROCEDURE — 17999 PR NON-COVERED FOOT CARE: ICD-10-PCS | Mod: CSM,S$GLB,, | Performed by: PODIATRIST

## 2020-07-07 PROCEDURE — 99214 OFFICE O/P EST MOD 30 MIN: CPT | Mod: 25,,, | Performed by: PODIATRIST

## 2020-07-07 PROCEDURE — 99999 PR PBB SHADOW E&M-EST. PATIENT-LVL IV: ICD-10-PCS | Mod: PBBFAC,,, | Performed by: PODIATRIST

## 2020-07-07 PROCEDURE — 17999 UNLISTD PX SKN MUC MEMB SUBQ: CPT | Mod: CSM,S$GLB,, | Performed by: PODIATRIST

## 2020-07-07 PROCEDURE — 99214 PR OFFICE/OUTPT VISIT, EST, LEVL IV, 30-39 MIN: ICD-10-PCS | Mod: 25,,, | Performed by: PODIATRIST

## 2020-07-07 PROCEDURE — 3008F PR BODY MASS INDEX (BMI) DOCUMENTED: ICD-10-PCS | Mod: CPTII,,, | Performed by: PODIATRIST

## 2020-07-07 PROCEDURE — 29540 PR STRAPPING; ANKLE &/OR FOOT: ICD-10-PCS | Mod: RT,,, | Performed by: PODIATRIST

## 2020-07-07 PROCEDURE — 99999 PR PBB SHADOW E&M-EST. PATIENT-LVL IV: CPT | Mod: PBBFAC,,, | Performed by: PODIATRIST

## 2020-07-07 PROCEDURE — 3008F BODY MASS INDEX DOCD: CPT | Mod: CPTII,,, | Performed by: PODIATRIST

## 2020-07-07 PROCEDURE — 29540 STRAPPING ANKLE &/FOOT: CPT | Mod: RT,,, | Performed by: PODIATRIST

## 2020-07-07 RX ORDER — LIDOCAINE HYDROCHLORIDE 20 MG/ML
JELLY TOPICAL
Qty: 30 ML | Refills: 2 | Status: SHIPPED | OUTPATIENT
Start: 2020-07-07 | End: 2023-06-08

## 2020-07-07 NOTE — PROGRESS NOTES
Subjective:      Patient ID: Soraya Elmore is a 59 y.o. female.    Chief Complaint: Heel Pain (Right foot) and Ingrown Toenail (Right Great toenail & Left 3 toenail)    Sharp deep pain bottom right heel.  Gradual onset, worsening over past several weeks, aggravated by increased weight bearing, shoe gear, pressure.  No previous medical treatment.  OTC pain med not helping. Denies trauma, surgery.    CC2 thick discolored misshapen toenails all ten toes.  Gradual onset, worsening over past several weeks, aggravated by increased weight bearing, shoe gear, pressure.  No previous medical treatment.  OTC med not helping. Denies trauma, surgery all toenails.    Review of Systems   Constitution: Negative for chills, diaphoresis, fever, malaise/fatigue and night sweats.   Cardiovascular: Negative for claudication, cyanosis, leg swelling and syncope.   Skin: Positive for nail changes. Negative for color change, dry skin, rash, suspicious lesions and unusual hair distribution.   Musculoskeletal: Negative for falls, joint pain, joint swelling, muscle cramps, muscle weakness and stiffness.   Gastrointestinal: Negative for constipation, diarrhea, nausea and vomiting.   Neurological: Negative for brief paralysis, disturbances in coordination, focal weakness, numbness, paresthesias, sensory change and tremors.           Objective:      Physical Exam  Constitutional:       General: She is not in acute distress.     Appearance: She is well-developed. She is not diaphoretic.   Cardiovascular:      Pulses:           Popliteal pulses are 2+ on the right side and 2+ on the left side.        Dorsalis pedis pulses are 2+ on the right side and 2+ on the left side.        Posterior tibial pulses are 2+ on the right side and 2+ on the left side.      Comments: Capillary refill 3 seconds all toes/distal feet, all toes/both feet warm to touch.      Negative lymphadenopathy bilateral popliteal fossa and tarsal tunnel.      Negavie lower  extremity edema bilateral.    Musculoskeletal:      Right ankle: She exhibits normal range of motion, no swelling, no ecchymosis, no deformity, no laceration and normal pulse. Achilles tendon normal. Achilles tendon exhibits no pain, no defect and normal Mendez's test results.      Comments: Sharp deep pain to palpation inferior heel right at medial calcaneal tubercle without ecchymosis, erythema, edema, or cardinal signs infection, and no signs of trauma.    Ankle dorsiflexion decreased at <10 degrees bilateral with moderate increase with knee flexion bilateral.    Otherwise, Normal angle, base, station of gait. All ten toes without clubbing, cyanosis, or signs of ischemia.  No pain to palpation bilateral lower extremities.  Range of motion, stability, muscle strength, and muscle tone normal bilateral feet and legs.    Lymphadenopathy:      Lower Body: No right inguinal adenopathy. No left inguinal adenopathy.      Comments: Negative lymphadenopathy bilateral popliteal fossa and tarsal tunnel.    Negative lymphangitic streaking bilateral feet/ankles/legs.   Skin:     General: Skin is warm and dry.      Capillary Refill: Capillary refill takes 2 to 3 seconds.      Coloration: Skin is not pale.      Findings: No abrasion, bruising, burn, ecchymosis, erythema, laceration, lesion or rash.      Nails: There is no clubbing.        Comments:   Skin is normal age and health appropriate color, turgor, texture, and temperature bilateral lower extremities without ulceration, hyperpigmentation, discoloration, masses nodules or cords palpated.  No ecchymosis, erythema, edema, or cardinal signs of infection bilateral lower extremities.    Toenails 1st, 2nd, 3rd, 4th, 5th  bilateral are hypertrophic thickened 2-3 mm, dystrophic, discolored tanish brown with tan, gray crumbly subungual debris.  Tender to distal nail plate pressure, without periungual skin abnormality of each.     Neurological:      Mental Status: She is alert  and oriented to person, place, and time.      Sensory: No sensory deficit.      Motor: No tremor, atrophy or abnormal muscle tone.      Gait: Gait normal.      Deep Tendon Reflexes:      Reflex Scores:       Patellar reflexes are 2+ on the right side and 2+ on the left side.       Achilles reflexes are 2+ on the right side and 2+ on the left side.     Comments: Negative tinel sign to percussion sural, superficial peroneal, deep peroneal, saphenous, and posterior tibial nerves right and left ankles and feet.     Psychiatric:         Behavior: Behavior is cooperative.               Assessment:       Encounter Diagnoses   Name Primary?    Plantar fasciitis Yes    Foot pain, right     Equinus contracture of ankle     Onychomycosis due to dermatophyte          Plan:       Soraya was seen today for heel pain and ingrown toenail.    Diagnoses and all orders for this visit:    Plantar fasciitis    Foot pain, right    Equinus contracture of ankle    Onychomycosis due to dermatophyte    Other orders  -     lidocaine HCL 2% (XYLOCAINE) 2 % jelly; Apply topically as needed. Apply topically once nightly to affected part of foot/feet.      I counseled the patient on her conditions, their implications and medical management.    Patient will stretch the tendo achilles complex three times daily as demonstrated in the office.  Literature was dispensed illustrating proper stretching technique.    I applied a plantar rest strapping to the patient's right foot to offload symptomatic area, support the arch, and relieve pain.    Patient will obtain over the counter arch supports and wear them in shoes whenever possible.  Athletic shoes intended for walking or running are usually best.    Discussed conservative treatment with shoes of adequate dimensions, material, and style to alleviate symptoms and delay or prevent surgical intervention.        - Shoe inspection. Patient instructed on proper foot hygeine. We discussed wearing proper  shoe gear, daily foot inspections, never walking without protective shoe gear, never putting sharp instruments to feet, routine podiatric visits at [facility] annually.    - With patient's permission, nails were aggressively reduced and debrided x 10 to their soft tissue attachment mechanically , removing all offending nail and debris. Patient relates relief following the procedure. She will continue to monitor the areas daily, inspect her feet, wear protective shoe gear when ambulatory, moisturizer to maintain skin integrity and follow in this office p.r.n.          Follow up in about 1 month (around 8/7/2020) for right plan fasc.

## 2020-08-11 ENCOUNTER — OFFICE VISIT (OUTPATIENT)
Dept: PODIATRY | Facility: CLINIC | Age: 60
End: 2020-08-11
Payer: COMMERCIAL

## 2020-08-11 VITALS
WEIGHT: 161 LBS | DIASTOLIC BLOOD PRESSURE: 61 MMHG | HEART RATE: 84 BPM | SYSTOLIC BLOOD PRESSURE: 136 MMHG | HEIGHT: 64 IN | BODY MASS INDEX: 27.49 KG/M2 | TEMPERATURE: 98 F

## 2020-08-11 DIAGNOSIS — M72.2 PLANTAR FASCIITIS: Primary | ICD-10-CM

## 2020-08-11 DIAGNOSIS — M24.573 EQUINUS CONTRACTURE OF ANKLE: ICD-10-CM

## 2020-08-11 DIAGNOSIS — M79.671 FOOT PAIN, RIGHT: ICD-10-CM

## 2020-08-11 PROCEDURE — 3008F PR BODY MASS INDEX (BMI) DOCUMENTED: ICD-10-PCS | Mod: CPTII,S$GLB,, | Performed by: PODIATRIST

## 2020-08-11 PROCEDURE — 99999 PR PBB SHADOW E&M-EST. PATIENT-LVL IV: CPT | Mod: PBBFAC,,, | Performed by: PODIATRIST

## 2020-08-11 PROCEDURE — 20550 NJX 1 TENDON SHEATH/LIGAMENT: CPT | Mod: RT,S$GLB,, | Performed by: PODIATRIST

## 2020-08-11 PROCEDURE — 29540 STRAPPING ANKLE &/FOOT: CPT | Mod: 59,RT,S$GLB, | Performed by: PODIATRIST

## 2020-08-11 PROCEDURE — 20550 PR INJECT TENDON SHEATH/LIGAMENT: ICD-10-PCS | Mod: RT,S$GLB,, | Performed by: PODIATRIST

## 2020-08-11 PROCEDURE — 99213 PR OFFICE/OUTPT VISIT, EST, LEVL III, 20-29 MIN: ICD-10-PCS | Mod: 25,S$GLB,, | Performed by: PODIATRIST

## 2020-08-11 PROCEDURE — 3008F BODY MASS INDEX DOCD: CPT | Mod: CPTII,S$GLB,, | Performed by: PODIATRIST

## 2020-08-11 PROCEDURE — 99213 OFFICE O/P EST LOW 20 MIN: CPT | Mod: 25,S$GLB,, | Performed by: PODIATRIST

## 2020-08-11 PROCEDURE — 99999 PR PBB SHADOW E&M-EST. PATIENT-LVL IV: ICD-10-PCS | Mod: PBBFAC,,, | Performed by: PODIATRIST

## 2020-08-11 PROCEDURE — 29540 PR STRAPPING; ANKLE &/OR FOOT: ICD-10-PCS | Mod: 59,RT,S$GLB, | Performed by: PODIATRIST

## 2020-08-11 RX ORDER — DEXAMETHASONE SODIUM PHOSPHATE 4 MG/ML
4 INJECTION, SOLUTION INTRA-ARTICULAR; INTRALESIONAL; INTRAMUSCULAR; INTRAVENOUS; SOFT TISSUE
Status: COMPLETED | OUTPATIENT
Start: 2020-08-11 | End: 2020-08-11

## 2020-08-11 RX ORDER — TRIAMCINOLONE ACETONIDE 40 MG/ML
40 INJECTION, SUSPENSION INTRA-ARTICULAR; INTRAMUSCULAR
Status: COMPLETED | OUTPATIENT
Start: 2020-08-11 | End: 2020-08-11

## 2020-08-11 RX ADMIN — DEXAMETHASONE SODIUM PHOSPHATE 4 MG: 4 INJECTION, SOLUTION INTRA-ARTICULAR; INTRALESIONAL; INTRAMUSCULAR; INTRAVENOUS; SOFT TISSUE at 09:08

## 2020-08-11 RX ADMIN — TRIAMCINOLONE ACETONIDE 40 MG: 40 INJECTION, SUSPENSION INTRA-ARTICULAR; INTRAMUSCULAR at 09:08

## 2020-08-11 NOTE — PROGRESS NOTES
Subjective:      Patient ID: Soraya Elmore is a 59 y.o. female.    Chief Complaint: Plantar Fasciitis (Follow up, Right foot)    Sharp deep pain bottom right heel.  Gradual onset, no change over past several weeks, aggravated by increased weight bearing, shoe gear, pressure.  Stretches otc inserts minimal change past month.  OTC pain med not helping. Denies trauma, surgery.        Review of Systems   Constitution: Negative for chills, diaphoresis, fever, malaise/fatigue and night sweats.   Cardiovascular: Negative for claudication, cyanosis, leg swelling and syncope.   Skin: Positive for nail changes. Negative for color change, dry skin, rash, suspicious lesions and unusual hair distribution.   Musculoskeletal: Negative for falls, joint pain, joint swelling, muscle cramps, muscle weakness and stiffness.   Gastrointestinal: Negative for constipation, diarrhea, nausea and vomiting.   Neurological: Negative for brief paralysis, disturbances in coordination, focal weakness, numbness, paresthesias, sensory change and tremors.           Objective:      Physical Exam  Constitutional:       General: She is not in acute distress.     Appearance: She is well-developed. She is not diaphoretic.   Cardiovascular:      Pulses:           Popliteal pulses are 2+ on the right side and 2+ on the left side.        Dorsalis pedis pulses are 2+ on the right side and 2+ on the left side.        Posterior tibial pulses are 2+ on the right side and 2+ on the left side.      Comments: Capillary refill 3 seconds all toes/distal feet, all toes/both feet warm to touch.      Negative lymphadenopathy bilateral popliteal fossa and tarsal tunnel.      Negavie lower extremity edema bilateral.    Musculoskeletal:      Right ankle: She exhibits normal range of motion, no swelling, no ecchymosis, no deformity, no laceration and normal pulse. Achilles tendon normal. Achilles tendon exhibits no pain, no defect and normal Mendez's test results.       Comments: Sharp deep pain to palpation inferior heel right at medial calcaneal tubercle without ecchymosis, erythema, edema, or cardinal signs infection, and no signs of trauma.    Ankle dorsiflexion decreased at <10 degrees bilateral with moderate increase with knee flexion bilateral.    Otherwise, Normal angle, base, station of gait. All ten toes without clubbing, cyanosis, or signs of ischemia.  No pain to palpation bilateral lower extremities.  Range of motion, stability, muscle strength, and muscle tone normal bilateral feet and legs.    Lymphadenopathy:      Lower Body: No right inguinal adenopathy. No left inguinal adenopathy.      Comments: Negative lymphadenopathy bilateral popliteal fossa and tarsal tunnel.    Negative lymphangitic streaking bilateral feet/ankles/legs.   Skin:     General: Skin is warm and dry.      Capillary Refill: Capillary refill takes 2 to 3 seconds.      Coloration: Skin is not pale.      Findings: No abrasion, bruising, burn, ecchymosis, erythema, laceration, lesion or rash.      Nails: There is no clubbing.        Comments:   Skin is normal age and health appropriate color, turgor, texture, and temperature bilateral lower extremities without ulceration, hyperpigmentation, discoloration, masses nodules or cords palpated.  No ecchymosis, erythema, edema, or cardinal signs of infection bilateral lower extremities.    Toenails 1st, 2nd, 3rd, 4th, 5th  bilateral are hypertrophic thickened 2-3 mm, dystrophic, discolored tanish brown with tan, gray crumbly subungual debris.  Tender to distal nail plate pressure, without periungual skin abnormality of each.     Neurological:      Mental Status: She is alert and oriented to person, place, and time.      Sensory: No sensory deficit.      Motor: No tremor, atrophy or abnormal muscle tone.      Gait: Gait normal.      Deep Tendon Reflexes:      Reflex Scores:       Patellar reflexes are 2+ on the right side and 2+ on the left side.        Achilles reflexes are 2+ on the right side and 2+ on the left side.     Comments: Negative tinel sign to percussion sural, superficial peroneal, deep peroneal, saphenous, and posterior tibial nerves right and left ankles and feet.     Psychiatric:         Behavior: Behavior is cooperative.               Assessment:       Encounter Diagnoses   Name Primary?    Plantar fasciitis Yes    Foot pain, right     Equinus contracture of ankle          Plan:       Soraya was seen today for plantar fasciitis.    Diagnoses and all orders for this visit:    Plantar fasciitis  -     ORTHOTIC DEVICE (DME)  -     Ambulatory referral/consult to Physical/Occupational Therapy; Future    Foot pain, right  -     ORTHOTIC DEVICE (DME)  -     Ambulatory referral/consult to Physical/Occupational Therapy; Future    Equinus contracture of ankle  -     ORTHOTIC DEVICE (DME)  -     Ambulatory referral/consult to Physical/Occupational Therapy; Future    Other orders  -     dexamethasone injection 4 mg  -     triamcinolone acetonide injection 40 mg      I counseled the patient on her conditions, their implications and medical management.  Continue stretches, inserts, athletic shoes, activity to tolerance.    Rx PT, custom orthotics.    I applied a plantar rest strapping to the patient's right foot to offload symptomatic area, support the arch, and relieve pain.      Counseled the patient on the potential complications of local injection of corticosteroid including, but not limited to:  Discoloration of skin, erosion of soft tissue, and increased likelihood of rupture of a soft tissue structure (ie. Plantar fascia, muscle, tendon, ligament, or capsule in the area of injection).  Patient indicates understanding of my explanation, that any questions have been answered to patient satisfaction, and patient gives verbal consent for injection of affected area.    After sterile skin prep, steroid injection was performed with patient verbal consent  and timeout procedure with patient identifiers, site markings, and procedures in agreement to all present, at rt plantar fascia using 20 mg of Kenalog, 4mg dexamethazone sodium phosphate, and 1mL 1% Lidocaine plain. This was well tolerated.      dispense night splint.    No follow-ups on file.

## 2020-08-21 ENCOUNTER — CLINICAL SUPPORT (OUTPATIENT)
Dept: REHABILITATION | Facility: OTHER | Age: 60
End: 2020-08-21
Attending: PODIATRIST
Payer: COMMERCIAL

## 2020-08-21 DIAGNOSIS — M25.671 DECREASED RANGE OF MOTION OF RIGHT ANKLE: ICD-10-CM

## 2020-08-21 DIAGNOSIS — M72.2 PLANTAR FASCIITIS: ICD-10-CM

## 2020-08-21 DIAGNOSIS — M79.671 FOOT PAIN, RIGHT: ICD-10-CM

## 2020-08-21 DIAGNOSIS — M24.573 EQUINUS CONTRACTURE OF ANKLE: ICD-10-CM

## 2020-08-21 PROCEDURE — 97162 PT EVAL MOD COMPLEX 30 MIN: CPT | Mod: PN

## 2020-08-21 PROCEDURE — 97110 THERAPEUTIC EXERCISES: CPT | Mod: PN

## 2020-08-21 NOTE — PLAN OF CARE
[facility]  Physical Therapy Initial Evaluation    Name: Soraya Elmore  Clinic Number: [id number]    Therapy Diagnosis:   Encounter Diagnoses   Name Primary?    Plantar fasciitis     Foot pain, right     Equinus contracture of ankle     Decreased range of motion of right ankle      Physician: Hayden Flores DPM    Physician Orders: PT Eval and Treat   Medical Diagnosis:  M72.2 (ICD-10-CM) - Plantar fasciitis  M79.671 (ICD-10-CM) - Foot pain, right   M24.573 (ICD-10-CM) - Equinus contracture of ankle    Evaluation Date: 8/21/2020  Authorization Period Expiration: 12/31/2020  Plan of Care Certification Period: 10/16/2020  Visit # / Visits authorized: 1/ 20    Time In: 10:46 AM  Time Out: 11:20 AM  Total Billable Time: 34 minutes    Precautions: Diabetes    Subjective     History of current condition - Soraya is a 60 yo AAF referred to OP PT secondary 6 month history of R foot pain. Reports she sprained her R ankle 2 years ago       Past Medical History:   Diagnosis Date    Diabetes mellitus     Gastroenteritis     Glaucoma (increased eye pressure)     Prediabetes 6/3/2019     Soraya Elmore  has a past surgical history that includes Oophorectomy (1999) and Colonoscopy (N/A, 6/7/2016).    Soraya has a current medication list which includes the following prescription(s): azelastine, bifidobacterium infantis, cholecalciferol (vitamin d3), fluocinonide, fluticasone propionate, ibuprofen, ketoconazole, latanoprost, leg brace, lidocaine hcl 2%, and pantoprazole.    Review of patient's allergies indicates:  No Known Allergies     Imaging: no imaging in EPIC    Prior Therapy: none  Social History:  lives with their spouse. 6 steps to enter home.  Occupation:patient care taker  Prior Level of Function: I with amb and ADL  Current Level of Function: I with amb and ADL    Pain:  Current 1/10, worst 9/10, best 0/10   Location: right foot   Description: Dull  Aggravating  "Factors: Sitting, Standing, Morning, negotiating steps, and Getting out of bed/chair  Easing Factors: stretching, wearing night slint, steroid shot    Pts goals: to get up in the morning and be able to put pressure on her foot. Get off of work without her foot hurting.    Objective     Functional assessment:   - walking:   - sit to stand: I    AROM  LE MMT  R  L    Hip flexion  5/5  5/5    Hip abduction  5/5  5/5    Hip extension  4-/5  4/5    Hip ER  5/5  5/5    Hip IR  5/5  5/5    Knee extension  5/5  5/5    Knee flexion  5/5  5/5    Ankle dorsiflexion  5/5  5/5    Ankle plantar flexion  5/5  5/5    Ankle inversion  5/5  5/5    Ankle eversion  4+/5  5/5        Flexibility testing:  - hamstrings:         B: WNL   - gastrocnemius:   R: tight, R: neutral. L: 10 deg  - piriformis:            B: WNL  - quadriceps:        B: WNL  - hip adductors:    B: tight, decreased 25%  - hip flexors:         B: tight, decreased 25%  - IT bands:            B: WNL    Joint mobility:           R              L  Dorsiflexion         Neutral      10 deg  Plantar flexion     60 deg       60 deg  Inversion             40 deg       45 deg  Eversion             15 deg       25 deg      CMS Impairment/Limitation/Restriction for FOTO Ankle Survey    Therapist reviewed FOTO scores for Soraya Elmore on 8/21/2020.   FOTO documents entered into [email or web address] - see Media section.    Limitation Score: 38%  Category: Mobility    Current : CJ = at least 20% but < 40% impaired, limited or restricted  Goal: CJ = at least 20% but < 40% impaired, limited or restricted       TREATMENT   Treatment Time In: 11:04 AM  Treatment Time Out: 11:18 AM  Total Treatment time separate from Evaluation time:14 minutes    Soraya received therapeutic exercises to develop strength, ROM and flexibility for 14 minutes including:  Towel crunches x 30 reps  Toe spreads x 30 reps  Standing gastroc stretch  Standing Soleus stretch 3 x 30"  Golf ball roll x 1'      Home " Exercises Provided and Patient Education Provided     Education provided:   - Discussed the role of the PTA on the Rehab Team. Also discussed the use of the [facility] Portal for communication.    Towel crunches  Toe spreads  Frozen bottle roll  Standing gastroc stretch  Standing Soleus stretch    Written Home Exercises Provided: yes.  Exercises were reviewed and Soraya was able to demonstrate them prior to the end of the session.  Soraya demonstrated good  understanding of the education provided.     See EMR under Patient Instructions for exercises provided 8/21/2020.  Assessment   Soraya is a 59 y.o. female referred to outpatient Physical Therapy with a medical diagnosis of M72.2 (ICD-10-CM) - Plantar fasciitis, M79.671 (ICD-10-CM) - Foot pain, right, M24.573 (ICD-10-CM) - Equinus contracture of ankle. Pt presents with decreased range of motion in R ankle and increased R calf and foot pain. Patient will benefit from OP PT for ankle ROM, stretching to decrease R foot pain. Patient has hx of DM, which could affect flexibility in R Achilles tendon.    Pt prognosis is Good.   Pt will benefit from skilled outpatient Physical Therapy to address the deficits stated above and in the chart below, provide pt/family education, and to maximize pt's level of independence.     Plan of care discussed with patient: Yes  Pt's spiritual, cultural and educational needs considered and patient is agreeable to the plan of care and goals as stated below:     Anticipated Barriers for therapy: work schedule    Medical Necessity is demonstrated by the following  History  Co-morbidities and personal factors that may impact the plan of care Co-morbidities:   diabetes    Personal Factors:   no deficits     moderate   Examination  Body Structures and Functions, activity limitations and participation restrictions that may impact the plan of care Body Regions:   lower extremities    Body Systems:     ROM  strength  balance  gait    Participation Restrictions:   none    Activity limitations:   Learning and applying knowledge  no deficits    General Tasks and Commands  no deficits    Communication  no deficits    Mobility  walking    Self care  no deficits    Domestic Life  no deficits    Interactions/Relationships  no deficits    Life Areas  no deficits    Community and Social Life  no deficits         moderate   Clinical Presentation evolving clinical presentation with changing clinical characteristics moderate   Decision Making/ Complexity Score: moderate     Goals:  Short Term Goals: 4 weeks   1. Independent with HEP.  2. Report decreased R foot pain < or =  6/10 with adls such as sitting, standing, negotiating steps, and getting out of bed/chair.  3. Increased R ankle dorsiflexion to 5 deg.    Long Term Goals: 8 weeks   4. Increased R ankle dorsiflexion to 10 deg   5. Report decreased R foot pain < or =  3/10 with adls such as sitting, standing, negotiating steps, and getting out of bed/chair.  6. Increased MMT for B LE to 5/5 muscle grade to promote proper pelvic stability to decrease R foot pain < or =  3/10 with adls such as sitting, standing, negotiating steps, and getting out of bed/chair.   7. Increased flexibility in B hip adductors and B hip flexors to promote proper pelvic stability to decrease R foot pain < or =  3/10 with adls such as sitting, standing, negotiating steps, and getting out of bed/chair.  8. Patient to achieve level on the FOTO Outcomes Measurement System.    Plan   Certification Period/Plan of care expiration: 8/21/2020 to 10/16/2020.    Outpatient Physical Therapy 1 times weekly for 8 weeks to include the following interventions: Gait Training, Manual Therapy, Moist Heat/ Ice, Neuromuscular Re-ed, Patient Education, Therapeutic Exercise and dry needling.     Rashard Marcos, PT

## 2020-08-21 NOTE — PATIENT INSTRUCTIONS
Achilles / Gastroc, Standing        Stand, right foot behind, heel on floor and turned slightly out, leg straight, forward leg bent. Move hips forward.     Hold _30_ seconds. Repeat _3_ times per session. Do _1-2__ sessions per day.    Copyright © I. All rights reserved.   Achilles / Soleus, Standing        Stand, right foot behind, heel on floor and turned slightly out. Lower hips and bend knees.     Hold _30_ seconds. Repeat _3_ times per session. Do _1-2__ sessions per day.    Copyright © I. All rights reserved.     TOES: Towel Bunching        With involved straight toes on towel, bend toes bunching up towel _20_ times.  Do _1-2__ times per day.    Copyright © I. All rights reserved.     Toe Spread        Sitting, spread toes, then bring them together.  Repeat _20__ times. Do _1-2__ sessions per day.    Copyright © I. All rights reserved.         FROZEN BOTTLE ROLL - PLANTAR FASCIA         Place foot on plastic frozen water bottle. Roll frozen water bottle from the ball of your foot to the heel along the arch.    Copyright © 2010-20120 [employer]

## 2020-08-26 ENCOUNTER — CLINICAL SUPPORT (OUTPATIENT)
Dept: REHABILITATION | Facility: OTHER | Age: 60
End: 2020-08-26
Attending: PODIATRIST
Payer: COMMERCIAL

## 2020-08-26 DIAGNOSIS — M25.671 DECREASED RANGE OF MOTION OF RIGHT ANKLE: ICD-10-CM

## 2020-08-26 PROCEDURE — 97110 THERAPEUTIC EXERCISES: CPT | Mod: PN,CQ

## 2020-08-26 NOTE — PROGRESS NOTES
"  Physical Therapy Treatment Note     [facility]  [facility] Number[facility] [id number]    Therapy Diagnosis:   Encounter Diagnosis   Name Primary?    Decreased range of motion of right ankle      Physician: Hayden Flores DPM    Visit Date: 8/26/2020    Physician Orders: PT Eval and Treat   Medical Diagnosis:  M72.2 (ICD-10-CM) - Plantar fasciitis  M79.671 (ICD-10-CM) - Foot pain, right   M24.573 (ICD-10-CM) - Equinus contracture of ankle     Evaluation Date: 8/21/2020  Authorization Period Expiration: 12/31/2020  Plan of Care Certification Period: 10/16/2020  Visit # / Visits authorized: 2/ 20     Time In: 1309  Time Out: 1347  Total Billable Time: 38 minutes     Precautions: Diabetes       Subjective     Pt reports: w/ no mild pn in R ankle and foot.  She was compliant with home exercise program.  Response to previous treatment: no adverse effects  Functional change: no change     Pain: 2/10  Location: right ankle    Objective     Soraya received therapeutic exercises to develop strength, endurance, ROM, flexibility, posture and core stabilization for 38 minutes including:  Towel crunches x 3 min   Toe spreads x 3 min   Standing gastroc stretch 3 x 30 sec   Standing Soleus stretch 3 x 30"  Golf ball roll x 3 min   Bridges 3 x 10  [place]  x 1   PROM ankle DF/PF  Supine hip flexor stretch 3 x 20 sec B           Home Exercises Provided and Patient Education Provided     Education provided:   - Pt edu on proper exercise technique.      Written Home Exercises Provided: Patient instructed to cont prior HEP.  Exercises were reviewed and Autumnstaci was able to demonstrate them prior to the end of the session.  Soraya demonstrated good  understanding of the education provided.     See EMR under Patient Instructions for exercises provided prior visit.    Assessment     Pt sai tx well.  Pn level remained same prior to and after tx session.  Pt cont to lack some ankle strength and stability.  Pt showed increased " strength and endurance during therex.    Soraya is progressing well towards her goals.   Pt prognosis is Good.     Pt will continue to benefit from skilled outpatient physical therapy to address the deficits listed in the problem list box on initial evaluation, provide pt/family education and to maximize pt's level of independence in the home and community environment.     Pt's spiritual, cultural and educational needs considered and pt agreeable to plan of care and goals.     Anticipated barriers to physical therapy: none    Goals:Goals:  Short Term Goals: 4 weeks   1. Independent with HEP.  ( progressing, not met)  2. Report decreased R foot pain < or =  6/10 with adls such as sitting, standing, negotiating steps, and getting out of bed/chair. ( progressing, not met)  3. Increased R ankle dorsiflexion to 5 deg. ( progressing, not met)     Long Term Goals: 8 weeks   4. Increased R ankle dorsiflexion to 10 deg  ( progressing, not met)  5. Report decreased R foot pain < or =  3/10 with adls such as sitting, standing, negotiating steps, and getting out of bed/chair. ( progressing, not met)  6. Increased MMT for B LE to 5/5 muscle grade to promote proper pelvic stability to decrease R foot pain < or =  3/10 with adls such as sitting, standing, negotiating steps, and getting out of bed/chair.  ( progressing, not met)  7. Increased flexibility in B hip adductors and B hip flexors to promote proper pelvic stability to decrease R foot pain < or =  3/10 with adls such as sitting, standing, negotiating steps, and getting out of bed/chair. ( progressing, not met)  8. Patient to achieve level on the FOTO Outcomes Measurement System. ( progressing, not met)         Plan     Cont to progress towards goals set by PT.  Work to increase ankle mobility and Le strength next visit.       Douglas Cardenas, PTA

## 2020-09-04 ENCOUNTER — CLINICAL SUPPORT (OUTPATIENT)
Dept: REHABILITATION | Facility: OTHER | Age: 60
End: 2020-09-04
Attending: PODIATRIST
Payer: COMMERCIAL

## 2020-09-04 DIAGNOSIS — M25.671 DECREASED RANGE OF MOTION OF RIGHT ANKLE: ICD-10-CM

## 2020-09-04 PROCEDURE — 97110 THERAPEUTIC EXERCISES: CPT | Mod: PN

## 2020-09-04 PROCEDURE — 97112 NEUROMUSCULAR REEDUCATION: CPT | Mod: PN

## 2020-09-04 NOTE — PROGRESS NOTES
"  Physical Therapy Treatment Note     [facility]  [facility] Number[facility] [id number]    Therapy Diagnosis:   Encounter Diagnosis   Name Primary?    Decreased range of motion of right ankle      Physician: Hayden Flores DPM    Visit Date: 9/4/2020    Physician Orders: PT Eval and Treat   Medical Diagnosis:  M72.2 (ICD-10-CM) - Plantar fasciitis  M79.671 (ICD-10-CM) - Foot pain, right   M24.573 (ICD-10-CM) - Equinus contracture of ankle  Evaluation Date: 8/21/2020  Authorization Period Expiration: 12/31/2020  Plan of Care Certification Period: 10/16/2020  Visit # / Visits authorized: 3/ 20     Time In: 10:31 AM  Time Out: 11:11 AM  Total Billable Time: 40 minutes     Precautions: Diabetes       Subjective     Pt reports: she has been doing well. Reports mild pain this morning  She was compliant with home exercise program.  Response to previous treatment: felt better  Functional change: getting out of bed without pain    Pain: 1/10  Location: right ankle    Objective     Soraya received therapeutic exercises to develop strength, endurance, ROM, flexibility, posture and core stabilization for 25 minutes including:  Towel crunches x 3 min   Toe spreads x 3 min   Seated gastroc stretch 3 x 30 sec with towel  seated Soleus stretch 3 x 30" with towel  Golf ball roll x 3 min   Bridges 3 x 10  [place]  x 1   PROM ankle DF/PF  Supine hip flexor stretch 3 x 20 sec B     Soraya participated in neuromuscular re-education activities to improve: Balance, Coordination and Proprioception for 15 minutes. The following activities were included:  Wobble board PF/DF x 30 reps  Wobble Board Inv/Ev x 30 reps  SLS 3 x 20"  SLS eyes closed 3 x 20"  Tandem stance 3 x 20"  Lateral step outs x 20 reps  Forward step outs with contralateral UE swing x 20 reps      Home Exercises Provided and Patient Education Provided     Education provided:   - Pt edu on proper exercise technique.      Written Home Exercises Provided: Patient " instructed to cont prior HEP.  Exercises were reviewed and Soraya was able to demonstrate them prior to the end of the session.  Soraya demonstrated good  understanding of the education provided.     See EMR under Patient Instructions for exercises provided prior visit.    Assessment     Initiated balance exercises today. PT provided S for patient. Will continue with dynamic balance training.    Soraya is progressing well towards her goals.   Pt prognosis is Good.     Pt will continue to benefit from skilled outpatient physical therapy to address the deficits listed in the problem list box on initial evaluation, provide pt/family education and to maximize pt's level of independence in the home and community environment.     Pt's spiritual, cultural and educational needs considered and pt agreeable to plan of care and goals.     Anticipated barriers to physical therapy: none    Goals:Goals:  Short Term Goals: 4 weeks   1. Independent with HEP.  ( progressing, not met)  2. Report decreased R foot pain < or =  6/10 with adls such as sitting, standing, negotiating steps, and getting out of bed/chair. ( progressing, not met)  3. Increased R ankle dorsiflexion to 5 deg. ( progressing, not met)     Long Term Goals: 8 weeks   4. Increased R ankle dorsiflexion to 10 deg  ( progressing, not met)  5. Report decreased R foot pain < or =  3/10 with adls such as sitting, standing, negotiating steps, and getting out of bed/chair. ( progressing, not met)  6. Increased MMT for B LE to 5/5 muscle grade to promote proper pelvic stability to decrease R foot pain < or =  3/10 with adls such as sitting, standing, negotiating steps, and getting out of bed/chair.  ( progressing, not met)  7. Increased flexibility in B hip adductors and B hip flexors to promote proper pelvic stability to decrease R foot pain < or =  3/10 with adls such as sitting, standing, negotiating steps, and getting out of bed/chair. ( progressing, not  met)  8. Patient to achieve CJ (at least 20% < 40% impaired, limited or restricted) level at 30% functional limitation on the FOTO Outcomes Measurement System. ( progressing, not met)         Plan     Continue with LE/ankle strengthening and dynamic balance exercises.    Rashard Marcos, PT

## 2020-09-11 ENCOUNTER — CLINICAL SUPPORT (OUTPATIENT)
Dept: REHABILITATION | Facility: OTHER | Age: 60
End: 2020-09-11
Attending: PODIATRIST
Payer: COMMERCIAL

## 2020-09-11 DIAGNOSIS — M25.671 DECREASED RANGE OF MOTION OF RIGHT ANKLE: ICD-10-CM

## 2020-09-11 PROCEDURE — 97112 NEUROMUSCULAR REEDUCATION: CPT | Mod: PN,CQ

## 2020-09-11 PROCEDURE — 97110 THERAPEUTIC EXERCISES: CPT | Mod: PN,CQ

## 2020-09-11 NOTE — PROGRESS NOTES
"  Physical Therapy Treatment Note     [facility]  [id number]    Therapy Diagnosis:   Encounter Diagnosis   Name Primary?    Decreased range of motion of right ankle      Physician: Hayden Flores DPM    Visit Date: 9/11/2020    Physician Orders: PT Eval and Treat   Medical Diagnosis:  M72.2 (ICD-10-CM) - Plantar fasciitis  M79.671 (ICD-10-CM) - Foot pain, right   M24.573 (ICD-10-CM) - Equinus contracture of ankle  Evaluation Date: 8/21/2020  Authorization Period Expiration: 12/31/2020  Plan of Care Certification Period: 10/16/2020  Visit # / Visits authorized: 4/ 20     Time In: 0925  Time Out: 1003  Total Billable Time: 38 minutes     Precautions: Diabetes       Subjective     Pt states feeling okay w/ no c/o pn in R ankle currently.    She was compliant with home exercise program.  Response to previous treatment: no adverse effects  Functional change: standing at work is more tolerable     Pain: 0/10  Location: right ankle    Objective     Soraya received therapeutic exercises to develop strength, endurance, ROM, flexibility, posture and core stabilization for 28 minutes including:  Towel crunches x 3 min   Toe spreads x 3 min   Seated gastroc stretch 3 x 30 sec with towel  seated Soleus stretch 3 x 30" with towel  Golf ball roll x 3 min   [place]  x 1     Bridges 3 x 10  PROM ankle DF/PF  Supine hip flexor stretch 3 x 20 sec B     Soraya participated in neuromuscular re-education activities to improve: Balance, Coordination and Proprioception for 10 minutes. The following activities were included:  Wobble board PF/DF x 30 reps  Wobble Board Inv/Ev x 30 reps  SLS 3 x 30"  SLS eyes closed 3 x 30"  Tandem stance 3 x 30"  Lateral step outs 2 x 40 ft  Forward step outs with contralateral UE swing 2 x 40 ft       Home Exercises Provided and Patient Education Provided     Education provided:   - Pt edu on proper exercise technique.      Written Home Exercises Provided: Patient " instructed to cont prior HEP.  Exercises were reviewed and Soraya was able to demonstrate them prior to the end of the session.  Soraya demonstrated good  understanding of the education provided.     See EMR under Patient Instructions for exercises provided prior visit.    Assessment   Pt arrived 10 min late to tx today.  Pt demonstrated increased balance during therex.  Pt cont to lack some ankle stability and strength.  Pt had no adverse effects from tx.  Pt needed a few rest breaks 2* feeling dizzy.      Initiated balance exercises today. PT provided S for patient. Will continue with dynamic balance training.    Soraya is progressing well towards her goals.   Pt prognosis is Good.     Pt will continue to benefit from skilled outpatient physical therapy to address the deficits listed in the problem list box on initial evaluation, provide pt/family education and to maximize pt's level of independence in the home and community environment.     Pt's spiritual, cultural and educational needs considered and pt agreeable to plan of care and goals.     Anticipated barriers to physical therapy: none    Goals:Goals:  Short Term Goals: 4 weeks   1. Independent with HEP.  ( progressing, not met)  2. Report decreased R foot pain < or =  6/10 with adls such as sitting, standing, negotiating steps, and getting out of bed/chair. ( progressing, not met)  3. Increased R ankle dorsiflexion to 5 deg. ( progressing, not met)     Long Term Goals: 8 weeks   4. Increased R ankle dorsiflexion to 10 deg  ( progressing, not met)  5. Report decreased R foot pain < or =  3/10 with adls such as sitting, standing, negotiating steps, and getting out of bed/chair. ( progressing, not met)  6. Increased MMT for B LE to 5/5 muscle grade to promote proper pelvic stability to decrease R foot pain < or =  3/10 with adls such as sitting, standing, negotiating steps, and getting out of bed/chair.  ( progressing, not met)  7. Increased flexibility in  B hip adductors and B hip flexors to promote proper pelvic stability to decrease R foot pain < or =  3/10 with adls such as sitting, standing, negotiating steps, and getting out of bed/chair. ( progressing, not met)  8. Patient to achieve CJ (at least 20% < 40% impaired, limited or restricted) level at 30% functional limitation on the FOTO Outcomes Measurement System. ( progressing, not met)         Plan     Cont to progress towards goals set by PT focusing on dynamic balance and ankle strength.      Douglas Cardenas, PTA

## 2020-09-15 ENCOUNTER — OFFICE VISIT (OUTPATIENT)
Dept: INTERNAL MEDICINE | Facility: CLINIC | Age: 60
End: 2020-09-15
Attending: FAMILY MEDICINE
Payer: COMMERCIAL

## 2020-09-15 VITALS
DIASTOLIC BLOOD PRESSURE: 74 MMHG | BODY MASS INDEX: 27.52 KG/M2 | WEIGHT: 161.19 LBS | HEART RATE: 86 BPM | SYSTOLIC BLOOD PRESSURE: 118 MMHG | OXYGEN SATURATION: 100 % | HEIGHT: 64 IN

## 2020-09-15 DIAGNOSIS — Z00.00 ANNUAL PHYSICAL EXAM: Primary | ICD-10-CM

## 2020-09-15 DIAGNOSIS — H04.129 DRY EYE SYNDROME, UNSPECIFIED LATERALITY: ICD-10-CM

## 2020-09-15 DIAGNOSIS — K21.9 GASTROESOPHAGEAL REFLUX DISEASE WITHOUT ESOPHAGITIS: ICD-10-CM

## 2020-09-15 PROCEDURE — 99999 PR PBB SHADOW E&M-EST. PATIENT-LVL IV: ICD-10-PCS | Mod: PBBFAC,,, | Performed by: FAMILY MEDICINE

## 2020-09-15 PROCEDURE — 99396 PREV VISIT EST AGE 40-64: CPT | Mod: S$GLB,,, | Performed by: FAMILY MEDICINE

## 2020-09-15 PROCEDURE — 3008F PR BODY MASS INDEX (BMI) DOCUMENTED: ICD-10-PCS | Mod: CPTII,S$GLB,, | Performed by: FAMILY MEDICINE

## 2020-09-15 PROCEDURE — 99999 PR PBB SHADOW E&M-EST. PATIENT-LVL IV: CPT | Mod: PBBFAC,,, | Performed by: FAMILY MEDICINE

## 2020-09-15 PROCEDURE — 99396 PR PREVENTIVE VISIT,EST,40-64: ICD-10-PCS | Mod: S$GLB,,, | Performed by: FAMILY MEDICINE

## 2020-09-15 PROCEDURE — 3008F BODY MASS INDEX DOCD: CPT | Mod: CPTII,S$GLB,, | Performed by: FAMILY MEDICINE

## 2020-09-15 NOTE — PROGRESS NOTES
"CHIEF COMPLAINT: Establish a primary care physician    HISTORY OF PRESENT ILLNESS: The patient is a generally healthy 59 year-old BF.  The patient has no specific complaints today other than some lingering reflux symptoms.  She was seen in the ER about 3 months ago and begun on a PPI.  She states she has about 50% better now 3 months later.  She does have some chronic allergic rhinitis for which she takes routine medications.  The patient wishes to establish a new primary care physician.  The patient recently had complete blood work done.    REVIEW OF SYSTEMS:  GENERAL: No fever, chills, fatigability or weight loss.  SKIN: No rashes, itching or changes in color or texture of skin.  HEAD: No headaches or recent head trauma.  EYES: Visual acuity fine. No photophobia, ocular pain or diplopia.  EARS: Denies ear pain, discharge or vertigo.  NOSE: No loss of smell, no epistaxis or postnasal drip.  MOUTH & THROAT: No hoarseness or change in voice. No excessive gum bleeding.  NODES: Denies swollen glands.  CHEST: Denies GRIFFITHS, cyanosis, wheezing, cough and sputum production.  CARDIOVASCULAR: Denies chest pain, PND, orthopnea or reduced exercise tolerance.  ABDOMEN: Appetite fine. No weight loss. Denies diarrhea, hematemesis or blood in stool.  URINARY: No flank pain, dysuria or hematuria.  PERIPHERAL VASCULAR: No claudication or cyanosis.  MUSCULOSKELETAL: No joint stiffness or swelling. Denies back pain.  NEUROLOGIC: No history of seizures, paralysis, alteration of gait or coordination.    SOCIAL HISTORY: The patient does not smoke.  The patient consumes alcohol socially.  The patient is single.  [facility]    PHYSICAL EXAMINATION:   Blood pressure 118/74, pulse 86, height 5' 4" (1.626 m), weight 73.1 kg (161 lb 2.5 oz), last menstrual period 07/21/2006, SpO2 100 %.    APPEARANCE: Well nourished, well developed, in no acute distress.    HEAD: Normocephalic, atraumatic.  EYES: PERRL. EOMI.  Conjunctivae without injection " and  anicteric  NOSE: Mucosa pink. Airway clear.  MOUTH & THROAT: No tonsillar enlargement. No pharyngeal erythema or exudate. No stridor.  NECK: Supple.   NODES: No cervical, axillary or inguinal lymph node enlargement.  CHEST: Lungs clear to auscultation.  No retractions are noted.  No rales or rhonchi are present.  CARDIOVASCULAR: Normal S1, S2. No rubs, murmurs or gallops.  ABDOMEN: Bowel sounds normal. Not distended. Soft. No tenderness or masses.  No ascites is noted.  MUSCULOSKELETAL:  There is no clubbing, cyanosis, or edema of the extremities x4.  There is full range of motion of the lumbar spine.  There is full range of motion of the extremities x4.  There is no deformity noted.    NEUROLOGIC:       Normal speech development.      Hearing normal.      Normal gait.      Motor and sensory exams grossly normal.  PSYCHIATRIC: Patient is alert and oriented x3.  Thought processes are all normal.  There is no homicidality.  There is no suicidality.  There is no evidence of psychosis.    LABORATORY/RADIOLOGY:   Chart reviewed.  We reviewed recent blood work today.    ASSESSMENT:   Annual  GERD, slowly improving on PPI  Chronic allergic rhinitis    PLAN:  Up to date w/ blood work  Given that it has been 3 months on a PPI will have her see Gastroenterology in about a month if things are not substantially more improved  Return to clinic in one year.

## 2020-09-16 ENCOUNTER — IMMUNIZATION (OUTPATIENT)
Dept: PHARMACY | Facility: CLINIC | Age: 60
End: 2020-09-16
Payer: COMMERCIAL

## 2020-09-18 ENCOUNTER — CLINICAL SUPPORT (OUTPATIENT)
Dept: REHABILITATION | Facility: OTHER | Age: 60
End: 2020-09-18
Attending: PODIATRIST
Payer: COMMERCIAL

## 2020-09-18 DIAGNOSIS — M25.671 DECREASED RANGE OF MOTION OF RIGHT ANKLE: ICD-10-CM

## 2020-09-18 PROCEDURE — 97112 NEUROMUSCULAR REEDUCATION: CPT | Mod: PN

## 2020-09-18 PROCEDURE — 97110 THERAPEUTIC EXERCISES: CPT | Mod: PN

## 2020-09-18 NOTE — PROGRESS NOTES
"  Physical Therapy Treatment Note     [facility]  [id number]    Therapy Diagnosis:   Encounter Diagnosis   Name Primary?    Decreased range of motion of right ankle      Physician: Hayden Flores DPM    Visit Date: 9/18/2020    Physician Orders: PT Eval and Treat   Medical Diagnosis:  M72.2 (ICD-10-CM) - Plantar fasciitis  M79.671 (ICD-10-CM) - Foot pain, right   M24.573 (ICD-10-CM) - Equinus contracture of ankle  Evaluation Date: 8/21/2020  Authorization Period Expiration: 12/31/2020  Plan of Care Certification Period: 10/16/2020  Visit # / Visits authorized: 4/ 20     Time In: 9:47 AM  Time Out: 10:31 AM  Total Billable Time: 44 minutes     Precautions: Diabetes       Subjective     Pt states she is feeling good today. States she worked last night and she did not have any increased pain.  She was compliant with home exercise program.  Response to previous treatment: no adverse effects  Functional change: standing at work is more tolerable     Pain: 0/10  Location: right ankle    Objective     Soraya received therapeutic exercises to develop strength, endurance, ROM, flexibility, posture and core stabilization for 33 minutes including:  Towel crunches x 3 min   Toe spreads x 3 min   Seated gastroc stretch 3 x 30 sec with towel  seated Soleus stretch 3 x 30" with towel  Golf ball roll x 3 min   [place]  x 2 trials     Bridges 3 x 10  PROM ankle DF/PF  Supine hip flexor stretch 3 x 20 sec B     Soraya participated in neuromuscular re-education activities to improve: Balance, Coordination and Proprioception for 11minutes. The following activities were included:  Wobble board PF/DF x 30 reps  Wobble Board Inv/Ev x 30 reps  SLS 3 x 30"  SLS eyes closed 3 x 30"  Tandem stance 3 x 30"  Lateral step outs 2 x 40 ft  Forward step outs with contralateral UE swing 2 x 40 ft       Home Exercises Provided and Patient Education Provided     Education provided:   - Pt edu on proper exercise " technique.      Written Home Exercises Provided: Patient instructed to cont prior HEP.  Exercises were reviewed and Soraya was able to demonstrate them prior to the end of the session.  Soraya demonstrated good  understanding of the education provided.     See EMR under Patient Instructions for exercises provided prior visit.    Assessment   Pt continues to experience decreased R foot pain. Was able to work last night without increased pain.    Soraya is progressing well towards her goals.   Pt prognosis is Good.     Pt will continue to benefit from skilled outpatient physical therapy to address the deficits listed in the problem list box on initial evaluation, provide pt/family education and to maximize pt's level of independence in the home and community environment.     Pt's spiritual, cultural and educational needs considered and pt agreeable to plan of care and goals.     Anticipated barriers to physical therapy: none    Goals:Goals:  Short Term Goals: 4 weeks   1. Independent with HEP.  ( progressing, not met)  2. Report decreased R foot pain < or =  6/10 with adls such as sitting, standing, negotiating steps, and getting out of bed/chair. ( progressing, not met)  3. Increased R ankle dorsiflexion to 5 deg. ( progressing, not met)     Long Term Goals: 8 weeks   4. Increased R ankle dorsiflexion to 10 deg  ( progressing, not met)  5. Report decreased R foot pain < or =  3/10 with adls such as sitting, standing, negotiating steps, and getting out of bed/chair. ( progressing, not met)  6. Increased MMT for B LE to 5/5 muscle grade to promote proper pelvic stability to decrease R foot pain < or =  3/10 with adls such as sitting, standing, negotiating steps, and getting out of bed/chair.  ( progressing, not met)  7. Increased flexibility in B hip adductors and B hip flexors to promote proper pelvic stability to decrease R foot pain < or =  3/10 with adls such as sitting, standing, negotiating steps, and getting  out of bed/chair. ( progressing, not met)  8. Patient to achieve CJ (at least 20% < 40% impaired, limited or restricted) level at 30% functional limitation on the FOTO Outcomes Measurement System. ( progressing, not met)         Plan     Cont to progress towards goals set by PT focusing on dynamic balance and ankle strength.      Rashard Marcos, PT

## 2020-09-25 ENCOUNTER — CLINICAL SUPPORT (OUTPATIENT)
Dept: REHABILITATION | Facility: OTHER | Age: 60
End: 2020-09-25
Attending: PODIATRIST
Payer: COMMERCIAL

## 2020-09-25 ENCOUNTER — OFFICE VISIT (OUTPATIENT)
Dept: PODIATRY | Facility: CLINIC | Age: 60
End: 2020-09-25
Payer: COMMERCIAL

## 2020-09-25 VITALS
DIASTOLIC BLOOD PRESSURE: 65 MMHG | HEART RATE: 96 BPM | SYSTOLIC BLOOD PRESSURE: 137 MMHG | HEIGHT: 64 IN | BODY MASS INDEX: 27.49 KG/M2 | WEIGHT: 161 LBS

## 2020-09-25 DIAGNOSIS — B35.1 ONYCHOMYCOSIS DUE TO DERMATOPHYTE: ICD-10-CM

## 2020-09-25 DIAGNOSIS — M25.671 DECREASED RANGE OF MOTION OF RIGHT ANKLE: ICD-10-CM

## 2020-09-25 DIAGNOSIS — M24.573 EQUINUS CONTRACTURE OF ANKLE: ICD-10-CM

## 2020-09-25 DIAGNOSIS — M79.671 FOOT PAIN, RIGHT: ICD-10-CM

## 2020-09-25 DIAGNOSIS — M72.2 PLANTAR FASCIITIS: Primary | ICD-10-CM

## 2020-09-25 PROCEDURE — 3008F BODY MASS INDEX DOCD: CPT | Mod: CPTII,S$GLB,, | Performed by: PODIATRIST

## 2020-09-25 PROCEDURE — 99999 PR PBB SHADOW E&M-EST. PATIENT-LVL III: ICD-10-PCS | Mod: PBBFAC,,, | Performed by: PODIATRIST

## 2020-09-25 PROCEDURE — 99213 PR OFFICE/OUTPT VISIT, EST, LEVL III, 20-29 MIN: ICD-10-PCS | Mod: S$GLB,,, | Performed by: PODIATRIST

## 2020-09-25 PROCEDURE — 97112 NEUROMUSCULAR REEDUCATION: CPT | Mod: PN

## 2020-09-25 PROCEDURE — 97110 THERAPEUTIC EXERCISES: CPT | Mod: PN

## 2020-09-25 PROCEDURE — 3008F PR BODY MASS INDEX (BMI) DOCUMENTED: ICD-10-PCS | Mod: CPTII,S$GLB,, | Performed by: PODIATRIST

## 2020-09-25 PROCEDURE — 99999 PR PBB SHADOW E&M-EST. PATIENT-LVL III: CPT | Mod: PBBFAC,,, | Performed by: PODIATRIST

## 2020-09-25 PROCEDURE — 99213 OFFICE O/P EST LOW 20 MIN: CPT | Mod: S$GLB,,, | Performed by: PODIATRIST

## 2020-09-25 RX ORDER — CICLOPIROX 80 MG/ML
SOLUTION TOPICAL NIGHTLY
Qty: 6.6 ML | Refills: 11 | Status: SHIPPED | OUTPATIENT
Start: 2020-09-25 | End: 2021-05-20

## 2020-09-25 NOTE — PROGRESS NOTES
"  Physical Therapy Treatment Note     Name: Soraya McguireMonterey  Clinic Number: [id number]    Therapy Diagnosis:   Encounter Diagnosis   Name Primary?    Decreased range of motion of right ankle      Physician: Hayden Flores DPM    Visit Date: 9/25/2020    Physician Orders: PT Eval and Treat   Medical Diagnosis:  M72.2 (ICD-10-CM) - Plantar fasciitis  M79.671 (ICD-10-CM) - Foot pain, right   M24.573 (ICD-10-CM) - Equinus contracture of ankle  Evaluation Date: 8/21/2020  Authorization Period Expiration: 12/31/2020  Plan of Care Certification Period: 10/16/2020  Visit # / Visits authorized: 5/ 20     Time In: 10:02 AM  Time Out: 10:3 AM  Total Billable Time:  minutes     Precautions: Diabetes       Subjective     Pt states she can now put her foot flat to bear weight after sitting for a period of time.  She was compliant with home exercise program.  Response to previous treatment: "it was good"  Functional change: after work she can sit down and then stand without pain    Pain: 0.5/10  Location: right ankle    Objective     Soraya received therapeutic exercises to develop strength, endurance, ROM, flexibility, posture and core stabilization for 20 minutes including:  Towel crunches x 3 min   Toe spreads x 3 min   gastroc stretch x 90" on incline  Soleus stretch x 90" on incline  Golf ball roll x 3 min   [place]  x 2 trials     Bridges 3 x 10  PROM ankle DF/PF  Supine hip flexor stretch 3 x 20 sec B     Soraya participated in neuromuscular re-education activities to improve: Balance, Coordination and Proprioception for 10 minutes. The following activities were included:  Wobble board PF/DF x 30 reps  Wobble Board Inv/Ev x 30 reps  SLS 3 x 30"  SLS eyes closed 3 x 30"  Tandem stance 3 x 30"  Lateral step outs 2 x 40 ft  Forward step outs with contralateral UE swing 2 x 40 ft       Home Exercises Provided and Patient Education Provided     Education provided:   - Pt edu on proper exercise technique.  "     Written Home Exercises Provided: Patient instructed to cont prior HEP.  Exercises were reviewed and Soraya was able to demonstrate them prior to the end of the session.  Soraya demonstrated good  understanding of the education provided.     See EMR under Patient Instructions for exercises provided prior visit.    Assessment   Pt progressing well in OP PT towards below listed goals.Subjective reports of improvement in foot pain both at work and following work.    Soraya is progressing well towards her goals.   Pt prognosis is Good.     Pt will continue to benefit from skilled outpatient physical therapy to address the deficits listed in the problem list box on initial evaluation, provide pt/family education and to maximize pt's level of independence in the home and community environment.     Pt's spiritual, cultural and educational needs considered and pt agreeable to plan of care and goals.     Anticipated barriers to physical therapy: none    Goals:Goals:  Short Term Goals: 4 weeks   1. Independent with HEP.  ( progressing, not met)  2. Report decreased R foot pain < or =  6/10 with adls such as sitting, standing, negotiating steps, and getting out of bed/chair. ( progressing, not met)  3. Increased R ankle dorsiflexion to 5 deg. ( progressing, not met)     Long Term Goals: 8 weeks   4. Increased R ankle dorsiflexion to 10 deg  ( progressing, not met)  5. Report decreased R foot pain < or =  3/10 with adls such as sitting, standing, negotiating steps, and getting out of bed/chair. ( progressing, not met)  6. Increased MMT for B LE to 5/5 muscle grade to promote proper pelvic stability to decrease R foot pain < or =  3/10 with adls such as sitting, standing, negotiating steps, and getting out of bed/chair.  ( progressing, not met)  7. Increased flexibility in B hip adductors and B hip flexors to promote proper pelvic stability to decrease R foot pain < or =  3/10 with adls such as sitting, standing,  negotiating steps, and getting out of bed/chair. ( progressing, not met)  8. Patient to achieve CJ (at least 20% < 40% impaired, limited or restricted) level at 30% functional limitation on the FOTO Outcomes Measurement System. ( progressing, not met)         Plan     Cont to progress towards goals set by PT focusing on dynamic balance and ankle strength.      Rashard Marcos, PT

## 2020-09-25 NOTE — PROGRESS NOTES
Subjective:      Patient ID: Soraya Elmore is a 59 y.o. female.    Chief Complaint: Heel Pain (Right Heel 95% better)    Sharp deep pain bottom right heel.  Gradual onset, no change over past several weeks, aggravated by increased weight bearing, shoe gear, pressure.  Stretches otc inserts minimal change past month.  OTC pain med not helping. Denies trauma, surgery.    CC2 thick dark misshapen toenails all 10 toes.  Gradual onset worsening over the past year so aggravated by socks shoes pressure.  Self-care with a nail file has helped some.  But she desires more aggressive medical treatment.  Denies trauma and surgery all toenails      Review of Systems   Constitution: Negative for chills, diaphoresis, fever, malaise/fatigue and night sweats.   Cardiovascular: Negative for claudication, cyanosis, leg swelling and syncope.   Skin: Positive for nail changes. Negative for color change, dry skin, rash, suspicious lesions and unusual hair distribution.   Musculoskeletal: Negative for falls, joint pain, joint swelling, muscle cramps, muscle weakness and stiffness.   Gastrointestinal: Negative for constipation, diarrhea, nausea and vomiting.   Neurological: Negative for brief paralysis, disturbances in coordination, focal weakness, numbness, paresthesias, sensory change and tremors.           Objective:      Physical Exam  Constitutional:       General: She is not in acute distress.     Appearance: She is well-developed. She is not diaphoretic.   Cardiovascular:      Pulses:           Popliteal pulses are 2+ on the right side and 2+ on the left side.        Dorsalis pedis pulses are 2+ on the right side and 2+ on the left side.        Posterior tibial pulses are 2+ on the right side and 2+ on the left side.      Comments: Capillary refill 3 seconds all toes/distal feet, all toes/both feet warm to touch.      Negative lymphadenopathy bilateral popliteal fossa and tarsal tunnel.      Negavie lower extremity edema  bilateral.    Musculoskeletal:      Right ankle: She exhibits normal range of motion, no swelling, no ecchymosis, no deformity, no laceration and normal pulse. Achilles tendon normal. Achilles tendon exhibits no pain, no defect and normal Mendez's test results.      Comments: No current pain to palpation inferior heel right at medial calcaneal tubercle without ecchymosis, erythema, edema, or cardinal signs infection, and no signs of trauma.    Ankle dorsiflexion decreased at <10 degrees bilateral with moderate increase with knee flexion bilateral.    Otherwise, Normal angle, base, station of gait. All ten toes without clubbing, cyanosis, or signs of ischemia.  No pain to palpation bilateral lower extremities.  Range of motion, stability, muscle strength, and muscle tone normal bilateral feet and legs.    Lymphadenopathy:      Lower Body: No right inguinal adenopathy. No left inguinal adenopathy.      Comments: Negative lymphadenopathy bilateral popliteal fossa and tarsal tunnel.    Negative lymphangitic streaking bilateral feet/ankles/legs.   Skin:     General: Skin is warm and dry.      Capillary Refill: Capillary refill takes 2 to 3 seconds.      Coloration: Skin is not pale.      Findings: No abrasion, bruising, burn, ecchymosis, erythema, laceration, lesion or rash.      Nails: There is no clubbing.        Comments:   Skin is normal age and health appropriate color, turgor, texture, and temperature bilateral lower extremities without ulceration, hyperpigmentation, discoloration, masses nodules or cords palpated.  No ecchymosis, erythema, edema, or cardinal signs of infection bilateral lower extremities.    Toenails 1st, 2nd, 3rd, 4th, 5th  bilateral are hypertrophic thickened 2-3 mm, dystrophic, discolored tanish brown with tan, gray crumbly subungual debris.  nontender to distal nail plate pressure, without periungual skin abnormality of each.     Neurological:      Mental Status: She is alert and oriented to  person, place, and time.      Sensory: No sensory deficit.      Motor: No tremor, atrophy or abnormal muscle tone.      Gait: Gait normal.      Deep Tendon Reflexes:      Reflex Scores:       Patellar reflexes are 2+ on the right side and 2+ on the left side.       Achilles reflexes are 2+ on the right side and 2+ on the left side.     Comments: Negative tinel sign to percussion sural, superficial peroneal, deep peroneal, saphenous, and posterior tibial nerves right and left ankles and feet.     Psychiatric:         Behavior: Behavior is cooperative.               Assessment:       Encounter Diagnoses   Name Primary?    Plantar fasciitis Yes    Foot pain, right     Equinus contracture of ankle     Onychomycosis due to dermatophyte          Plan:       Soraya was seen today for heel pain.    Diagnoses and all orders for this visit:    Plantar fasciitis    Foot pain, right    Equinus contracture of ankle    Onychomycosis due to dermatophyte    Other orders  -     ciclopirox (PENLAC) 8 % Soln; Apply topically nightly.      I counseled the patient on her conditions, their implications and medical management.  Continue stretches, inserts, athletic shoes, activity to tolerance, physical therapy, night splint.    Activity to tolerance no current restrictions.    Penlac      Follow up if symptoms worsen or fail to improve.

## 2020-10-09 ENCOUNTER — TELEPHONE (OUTPATIENT)
Dept: INTERNAL MEDICINE | Facility: CLINIC | Age: 60
End: 2020-10-09

## 2020-10-09 ENCOUNTER — HOSPITAL ENCOUNTER (EMERGENCY)
Facility: OTHER | Age: 60
Discharge: HOME | End: 2020-10-09
Attending: EMERGENCY MEDICINE
Payer: COMMERCIAL

## 2020-10-09 VITALS
OXYGEN SATURATION: 98 % | WEIGHT: 159 LBS | HEART RATE: 88 BPM | BODY MASS INDEX: 27.14 KG/M2 | SYSTOLIC BLOOD PRESSURE: 150 MMHG | DIASTOLIC BLOOD PRESSURE: 66 MMHG | HEIGHT: 64 IN | RESPIRATION RATE: 20 BRPM | TEMPERATURE: 99 F

## 2020-10-09 DIAGNOSIS — R10.9 ABDOMINAL PAIN: ICD-10-CM

## 2020-10-09 DIAGNOSIS — R07.9 CHEST PAIN: ICD-10-CM

## 2020-10-09 DIAGNOSIS — K29.00 ACUTE GASTRITIS WITHOUT HEMORRHAGE, UNSPECIFIED GASTRITIS TYPE: Primary | ICD-10-CM

## 2020-10-09 LAB
HCV AB SERPL QL IA: NEGATIVE
HIV 1+2 AB+HIV1 P24 AG SERPL QL IA: NEGATIVE

## 2020-10-09 PROCEDURE — 93010 EKG 12-LEAD: ICD-10-PCS | Mod: ,,, | Performed by: INTERNAL MEDICINE

## 2020-10-09 PROCEDURE — 93005 ELECTROCARDIOGRAM TRACING: CPT

## 2020-10-09 PROCEDURE — 86703 HIV-1/HIV-2 1 RESULT ANTBDY: CPT

## 2020-10-09 PROCEDURE — 99284 EMERGENCY DEPT VISIT MOD MDM: CPT | Mod: 25

## 2020-10-09 PROCEDURE — 93010 ELECTROCARDIOGRAM REPORT: CPT | Mod: ,,, | Performed by: INTERNAL MEDICINE

## 2020-10-09 PROCEDURE — 25000003 PHARM REV CODE 250: Performed by: EMERGENCY MEDICINE

## 2020-10-09 PROCEDURE — 86803 HEPATITIS C AB TEST: CPT

## 2020-10-09 RX ORDER — SUCRALFATE 1 G/10ML
1 SUSPENSION ORAL
Qty: 414 ML | Refills: 0 | Status: SHIPPED | OUTPATIENT
Start: 2020-10-09 | End: 2020-11-07 | Stop reason: SDUPTHER

## 2020-10-09 RX ORDER — ONDANSETRON 4 MG/1
4 TABLET, ORALLY DISINTEGRATING ORAL EVERY 6 HOURS PRN
Qty: 20 TABLET | Refills: 0 | Status: SHIPPED | OUTPATIENT
Start: 2020-10-09 | End: 2020-11-19 | Stop reason: SDUPTHER

## 2020-10-09 RX ORDER — PANTOPRAZOLE SODIUM 20 MG/1
40 TABLET, DELAYED RELEASE ORAL DAILY
Qty: 60 TABLET | Refills: 0 | Status: SHIPPED | OUTPATIENT
Start: 2020-10-09 | End: 2023-06-08

## 2020-10-09 RX ADMIN — ALUMINUM HYDROXIDE, MAGNESIUM HYDROXIDE, DIMETHICONE 30 ML: 200; 200; 20 LIQUID ORAL at 04:10

## 2020-10-09 NOTE — ED TRIAGE NOTES
Pt presents to ED with c/o mid-sternal burning that woke her up out of her sleep at 1am with 1 episode of diarrhea. Reports hx of GERD and states this feels similar. States she took several OTC meds prior to coming with no relief. Denies fever, chills, N/V, abdominal pain, SOB, and dysuria

## 2020-10-09 NOTE — TELEPHONE ENCOUNTER
----- Message from Paola Calabrese sent at 10/9/2020  2:56 PM CDT -----  Contact: LUIS REDMAN [[id number]]  Type: Patient Call Back Who called:LUIS REDMAN [[id number]]What is the request in detail: Patient calling in regards to a medication  sucralfate (CARAFATE) 100 mg/mL suspension she received at the hospital for her stomach. She would like someone to return her call. Can the clinic reply by [facility]?no Would the patient rather a call back or a response via [facility]? no Best call back number:[phone] Additional Information:

## 2020-10-09 NOTE — Clinical Note
"Soraya Crooks" ChesterLaurenEriberto was seen and treated in our emergency department on 10/9/2020.  She may return to work on 10/10/2020.       If you have any questions or concerns, please don't hesitate to call.       RN    "

## 2020-10-09 NOTE — ED PROVIDER NOTES
"Encounter Date: 10/9/2020    SCRIBE #1 NOTE: IJuventino, am scribing for, and in the presence of, Anthony To MD.       History     Chief Complaint   Patient presents with    epigastric burning     X 1 hour with 1 episode of diarrhea     This is a 60 y.o female with a past medical history of GERD presents to the ER for evaluation of GERD flare up that began 30 minutes ago prior to arrival. She reports associated nausea, dizziness, and diarrhea x1 today. She denies vomiting. Pt describes the symptoms as a "fire" and "stuffed" sensation of the abdomen that radiates to her back. She reports similar symptoms in the past. NKDA. No treatment attempted.      The history is provided by the patient.     Review of patient's allergies indicates:  No Known Allergies  Past Medical History:   Diagnosis Date    Diabetes mellitus     Gastroenteritis     Glaucoma (increased eye pressure)     Prediabetes 6/3/2019     Past Surgical History:   Procedure Laterality Date    COLONOSCOPY N/A 6/7/2016    Procedure: COLONOSCOPY;  Surgeon: Katerine Ramsey MD;  Location: [address]);  Service: Endoscopy;  Laterality: N/A;    OOPHORECTOMY  1999    Unilateral oophorectomy     Family History   Problem Relation Age of Onset    Breast cancer Cousin 67    Hypertension Mother     Glaucoma Mother     Cataracts Mother     Diabetes Sister     Glaucoma Sister     Diabetes Sister     Colon cancer Neg Hx     Ovarian cancer Neg Hx     Stroke Neg Hx      Social History     Tobacco Use    Smoking status: Former Smoker     Packs/day: 0.10     Years: 15.00     Pack years: 1.50     Types: Cigarettes     Quit date: 7/21/2005     Years since quitting: 15.2    Smokeless tobacco: Never Used   Substance Use Topics    Alcohol use: No     Alcohol/week: 0.0 standard drinks    Drug use: No     Review of Systems   Constitutional: Negative for fever.   HENT: Negative for sore throat.    Respiratory: Negative for shortness of " "breath.    Cardiovascular: Negative for chest pain.   Gastrointestinal: Positive for diarrhea and nausea. Negative for vomiting.        Positive for epigastric "burning"    Genitourinary: Negative for dysuria.   Musculoskeletal: Negative for back pain.   Skin: Negative for rash.   Neurological: Positive for dizziness. Negative for weakness.   Psychiatric/Behavioral: Negative for confusion.   All other systems reviewed and are negative.      Physical Exam     Initial Vitals [10/09/20 0400]   BP Pulse Resp Temp SpO2   138/61 89 18 98.5 °F (36.9 °C) 98 %      MAP       --         Physical Exam    Nursing note and vitals reviewed.  Constitutional: She appears well-developed and well-nourished. She is not diaphoretic. No distress.   HENT:   Head: Normocephalic and atraumatic.   Eyes: EOM are normal. Pupils are equal, round, and reactive to light.   Neck: Normal range of motion. Neck supple.   Cardiovascular: Normal rate, regular rhythm and normal heart sounds. Exam reveals no gallop and no friction rub.    No murmur heard.  Pulmonary/Chest: Breath sounds normal. No respiratory distress. She has no wheezes. She has no rhonchi. She has no rales. She exhibits no tenderness.   Abdominal: Bowel sounds are normal. She exhibits no distension. There is no rebound and no guarding.   Mid epigastric tenderness.    Musculoskeletal: Normal range of motion. No tenderness or edema.   Neurological: She is alert and oriented to person, place, and time. No cranial nerve deficit.   Skin: Skin is warm and dry. Capillary refill takes less than 2 seconds.   Psychiatric: She has a normal mood and affect. Her behavior is normal. Judgment and thought content normal.         ED Course   Procedures  Labs Reviewed   HIV 1 / 2 ANTIBODY   HEPATITIS C ANTIBODY     EKG Readings: (Independently Interpreted)   Initial Reading: No STEMI. Rhythm: Normal Sinus Rhythm. Heart Rate: 74.   Normal DE intervals.        Imaging Results    None          Medical " Decision Making:   History:   Old Medical Records: I decided to obtain old medical records.  Differential Diagnosis:   Urinary tract infection, acute pyelonephritis, ovarian torsion, ovarian cyst rupture, PID, BV, TOA, , ureterolithiais, AAA rupture / dissection, diverticulitis, colitis, inflammatory bowel disease, gastroenteritis, gastritis, ulcer, cholecystitis, gallstones, pancreatitis, ileus, small bowel obstruction, appendicitis, constipation, intestinal gas pain, GERD, intestinal spasm,  intrabominal hemorrhage, intrabominal thrombus      Clinical Tests:   Medical Tests: Ordered and Reviewed  ED Management:  I feel it is safe and appropriate at this time for the patient to be discharged for follow up and re-evaluation as detailed in the discharge instructions. I have discussed the specifics of the workup with the patient/guardian and the patient/guardian has verbalized understanding of the details of the workup, the diagnosis, the treatment plan, and the need for outpatient follow-up. Although the patient has no emergent etiology, patient/guardian understands the ED visit today was primarily to address immediate concerns and to rule out emergent causes of the symptoms and this does not preclude the development of an emergent condition after discharge. The patient/guardian is comfortable going home.  I educated the patient/guardian on the warning signs and symptoms for which they must seek immediate medical attention. All questions addressed and patient/guardian were given discharge instructions and followup information.               Scribe Attestation:   Scribe #1: I performed the above scribed service and the documentation accurately describes the services I performed. I attest to the accuracy of the note.    Attending Attestation:           Physician Attestation for Scribe:  Physician Attestation Statement for Scribe #1: I, Anthony To MD, reviewed documentation, as scribed by Juventino Mishra in my  presence, and it is both accurate and complete.                 ED Course as of Oct 10 1203   Fri Oct 09, 2020   0444 Patient reports her symptoms are relieved.  We discussed diet and lifestyle modifications.    [MA]      ED Course User Index  [MA] Anthony To MD            Clinical Impression:       ICD-10-CM ICD-9-CM   1. Acute gastritis without hemorrhage, unspecified gastritis type  K29.00 535.00   2. Abdominal pain  R10.9 789.00                          ED Disposition Condition    Discharge Stable        ED Prescriptions     Medication Sig Dispense Start Date End Date Auth. Provider    ondansetron (ZOFRAN-ODT) 4 MG TbDL Take 1 tablet (4 mg total) by mouth every 6 (six) hours as needed. 20 tablet 10/9/2020  Anthony To MD    pantoprazole (PROTONIX) 20 MG tablet Take 2 tablets (40 mg total) by mouth once daily. 60 tablet 10/9/2020 11/8/2020 Anthony To MD    sucralfate (CARAFATE) 100 mg/mL suspension Take 10 mLs (1 g total) by mouth 4 (four) times daily before meals and nightly. 414 mL 10/9/2020  Anthony To MD        Follow-up Information     Follow up With Specialties Details Why Contact Info    [facility] Gastroenterology Schedule an appointment as soon as possible for a visit  For follow-up and re-evaluation of gastro issues [address]  [address]  [facility] [address]  [phone]      [facility]  As needed, for any new or worsening symptoms [address] [id number] [phone]                                       Anthony To MD  10/10/20 1201

## 2020-10-21 ENCOUNTER — OFFICE VISIT (OUTPATIENT)
Dept: OPTOMETRY | Facility: CLINIC | Age: 60
End: 2020-10-21
Payer: COMMERCIAL

## 2020-10-21 DIAGNOSIS — H04.123 DRY EYE SYNDROME OF BOTH EYES: Primary | ICD-10-CM

## 2020-10-21 DIAGNOSIS — H40.1131 PRIMARY OPEN ANGLE GLAUCOMA (POAG) OF BOTH EYES, MILD STAGE: ICD-10-CM

## 2020-10-21 PROCEDURE — 92004 PR EYE EXAM, NEW PATIENT,COMPREHESV: ICD-10-PCS | Mod: S$GLB,,, | Performed by: OPTOMETRIST

## 2020-10-21 PROCEDURE — 99999 PR PBB SHADOW E&M-EST. PATIENT-LVL III: ICD-10-PCS | Mod: PBBFAC,,, | Performed by: OPTOMETRIST

## 2020-10-21 PROCEDURE — 92004 COMPRE OPH EXAM NEW PT 1/>: CPT | Mod: S$GLB,,, | Performed by: OPTOMETRIST

## 2020-10-21 PROCEDURE — 99999 PR PBB SHADOW E&M-EST. PATIENT-LVL III: CPT | Mod: PBBFAC,,, | Performed by: OPTOMETRIST

## 2020-10-21 RX ORDER — LATANOPROST 50 UG/ML
1 SOLUTION/ DROPS OPHTHALMIC NIGHTLY
Qty: 2.5 ML | Refills: 4 | Status: SHIPPED | OUTPATIENT
Start: 2020-10-21 | End: 2021-02-01 | Stop reason: SDUPTHER

## 2020-10-21 NOTE — PROGRESS NOTES
HPI     Ms. Soraya Elmore was referred by PCP for a dry eyes.    Patient complains of dry eyes, stinging and pain. She uses systane BID.   (+)POAG, uses latanoprost OU. Floaters OD unchanged x 3 years, no light   flashes    Would patient like a refraction today? no     Paitent denies diplopia, headaches, flashes/floaters, itching, tearing,   burning, redness, and pain.    (+)drops    (+)Diabetes  LBS   Hemoglobin A1C       Date                     Value               Ref Range             Status                11/05/2019               6.0 (H)             4.0 - 5.6 %           Final                  06/03/2019               5.9 (H)             4.0 - 5.6 %           Final                  05/05/2017               6.2                 4.5 - 6.2 %           Final                  OCULAR HISTORY  Last Eye Exam: 8/10/17 with Dr Lomeli  (-)eye surgery   (+)diagnosed or treated for any eye conditions or diseases, glaucoma and   dry eyes    FAMILY HISTORY  (+)Glaucoma, mother and sister        Last edited by Maribell Garcia, OD on 10/21/2020 10:16 AM. (History)            Assessment /Plan     For exam results, see Encounter Report.    Dry eye syndrome of both eyes  -     Ambulatory referral/consult to Ophthalmology    Primary open angle glaucoma (POAG) of both eyes, mild stage  -     latanoprost (XALATAN) 0.005 % ophthalmic solution; Place 1 drop into both eyes every evening.  Dispense: 2.5 mL; Refill: 4  -     Sharif Visual Field - Intermediate - OU - Both Eyes; Future  -     OCT, Optic Nerve - OU - Both Eyes; Future      1. Educated pt on findings. Recommend increasing ATs to TID + adding dean/gel QHS. Also recommend warm compresses along eyelid margin Qday for 10-15 with light massage.   If symptoms worsen or dont improve, RTC. Monitor 1-2 months.     2. H/o POAG OU. Pt hasnt been seen since 2017 (with Dr. Lomeli) but notes she has been using latanoprost regularly, 1 gtt OU QHS. Assume noncompliance due to lost f/u.  Re-fills sent today.   Will order f/u testing. Patient to RTC in 1-2 months for HVF + RNFL OCT + annual eye exam. Monitor.       RTC in 1-2 months for HVF, RNFL OCT, and annual eye exam or sooner if needed.

## 2020-10-21 NOTE — LETTER
October 21, 2020      Rashard Ojeda MD  [address]  [facility] [address]           [facility]  [address]  [facility] [address]  Phone: [phone]          Patient: Soraya Elmore   MR Number: [id number]   YOB: 1960   Date of Visit: 10/21/2020       Dear Dr. Rashard Ojeda:    Thank you for referring Soraya Elmore to me for evaluation. Attached you will find relevant portions of my assessment and plan of care.    If you have questions, please do not hesitate to call me. I look forward to following Soraya Elmore along with you.    Sincerely,    Maribell Garcia, OD    Enclosure  CC:  No Recipients    If you would like to receive this communication electronically, please contact [email or web address] or [phone] to request more information on [employer] [facility] Link access.    For providers and/or their staff who would like to refer a patient to [facility], please contact us through our one-stop-shop provider referral line, [facility], at [phone].    If you feel you have received this communication in error or would no longer like to receive these types of communications, please e-mail [email or web address]

## 2020-10-28 ENCOUNTER — CLINICAL SUPPORT (OUTPATIENT)
Dept: OPHTHALMOLOGY | Facility: CLINIC | Age: 60
End: 2020-10-28
Payer: COMMERCIAL

## 2020-10-28 ENCOUNTER — OFFICE VISIT (OUTPATIENT)
Dept: OPTOMETRY | Facility: CLINIC | Age: 60
End: 2020-10-28
Payer: COMMERCIAL

## 2020-10-28 DIAGNOSIS — H40.1131 PRIMARY OPEN ANGLE GLAUCOMA (POAG) OF BOTH EYES, MILD STAGE: ICD-10-CM

## 2020-10-28 DIAGNOSIS — H52.202 MYOPIA WITH ASTIGMATISM AND PRESBYOPIA, LEFT: ICD-10-CM

## 2020-10-28 DIAGNOSIS — R73.03 PREDIABETES: ICD-10-CM

## 2020-10-28 DIAGNOSIS — H04.123 DRY EYE SYNDROME OF BOTH EYES: ICD-10-CM

## 2020-10-28 DIAGNOSIS — H52.11 MYOPIA WITH PRESBYOPIA OF RIGHT EYE: Primary | ICD-10-CM

## 2020-10-28 DIAGNOSIS — H52.4 MYOPIA WITH ASTIGMATISM AND PRESBYOPIA, LEFT: ICD-10-CM

## 2020-10-28 DIAGNOSIS — H25.13 NUCLEAR SCLEROSIS OF BOTH EYES: ICD-10-CM

## 2020-10-28 DIAGNOSIS — H52.12 MYOPIA WITH ASTIGMATISM AND PRESBYOPIA, LEFT: ICD-10-CM

## 2020-10-28 DIAGNOSIS — H52.4 MYOPIA WITH PRESBYOPIA OF RIGHT EYE: Primary | ICD-10-CM

## 2020-10-28 PROCEDURE — 92015 DETERMINE REFRACTIVE STATE: CPT | Mod: S$GLB,,, | Performed by: OPTOMETRIST

## 2020-10-28 PROCEDURE — 92015 PR REFRACTION: ICD-10-PCS | Mod: S$GLB,,, | Performed by: OPTOMETRIST

## 2020-10-28 PROCEDURE — 99999 PR PBB SHADOW E&M-EST. PATIENT-LVL II: ICD-10-PCS | Mod: PBBFAC,,, | Performed by: OPTOMETRIST

## 2020-10-28 PROCEDURE — 92014 PR EYE EXAM, EST PATIENT,COMPREHESV: ICD-10-PCS | Mod: S$GLB,,, | Performed by: OPTOMETRIST

## 2020-10-28 PROCEDURE — 99999 PR PBB SHADOW E&M-EST. PATIENT-LVL II: CPT | Mod: PBBFAC,,, | Performed by: OPTOMETRIST

## 2020-10-28 PROCEDURE — 92014 COMPRE OPH EXAM EST PT 1/>: CPT | Mod: S$GLB,,, | Performed by: OPTOMETRIST

## 2020-10-28 NOTE — PROGRESS NOTES
HPI     Ms. Soraya Elmore was referred by PCP for a diabetic eye exam.    Patient complains of occasionally sees a black floater when reading OD. No   light flashes.   Using latanoprost at night OU. HVF/OCT today.   Requesting a new glasses rx.    Would patient like a refraction today? yes     Paitent denies diplopia, headaches, flashes/floaters, itching, tearing,   burning, redness, and pain.    (+)drops: latanoprost at night ou, systane tid, gel at night    (+)Diabetes  LBS   Hemoglobin A1C       Date                     Value               Ref Range             Status                11/05/2019               6.0 (H)             4.0 - 5.6 %           Final                  06/03/2019               5.9 (H)             4.0 - 5.6 %           Final                  05/05/2017               6.2                 4.5 - 6.2 %           Final                 OCULAR HISTORY  Last Eye Exam: 8/10/17 with Dr Lomeli  (-)eye surgery   (+)diagnosed or treated for any eye conditions or diseases, POAG OU    FAMILY HISTORY  (+)Glaucoma: sister, mother        Last edited by Maribell Garcia, OD on 10/28/2020 11:39 AM. (History)            Assessment /Plan     For exam results, see Encounter Report.    Myopia with presbyopia of right eye    Myopia with astigmatism and presbyopia, left    Primary open angle glaucoma (POAG) of both eyes, mild stage    Dry eye syndrome of both eyes    Nuclear sclerosis of both eyes    Prediabetes      1. Updated SRx. Minimal change from habitual. Monitor yearly.     2. Educated pt on findings. Mild progression (OD>OS) on RNFL OCT since last OCT in 2017. Minimal change in HVF, possible beginning of superior nasal step OD which wasn't noted in the past. See testing results below.   Will repeat testing in 3-4 months to look for repeatability. If progression shown, consider changing gtt therapy. Questionable baseline IOP (not reported in previous notes). Uncertain if previous measurement of 12 IOP OU was  on or off gtts. IOP 16 OU today with gtt therapy (1 gtt latanoprost OU QHS). Discussed with patient to continue gtt as directed until 1 week before next appointment and then d/c gtt Tx. Will then re-evaluate baseline IOP. Patient notes understanding and happy with plan. Monitor 3-4 months.     RNFL OCT  OD: Significant RNFL thinning TS and G, Borderline thinning TI  OS: Significant RNFL thinning TS, G, and TI    24-2 HVF  OD: Reliable testing (FL: 0/11 FP: 0% FN: 10% VFI: 96% GHT: ONL). Few nonspecific spots. Possible early superior nasal step. Look for repeatability with repeat testing in 3-4 months.   OS: Reliable testing (FL: 0/11 FP: 0% FN: 0% VFI: 96% GHT: ONL). Few nonspecific spots. Few dense spots superior and inferior --- assume artifact.       3. Dry eye improvement OU. Continue ATs TID + dean/gel QHS. If symptoms worsen, RTC. Monitor.     4. Educated pt on findings. Not visually significant. No need for removal at this time. Monitor yearly.     5. No retinopathy noted, OU. Continue proper BS control and annual diabetic eye exams. Monitor yearly.           RTC in 3-4 months for repeat testing (HVF + OCT) and IOP check after testing or sooner if needed.

## 2020-10-28 NOTE — PROGRESS NOTES
HVF done/rel/fix/coop. Good ou/chart checked and asked patient about latex allergy, none noted. Rx used : -4.75 + .25 x 150/od -4.75 + 1.00 x 110/os-[facility]

## 2020-11-02 ENCOUNTER — PATIENT OUTREACH (OUTPATIENT)
Dept: ADMINISTRATIVE | Facility: HOSPITAL | Age: 60
End: 2020-11-02

## 2020-11-07 ENCOUNTER — HOSPITAL ENCOUNTER (EMERGENCY)
Facility: OTHER | Age: 60
Discharge: HOME | End: 2020-11-07
Attending: EMERGENCY MEDICINE
Payer: COMMERCIAL

## 2020-11-07 VITALS
DIASTOLIC BLOOD PRESSURE: 65 MMHG | OXYGEN SATURATION: 99 % | RESPIRATION RATE: 16 BRPM | SYSTOLIC BLOOD PRESSURE: 150 MMHG | HEART RATE: 64 BPM | WEIGHT: 150 LBS | HEIGHT: 64 IN | TEMPERATURE: 98 F | BODY MASS INDEX: 25.61 KG/M2

## 2020-11-07 DIAGNOSIS — R10.13 EPIGASTRIC PAIN: Primary | ICD-10-CM

## 2020-11-07 LAB
ALBUMIN SERPL BCP-MCNC: 4.3 G/DL (ref 3.5–5.2)
ALP SERPL-CCNC: 84 U/L (ref 55–135)
ALT SERPL W/O P-5'-P-CCNC: 18 U/L (ref 10–44)
ANION GAP SERPL CALC-SCNC: 12 MMOL/L (ref 8–16)
AST SERPL-CCNC: 20 U/L (ref 10–40)
BASOPHILS # BLD AUTO: 0.04 K/UL (ref 0–0.2)
BASOPHILS NFR BLD: 0.6 % (ref 0–1.9)
BILIRUB SERPL-MCNC: 0.7 MG/DL (ref 0.1–1)
BUN SERPL-MCNC: 13 MG/DL (ref 6–20)
CALCIUM SERPL-MCNC: 8.7 MG/DL (ref 8.7–10.5)
CHLORIDE SERPL-SCNC: 103 MMOL/L (ref 95–110)
CO2 SERPL-SCNC: 25 MMOL/L (ref 23–29)
CREAT SERPL-MCNC: 0.8 MG/DL (ref 0.5–1.4)
DIFFERENTIAL METHOD: ABNORMAL
EOSINOPHIL # BLD AUTO: 0.2 K/UL (ref 0–0.5)
EOSINOPHIL NFR BLD: 3.1 % (ref 0–8)
ERYTHROCYTE [DISTWIDTH] IN BLOOD BY AUTOMATED COUNT: 13.4 % (ref 11.5–14.5)
EST. GFR  (AFRICAN AMERICAN): >60 ML/MIN/1.73 M^2
EST. GFR  (NON AFRICAN AMERICAN): >60 ML/MIN/1.73 M^2
GLUCOSE SERPL-MCNC: 106 MG/DL (ref 70–110)
HCT VFR BLD AUTO: 41.7 % (ref 37–48.5)
HGB BLD-MCNC: 13 G/DL (ref 12–16)
IMM GRANULOCYTES # BLD AUTO: 0.01 K/UL (ref 0–0.04)
IMM GRANULOCYTES NFR BLD AUTO: 0.1 % (ref 0–0.5)
LIPASE SERPL-CCNC: 15 U/L (ref 4–60)
LYMPHOCYTES # BLD AUTO: 3.4 K/UL (ref 1–4.8)
LYMPHOCYTES NFR BLD: 48.4 % (ref 18–48)
MCH RBC QN AUTO: 27.8 PG (ref 27–31)
MCHC RBC AUTO-ENTMCNC: 31.2 G/DL (ref 32–36)
MCV RBC AUTO: 89 FL (ref 82–98)
MONOCYTES # BLD AUTO: 0.8 K/UL (ref 0.3–1)
MONOCYTES NFR BLD: 11.3 % (ref 4–15)
NEUTROPHILS # BLD AUTO: 2.6 K/UL (ref 1.8–7.7)
NEUTROPHILS NFR BLD: 36.5 % (ref 38–73)
NRBC BLD-RTO: 0 /100 WBC
PLATELET # BLD AUTO: 241 K/UL (ref 150–350)
PMV BLD AUTO: 9.8 FL (ref 9.2–12.9)
POTASSIUM SERPL-SCNC: 3.4 MMOL/L (ref 3.5–5.1)
PROT SERPL-MCNC: 7.4 G/DL (ref 6–8.4)
RBC # BLD AUTO: 4.67 M/UL (ref 4–5.4)
SODIUM SERPL-SCNC: 140 MMOL/L (ref 136–145)
TROPONIN I SERPL DL<=0.01 NG/ML-MCNC: 0.01 NG/ML (ref 0–0.03)
WBC # BLD AUTO: 7.06 K/UL (ref 3.9–12.7)

## 2020-11-07 PROCEDURE — 83690 ASSAY OF LIPASE: CPT

## 2020-11-07 PROCEDURE — 80053 COMPREHEN METABOLIC PANEL: CPT

## 2020-11-07 PROCEDURE — 93010 ELECTROCARDIOGRAM REPORT: CPT | Mod: ,,, | Performed by: INTERNAL MEDICINE

## 2020-11-07 PROCEDURE — 63600175 PHARM REV CODE 636 W HCPCS: Performed by: EMERGENCY MEDICINE

## 2020-11-07 PROCEDURE — 93010 EKG 12-LEAD: ICD-10-PCS | Mod: ,,, | Performed by: INTERNAL MEDICINE

## 2020-11-07 PROCEDURE — 84484 ASSAY OF TROPONIN QUANT: CPT

## 2020-11-07 PROCEDURE — 96374 THER/PROPH/DIAG INJ IV PUSH: CPT

## 2020-11-07 PROCEDURE — 85025 COMPLETE CBC W/AUTO DIFF WBC: CPT

## 2020-11-07 PROCEDURE — 93005 ELECTROCARDIOGRAM TRACING: CPT

## 2020-11-07 PROCEDURE — 25000003 PHARM REV CODE 250: Performed by: EMERGENCY MEDICINE

## 2020-11-07 PROCEDURE — 99284 EMERGENCY DEPT VISIT MOD MDM: CPT | Mod: 25

## 2020-11-07 RX ORDER — MAG HYDROX/ALUMINUM HYD/SIMETH 200-200-20
5 SUSPENSION, ORAL (FINAL DOSE FORM) ORAL
Status: COMPLETED | OUTPATIENT
Start: 2020-11-07 | End: 2020-11-07

## 2020-11-07 RX ORDER — SUCRALFATE 1 G/10ML
1 SUSPENSION ORAL
Qty: 414 ML | Refills: 0 | Status: SHIPPED | OUTPATIENT
Start: 2020-11-07 | End: 2020-12-23 | Stop reason: SDUPTHER

## 2020-11-07 RX ORDER — FAMOTIDINE 20 MG/1
20 TABLET, FILM COATED ORAL
Status: COMPLETED | OUTPATIENT
Start: 2020-11-07 | End: 2020-11-07

## 2020-11-07 RX ORDER — ONDANSETRON 2 MG/ML
8 INJECTION INTRAMUSCULAR; INTRAVENOUS
Status: COMPLETED | OUTPATIENT
Start: 2020-11-07 | End: 2020-11-07

## 2020-11-07 RX ORDER — SUCRALFATE 1 G/10ML
1 SUSPENSION ORAL
Status: COMPLETED | OUTPATIENT
Start: 2020-11-07 | End: 2020-11-07

## 2020-11-07 RX ADMIN — ALUMINUM HYDROXIDE, MAGNESIUM HYDROXIDE, AND SIMETHICONE 5 ML: 200; 200; 20 SUSPENSION ORAL at 07:11

## 2020-11-07 RX ADMIN — SUCRALFATE 1 G: 1 SUSPENSION ORAL at 07:11

## 2020-11-07 RX ADMIN — FAMOTIDINE 20 MG: 20 TABLET ORAL at 07:11

## 2020-11-07 RX ADMIN — ONDANSETRON 8 MG: 2 INJECTION INTRAMUSCULAR; INTRAVENOUS at 07:11

## 2020-11-07 NOTE — Clinical Note
"Soraya Crooks" Catarina was seen and treated in our emergency department on 11/7/2020.  She may return to work on 11/09/2020.       If you have any questions or concerns, please don't hesitate to call.      Mary Jo Moreno MD"

## 2020-11-08 NOTE — ED PROVIDER NOTES
"Encounter Date: 11/7/2020    SCRIBE #1 NOTE: I, Vidhya Siddiqui, am scribing for, and in the presence of, Dr. Moreno.       History     Chief Complaint   Patient presents with    Abdominal Pain     mid abdominal pain x2 hours. PMH gastritis/GERD. pt reports 1 episode of diarrhea.     Headache     pt reports " sinus pressure headache" x2 hours      Time seen by provider: 7:23 PM    This is a 60 y.o. female who presents with complaint of abd pain. She reports waking up 3 hours ago to go to work when she felt sinus pressure behind her eyes which she then took sinus pills for. She reports her abd began hurting shortly after. She reports the pain is located at her upper middle abd. She reports belching with moderate relief. She recently had an EGD biopsy. This is the extent of the patient's complaints at this time.    The history is provided by the patient.     Review of patient's allergies indicates:  No Known Allergies  Past Medical History:   Diagnosis Date    Diabetes mellitus     Gastroenteritis     Glaucoma (increased eye pressure)     Prediabetes 6/3/2019     Past Surgical History:   Procedure Laterality Date    COLONOSCOPY N/A 6/7/2016    Procedure: COLONOSCOPY;  Surgeon: Katerine Ramsey MD;  Location: [facility] ([address] [place] [address]);  Service: Endoscopy;  Laterality: N/A;    OOPHORECTOMY  1999    Unilateral oophorectomy     Family History   Problem Relation Age of Onset    Breast cancer Cousin 67    Hypertension Mother     Glaucoma Mother     Cataracts Mother     Diabetes Sister     Glaucoma Sister     Diabetes Sister     Colon cancer Neg Hx     Ovarian cancer Neg Hx     Stroke Neg Hx      Social History     Tobacco Use    Smoking status: Former Smoker     Packs/day: 0.10     Years: 15.00     Pack years: 1.50     Types: Cigarettes     Quit date: 7/21/2005     Years since quitting: 15.3    Smokeless tobacco: Never Used   Substance Use Topics    Alcohol use: No     Alcohol/week: 0.0 standard drinks    Drug " use: No     Review of Systems   Constitutional: Negative for chills and fever.   HENT: Negative for congestion, sore throat and trouble swallowing.    Eyes: Negative for photophobia and visual disturbance.        Sinus pressure behind eyes.   Respiratory: Negative for cough and shortness of breath.    Cardiovascular: Negative for chest pain.   Gastrointestinal: Positive for abdominal pain (Upper middle abd). Negative for diarrhea, nausea and vomiting.   Genitourinary: Negative for dysuria and hematuria.   Musculoskeletal: Negative for back pain and neck pain.   Skin: Negative for rash and wound.   Neurological: Negative for weakness and headaches.   Psychiatric/Behavioral: Negative.        Physical Exam     Initial Vitals [11/07/20 1854]   BP Pulse Resp Temp SpO2   (!) 151/75 88 18 98.2 °F (36.8 °C) 100 %      MAP       --         Physical Exam    Nursing note and vitals reviewed.  Constitutional: She appears well-developed and well-nourished. No distress.   HENT:   Head: Normocephalic and atraumatic.   Mouth/Throat: Oropharynx is clear and moist.   Eyes: Conjunctivae and EOM are normal. Pupils are equal, round, and reactive to light. No scleral icterus.   Neck: Normal range of motion. Neck supple. No JVD present.   Cardiovascular: Normal rate, regular rhythm and normal heart sounds. Exam reveals no gallop and no friction rub.    No murmur heard.  Pulmonary/Chest: Breath sounds normal. No respiratory distress. She has no wheezes. She has no rhonchi. She has no rales.   Abdominal: Soft. There is abdominal tenderness (Mild epigastric tenderness). There is no rebound and no guarding.   Musculoskeletal: Normal range of motion. No tenderness or edema.   Neurological: She is alert and oriented to person, place, and time. She has normal strength. No sensory deficit.   Skin: Skin is warm and dry. No rash noted.   Psychiatric: She has a normal mood and affect. Her behavior is normal. Judgment and thought content normal.          ED Course   Procedures  Labs Reviewed   COMPREHENSIVE METABOLIC PANEL - Abnormal; Notable for the following components:       Result Value    Potassium 3.4 (*)     All other components within normal limits   CBC W/ AUTO DIFFERENTIAL - Abnormal; Notable for the following components:    MCHC 31.2 (*)     Gran % 36.5 (*)     Lymph % 48.4 (*)     All other components within normal limits   LIPASE   TROPONIN I     EKG Readings: (Independently Interpreted)   Initial Reading: No STEMI.   Normal Sinus Rhythm. No ST abnormality.       Imaging Results    None          Medical Decision Making:   History:   Old Medical Records: I decided to obtain old medical records.  Initial Assessment:   59 yo f, h/o DM, recurrent gastritis/epigastric pain, now presenting with epigastric pain x 1-2 hours, s/p eating, c/w preior episodes of abd pain.  Pt reports recent EGD this month for these sx.  No results in system so assume with  Outside provider.      No CP/SOB,  No n/v/d    On exam, VSS  Minimal epigastric tenderness  Differential Diagnosis:   Gastritis  Less likely pancreatitis, perforation, gallbladder disease, given relatively benign abd exam  Independently Interpreted Test(s):   I have ordered and independently interpreted EKG Reading(s) - see prior notes  Clinical Tests:   Lab Tests: Reviewed and Ordered  Radiological Study: Ordered and Reviewed  Medical Tests: Ordered and Reviewed  ED Management:  Labs  GI cocktail  zofran  reassess            Scribe Attestation:   Scribe #1: I performed the above scribed service and the documentation accurately describes the services I performed. I attest to the accuracy of the note.    Attending Attestation:           Physician Attestation for Scribe:  Physician Attestation Statement for Scribe #1: I, Dr. Moreno, reviewed documentation, as scribed by Vidhya Siddiqui in my presence, and it is both accurate and complete.                 ED Course as of Nov 07 2258   Sat Nov 07, 2020 2122  Pt reports feeling completely better.  Asking for a rx for carafate.  Labs unremarkable.  Will d/c home    [AS]      ED Course User Index  [AS] Mary Jo Moreno MD            Clinical Impression:     ICD-10-CM ICD-9-CM   1. Epigastric pain  R10.13 789.06                          ED Disposition Condition    Discharge Stable        ED Prescriptions     Medication Sig Dispense Start Date End Date Auth. Provider    sucralfate (CARAFATE) 100 mg/mL suspension Take 10 mLs (1 g total) by mouth 4 (four) times daily before meals and nightly. 414 mL 11/7/2020  Mary Jo Moreno MD        Follow-up Information     Follow up With Specialties Details Why Contact Info    Rashard Ojeda MD Family Medicine Call in 2 days  [address] [facility] [address]  [facility] [address]  [phone]                                         Mary Jo Moreno MD  11/07/20 1540

## 2020-11-08 NOTE — ED TRIAGE NOTES
Pt presents to ER via POV with c/o intermittent epigastric cramping with diarrhea and nausea since around 1700 today. Denies fever/chills. Bloody stools. Using antacid without relief. Hx gastritis and this feels similar. Had EGD with biopsy a little over a week ago.

## 2020-11-10 ENCOUNTER — PATIENT OUTREACH (OUTPATIENT)
Dept: ADMINISTRATIVE | Facility: HOSPITAL | Age: 60
End: 2020-11-10

## 2020-11-19 ENCOUNTER — HOSPITAL ENCOUNTER (EMERGENCY)
Facility: OTHER | Age: 60
Discharge: HOME | End: 2020-11-19
Attending: EMERGENCY MEDICINE
Payer: COMMERCIAL

## 2020-11-19 VITALS
DIASTOLIC BLOOD PRESSURE: 63 MMHG | HEART RATE: 67 BPM | TEMPERATURE: 98 F | SYSTOLIC BLOOD PRESSURE: 127 MMHG | RESPIRATION RATE: 20 BRPM | OXYGEN SATURATION: 100 % | HEIGHT: 64 IN | BODY MASS INDEX: 26.46 KG/M2 | WEIGHT: 155 LBS

## 2020-11-19 DIAGNOSIS — R10.9 ABDOMINAL PAIN OF UNKNOWN ETIOLOGY: Primary | ICD-10-CM

## 2020-11-19 LAB
ALBUMIN SERPL BCP-MCNC: 4.2 G/DL (ref 3.5–5.2)
ALP SERPL-CCNC: 83 U/L (ref 55–135)
ALT SERPL W/O P-5'-P-CCNC: 18 U/L (ref 10–44)
ANION GAP SERPL CALC-SCNC: 7 MMOL/L (ref 8–16)
AST SERPL-CCNC: 22 U/L (ref 10–40)
BASOPHILS # BLD AUTO: 0.05 K/UL (ref 0–0.2)
BASOPHILS NFR BLD: 0.6 % (ref 0–1.9)
BILIRUB SERPL-MCNC: 0.8 MG/DL (ref 0.1–1)
BILIRUB UR QL STRIP: NEGATIVE
BUN SERPL-MCNC: 14 MG/DL (ref 6–20)
CALCIUM SERPL-MCNC: 8.8 MG/DL (ref 8.7–10.5)
CHLORIDE SERPL-SCNC: 105 MMOL/L (ref 95–110)
CLARITY UR: CLEAR
CO2 SERPL-SCNC: 29 MMOL/L (ref 23–29)
COLOR UR: YELLOW
CREAT SERPL-MCNC: 0.7 MG/DL (ref 0.5–1.4)
CTP QC/QA: YES
DIFFERENTIAL METHOD: ABNORMAL
EOSINOPHIL # BLD AUTO: 0.2 K/UL (ref 0–0.5)
EOSINOPHIL NFR BLD: 2.1 % (ref 0–8)
ERYTHROCYTE [DISTWIDTH] IN BLOOD BY AUTOMATED COUNT: 13.2 % (ref 11.5–14.5)
EST. GFR  (AFRICAN AMERICAN): >60 ML/MIN/1.73 M^2
EST. GFR  (NON AFRICAN AMERICAN): >60 ML/MIN/1.73 M^2
GLUCOSE SERPL-MCNC: 108 MG/DL (ref 70–110)
GLUCOSE UR QL STRIP: NEGATIVE
HCT VFR BLD AUTO: 39.8 % (ref 37–48.5)
HGB BLD-MCNC: 12.6 G/DL (ref 12–16)
HGB UR QL STRIP: NEGATIVE
IMM GRANULOCYTES # BLD AUTO: 0.01 K/UL (ref 0–0.04)
IMM GRANULOCYTES NFR BLD AUTO: 0.1 % (ref 0–0.5)
KETONES UR QL STRIP: NEGATIVE
LEUKOCYTE ESTERASE UR QL STRIP: NEGATIVE
LIPASE SERPL-CCNC: 17 U/L (ref 4–60)
LYMPHOCYTES # BLD AUTO: 2 K/UL (ref 1–4.8)
LYMPHOCYTES NFR BLD: 26.5 % (ref 18–48)
MCH RBC QN AUTO: 27.8 PG (ref 27–31)
MCHC RBC AUTO-ENTMCNC: 31.7 G/DL (ref 32–36)
MCV RBC AUTO: 88 FL (ref 82–98)
MONOCYTES # BLD AUTO: 0.7 K/UL (ref 0.3–1)
MONOCYTES NFR BLD: 9.6 % (ref 4–15)
NEUTROPHILS # BLD AUTO: 4.7 K/UL (ref 1.8–7.7)
NEUTROPHILS NFR BLD: 61.1 % (ref 38–73)
NITRITE UR QL STRIP: NEGATIVE
NRBC BLD-RTO: 0 /100 WBC
PH UR STRIP: 6 [PH] (ref 5–8)
PLATELET # BLD AUTO: 257 K/UL (ref 150–350)
PMV BLD AUTO: 10.1 FL (ref 9.2–12.9)
POTASSIUM SERPL-SCNC: 3.7 MMOL/L (ref 3.5–5.1)
PROT SERPL-MCNC: 7.2 G/DL (ref 6–8.4)
PROT UR QL STRIP: ABNORMAL
RBC # BLD AUTO: 4.53 M/UL (ref 4–5.4)
SARS-COV-2 RDRP RESP QL NAA+PROBE: NEGATIVE
SODIUM SERPL-SCNC: 141 MMOL/L (ref 136–145)
SP GR UR STRIP: 1.02 (ref 1–1.03)
URN SPEC COLLECT METH UR: ABNORMAL
UROBILINOGEN UR STRIP-ACNC: NEGATIVE EU/DL
WBC # BLD AUTO: 7.7 K/UL (ref 3.9–12.7)

## 2020-11-19 PROCEDURE — 81003 URINALYSIS AUTO W/O SCOPE: CPT

## 2020-11-19 PROCEDURE — U0002 COVID-19 LAB TEST NON-CDC: HCPCS | Performed by: EMERGENCY MEDICINE

## 2020-11-19 PROCEDURE — 96361 HYDRATE IV INFUSION ADD-ON: CPT

## 2020-11-19 PROCEDURE — 99284 EMERGENCY DEPT VISIT MOD MDM: CPT | Mod: 25

## 2020-11-19 PROCEDURE — 80053 COMPREHEN METABOLIC PANEL: CPT

## 2020-11-19 PROCEDURE — 96374 THER/PROPH/DIAG INJ IV PUSH: CPT

## 2020-11-19 PROCEDURE — 83690 ASSAY OF LIPASE: CPT

## 2020-11-19 PROCEDURE — 25000003 PHARM REV CODE 250: Performed by: EMERGENCY MEDICINE

## 2020-11-19 PROCEDURE — 63600175 PHARM REV CODE 636 W HCPCS: Performed by: EMERGENCY MEDICINE

## 2020-11-19 PROCEDURE — 96372 THER/PROPH/DIAG INJ SC/IM: CPT

## 2020-11-19 PROCEDURE — 85025 COMPLETE CBC W/AUTO DIFF WBC: CPT

## 2020-11-19 RX ORDER — DICYCLOMINE HYDROCHLORIDE 20 MG/1
20 TABLET ORAL 2 TIMES DAILY
Qty: 20 TABLET | Refills: 0 | Status: SHIPPED | OUTPATIENT
Start: 2020-11-19 | End: 2020-12-15 | Stop reason: SDUPTHER

## 2020-11-19 RX ORDER — DICYCLOMINE HYDROCHLORIDE 10 MG/ML
20 INJECTION INTRAMUSCULAR
Status: COMPLETED | OUTPATIENT
Start: 2020-11-19 | End: 2020-11-19

## 2020-11-19 RX ORDER — ONDANSETRON 4 MG/1
4 TABLET, ORALLY DISINTEGRATING ORAL EVERY 6 HOURS PRN
Qty: 12 TABLET | Refills: 0 | Status: SHIPPED | OUTPATIENT
Start: 2020-11-19 | End: 2021-01-13 | Stop reason: SDUPTHER

## 2020-11-19 RX ORDER — ONDANSETRON 2 MG/ML
4 INJECTION INTRAMUSCULAR; INTRAVENOUS
Status: COMPLETED | OUTPATIENT
Start: 2020-11-19 | End: 2020-11-19

## 2020-11-19 RX ADMIN — DICYCLOMINE HYDROCHLORIDE 20 MG: 10 INJECTION INTRAMUSCULAR at 06:11

## 2020-11-19 RX ADMIN — ONDANSETRON 4 MG: 2 INJECTION INTRAMUSCULAR; INTRAVENOUS at 06:11

## 2020-11-19 RX ADMIN — SODIUM CHLORIDE 1000 ML: 0.9 INJECTION, SOLUTION INTRAVENOUS at 06:11

## 2020-11-19 NOTE — Clinical Note
"Soraya Crooks" Catarina was seen and treated in our emergency department on 11/19/2020.  She may return to work on 11/20/2020.       If you have any questions or concerns, please don't hesitate to call.      Michael Rodriguez RN    "

## 2020-11-19 NOTE — FIRST PROVIDER EVALUATION
Emergency Department TeleTriage Encounter Note      CHIEF COMPLAINT    Chief Complaint   Patient presents with    Abdominal Cramping     that started 1 hr PTA with sudden onset of runny nose and diarrhea.  Patient also c/o headache.        VITAL SIGNS   Initial Vitals [11/19/20 1713]   BP Pulse Resp Temp SpO2   (!) 147/68 86 20 97.9 °F (36.6 °C) 99 %      MAP       --            ALLERGIES    Review of patient's allergies indicates:  No Known Allergies    PROVIDER TRIAGE NOTE  59 y/o female which presents with runny nose, body aches and nausea that began when she woke up from her nap this afternoon.       ORDERS  Labs Reviewed   URINALYSIS, REFLEX TO URINE CULTURE       ED Orders (720h ago, onward)    Start Ordered     Status Ordering Provider    11/19/20 1716 11/19/20 1715  Urinalysis, Reflex to Urine Culture Urine, Clean Catch  STAT      Ordered LIZ NOVAK II            Virtual Visit Note: The provider triage portion of this emergency department evaluation and documentation was performed via [employer], a HIPAA-compliant telemedicine application, in concert with a tele-presenter in the room. A face to face patient evaluation with one of my colleagues will occur once the patient is placed in an emergency department room.      DISCLAIMER: This note was prepared with [employer] voice recognition transcription software. Garbled syntax, mangled pronouns, and other bizarre constructions may be attributed to that software system.

## 2020-11-19 NOTE — ED PROVIDER NOTES
Encounter Date: 11/19/2020    SCRIBE #1 NOTE: I, Stewart Ayala, am scribing for, and in the presence of, Dr. Navas.       History     Chief Complaint   Patient presents with    Abdominal Cramping     that started 1 hr PTA with sudden onset of runny nose and diarrhea.  Patient also c/o headache.      Time seen by provider: 6:02 PM    This is a 60 y.o. female who presents with complaint of abdominal cramping since 3 PM today. 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 Patient states that when she woke up, her eyes were hurting and since she has a history of glaucoma, she just put in her drops and carried on. After that, patient states that she began to eat something when her nose began running all of a sudden, she got a headache, felt dizzy, and had intense nausea and abdominal cramping. Patient states that while most of her symptoms have resolved, the abdominal cramping still lingers. Patient states this is not her first episode of this, as she has had several ever since COVID began. Patient states that she had a few episodes of diarrhea PTA, and is experiencing lower back pain as well. Patient denies any vomiting, dysuria, fever, and change in taste or smell. Since patient has been experiencing this for so long, she has been seeing Dr. Denson for further testing such as an EGD, two biopsies, and an US. Patient denies any history of gallbladder, liver, or pancreas issues. Patient reports she is pre diabetic, but does not take any medication for it. Patient reports she has had no abdominal surgeries, and she doesn't drink, smoke, or use illegal drugs.       The history is provided by the patient.     Review of patient's allergies indicates:  No Known Allergies  Past Medical History:   Diagnosis Date    Diabetes mellitus     Gastroenteritis     Glaucoma (increased eye pressure)     Prediabetes 6/3/2019     Past Surgical  History:   Procedure Laterality Date    COLONOSCOPY N/A 6/7/2016    Procedure: COLONOSCOPY;  Surgeon: Katerine Ramsey MD;  Location: [facility] ([address] [place] [address]);  Service: Endoscopy;  Laterality: N/A;    OOPHORECTOMY  1999    Unilateral oophorectomy     Family History   Problem Relation Age of Onset    Breast cancer Cousin 67    Hypertension Mother     Glaucoma Mother     Cataracts Mother     Diabetes Sister     Glaucoma Sister     Diabetes Sister     Colon cancer Neg Hx     Ovarian cancer Neg Hx     Stroke Neg Hx      Social History     Tobacco Use    Smoking status: Former Smoker     Packs/day: 0.10     Years: 15.00     Pack years: 1.50     Types: Cigarettes     Quit date: 7/21/2005     Years since quitting: 15.3    Smokeless tobacco: Never Used   Substance Use Topics    Alcohol use: No     Alcohol/week: 0.0 standard drinks    Drug use: No     Review of Systems   Constitutional: Negative for fever.   HENT: Negative for sore throat.    Respiratory: Negative for shortness of breath.    Cardiovascular: Negative for chest pain.   Gastrointestinal: Positive for abdominal pain, diarrhea and nausea. Negative for vomiting.   Genitourinary: Negative for dysuria.   Musculoskeletal: Positive for back pain.   Skin: Negative for rash.   Neurological: Positive for dizziness and headaches. Negative for weakness.   Hematological: Does not bruise/bleed easily.       Physical Exam     Initial Vitals [11/19/20 1713]   BP Pulse Resp Temp SpO2   (!) 147/68 86 20 97.9 °F (36.6 °C) 99 %      MAP       --         Physical Exam    Nursing note and vitals reviewed.  Constitutional: She appears well-developed and well-nourished. No distress.   HENT:   Head: Normocephalic and atraumatic.   Mouth/Throat: Oropharynx is clear and moist. Mucous membranes are dry (mildly).   Mucous membranes are moist.   Eyes: Conjunctivae and EOM are normal. Pupils are equal, round, and reactive to light. No scleral icterus.   No pallor or icterus.    Neck: Normal range of motion. Neck supple. No JVD present.   Cardiovascular: Normal rate, regular rhythm and normal heart sounds. Exam reveals no gallop and no friction rub.    No murmur heard.  Pulmonary/Chest: Breath sounds normal. No respiratory distress. She has no wheezes. She has no rhonchi. She has no rales.   Abdominal: Soft. There is no abdominal tenderness. There is no rebound and no guarding.   Epigastric tenderness without rebound or guarding.   Musculoskeletal: Normal range of motion. No tenderness or edema.   Neurological: She is alert and oriented to person, place, and time. She has normal strength. No sensory deficit.   Skin: Skin is warm and dry. No rash noted. No pallor.   Psychiatric: She has a normal mood and affect. Her behavior is normal. Judgment and thought content normal.         ED Course   Procedures  Labs Reviewed   URINALYSIS, REFLEX TO URINE CULTURE - Abnormal; Notable for the following components:       Result Value    Protein, UA Trace (*)     All other components within normal limits    Narrative:     Specimen Source->Urine   CBC W/ AUTO DIFFERENTIAL - Abnormal; Notable for the following components:    MCHC 31.7 (*)     All other components within normal limits   COMPREHENSIVE METABOLIC PANEL - Abnormal; Notable for the following components:    Anion Gap 7 (*)     All other components within normal limits   LIPASE   SARS-COV-2 RDRP GENE          Imaging Results    None          Medical Decision Making:   History:   Old Medical Records: I decided to obtain old medical records.  Clinical Tests:   Lab Tests: Ordered and Reviewed            Scribe Attestation:   Scribe #1: I performed the above scribed service and the documentation accurately describes the services I performed. I attest to the accuracy of the note.    Attending Attestation:           Physician Attestation for Scribe:  Physician Attestation Statement for Scribe #1: I, Dr. Navas, reviewed documentation, as scribed by  Stewart Guallpa in my presence, and it is both accurate and complete.                 ED Course as of Nov 19 2217   Thu Nov 19, 2020 1945 Feeling better after the Bentyl.    [KW]      ED Course User Index  [KW] Stewart Guallpa     Patient presents complaining of abdominal cramping.  She has had multiple similar episodes over the past months, has seen various providers including Dr. Reynolds of GI.  States that she had recent outpatient ultrasound as well as an upper GI and they took some biopsies but she does not know the results.  I do not find these results in epic.  Encouraged continue follow-up with her GI for results of these as well as follow-up of today's episode.  The patient states the pain was worse prior in the evening, is feeling somewhat better here, was given fluids antiemetics and Bentyl with good relief.  Lab workup today does not show any acute findings.  Discussed with patient abdominal pain etiology is unclear.  Return precautions were discussed.  Will give prescription for Bentyl for hopeful symptom relief at home.       Clinical Impression:     ICD-10-CM ICD-9-CM   1. Abdominal pain of unknown etiology  R10.9 789.00                          ED Disposition Condition    Discharge Stable        ED Prescriptions     Medication Sig Dispense Start Date End Date Auth. Provider    ondansetron (ZOFRAN-ODT) 4 MG TbDL Take 1 tablet (4 mg total) by mouth every 6 (six) hours as needed. 12 tablet 11/19/2020  Brian Navas II, MD    dicyclomine (BENTYL) 20 mg tablet Take 1 tablet (20 mg total) by mouth 2 (two) times daily. 20 tablet 11/19/2020 12/19/2020 Brian Navas II, MD        Follow-up Information     Follow up With Specialties Details Why Contact Info    Rashard Ojeda MD Family Medicine In 1 week  [address]  [address]  [place] [address]  [phone]      Luis Alberto Denson MD Gastroenterology Call   [address]  [address]  [place] [id number] [phone]                                          Brian Navas II, MD  11/19/20 6762

## 2020-11-20 NOTE — ED TRIAGE NOTES
Pt c/o bilat eye pain, runny nose, and abd pain. States felt dizzy, ate and then abd cramping started with 3 diarrhea stools approx.one hr ago. Denies fever.

## 2020-12-15 ENCOUNTER — TELEPHONE (OUTPATIENT)
Dept: INTERNAL MEDICINE | Facility: CLINIC | Age: 60
End: 2020-12-15

## 2020-12-15 RX ORDER — DICYCLOMINE HYDROCHLORIDE 20 MG/1
20 TABLET ORAL 2 TIMES DAILY
Qty: 20 TABLET | Refills: 0 | Status: SHIPPED | OUTPATIENT
Start: 2020-12-15 | End: 2020-12-17 | Stop reason: SDUPTHER

## 2020-12-15 NOTE — TELEPHONE ENCOUNTER
----- Message from Rachel Spencer sent at 12/15/2020  1:06 PM CST -----  Contact: LUIS REDMAN [[id number]]  Type: Patient Call Back    Who called:LUIS REDMAN [[id number]]    What is the request in detail: Patient is requesting a call back. LUIS REDMAN [[id number]] requests to speak to someone in the office in regards to a medication and when she went to the ER.  Please advise.    Can the clinic reply by [facility]? No    Best call back number: ..[phone]    Additional Information: N/A

## 2020-12-21 RX ORDER — DICYCLOMINE HYDROCHLORIDE 20 MG/1
20 TABLET ORAL 2 TIMES DAILY
Qty: 20 TABLET | Refills: 0 | Status: SHIPPED | OUTPATIENT
Start: 2020-12-21 | End: 2021-01-13 | Stop reason: SDUPTHER

## 2020-12-23 ENCOUNTER — HOSPITAL ENCOUNTER (EMERGENCY)
Facility: OTHER | Age: 60
Discharge: HOME | End: 2020-12-23
Attending: EMERGENCY MEDICINE
Payer: COMMERCIAL

## 2020-12-23 VITALS
WEIGHT: 155 LBS | BODY MASS INDEX: 26.46 KG/M2 | OXYGEN SATURATION: 100 % | TEMPERATURE: 99 F | HEIGHT: 64 IN | HEART RATE: 73 BPM | RESPIRATION RATE: 18 BRPM | SYSTOLIC BLOOD PRESSURE: 131 MMHG | DIASTOLIC BLOOD PRESSURE: 61 MMHG

## 2020-12-23 DIAGNOSIS — R10.9 ABDOMINAL PAIN: ICD-10-CM

## 2020-12-23 DIAGNOSIS — R11.2 NON-INTRACTABLE VOMITING WITH NAUSEA, UNSPECIFIED VOMITING TYPE: Primary | ICD-10-CM

## 2020-12-23 LAB
ALBUMIN SERPL BCP-MCNC: 4.2 G/DL (ref 3.5–5.2)
ALP SERPL-CCNC: 89 U/L (ref 55–135)
ALT SERPL W/O P-5'-P-CCNC: 16 U/L (ref 10–44)
ANION GAP SERPL CALC-SCNC: 10 MMOL/L (ref 8–16)
AST SERPL-CCNC: 20 U/L (ref 10–40)
BACTERIA #/AREA URNS HPF: ABNORMAL /HPF
BASOPHILS # BLD AUTO: 0.06 K/UL (ref 0–0.2)
BASOPHILS NFR BLD: 0.8 % (ref 0–1.9)
BILIRUB SERPL-MCNC: 0.6 MG/DL (ref 0.1–1)
BILIRUB UR QL STRIP: NEGATIVE
BUN SERPL-MCNC: 13 MG/DL (ref 6–20)
CALCIUM SERPL-MCNC: 8.7 MG/DL (ref 8.7–10.5)
CHLORIDE SERPL-SCNC: 103 MMOL/L (ref 95–110)
CLARITY UR: CLEAR
CO2 SERPL-SCNC: 27 MMOL/L (ref 23–29)
COLOR UR: YELLOW
CREAT SERPL-MCNC: 0.8 MG/DL (ref 0.5–1.4)
DIFFERENTIAL METHOD: ABNORMAL
EOSINOPHIL # BLD AUTO: 0.3 K/UL (ref 0–0.5)
EOSINOPHIL NFR BLD: 3.1 % (ref 0–8)
ERYTHROCYTE [DISTWIDTH] IN BLOOD BY AUTOMATED COUNT: 13.2 % (ref 11.5–14.5)
EST. GFR  (AFRICAN AMERICAN): >60 ML/MIN/1.73 M^2
EST. GFR  (NON AFRICAN AMERICAN): >60 ML/MIN/1.73 M^2
GLUCOSE SERPL-MCNC: 114 MG/DL (ref 70–110)
GLUCOSE UR QL STRIP: NEGATIVE
HCT VFR BLD AUTO: 40.9 % (ref 37–48.5)
HGB BLD-MCNC: 12.9 G/DL (ref 12–16)
HGB UR QL STRIP: NEGATIVE
IMM GRANULOCYTES # BLD AUTO: 0.02 K/UL (ref 0–0.04)
IMM GRANULOCYTES NFR BLD AUTO: 0.3 % (ref 0–0.5)
KETONES UR QL STRIP: NEGATIVE
LEUKOCYTE ESTERASE UR QL STRIP: ABNORMAL
LIPASE SERPL-CCNC: 20 U/L (ref 4–60)
LYMPHOCYTES # BLD AUTO: 3.4 K/UL (ref 1–4.8)
LYMPHOCYTES NFR BLD: 42.5 % (ref 18–48)
MCH RBC QN AUTO: 28.2 PG (ref 27–31)
MCHC RBC AUTO-ENTMCNC: 31.5 G/DL (ref 32–36)
MCV RBC AUTO: 89 FL (ref 82–98)
MICROSCOPIC COMMENT: ABNORMAL
MONOCYTES # BLD AUTO: 0.7 K/UL (ref 0.3–1)
MONOCYTES NFR BLD: 9 % (ref 4–15)
NEUTROPHILS # BLD AUTO: 3.5 K/UL (ref 1.8–7.7)
NEUTROPHILS NFR BLD: 44.3 % (ref 38–73)
NITRITE UR QL STRIP: NEGATIVE
NRBC BLD-RTO: 0 /100 WBC
PH UR STRIP: 8 [PH] (ref 5–8)
PLATELET # BLD AUTO: 248 K/UL (ref 150–350)
PMV BLD AUTO: 10.3 FL (ref 9.2–12.9)
POTASSIUM SERPL-SCNC: 3.4 MMOL/L (ref 3.5–5.1)
PROT SERPL-MCNC: 7 G/DL (ref 6–8.4)
PROT UR QL STRIP: NEGATIVE
RBC # BLD AUTO: 4.58 M/UL (ref 4–5.4)
RBC #/AREA URNS HPF: 1 /HPF (ref 0–4)
SODIUM SERPL-SCNC: 140 MMOL/L (ref 136–145)
SP GR UR STRIP: 1.02 (ref 1–1.03)
SQUAMOUS #/AREA URNS HPF: 5 /HPF
URN SPEC COLLECT METH UR: ABNORMAL
UROBILINOGEN UR STRIP-ACNC: NEGATIVE EU/DL
WBC # BLD AUTO: 7.96 K/UL (ref 3.9–12.7)
WBC #/AREA URNS HPF: 6 /HPF (ref 0–5)

## 2020-12-23 PROCEDURE — 80053 COMPREHEN METABOLIC PANEL: CPT

## 2020-12-23 PROCEDURE — 93010 ELECTROCARDIOGRAM REPORT: CPT | Mod: ,,, | Performed by: INTERNAL MEDICINE

## 2020-12-23 PROCEDURE — 99284 EMERGENCY DEPT VISIT MOD MDM: CPT | Mod: 25

## 2020-12-23 PROCEDURE — 85025 COMPLETE CBC W/AUTO DIFF WBC: CPT

## 2020-12-23 PROCEDURE — 81000 URINALYSIS NONAUTO W/SCOPE: CPT

## 2020-12-23 PROCEDURE — 96372 THER/PROPH/DIAG INJ SC/IM: CPT | Mod: 59

## 2020-12-23 PROCEDURE — 83690 ASSAY OF LIPASE: CPT

## 2020-12-23 PROCEDURE — 96374 THER/PROPH/DIAG INJ IV PUSH: CPT

## 2020-12-23 PROCEDURE — 93005 ELECTROCARDIOGRAM TRACING: CPT

## 2020-12-23 PROCEDURE — 96361 HYDRATE IV INFUSION ADD-ON: CPT

## 2020-12-23 PROCEDURE — 25000003 PHARM REV CODE 250: Performed by: EMERGENCY MEDICINE

## 2020-12-23 PROCEDURE — 63600175 PHARM REV CODE 636 W HCPCS: Performed by: EMERGENCY MEDICINE

## 2020-12-23 PROCEDURE — 96375 TX/PRO/DX INJ NEW DRUG ADDON: CPT

## 2020-12-23 PROCEDURE — 93010 EKG 12-LEAD: ICD-10-PCS | Mod: ,,, | Performed by: INTERNAL MEDICINE

## 2020-12-23 RX ORDER — SUCRALFATE 1 G/10ML
1 SUSPENSION ORAL
Qty: 414 ML | Refills: 0 | Status: SHIPPED | OUTPATIENT
Start: 2020-12-23 | End: 2023-06-08

## 2020-12-23 RX ORDER — DICYCLOMINE HYDROCHLORIDE 10 MG/ML
20 INJECTION INTRAMUSCULAR
Status: COMPLETED | OUTPATIENT
Start: 2020-12-23 | End: 2020-12-23

## 2020-12-23 RX ORDER — SODIUM CHLORIDE 9 MG/ML
INJECTION, SOLUTION INTRAVENOUS
Status: COMPLETED | OUTPATIENT
Start: 2020-12-23 | End: 2020-12-23

## 2020-12-23 RX ORDER — ONDANSETRON 2 MG/ML
4 INJECTION INTRAMUSCULAR; INTRAVENOUS
Status: COMPLETED | OUTPATIENT
Start: 2020-12-23 | End: 2020-12-23

## 2020-12-23 RX ORDER — FAMOTIDINE 10 MG/ML
20 INJECTION INTRAVENOUS
Status: COMPLETED | OUTPATIENT
Start: 2020-12-23 | End: 2020-12-23

## 2020-12-23 RX ADMIN — DICYCLOMINE HYDROCHLORIDE 20 MG: 10 INJECTION INTRAMUSCULAR at 03:12

## 2020-12-23 RX ADMIN — FAMOTIDINE 20 MG: 10 INJECTION INTRAVENOUS at 03:12

## 2020-12-23 RX ADMIN — ONDANSETRON 4 MG: 2 INJECTION INTRAMUSCULAR; INTRAVENOUS at 03:12

## 2020-12-23 RX ADMIN — SODIUM CHLORIDE 1000 ML/HR: 0.9 INJECTION, SOLUTION INTRAVENOUS at 03:12

## 2020-12-23 RX ADMIN — LIDOCAINE HYDROCHLORIDE 50 ML: 20 SOLUTION ORAL; TOPICAL at 05:12

## 2020-12-23 NOTE — ED TRIAGE NOTES
Pt presents to the ED for severe abdominal pain x 1 hr. Pt reports severe cramping, n/v, distended abdomen. Reports vomiting tonight. Denies diarrhea. Pt has a hx of gallstones. Pt denies fever/cough/chills. Moderate distress noted. AAOx4.

## 2020-12-23 NOTE — ED PROVIDER NOTES
Encounter Date: 12/23/2020    SCRIBE #1 NOTE: Sen DUNAWAY, am scribing for, and in the presence of, Dr. Vargas.       History     Chief Complaint   Patient presents with    Abdominal Pain     pt woke up about 1 hr PTA with severe stomach cramping, nausea and one episode of vomiting. pt is guarding her abdomen in triage     Time seen by provider: 3:02 AM    This is a 60 y.o. female who presents with complaint of sudden abdominal pain that began 1 hour ago. Pt has been seen multiple times this year for similar episodes. She states about 20 minutes after eating mashed potatoes and a piece of chicken, she began experiencing abdominal cramping. Pt states it feels similar to her previous abdominal pain episodes which usually begins after eating. Pt reports associated nausea, vomiting, diarrhea, a headache, and dizziness. She denies a fever. She states she is scheduled to have her gallbladder removed 1/8/2021.    The history is provided by the patient and medical records.     Review of patient's allergies indicates:  No Known Allergies  Past Medical History:   Diagnosis Date    Diabetes mellitus     Gastroenteritis     Glaucoma (increased eye pressure)     Prediabetes 6/3/2019     Past Surgical History:   Procedure Laterality Date    COLONOSCOPY N/A 6/7/2016    Procedure: COLONOSCOPY;  Surgeon: Katerine Ramsey MD;  Location: [facility] ([address] [place] [address]);  Service: Endoscopy;  Laterality: N/A;    OOPHORECTOMY  1999    Unilateral oophorectomy     Family History   Problem Relation Age of Onset    Breast cancer Cousin 67    Hypertension Mother     Glaucoma Mother     Cataracts Mother     Diabetes Sister     Glaucoma Sister     Diabetes Sister     Colon cancer Neg Hx     Ovarian cancer Neg Hx     Stroke Neg Hx      Social History     Tobacco Use    Smoking status: Former Smoker     Packs/day: 0.10     Years: 15.00     Pack years: 1.50     Types: Cigarettes     Quit date: 7/21/2005     Years since  quitting: 15.4    Smokeless tobacco: Never Used   Substance Use Topics    Alcohol use: No     Alcohol/week: 0.0 standard drinks    Drug use: No     Review of Systems   Constitutional: Negative for chills and fever.   HENT: Negative for congestion, rhinorrhea and sore throat.    Eyes: Negative for visual disturbance.   Respiratory: Negative for cough and shortness of breath.    Cardiovascular: Negative for chest pain.   Gastrointestinal: Positive for abdominal pain (cramping), diarrhea, nausea and vomiting.   Genitourinary: Negative for dysuria.   Musculoskeletal: Negative for back pain.   Skin: Negative for rash.   Neurological: Positive for dizziness and headaches. Negative for weakness and light-headedness.   Psychiatric/Behavioral: Negative for confusion.       Physical Exam     Initial Vitals   BP Pulse Resp Temp SpO2   12/23/20 0243 12/23/20 0243 12/23/20 0243 12/23/20 0247 12/23/20 0243   (!) 150/68 109 (!) 22 98.6 °F (37 °C) 100 %      MAP       --                Physical Exam    Constitutional: She appears well-developed and well-nourished.   HENT:   Head: Normocephalic and atraumatic.   Eyes: Conjunctivae and EOM are normal. Pupils are equal, round, and reactive to light.   Neck: Normal range of motion. Neck supple.   Cardiovascular: Normal rate, regular rhythm, normal heart sounds and normal pulses.   No murmur heard.  Pulmonary/Chest: Breath sounds normal. No respiratory distress. She has no wheezes. She has no rhonchi. She has no rales.   Abdominal: Soft. There is abdominal tenderness. There is no rebound and no guarding.   Mild epigastric tenderness   Musculoskeletal: No edema.   Neurological: She is alert and oriented to person, place, and time. She has normal strength. No cranial nerve deficit.   Skin: Skin is warm and dry.   Psychiatric: She has a normal mood and affect.         ED Course   Procedures  Labs Reviewed   CBC W/ AUTO DIFFERENTIAL - Abnormal; Notable for the following components:        Result Value    MCHC 31.5 (*)     All other components within normal limits    Narrative:     For upper or mid abdominal pain.   COMPREHENSIVE METABOLIC PANEL - Abnormal; Notable for the following components:    Potassium 3.4 (*)     Glucose 114 (*)     All other components within normal limits    Narrative:     For upper or mid abdominal pain.   URINALYSIS, REFLEX TO URINE CULTURE - Abnormal; Notable for the following components:    Leukocytes, UA 1+ (*)     All other components within normal limits    Narrative:     In and Out Cath as needed it patient unable to void  Specimen Source->Urine   URINALYSIS MICROSCOPIC - Abnormal; Notable for the following components:    WBC, UA 6 (*)     All other components within normal limits    Narrative:     In and Out Cath as needed it patient unable to void  Specimen Source->Urine   LIPASE    Narrative:     For upper or mid abdominal pain.        ECG Results          EKG 12-lead (Final result)  Result time 12/23/20 18:39:37    Final result by Interface, Lab In [facility] (12/23/20 18:39:37)                 Narrative:    Test Reason : R10.9,    Vent. Rate : 070 BPM     Atrial Rate : 070 BPM     P-R Int : 136 ms          QRS Dur : 078 ms      QT Int : 404 ms       P-R-T Axes : 065 063 076 degrees     QTc Int : 436 ms    Normal sinus rhythm  Normal ECG    Confirmed by Yaritza IBARRA, Lambert GRULLON ([id number]) on 12/23/2020 6:39:25 PM    Referred By: ALEX ROGERS           Confirmed By:Lambert Vigil MD                            Imaging Results    None          Medical Decision Making:   History:   Old Medical Records: I decided to obtain old medical records.  Initial Assessment:       60-year-old female presents for evaluation after acute onset of abdominal cramping, vomiting and diarrhea, headache and dizziness that started 1 hr ago.  She states she has had multiple previous episodes, and per chart review she has been seen in the ED 3 times over the past 3 months with similar  presentation.  She states she has had outpatient workup including EGD with no clear etiology, and is scheduled for cholecystectomy with Dr. Wilkerson in 2 weeks.  She states pain usually occurs after eating, and she occasionally gets mild episodes, but this episode is more severe and started 30 min after her last meal.  She reports diffuse abdominal cramping, worse in the upper abdomen, and still feels nauseous.  She is otherwise at baseline prior, with no fevers or recent illness.  On arrival patient afebrile with mild tachycardia and minimal epigastric tenderness, no right upper quadrant tenderness or other focal tenderness.  Based on HPI, could be gastritis but she states she had negative EGD recently.  She could also have biliary colic, low suspicion for acute cholecystitis or intra-abdominal infection based on lack of right upper quadrant or point tenderness.      Basic labs checked with normal WBC, no elevated LFTs/lipase or other acute findings.  UA does have 6 WBC but no urinary symptoms to suggest true UTI.  Patient was treated with Bentyl, Pepcid and Zofran and IVF with improvement, but still felt some mild epigastric pain, that resolved with GI cocktail that was then given.   No ultrasound documented in our system, but patient states that she had 1 and it did show gallstones.  I consider repeating right upper quadrant ultrasound but given very low suspicion of acute cholecystitis and no sign of biliary obstruction or elevated WBC on labs, no indication for emergent repeat at this time.  Patient requesting refill of Carafate and has all her other meds, and will continue antacids and follow-up with surgery as scheduled.  She is also advised to modify diet to low acid and low-fat diet to see if this improves the frequency of her episodes given gastritis and biliary colic as most likely diagnoses.  She is comfortable with discharge plan and understands return to the ED for any worsening symptoms or to the  concerns.      Clinical Tests:   Lab Tests: Ordered and Reviewed  Medical Tests: Ordered and Reviewed            Scribe Attestation:   Scribe #1: I performed the above scribed service and the documentation accurately describes the services I performed. I attest to the accuracy of the note.    Attending Attestation:           Physician Attestation for Scribe:  Physician Attestation Statement for Scribe #1: I, Dr. Vargas, reviewed documentation, as scribed by Sen Morrell in my presence, and it is both accurate and complete.                           Clinical Impression:       ICD-10-CM ICD-9-CM   1. Non-intractable vomiting with nausea, unspecified vomiting type  R11.2 787.01   2. Abdominal pain  R10.9 789.00                          ED Disposition Condition    Discharge Stable        ED Prescriptions     Medication Sig Dispense Start Date End Date Auth. Provider    sucralfate (CARAFATE) 100 mg/mL suspension Take 10 mLs (1 g total) by mouth 4 (four) times daily before meals and nightly. 414 mL 12/23/2020  Chet Vargas MD        Follow-up Information     Follow up With Specialties Details Why Contact Info    [facility] Go to  If symptoms worsen [address] [facility] [facility] [id number] [phone]                                       Chet Vargas MD  12/24/20 7216

## 2021-01-05 ENCOUNTER — HOSPITAL ENCOUNTER (OUTPATIENT)
Dept: PREADMISSION TESTING | Facility: OTHER | Age: 61
Discharge: HOME | End: 2021-01-05
Attending: SPECIALIST
Payer: COMMERCIAL

## 2021-01-05 ENCOUNTER — ANESTHESIA EVENT (OUTPATIENT)
Dept: SURGERY | Facility: OTHER | Age: 61
End: 2021-01-05
Payer: COMMERCIAL

## 2021-01-05 VITALS
TEMPERATURE: 97 F | DIASTOLIC BLOOD PRESSURE: 62 MMHG | SYSTOLIC BLOOD PRESSURE: 134 MMHG | WEIGHT: 155 LBS | OXYGEN SATURATION: 100 % | HEIGHT: 64 IN | BODY MASS INDEX: 26.46 KG/M2 | HEART RATE: 67 BPM

## 2021-01-05 DIAGNOSIS — Z01.818 PREOP TESTING: ICD-10-CM

## 2021-01-05 PROCEDURE — U0003 INFECTIOUS AGENT DETECTION BY NUCLEIC ACID (DNA OR RNA); SEVERE ACUTE RESPIRATORY SYNDROME CORONAVIRUS 2 (SARS-COV-2) (CORONAVIRUS DISEASE [COVID-19]), AMPLIFIED PROBE TECHNIQUE, MAKING USE OF HIGH THROUGHPUT TECHNOLOGIES AS DESCRIBED BY CMS-2020-01-R: HCPCS

## 2021-01-05 RX ORDER — SODIUM CHLORIDE, SODIUM LACTATE, POTASSIUM CHLORIDE, CALCIUM CHLORIDE 600; 310; 30; 20 MG/100ML; MG/100ML; MG/100ML; MG/100ML
INJECTION, SOLUTION INTRAVENOUS CONTINUOUS
Status: CANCELLED | OUTPATIENT
Start: 2021-01-05

## 2021-01-06 LAB — SARS-COV-2 RNA RESP QL NAA+PROBE: NOT DETECTED

## 2021-01-08 ENCOUNTER — ANESTHESIA (OUTPATIENT)
Dept: SURGERY | Facility: OTHER | Age: 61
End: 2021-01-08
Payer: COMMERCIAL

## 2021-01-08 ENCOUNTER — HOSPITAL ENCOUNTER (OUTPATIENT)
Facility: OTHER | Age: 61
Discharge: HOME | End: 2021-01-08
Attending: SPECIALIST | Admitting: SPECIALIST
Payer: COMMERCIAL

## 2021-01-08 VITALS
SYSTOLIC BLOOD PRESSURE: 132 MMHG | WEIGHT: 155 LBS | RESPIRATION RATE: 16 BRPM | BODY MASS INDEX: 26.46 KG/M2 | DIASTOLIC BLOOD PRESSURE: 60 MMHG | HEIGHT: 64 IN | TEMPERATURE: 98 F | HEART RATE: 74 BPM | OXYGEN SATURATION: 97 %

## 2021-01-08 DIAGNOSIS — K82.9 GALL BLADDER DISEASE: Primary | ICD-10-CM

## 2021-01-08 DIAGNOSIS — Z01.818 PREOP TESTING: ICD-10-CM

## 2021-01-08 PROCEDURE — 63600175 PHARM REV CODE 636 W HCPCS: Performed by: SPECIALIST

## 2021-01-08 PROCEDURE — 71000015 HC POSTOP RECOV 1ST HR: Performed by: SPECIALIST

## 2021-01-08 PROCEDURE — 88304 TISSUE EXAM BY PATHOLOGIST: CPT | Mod: 26,,, | Performed by: PATHOLOGY

## 2021-01-08 PROCEDURE — 36000708 HC OR TIME LEV III 1ST 15 MIN: Performed by: SPECIALIST

## 2021-01-08 PROCEDURE — C1729 CATH, DRAINAGE: HCPCS | Performed by: SPECIALIST

## 2021-01-08 PROCEDURE — 63600175 PHARM REV CODE 636 W HCPCS: Performed by: ANESTHESIOLOGY

## 2021-01-08 PROCEDURE — 71000016 HC POSTOP RECOV ADDL HR: Performed by: SPECIALIST

## 2021-01-08 PROCEDURE — 27201423 OPTIME MED/SURG SUP & DEVICES STERILE SUPPLY: Performed by: SPECIALIST

## 2021-01-08 PROCEDURE — 71000039 HC RECOVERY, EACH ADD'L HOUR: Performed by: SPECIALIST

## 2021-01-08 PROCEDURE — 71000033 HC RECOVERY, INTIAL HOUR: Performed by: SPECIALIST

## 2021-01-08 PROCEDURE — 63600175 PHARM REV CODE 636 W HCPCS: Performed by: NURSE ANESTHETIST, CERTIFIED REGISTERED

## 2021-01-08 PROCEDURE — 88304 TISSUE EXAM BY PATHOLOGIST: CPT | Performed by: PATHOLOGY

## 2021-01-08 PROCEDURE — 88304 PR  SURG PATH,LEVEL III: ICD-10-PCS | Mod: 26,,, | Performed by: PATHOLOGY

## 2021-01-08 PROCEDURE — 36000709 HC OR TIME LEV III EA ADD 15 MIN: Performed by: SPECIALIST

## 2021-01-08 PROCEDURE — 25000003 PHARM REV CODE 250: Performed by: SPECIALIST

## 2021-01-08 PROCEDURE — 37000009 HC ANESTHESIA EA ADD 15 MINS: Performed by: SPECIALIST

## 2021-01-08 PROCEDURE — 25000003 PHARM REV CODE 250: Performed by: NURSE ANESTHETIST, CERTIFIED REGISTERED

## 2021-01-08 PROCEDURE — 37000008 HC ANESTHESIA 1ST 15 MINUTES: Performed by: SPECIALIST

## 2021-01-08 RX ORDER — OXYCODONE HYDROCHLORIDE 5 MG/1
5 TABLET ORAL EVERY 4 HOURS PRN
Status: DISCONTINUED | OUTPATIENT
Start: 2021-01-08 | End: 2021-01-08 | Stop reason: HOSPADM

## 2021-01-08 RX ORDER — ONDANSETRON 8 MG/1
8 TABLET, ORALLY DISINTEGRATING ORAL EVERY 8 HOURS PRN
Status: DISCONTINUED | OUTPATIENT
Start: 2021-01-08 | End: 2021-01-08 | Stop reason: HOSPADM

## 2021-01-08 RX ORDER — SODIUM CHLORIDE 9 MG/ML
INJECTION, SOLUTION INTRAVENOUS CONTINUOUS
Status: DISCONTINUED | OUTPATIENT
Start: 2021-01-08 | End: 2021-01-08 | Stop reason: HOSPADM

## 2021-01-08 RX ORDER — OXYCODONE HYDROCHLORIDE 5 MG/1
5 TABLET ORAL
Status: DISCONTINUED | OUTPATIENT
Start: 2021-01-08 | End: 2021-01-08 | Stop reason: HOSPADM

## 2021-01-08 RX ORDER — MIDAZOLAM HYDROCHLORIDE 1 MG/ML
INJECTION INTRAMUSCULAR; INTRAVENOUS
Status: DISCONTINUED | OUTPATIENT
Start: 2021-01-08 | End: 2021-01-08

## 2021-01-08 RX ORDER — HYDROMORPHONE HYDROCHLORIDE 2 MG/ML
0.4 INJECTION, SOLUTION INTRAMUSCULAR; INTRAVENOUS; SUBCUTANEOUS EVERY 5 MIN PRN
Status: DISCONTINUED | OUTPATIENT
Start: 2021-01-08 | End: 2021-01-08 | Stop reason: HOSPADM

## 2021-01-08 RX ORDER — SODIUM CHLORIDE, SODIUM LACTATE, POTASSIUM CHLORIDE, CALCIUM CHLORIDE 600; 310; 30; 20 MG/100ML; MG/100ML; MG/100ML; MG/100ML
INJECTION, SOLUTION INTRAVENOUS CONTINUOUS
Status: DISCONTINUED | OUTPATIENT
Start: 2021-01-08 | End: 2021-01-08 | Stop reason: HOSPADM

## 2021-01-08 RX ORDER — MEPERIDINE HYDROCHLORIDE 25 MG/ML
12.5 INJECTION INTRAMUSCULAR; INTRAVENOUS; SUBCUTANEOUS ONCE AS NEEDED
Status: DISCONTINUED | OUTPATIENT
Start: 2021-01-08 | End: 2021-01-08 | Stop reason: HOSPADM

## 2021-01-08 RX ORDER — SODIUM CHLORIDE 0.9 % (FLUSH) 0.9 %
3 SYRINGE (ML) INJECTION
Status: DISCONTINUED | OUTPATIENT
Start: 2021-01-08 | End: 2021-01-08 | Stop reason: HOSPADM

## 2021-01-08 RX ORDER — ONDANSETRON 2 MG/ML
INJECTION INTRAMUSCULAR; INTRAVENOUS
Status: DISCONTINUED | OUTPATIENT
Start: 2021-01-08 | End: 2021-01-08

## 2021-01-08 RX ORDER — NEOSTIGMINE METHYLSULFATE 1 MG/ML
INJECTION, SOLUTION INTRAVENOUS
Status: DISCONTINUED | OUTPATIENT
Start: 2021-01-08 | End: 2021-01-08

## 2021-01-08 RX ORDER — ACETAMINOPHEN 325 MG/1
650 TABLET ORAL EVERY 4 HOURS PRN
Status: DISCONTINUED | OUTPATIENT
Start: 2021-01-08 | End: 2021-01-08 | Stop reason: HOSPADM

## 2021-01-08 RX ORDER — LIDOCAINE HYDROCHLORIDE 10 MG/ML
1 INJECTION, SOLUTION EPIDURAL; INFILTRATION; INTRACAUDAL; PERINEURAL ONCE
Status: DISCONTINUED | OUTPATIENT
Start: 2021-01-08 | End: 2021-01-08 | Stop reason: HOSPADM

## 2021-01-08 RX ORDER — OXYCODONE AND ACETAMINOPHEN 7.5; 325 MG/1; MG/1
1 TABLET ORAL EVERY 6 HOURS PRN
Qty: 28 TABLET | Refills: 0 | Status: SHIPPED | OUTPATIENT
Start: 2021-01-08 | End: 2021-11-10

## 2021-01-08 RX ORDER — PROPOFOL 10 MG/ML
VIAL (ML) INTRAVENOUS
Status: DISCONTINUED | OUTPATIENT
Start: 2021-01-08 | End: 2021-01-08

## 2021-01-08 RX ORDER — OXYCODONE HYDROCHLORIDE 5 MG/1
10 TABLET ORAL EVERY 4 HOURS PRN
Status: DISCONTINUED | OUTPATIENT
Start: 2021-01-08 | End: 2021-01-08 | Stop reason: HOSPADM

## 2021-01-08 RX ORDER — ROCURONIUM BROMIDE 10 MG/ML
INJECTION, SOLUTION INTRAVENOUS
Status: DISCONTINUED | OUTPATIENT
Start: 2021-01-08 | End: 2021-01-08

## 2021-01-08 RX ORDER — LIDOCAINE HCL/PF 100 MG/5ML
SYRINGE (ML) INTRAVENOUS
Status: DISCONTINUED | OUTPATIENT
Start: 2021-01-08 | End: 2021-01-08

## 2021-01-08 RX ORDER — DEXAMETHASONE SODIUM PHOSPHATE 4 MG/ML
INJECTION, SOLUTION INTRA-ARTICULAR; INTRALESIONAL; INTRAMUSCULAR; INTRAVENOUS; SOFT TISSUE
Status: DISCONTINUED | OUTPATIENT
Start: 2021-01-08 | End: 2021-01-08

## 2021-01-08 RX ORDER — ONDANSETRON 2 MG/ML
4 INJECTION INTRAMUSCULAR; INTRAVENOUS DAILY PRN
Status: DISCONTINUED | OUTPATIENT
Start: 2021-01-08 | End: 2021-01-08 | Stop reason: HOSPADM

## 2021-01-08 RX ORDER — BUPIVACAINE HYDROCHLORIDE 2.5 MG/ML
INJECTION, SOLUTION EPIDURAL; INFILTRATION; INTRACAUDAL
Status: DISCONTINUED | OUTPATIENT
Start: 2021-01-08 | End: 2021-01-08 | Stop reason: HOSPADM

## 2021-01-08 RX ORDER — CEFAZOLIN SODIUM 1 G/3ML
2 INJECTION, POWDER, FOR SOLUTION INTRAMUSCULAR; INTRAVENOUS
Status: COMPLETED | OUTPATIENT
Start: 2021-01-08 | End: 2021-01-08

## 2021-01-08 RX ORDER — FENTANYL CITRATE 50 UG/ML
INJECTION, SOLUTION INTRAMUSCULAR; INTRAVENOUS
Status: DISCONTINUED | OUTPATIENT
Start: 2021-01-08 | End: 2021-01-08

## 2021-01-08 RX ADMIN — FENTANYL CITRATE 50 MCG: 50 INJECTION, SOLUTION INTRAMUSCULAR; INTRAVENOUS at 08:01

## 2021-01-08 RX ADMIN — SODIUM CHLORIDE, SODIUM LACTATE, POTASSIUM CHLORIDE, AND CALCIUM CHLORIDE: 600; 310; 30; 20 INJECTION, SOLUTION INTRAVENOUS at 08:01

## 2021-01-08 RX ADMIN — SODIUM CHLORIDE, SODIUM LACTATE, POTASSIUM CHLORIDE, AND CALCIUM CHLORIDE: 600; 310; 30; 20 INJECTION, SOLUTION INTRAVENOUS at 06:01

## 2021-01-08 RX ADMIN — PROPOFOL 200 MG: 10 INJECTION, EMULSION INTRAVENOUS at 07:01

## 2021-01-08 RX ADMIN — GLYCOPYRROLATE 0.6 MG: 0.2 INJECTION, SOLUTION INTRAMUSCULAR; INTRAVITREAL at 08:01

## 2021-01-08 RX ADMIN — HYDROMORPHONE HYDROCHLORIDE 0.4 MG: 2 INJECTION INTRAMUSCULAR; INTRAVENOUS; SUBCUTANEOUS at 09:01

## 2021-01-08 RX ADMIN — FENTANYL CITRATE 100 MCG: 50 INJECTION, SOLUTION INTRAMUSCULAR; INTRAVENOUS at 07:01

## 2021-01-08 RX ADMIN — MIDAZOLAM HYDROCHLORIDE 2 MG: 1 INJECTION, SOLUTION INTRAMUSCULAR; INTRAVENOUS at 07:01

## 2021-01-08 RX ADMIN — DEXAMETHASONE SODIUM PHOSPHATE 8 MG: 4 INJECTION, SOLUTION INTRAMUSCULAR; INTRAVENOUS at 07:01

## 2021-01-08 RX ADMIN — ROCURONIUM BROMIDE 35 MG: 10 INJECTION, SOLUTION INTRAVENOUS at 07:01

## 2021-01-08 RX ADMIN — LIDOCAINE HYDROCHLORIDE 100 MG: 20 INJECTION, SOLUTION INTRAVENOUS at 07:01

## 2021-01-08 RX ADMIN — CEFAZOLIN 2 G: 330 INJECTION, POWDER, FOR SOLUTION INTRAMUSCULAR; INTRAVENOUS at 07:01

## 2021-01-08 RX ADMIN — NEOSTIGMINE METHYLSULFATE 5 MG: 1 INJECTION INTRAVENOUS at 08:01

## 2021-01-08 RX ADMIN — ONDANSETRON HYDROCHLORIDE 4 MG: 2 INJECTION INTRAMUSCULAR; INTRAVENOUS at 07:01

## 2021-01-11 ENCOUNTER — OFFICE VISIT (OUTPATIENT)
Dept: INTERNAL MEDICINE | Facility: CLINIC | Age: 61
End: 2021-01-11
Attending: FAMILY MEDICINE
Payer: COMMERCIAL

## 2021-01-11 VITALS
HEART RATE: 74 BPM | OXYGEN SATURATION: 99 % | HEIGHT: 64 IN | SYSTOLIC BLOOD PRESSURE: 128 MMHG | BODY MASS INDEX: 26.76 KG/M2 | DIASTOLIC BLOOD PRESSURE: 82 MMHG | WEIGHT: 156.75 LBS

## 2021-01-11 DIAGNOSIS — R10.13 DYSPEPSIA: ICD-10-CM

## 2021-01-11 DIAGNOSIS — K21.9 GASTROESOPHAGEAL REFLUX DISEASE WITHOUT ESOPHAGITIS: Primary | ICD-10-CM

## 2021-01-11 DIAGNOSIS — Z91.09 ENVIRONMENTAL ALLERGIES: ICD-10-CM

## 2021-01-11 PROCEDURE — 99214 PR OFFICE/OUTPT VISIT, EST, LEVL IV, 30-39 MIN: ICD-10-PCS | Mod: S$GLB,,, | Performed by: FAMILY MEDICINE

## 2021-01-11 PROCEDURE — 3008F PR BODY MASS INDEX (BMI) DOCUMENTED: ICD-10-PCS | Mod: CPTII,S$GLB,, | Performed by: FAMILY MEDICINE

## 2021-01-11 PROCEDURE — 1125F AMNT PAIN NOTED PAIN PRSNT: CPT | Mod: S$GLB,,, | Performed by: FAMILY MEDICINE

## 2021-01-11 PROCEDURE — 1125F PR PAIN SEVERITY QUANTIFIED, PAIN PRESENT: ICD-10-PCS | Mod: S$GLB,,, | Performed by: FAMILY MEDICINE

## 2021-01-11 PROCEDURE — 99999 PR PBB SHADOW E&M-EST. PATIENT-LVL III: CPT | Mod: PBBFAC,,, | Performed by: FAMILY MEDICINE

## 2021-01-11 PROCEDURE — 99999 PR PBB SHADOW E&M-EST. PATIENT-LVL III: ICD-10-PCS | Mod: PBBFAC,,, | Performed by: FAMILY MEDICINE

## 2021-01-11 PROCEDURE — 3008F BODY MASS INDEX DOCD: CPT | Mod: CPTII,S$GLB,, | Performed by: FAMILY MEDICINE

## 2021-01-11 PROCEDURE — 99214 OFFICE O/P EST MOD 30 MIN: CPT | Mod: S$GLB,,, | Performed by: FAMILY MEDICINE

## 2021-01-13 ENCOUNTER — TELEPHONE (OUTPATIENT)
Dept: INTERNAL MEDICINE | Facility: CLINIC | Age: 61
End: 2021-01-13

## 2021-01-13 RX ORDER — ONDANSETRON 4 MG/1
4 TABLET, ORALLY DISINTEGRATING ORAL EVERY 6 HOURS PRN
Qty: 12 TABLET | Refills: 0 | Status: SHIPPED | OUTPATIENT
Start: 2021-01-13 | End: 2021-09-09 | Stop reason: SDUPTHER

## 2021-01-13 RX ORDER — DICYCLOMINE HYDROCHLORIDE 20 MG/1
20 TABLET ORAL 2 TIMES DAILY
Qty: 20 TABLET | Refills: 0 | Status: SHIPPED | OUTPATIENT
Start: 2021-01-13 | End: 2021-02-02 | Stop reason: SDUPTHER

## 2021-01-19 LAB
FINAL PATHOLOGIC DIAGNOSIS: NORMAL
GROSS: NORMAL
Lab: NORMAL

## 2021-02-01 ENCOUNTER — OFFICE VISIT (OUTPATIENT)
Dept: OPTOMETRY | Facility: CLINIC | Age: 61
End: 2021-02-01
Payer: COMMERCIAL

## 2021-02-01 ENCOUNTER — CLINICAL SUPPORT (OUTPATIENT)
Dept: OPHTHALMOLOGY | Facility: CLINIC | Age: 61
End: 2021-02-01
Payer: COMMERCIAL

## 2021-02-01 DIAGNOSIS — H40.1131 PRIMARY OPEN ANGLE GLAUCOMA (POAG) OF BOTH EYES, MILD STAGE: Primary | ICD-10-CM

## 2021-02-01 DIAGNOSIS — H40.1131 PRIMARY OPEN ANGLE GLAUCOMA (POAG) OF BOTH EYES, MILD STAGE: ICD-10-CM

## 2021-02-01 PROCEDURE — 1126F PR PAIN SEVERITY QUANTIFIED, NO PAIN PRESENT: ICD-10-PCS | Mod: S$GLB,,, | Performed by: OPTOMETRIST

## 2021-02-01 PROCEDURE — 1126F AMNT PAIN NOTED NONE PRSNT: CPT | Mod: S$GLB,,, | Performed by: OPTOMETRIST

## 2021-02-01 PROCEDURE — 92012 INTRM OPH EXAM EST PATIENT: CPT | Mod: S$GLB,,, | Performed by: OPTOMETRIST

## 2021-02-01 PROCEDURE — 99999 PR PBB SHADOW E&M-EST. PATIENT-LVL III: ICD-10-PCS | Mod: PBBFAC,,, | Performed by: OPTOMETRIST

## 2021-02-01 PROCEDURE — 92012 PR EYE EXAM, EST PATIENT,INTERMED: ICD-10-PCS | Mod: S$GLB,,, | Performed by: OPTOMETRIST

## 2021-02-01 PROCEDURE — 99999 PR PBB SHADOW E&M-EST. PATIENT-LVL III: CPT | Mod: PBBFAC,,, | Performed by: OPTOMETRIST

## 2021-02-01 RX ORDER — LATANOPROST 50 UG/ML
1 SOLUTION/ DROPS OPHTHALMIC NIGHTLY
Qty: 2.5 ML | Refills: 4 | Status: SHIPPED | OUTPATIENT
Start: 2021-02-01 | End: 2021-06-22

## 2021-03-23 ENCOUNTER — OFFICE VISIT (OUTPATIENT)
Dept: OPTOMETRY | Facility: CLINIC | Age: 61
End: 2021-03-23
Payer: COMMERCIAL

## 2021-03-23 DIAGNOSIS — H40.1131 PRIMARY OPEN ANGLE GLAUCOMA (POAG) OF BOTH EYES, MILD STAGE: Primary | ICD-10-CM

## 2021-03-23 DIAGNOSIS — H04.123 DRY EYE SYNDROME OF BOTH EYES: ICD-10-CM

## 2021-03-23 DIAGNOSIS — H53.9 VISUAL DISTURBANCE: ICD-10-CM

## 2021-03-23 DIAGNOSIS — R73.03 PREDIABETES: ICD-10-CM

## 2021-03-23 DIAGNOSIS — H25.13 NUCLEAR SCLEROSIS OF BOTH EYES: ICD-10-CM

## 2021-03-23 PROCEDURE — 1125F PR PAIN SEVERITY QUANTIFIED, PAIN PRESENT: ICD-10-PCS | Mod: S$GLB,,, | Performed by: OPTOMETRIST

## 2021-03-23 PROCEDURE — 92014 COMPRE OPH EXAM EST PT 1/>: CPT | Mod: S$GLB,,, | Performed by: OPTOMETRIST

## 2021-03-23 PROCEDURE — 1125F AMNT PAIN NOTED PAIN PRSNT: CPT | Mod: S$GLB,,, | Performed by: OPTOMETRIST

## 2021-03-23 PROCEDURE — 99999 PR PBB SHADOW E&M-EST. PATIENT-LVL II: ICD-10-PCS | Mod: PBBFAC,,, | Performed by: OPTOMETRIST

## 2021-03-23 PROCEDURE — 99999 PR PBB SHADOW E&M-EST. PATIENT-LVL II: CPT | Mod: PBBFAC,,, | Performed by: OPTOMETRIST

## 2021-03-23 PROCEDURE — 92014 PR EYE EXAM, EST PATIENT,COMPREHESV: ICD-10-PCS | Mod: S$GLB,,, | Performed by: OPTOMETRIST

## 2021-04-28 ENCOUNTER — PATIENT MESSAGE (OUTPATIENT)
Dept: RESEARCH | Facility: HOSPITAL | Age: 61
End: 2021-04-28

## 2021-05-20 ENCOUNTER — OFFICE VISIT (OUTPATIENT)
Dept: PODIATRY | Facility: CLINIC | Age: 61
End: 2021-05-20
Payer: COMMERCIAL

## 2021-05-20 VITALS
HEIGHT: 64 IN | SYSTOLIC BLOOD PRESSURE: 131 MMHG | WEIGHT: 150 LBS | HEART RATE: 85 BPM | DIASTOLIC BLOOD PRESSURE: 61 MMHG | BODY MASS INDEX: 25.61 KG/M2

## 2021-05-20 DIAGNOSIS — B35.1 ONYCHOMYCOSIS DUE TO DERMATOPHYTE: Primary | ICD-10-CM

## 2021-05-20 PROCEDURE — 17999 UNLISTD PX SKN MUC MEMB SUBQ: CPT | Mod: CSM,S$GLB,, | Performed by: PODIATRIST

## 2021-05-20 PROCEDURE — 3008F BODY MASS INDEX DOCD: CPT | Mod: CPTII,,, | Performed by: PODIATRIST

## 2021-05-20 PROCEDURE — 99999 PR PBB SHADOW E&M-EST. PATIENT-LVL IV: ICD-10-PCS | Mod: PBBFAC,,, | Performed by: PODIATRIST

## 2021-05-20 PROCEDURE — 1125F PR PAIN SEVERITY QUANTIFIED, PAIN PRESENT: ICD-10-PCS | Mod: ,,, | Performed by: PODIATRIST

## 2021-05-20 PROCEDURE — 1125F AMNT PAIN NOTED PAIN PRSNT: CPT | Mod: ,,, | Performed by: PODIATRIST

## 2021-05-20 PROCEDURE — 17999 PR NON-COVERED FOOT CARE: ICD-10-PCS | Mod: CSM,S$GLB,, | Performed by: PODIATRIST

## 2021-05-20 PROCEDURE — 99999 PR PBB SHADOW E&M-EST. PATIENT-LVL IV: CPT | Mod: PBBFAC,,, | Performed by: PODIATRIST

## 2021-05-20 PROCEDURE — 99214 OFFICE O/P EST MOD 30 MIN: CPT | Mod: ,,, | Performed by: PODIATRIST

## 2021-05-20 PROCEDURE — 3008F PR BODY MASS INDEX (BMI) DOCUMENTED: ICD-10-PCS | Mod: CPTII,,, | Performed by: PODIATRIST

## 2021-05-20 PROCEDURE — 99214 PR OFFICE/OUTPT VISIT, EST, LEVL IV, 30-39 MIN: ICD-10-PCS | Mod: ,,, | Performed by: PODIATRIST

## 2021-05-20 RX ORDER — CICLOPIROX 80 MG/ML
SOLUTION TOPICAL NIGHTLY
Qty: 6.6 ML | Refills: 11 | Status: SHIPPED | OUTPATIENT
Start: 2021-05-20 | End: 2021-11-11

## 2021-05-20 RX ORDER — AMOXICILLIN 875 MG/1
875 TABLET, FILM COATED ORAL 2 TIMES DAILY
COMMUNITY
Start: 2021-05-17 | End: 2021-11-10

## 2021-05-20 RX ORDER — PHENOBARBITAL WITH BELLADONNA ALKALOIDS 16.2; .1037; .0194; .0065 MG/5ML; MG/5ML; MG/5ML; MG/5ML
5 ELIXIR ORAL
COMMUNITY
Start: 2014-05-13 | End: 2023-06-08

## 2021-05-20 RX ORDER — DICYCLOMINE HYDROCHLORIDE 20 MG/1
20 TABLET ORAL 2 TIMES DAILY
COMMUNITY
Start: 2021-05-13 | End: 2021-08-13 | Stop reason: SDUPTHER

## 2021-06-23 DIAGNOSIS — Z12.31 OTHER SCREENING MAMMOGRAM: ICD-10-CM

## 2021-07-09 ENCOUNTER — TELEPHONE (OUTPATIENT)
Dept: INTERNAL MEDICINE | Facility: CLINIC | Age: 61
End: 2021-07-09
Payer: COMMERCIAL

## 2021-07-09 DIAGNOSIS — D22.9 CHANGE IN MOLE: Primary | ICD-10-CM

## 2021-07-14 ENCOUNTER — HOSPITAL ENCOUNTER (OUTPATIENT)
Dept: RADIOLOGY | Facility: OTHER | Age: 61
Discharge: HOME | End: 2021-07-14
Attending: FAMILY MEDICINE
Payer: COMMERCIAL

## 2021-07-14 DIAGNOSIS — Z12.31 OTHER SCREENING MAMMOGRAM: ICD-10-CM

## 2021-07-14 PROCEDURE — 77067 SCR MAMMO BI INCL CAD: CPT | Mod: 26,,, | Performed by: RADIOLOGY

## 2021-07-14 PROCEDURE — 77063 BREAST TOMOSYNTHESIS BI: CPT | Mod: 26,,, | Performed by: RADIOLOGY

## 2021-07-14 PROCEDURE — 77063 MAMMO DIGITAL SCREENING BILAT WITH TOMO: ICD-10-PCS | Mod: 26,,, | Performed by: RADIOLOGY

## 2021-07-14 PROCEDURE — 77067 MAMMO DIGITAL SCREENING BILAT WITH TOMO: ICD-10-PCS | Mod: 26,,, | Performed by: RADIOLOGY

## 2021-07-14 PROCEDURE — 77067 SCR MAMMO BI INCL CAD: CPT | Mod: TC

## 2021-07-27 ENCOUNTER — OFFICE VISIT (OUTPATIENT)
Dept: OPTOMETRY | Facility: CLINIC | Age: 61
End: 2021-07-27
Payer: COMMERCIAL

## 2021-07-27 ENCOUNTER — CLINICAL SUPPORT (OUTPATIENT)
Dept: OPHTHALMOLOGY | Facility: CLINIC | Age: 61
End: 2021-07-27
Payer: COMMERCIAL

## 2021-07-27 ENCOUNTER — TELEPHONE (OUTPATIENT)
Dept: OPTOMETRY | Facility: CLINIC | Age: 61
End: 2021-07-27

## 2021-07-27 DIAGNOSIS — H40.1131 PRIMARY OPEN ANGLE GLAUCOMA (POAG) OF BOTH EYES, MILD STAGE: ICD-10-CM

## 2021-07-27 PROCEDURE — 1159F MED LIST DOCD IN RCRD: CPT | Mod: CPTII,S$GLB,, | Performed by: OPTOMETRIST

## 2021-07-27 PROCEDURE — 1160F RVW MEDS BY RX/DR IN RCRD: CPT | Mod: CPTII,S$GLB,, | Performed by: OPTOMETRIST

## 2021-07-27 PROCEDURE — 1126F PR PAIN SEVERITY QUANTIFIED, NO PAIN PRESENT: ICD-10-PCS | Mod: CPTII,S$GLB,, | Performed by: OPTOMETRIST

## 2021-07-27 PROCEDURE — 99999 PR PBB SHADOW E&M-EST. PATIENT-LVL III: CPT | Mod: PBBFAC,,, | Performed by: OPTOMETRIST

## 2021-07-27 PROCEDURE — 92012 PR EYE EXAM, EST PATIENT,INTERMED: ICD-10-PCS | Mod: S$GLB,,, | Performed by: OPTOMETRIST

## 2021-07-27 PROCEDURE — 92133 CPTRZD OPH DX IMG PST SGM ON: CPT | Mod: S$GLB,,, | Performed by: OPTOMETRIST

## 2021-07-27 PROCEDURE — 1126F AMNT PAIN NOTED NONE PRSNT: CPT | Mod: CPTII,S$GLB,, | Performed by: OPTOMETRIST

## 2021-07-27 PROCEDURE — 99999 PR PBB SHADOW E&M-EST. PATIENT-LVL III: ICD-10-PCS | Mod: PBBFAC,,, | Performed by: OPTOMETRIST

## 2021-07-27 PROCEDURE — 92012 INTRM OPH EXAM EST PATIENT: CPT | Mod: S$GLB,,, | Performed by: OPTOMETRIST

## 2021-07-27 PROCEDURE — 92133 OCT, OPTIC NERVE - OU - BOTH EYES: ICD-10-PCS | Mod: S$GLB,,, | Performed by: OPTOMETRIST

## 2021-07-27 PROCEDURE — 1159F PR MEDICATION LIST DOCUMENTED IN MEDICAL RECORD: ICD-10-PCS | Mod: CPTII,S$GLB,, | Performed by: OPTOMETRIST

## 2021-07-27 PROCEDURE — 1160F PR REVIEW ALL MEDS BY PRESCRIBER/CLIN PHARMACIST DOCUMENTED: ICD-10-PCS | Mod: CPTII,S$GLB,, | Performed by: OPTOMETRIST

## 2021-07-27 RX ORDER — LATANOPROST 50 UG/ML
1 SOLUTION/ DROPS OPHTHALMIC NIGHTLY
Qty: 2.5 ML | Refills: 6 | Status: SHIPPED | OUTPATIENT
Start: 2021-07-27

## 2021-08-13 ENCOUNTER — TELEPHONE (OUTPATIENT)
Dept: INTERNAL MEDICINE | Facility: CLINIC | Age: 61
End: 2021-08-13

## 2021-08-13 RX ORDER — DICYCLOMINE HYDROCHLORIDE 20 MG/1
20 TABLET ORAL 2 TIMES DAILY
Qty: 60 TABLET | Refills: 0 | Status: SHIPPED | OUTPATIENT
Start: 2021-08-13 | End: 2022-09-12

## 2021-09-09 DIAGNOSIS — R10.13 DYSPEPSIA: Primary | ICD-10-CM

## 2021-09-09 RX ORDER — ONDANSETRON 4 MG/1
4 TABLET, ORALLY DISINTEGRATING ORAL EVERY 6 HOURS PRN
Qty: 12 TABLET | Refills: 0 | Status: SHIPPED | OUTPATIENT
Start: 2021-09-09 | End: 2021-09-10 | Stop reason: SDUPTHER

## 2021-09-10 DIAGNOSIS — R10.13 DYSPEPSIA: ICD-10-CM

## 2021-09-10 RX ORDER — ONDANSETRON 4 MG/1
4 TABLET, ORALLY DISINTEGRATING ORAL EVERY 6 HOURS PRN
Qty: 12 TABLET | Refills: 0 | OUTPATIENT
Start: 2021-09-10 | End: 2022-02-28

## 2021-09-13 ENCOUNTER — PATIENT MESSAGE (OUTPATIENT)
Dept: INTERNAL MEDICINE | Facility: CLINIC | Age: 61
End: 2021-09-13

## 2021-09-20 ENCOUNTER — OFFICE VISIT (OUTPATIENT)
Dept: DERMATOLOGY | Facility: CLINIC | Age: 61
End: 2021-09-20
Payer: COMMERCIAL

## 2021-09-20 DIAGNOSIS — L82.1 SEBORRHEIC KERATOSES: Primary | ICD-10-CM

## 2021-09-20 DIAGNOSIS — D22.9 CHANGE IN MOLE: ICD-10-CM

## 2021-09-20 DIAGNOSIS — L21.9 SEBORRHEIC DERMATITIS: ICD-10-CM

## 2021-09-20 PROCEDURE — 99204 OFFICE O/P NEW MOD 45 MIN: CPT | Mod: S$GLB,,, | Performed by: DERMATOLOGY

## 2021-09-20 PROCEDURE — 99999 PR PBB SHADOW E&M-EST. PATIENT-LVL II: CPT | Mod: PBBFAC,,,

## 2021-09-20 PROCEDURE — 99999 PR PBB SHADOW E&M-EST. PATIENT-LVL II: ICD-10-PCS | Mod: PBBFAC,,,

## 2021-09-20 PROCEDURE — 99204 PR OFFICE/OUTPT VISIT, NEW, LEVL IV, 45-59 MIN: ICD-10-PCS | Mod: S$GLB,,, | Performed by: DERMATOLOGY

## 2021-09-20 RX ORDER — FLUOCINONIDE TOPICAL SOLUTION USP, 0.05% 0.5 MG/ML
SOLUTION TOPICAL DAILY PRN
Qty: 60 ML | Refills: 6 | Status: SHIPPED | OUTPATIENT
Start: 2021-09-20

## 2021-09-20 RX ORDER — HYDROCORTISONE 25 MG/G
CREAM TOPICAL 2 TIMES DAILY PRN
Qty: 30 G | Refills: 6 | Status: SHIPPED | OUTPATIENT
Start: 2021-09-20

## 2021-09-20 RX ORDER — KETOCONAZOLE 20 MG/G
CREAM TOPICAL 2 TIMES DAILY PRN
Qty: 30 G | Refills: 6 | Status: SHIPPED | OUTPATIENT
Start: 2021-09-20 | End: 2023-06-08

## 2021-09-20 RX ORDER — KETOCONAZOLE 20 MG/ML
SHAMPOO, SUSPENSION TOPICAL
Qty: 120 ML | Refills: 6 | Status: SHIPPED | OUTPATIENT
Start: 2021-09-20 | End: 2022-04-13 | Stop reason: SDUPTHER

## 2021-10-12 ENCOUNTER — TELEPHONE (OUTPATIENT)
Dept: INTERNAL MEDICINE | Facility: CLINIC | Age: 61
End: 2021-10-12

## 2021-10-15 ENCOUNTER — TELEPHONE (OUTPATIENT)
Dept: INTERNAL MEDICINE | Facility: CLINIC | Age: 61
End: 2021-10-15

## 2021-10-18 ENCOUNTER — PATIENT MESSAGE (OUTPATIENT)
Dept: INTERNAL MEDICINE | Facility: CLINIC | Age: 61
End: 2021-10-18

## 2021-10-18 ENCOUNTER — TELEPHONE (OUTPATIENT)
Dept: INTERNAL MEDICINE | Facility: CLINIC | Age: 61
End: 2021-10-18

## 2021-11-10 ENCOUNTER — LAB VISIT (OUTPATIENT)
Dept: LAB | Facility: OTHER | Age: 61
End: 2021-11-10
Attending: FAMILY MEDICINE
Payer: COMMERCIAL

## 2021-11-10 ENCOUNTER — OFFICE VISIT (OUTPATIENT)
Dept: INTERNAL MEDICINE | Facility: CLINIC | Age: 61
End: 2021-11-10
Attending: FAMILY MEDICINE
Payer: COMMERCIAL

## 2021-11-10 VITALS
BODY MASS INDEX: 26.46 KG/M2 | DIASTOLIC BLOOD PRESSURE: 72 MMHG | HEIGHT: 64 IN | WEIGHT: 155 LBS | SYSTOLIC BLOOD PRESSURE: 128 MMHG | OXYGEN SATURATION: 100 % | HEART RATE: 94 BPM

## 2021-11-10 DIAGNOSIS — M79.672 LEFT FOOT PAIN: ICD-10-CM

## 2021-11-10 DIAGNOSIS — Z00.00 ANNUAL PHYSICAL EXAM: Primary | ICD-10-CM

## 2021-11-10 DIAGNOSIS — Z00.00 ANNUAL PHYSICAL EXAM: ICD-10-CM

## 2021-11-10 LAB
ALBUMIN SERPL BCP-MCNC: 3.9 G/DL (ref 3.5–5.2)
ALP SERPL-CCNC: 66 U/L (ref 55–135)
ALT SERPL W/O P-5'-P-CCNC: 17 U/L (ref 10–44)
ANION GAP SERPL CALC-SCNC: 11 MMOL/L (ref 8–16)
AST SERPL-CCNC: 19 U/L (ref 10–40)
BILIRUB SERPL-MCNC: 0.6 MG/DL (ref 0.1–1)
BUN SERPL-MCNC: 16 MG/DL (ref 6–20)
CALCIUM SERPL-MCNC: 8.6 MG/DL (ref 8.7–10.5)
CHLORIDE SERPL-SCNC: 103 MMOL/L (ref 95–110)
CHOLEST SERPL-MCNC: 167 MG/DL (ref 120–199)
CHOLEST/HDLC SERPL: 4.1 {RATIO} (ref 2–5)
CO2 SERPL-SCNC: 27 MMOL/L (ref 23–29)
CREAT SERPL-MCNC: 0.7 MG/DL (ref 0.5–1.4)
EST. GFR  (AFRICAN AMERICAN): >60 ML/MIN/1.73 M^2
EST. GFR  (NON AFRICAN AMERICAN): >60 ML/MIN/1.73 M^2
ESTIMATED AVG GLUCOSE: 123 MG/DL (ref 68–131)
GLUCOSE SERPL-MCNC: 89 MG/DL (ref 70–110)
HBA1C MFR BLD: 5.9 % (ref 4–5.6)
HDLC SERPL-MCNC: 41 MG/DL (ref 40–75)
HDLC SERPL: 24.6 % (ref 20–50)
LDLC SERPL CALC-MCNC: 116.4 MG/DL (ref 63–159)
NONHDLC SERPL-MCNC: 126 MG/DL
POTASSIUM SERPL-SCNC: 3.8 MMOL/L (ref 3.5–5.1)
PROT SERPL-MCNC: 6.9 G/DL (ref 6–8.4)
SODIUM SERPL-SCNC: 141 MMOL/L (ref 136–145)
TRIGL SERPL-MCNC: 48 MG/DL (ref 30–150)
TSH SERPL DL<=0.005 MIU/L-ACNC: 2.01 UIU/ML (ref 0.4–4)

## 2021-11-10 PROCEDURE — 99999 PR PBB SHADOW E&M-EST. PATIENT-LVL III: ICD-10-PCS | Mod: PBBFAC,,, | Performed by: FAMILY MEDICINE

## 2021-11-10 PROCEDURE — 99396 PREV VISIT EST AGE 40-64: CPT | Mod: S$GLB,,, | Performed by: FAMILY MEDICINE

## 2021-11-10 PROCEDURE — 83036 HEMOGLOBIN GLYCOSYLATED A1C: CPT | Performed by: FAMILY MEDICINE

## 2021-11-10 PROCEDURE — 36415 COLL VENOUS BLD VENIPUNCTURE: CPT | Performed by: FAMILY MEDICINE

## 2021-11-10 PROCEDURE — 99999 PR PBB SHADOW E&M-EST. PATIENT-LVL III: CPT | Mod: PBBFAC,,, | Performed by: FAMILY MEDICINE

## 2021-11-10 PROCEDURE — 99396 PR PREVENTIVE VISIT,EST,40-64: ICD-10-PCS | Mod: S$GLB,,, | Performed by: FAMILY MEDICINE

## 2021-11-10 PROCEDURE — 80061 LIPID PANEL: CPT | Performed by: FAMILY MEDICINE

## 2021-11-10 PROCEDURE — 84443 ASSAY THYROID STIM HORMONE: CPT | Performed by: FAMILY MEDICINE

## 2021-11-10 PROCEDURE — 80053 COMPREHEN METABOLIC PANEL: CPT | Performed by: FAMILY MEDICINE

## 2021-11-11 ENCOUNTER — OFFICE VISIT (OUTPATIENT)
Dept: PODIATRY | Facility: CLINIC | Age: 61
End: 2021-11-11
Payer: COMMERCIAL

## 2021-11-11 VITALS
HEART RATE: 65 BPM | SYSTOLIC BLOOD PRESSURE: 128 MMHG | HEIGHT: 64 IN | DIASTOLIC BLOOD PRESSURE: 59 MMHG | WEIGHT: 154 LBS | BODY MASS INDEX: 26.29 KG/M2

## 2021-11-11 DIAGNOSIS — B35.1 ONYCHOMYCOSIS DUE TO DERMATOPHYTE: Primary | ICD-10-CM

## 2021-11-11 DIAGNOSIS — M20.12 HALLUX VALGUS (ACQUIRED), LEFT FOOT: ICD-10-CM

## 2021-11-11 PROCEDURE — 3008F PR BODY MASS INDEX (BMI) DOCUMENTED: ICD-10-PCS | Mod: CPTII,S$GLB,, | Performed by: PODIATRIST

## 2021-11-11 PROCEDURE — 3078F DIAST BP <80 MM HG: CPT | Mod: CPTII,S$GLB,, | Performed by: PODIATRIST

## 2021-11-11 PROCEDURE — 3074F SYST BP LT 130 MM HG: CPT | Mod: CPTII,S$GLB,, | Performed by: PODIATRIST

## 2021-11-11 PROCEDURE — 99214 OFFICE O/P EST MOD 30 MIN: CPT | Mod: 25,S$GLB,, | Performed by: PODIATRIST

## 2021-11-11 PROCEDURE — 3074F PR MOST RECENT SYSTOLIC BLOOD PRESSURE < 130 MM HG: ICD-10-PCS | Mod: CPTII,S$GLB,, | Performed by: PODIATRIST

## 2021-11-11 PROCEDURE — 3008F BODY MASS INDEX DOCD: CPT | Mod: CPTII,S$GLB,, | Performed by: PODIATRIST

## 2021-11-11 PROCEDURE — 1160F PR REVIEW ALL MEDS BY PRESCRIBER/CLIN PHARMACIST DOCUMENTED: ICD-10-PCS | Mod: CPTII,S$GLB,, | Performed by: PODIATRIST

## 2021-11-11 PROCEDURE — 99999 PR PBB SHADOW E&M-EST. PATIENT-LVL III: CPT | Mod: PBBFAC,,, | Performed by: PODIATRIST

## 2021-11-11 PROCEDURE — 11721 PR DEBRIDEMENT OF NAILS, 6 OR MORE: ICD-10-PCS | Mod: S$GLB,,, | Performed by: PODIATRIST

## 2021-11-11 PROCEDURE — 1159F PR MEDICATION LIST DOCUMENTED IN MEDICAL RECORD: ICD-10-PCS | Mod: CPTII,S$GLB,, | Performed by: PODIATRIST

## 2021-11-11 PROCEDURE — 3044F PR MOST RECENT HEMOGLOBIN A1C LEVEL <7.0%: ICD-10-PCS | Mod: CPTII,S$GLB,, | Performed by: PODIATRIST

## 2021-11-11 PROCEDURE — 1159F MED LIST DOCD IN RCRD: CPT | Mod: CPTII,S$GLB,, | Performed by: PODIATRIST

## 2021-11-11 PROCEDURE — 99214 PR OFFICE/OUTPT VISIT, EST, LEVL IV, 30-39 MIN: ICD-10-PCS | Mod: 25,S$GLB,, | Performed by: PODIATRIST

## 2021-11-11 PROCEDURE — 99999 PR PBB SHADOW E&M-EST. PATIENT-LVL III: ICD-10-PCS | Mod: PBBFAC,,, | Performed by: PODIATRIST

## 2021-11-11 PROCEDURE — 11721 DEBRIDE NAIL 6 OR MORE: CPT | Mod: S$GLB,,, | Performed by: PODIATRIST

## 2021-11-11 PROCEDURE — 3044F HG A1C LEVEL LT 7.0%: CPT | Mod: CPTII,S$GLB,, | Performed by: PODIATRIST

## 2021-11-11 PROCEDURE — 1160F RVW MEDS BY RX/DR IN RCRD: CPT | Mod: CPTII,S$GLB,, | Performed by: PODIATRIST

## 2021-11-11 PROCEDURE — 3078F PR MOST RECENT DIASTOLIC BLOOD PRESSURE < 80 MM HG: ICD-10-PCS | Mod: CPTII,S$GLB,, | Performed by: PODIATRIST

## 2021-11-11 RX ORDER — CICLOPIROX 80 MG/ML
SOLUTION TOPICAL NIGHTLY
Qty: 6.6 ML | Refills: 11 | Status: SHIPPED | OUTPATIENT
Start: 2021-11-11

## 2021-12-03 ENCOUNTER — TELEPHONE (OUTPATIENT)
Dept: INTERNAL MEDICINE | Facility: CLINIC | Age: 61
End: 2021-12-03
Payer: COMMERCIAL

## 2022-02-23 ENCOUNTER — TELEPHONE (OUTPATIENT)
Dept: INTERNAL MEDICINE | Facility: CLINIC | Age: 62
End: 2022-02-23
Payer: COMMERCIAL

## 2022-02-23 NOTE — TELEPHONE ENCOUNTER
----- Message from Deloris Ortega sent at 2/23/2022  1:56 PM CST -----  Contact: Patient [phone]  .Name of Caller Patient    Reason for Visit/Symptoms Cold symptoms-Hoarseness, cough, sneeze. Also hurt finger at work-not worker's comp.   Best Contact Number or Confirm if Mychart Preferred [phone]  Preferred Date/Time of Appointment Tomorrow, 02-24-22 Morning  Interested in Virtual Visit (yes/no) No

## 2022-02-23 NOTE — TELEPHONE ENCOUNTER
Returned pt's call.  Pt states she has been having cold symptoms, cough, sneezing and hoarseness that stared on 2/18. Pt has not had Covid test. Encouraged pt to get Covid testing done.    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 Pt states she is still having some pain in the crease of her finger and would like this assessed during visit as well.    Offered VV and explained that MD can assess via video and order x-rays as needed.    Sched. VV appt for pt tomorrow as requested.

## 2022-02-24 ENCOUNTER — NURSE TRIAGE (OUTPATIENT)
Dept: ADMINISTRATIVE | Facility: CLINIC | Age: 62
End: 2022-02-24
Payer: COMMERCIAL

## 2022-02-24 ENCOUNTER — OFFICE VISIT (OUTPATIENT)
Dept: INTERNAL MEDICINE | Facility: CLINIC | Age: 62
End: 2022-02-24
Attending: FAMILY MEDICINE
Payer: COMMERCIAL

## 2022-02-24 ENCOUNTER — HOSPITAL ENCOUNTER (EMERGENCY)
Facility: OTHER | Age: 62
Discharge: HOME | End: 2022-02-24
Attending: EMERGENCY MEDICINE
Payer: COMMERCIAL

## 2022-02-24 ENCOUNTER — TELEPHONE (OUTPATIENT)
Dept: INTERNAL MEDICINE | Facility: CLINIC | Age: 62
End: 2022-02-24

## 2022-02-24 ENCOUNTER — PATIENT MESSAGE (OUTPATIENT)
Dept: ADMINISTRATIVE | Facility: CLINIC | Age: 62
End: 2022-02-24
Payer: COMMERCIAL

## 2022-02-24 VITALS
WEIGHT: 150 LBS | SYSTOLIC BLOOD PRESSURE: 138 MMHG | DIASTOLIC BLOOD PRESSURE: 63 MMHG | TEMPERATURE: 98 F | HEART RATE: 71 BPM | RESPIRATION RATE: 19 BRPM | BODY MASS INDEX: 25.75 KG/M2 | OXYGEN SATURATION: 97 %

## 2022-02-24 VITALS — DIASTOLIC BLOOD PRESSURE: 70 MMHG | BODY MASS INDEX: 25.75 KG/M2 | WEIGHT: 150 LBS | SYSTOLIC BLOOD PRESSURE: 130 MMHG

## 2022-02-24 DIAGNOSIS — U07.1 COVID: Primary | ICD-10-CM

## 2022-02-24 DIAGNOSIS — J06.9 VIRAL UPPER RESPIRATORY TRACT INFECTION: Primary | ICD-10-CM

## 2022-02-24 LAB
CTP QC/QA: YES
SARS-COV-2 RDRP RESP QL NAA+PROBE: POSITIVE

## 2022-02-24 PROCEDURE — 1159F MED LIST DOCD IN RCRD: CPT | Mod: CPTII,95,, | Performed by: FAMILY MEDICINE

## 2022-02-24 PROCEDURE — 3075F PR MOST RECENT SYSTOLIC BLOOD PRESS GE 130-139MM HG: ICD-10-PCS | Mod: CPTII,95,, | Performed by: FAMILY MEDICINE

## 2022-02-24 PROCEDURE — 3008F PR BODY MASS INDEX (BMI) DOCUMENTED: ICD-10-PCS | Mod: CPTII,95,, | Performed by: FAMILY MEDICINE

## 2022-02-24 PROCEDURE — 99499 UNLISTED E&M SERVICE: CPT | Mod: 95,,, | Performed by: FAMILY MEDICINE

## 2022-02-24 PROCEDURE — 99282 EMERGENCY DEPT VISIT SF MDM: CPT

## 2022-02-24 PROCEDURE — 99499 NO LOS: ICD-10-PCS | Mod: 95,,, | Performed by: FAMILY MEDICINE

## 2022-02-24 PROCEDURE — 3078F PR MOST RECENT DIASTOLIC BLOOD PRESSURE < 80 MM HG: ICD-10-PCS | Mod: CPTII,95,, | Performed by: FAMILY MEDICINE

## 2022-02-24 PROCEDURE — 1159F PR MEDICATION LIST DOCUMENTED IN MEDICAL RECORD: ICD-10-PCS | Mod: CPTII,95,, | Performed by: FAMILY MEDICINE

## 2022-02-24 PROCEDURE — U0002 COVID-19 LAB TEST NON-CDC: HCPCS | Performed by: EMERGENCY MEDICINE

## 2022-02-24 PROCEDURE — 3078F DIAST BP <80 MM HG: CPT | Mod: CPTII,95,, | Performed by: FAMILY MEDICINE

## 2022-02-24 PROCEDURE — 3008F BODY MASS INDEX DOCD: CPT | Mod: CPTII,95,, | Performed by: FAMILY MEDICINE

## 2022-02-24 PROCEDURE — 3075F SYST BP GE 130 - 139MM HG: CPT | Mod: CPTII,95,, | Performed by: FAMILY MEDICINE

## 2022-02-24 NOTE — TELEPHONE ENCOUNTER
Pt called for enrollment call #1 and she Opts for the [id number] and pt placed order for [id number] and tasks removed      Reason for Disposition   Information only question and nurse able to answer    Protocols used: INFORMATION ONLY CALL - NO TRIAGE-A-OH

## 2022-02-24 NOTE — TELEPHONE ENCOUNTER
----- Message from Jonelle Soto sent at 2/24/2022 10:43 AM CST -----  Regarding: [facility] Charge Nurse Mio  Contact: [facility] Charge Nurse  Name of Who is Calling: Mio with  [employer]-day Charge Nurse           What is the request in detail: Requesting a call back to consult about Pt . Please advise           Can the clinic reply by [facility]:   No         What [facility] Back [facility]: [phone]

## 2022-02-24 NOTE — ED NOTES
Pt seen, evaluated, and discharged by ED provider in [facility], no intervention needed at this time. Please refer to ED provider note for details.

## 2022-02-24 NOTE — TELEPHONE ENCOUNTER
Returned Mio's call from the ER.  Pt was supposed to have VV with us this morning, but according to schedule notes, pt did not answer when contacted this morning. Pt went to ER for Covid testing - she evidently misunderstood our request for her to be Covid tested. Pt was not told to go to ER for this.    Pt is Covid positive and symptoms began over 7 days ago, They do not do monoclonal infusion there at [facility] ER. Told Mio to have pt contact us for follow up visit.    Routed to MD for further information.

## 2022-02-24 NOTE — ED PROVIDER NOTES
Encounter Date: 2022       History     Chief Complaint   Patient presents with    Nasal Congestion     Pt sent here by pcp to be cleared for covid prior to appt. Reports cold sxs  x 1 wk, voice hoarseness, cough, nasal congestion.      Patient is a 61-year-old female with history of diabetes who presents to the emergency department with cough and congestion.  Patient reports for 8 days she has not felt well.  She reports nasal congestion.  She reports cough.  She reports a hoarse voice.  She states she called her PCP who advised her to be tested for COVID.  She states she has been fully vaccinated for COVID.  She denies chest pain or shortness of breath.    The history is provided by the patient.     Review of patient's allergies indicates:  No Known Allergies  Past Medical History:   Diagnosis Date    Diabetes mellitus     Gastroenteritis     Glaucoma (increased eye pressure)     Prediabetes 6/3/2019    Tinnitus      Past Surgical History:   Procedure Laterality Date    COLONOSCOPY N/A 2016    Procedure: COLONOSCOPY;  Surgeon: Katerine Ramsey MD;  Location: [address];  Service: Endoscopy;  Laterality: N/A;    LAPAROSCOPIC CHOLECYSTECTOMY N/A 2021    Procedure: CHOLECYSTECTOMY, LAPAROSCOPIC;  Surgeon: Johan Wilkerson Jr., MD;  Location: [facility];  Service: General;  Laterality: N/A;    OOPHORECTOMY      Unilateral oophorectomy     Family History   Problem Relation Age of Onset    Breast cancer Cousin 67    Hypertension Mother     Glaucoma Mother     Cataracts Mother     Diabetes Sister     Glaucoma Sister     Diabetes Sister     Colon cancer Neg Hx     Ovarian cancer Neg Hx     Stroke Neg Hx      Social History     Tobacco Use    Smoking status: Former Smoker     Packs/day: 0.10     Years: 15.00     Pack years: 1.50     Types: Cigarettes     Quit date: 2005     Years since quittin.6    Smokeless tobacco: Never Used   Substance Use Topics    Alcohol use: No      Alcohol/week: 0.0 standard drinks    Drug use: No     Review of Systems   Constitutional: Negative for activity change, appetite change, chills, fatigue and fever.   HENT: Positive for congestion, rhinorrhea, sore throat and voice change. Negative for ear discharge, ear pain and trouble swallowing.    Respiratory: Positive for cough. Negative for shortness of breath.    Cardiovascular: Negative for chest pain and leg swelling.   Gastrointestinal: Negative for abdominal pain, blood in stool, constipation, diarrhea, nausea and vomiting.   Genitourinary: Negative for dysuria, flank pain and hematuria.   Musculoskeletal: Negative for back pain, neck pain and neck stiffness.   Skin: Negative for rash and wound.   Neurological: Negative for dizziness, light-headedness and headaches.       Physical Exam     Initial Vitals [02/24/22 1013]   BP Pulse Resp Temp SpO2   (!) 148/67 69 16 98.1 °F (36.7 °C) 99 %      MAP       --         Physical Exam    Nursing note and vitals reviewed.  Constitutional: She appears well-developed and well-nourished. She is not diaphoretic.  Non-toxic appearance. No distress.   HENT:   Head: Normocephalic.   Right Ear: External ear normal.   Left Ear: External ear normal.   Nose: Nose normal.   Mouth/Throat: Oropharynx is clear and moist. No oropharyngeal exudate.   Eyes: Conjunctivae are normal. Pupils are equal, round, and reactive to light.   Neck:   Normal range of motion.  Cardiovascular: Normal rate and regular rhythm.   No lower extremity edema   Pulmonary/Chest: Breath sounds normal. No respiratory distress. She has no wheezes. She has no rhonchi. She has no rales.   Abdominal: Abdomen is soft. Bowel sounds are normal. There is no abdominal tenderness.   Musculoskeletal:      Cervical back: Normal range of motion.     Neurological: She is alert and oriented to person, place, and time.   Skin: Skin is warm and dry. Capillary refill takes less than 2 seconds.   Psychiatric: She has a normal  mood and affect.         ED Course   Procedures  Labs Reviewed   SARS-COV-2 RDRP GENE - Abnormal; Notable for the following components:       Result Value    POC Rapid COVID Positive (*)     All other components within normal limits          Imaging Results    None          Medications - No data to display  Medical Decision Making:   Initial Assessment:   Urgent evaluation of a 61-year-old female with history of diabetes who presents to the emergency department with cough and congestion.  Patient is afebrile, nontoxic appearing, and hemodynamically stable.  Lungs are clear to auscultation.  Patient is satting at 99% on room air.  Patient's symptoms have been present for more than a week.  Patient is positive COVID.  Have placed her on surveillance program.  Advised her she no longer has to quarantine if symptoms have improved.  Advised to follow up with PCP.  Advised to return to the emergency department with any worsening symptoms or concerns.                      Clinical Impression:   Final diagnoses:  [U07.1] COVID (Primary)          ED Disposition Condition    Discharge Stable        ED Prescriptions     None        Follow-up Information     Follow up With Specialties Details Why Contact Info    Rashard Ojeda MD Family Medicine   [address] [facility] [address]  [facility] [address]  [phone]             Becky Pérez PA-C  02/24/22 1934

## 2022-02-24 NOTE — DISCHARGE INSTRUCTIONS
Your symptoms have been present for 7 days.  You only have to quarantine for 5 days.  If your symptoms are improving and you are no longer running fever, you no longer need to quarantine.  Wear mask and practice social distancing.  Return to ED with any worsening symptoms.

## 2022-02-24 NOTE — Clinical Note
"Soraya Crooks" Catarina was seen and treated in our emergency department on 2/24/2022.  She may return to work on 02/28/2022.       If you have any questions or concerns, please don't hesitate to call.      RN RN    "

## 2022-02-24 NOTE — PROGRESS NOTES
Patient is having URI symptoms consistent with COVID.  She will proceed to the nearest COVID testings site.  Answers for HPI/ROS submitted by the patient on 2/24/2022  Chronicity: new  Onset: in the past 7 days  Progression since onset: waxing and waning  Frequency: every few minutes  Cough characteristics: non-productive  chest pain: No  chills: No  ear congestion: No  ear pain: No  fever: No  headaches: Yes  heartburn: Yes  hemoptysis: No  myalgias: No  nasal congestion: Yes  postnasal drip: Yes  rash: No  rhinorrhea: Yes  shortness of breath: No  sore throat: Yes  sweats: No  weight loss: No  wheezing: No  Aggravated by: stress  asthma: No  bronchiectasis: No  bronchitis: No  COPD: No  emphysema: No  environmental allergies: No  pneumonia: No

## 2022-02-25 ENCOUNTER — TELEPHONE (OUTPATIENT)
Dept: INTERNAL MEDICINE | Facility: CLINIC | Age: 62
End: 2022-02-25
Payer: COMMERCIAL

## 2022-02-25 ENCOUNTER — PES CALL (OUTPATIENT)
Dept: ADMINISTRATIVE | Facility: CLINIC | Age: 62
End: 2022-02-25
Payer: COMMERCIAL

## 2022-02-25 ENCOUNTER — NURSE TRIAGE (OUTPATIENT)
Dept: ADMINISTRATIVE | Facility: CLINIC | Age: 62
End: 2022-02-25
Payer: COMMERCIAL

## 2022-02-25 NOTE — TELEPHONE ENCOUNTER
Pt was transferred to me because she read her [employer] message about surveillance enrollment and it stated she would receive a pulse ox. Pt was inquiring about pulse ox. Pt spoke to nurse yesterday and opted out of surveillance program and switched to home symptom monitoring program where pulse ox is not required. Somehow, pulse ox was not originally ordered when Covid Surveillance order was placed. Since pt opted out of surveillance and is enrolled in HSM program, did not order pulse ox at this time. Advised pt she can purchase one at any pharmacy if needed. Pt did not complain of any symptoms at this time. Pt has number to OOC for further concerns.     Reason for Disposition   Information only question and nurse able to answer    Protocols used: INFORMATION ONLY CALL - NO TRIAGE-A-OH

## 2022-02-25 NOTE — TELEPHONE ENCOUNTER
Pt had some questions about the covid surveillance program. Pt transferred to WENDY Fishman Rn.     Reason for Disposition   [1] Follow-up call to recent contact AND [2] information only call, no triage required    Protocols used: INFORMATION ONLY CALL-A-

## 2022-02-25 NOTE — TELEPHONE ENCOUNTER
----- Message from Jonelle Soto sent at 2/25/2022  2:34 PM CST -----  Regarding: Requesting a call back  Contact: LUIS REDMAN [[id number]]  Name of Who is Calling:  LUIS REDMAN [[id number]]        What is the request in detail: Pt stated she wants to speak with a nurse about sysmothns she is having she was advise she should be talking something,Pt is requesting call back   Please advise           Can the clinic reply by [facility]: No           What Number to Call Back if not in [facility]:Click to [phone]

## 2022-02-25 NOTE — TELEPHONE ENCOUNTER
Patient informed of upcoming appointment. Patient states that she already has a nurse calling from the [employer] surveillance system. Patient declined symptoms but was informed that if symptoms gets worse SOB etc. To report to the ER.

## 2022-02-25 NOTE — TELEPHONE ENCOUNTER
----- Message from Jonelle Soto sent at 2/25/2022  2:34 PM CST -----  Regarding: Requesting a call back  Contact: LUIS REDMAN [[id number]]  Name of Who is Calling:  LUIS REDMAN [[id number]]        What is the request in detail: Pt stated she wants to speak with a nurse about sysmothns she is having she was advise she should be talking something,Pt is requesting call back   Please advise           Can the clinic reply by [facility]: No           What Number to Call Back if not in [facility]:Click to [phone]

## 2022-02-27 ENCOUNTER — NURSE TRIAGE (OUTPATIENT)
Dept: ADMINISTRATIVE | Facility: CLINIC | Age: 62
End: 2022-02-27
Payer: COMMERCIAL

## 2022-02-27 NOTE — TELEPHONE ENCOUNTER
"Pt contacted through Covid Symptom tracking for an escalation of symptoms. Body aches and sore throat. Pain swallowing. Pt states she has been having low grade fever 2-3 days. Taking cold meds for symptoms- some temporary decrease but comes back. Care advice per protocol. OAC and RR offered. OAC accepted, information provided. Advised to call back with concerns or worsening symptoms. Verbalized understanding.     Reason for Disposition   Diabetes mellitus or weak immune system (e.g., HIV positive, cancer chemo, splenectomy, organ transplant, chronic steroids)    Additional Information   Negative: SEVERE difficulty breathing (e.g., struggling for each breath, speaks in single words, stridor)   Negative: Sounds like a life-threatening emergency to the triager   Negative: [1] Drooling or spitting out saliva (because can't swallow) AND [2] normal breathing   Negative: Unable to open mouth completely   Negative: [1] Difficulty breathing AND [2] not severe   Negative: Fever > 104 F (40 C)   Negative: [1] Refuses to drink anything AND [2] for > 12 hours   Negative: [1] Drinking very little AND [2] dehydration suspected (e.g., no urine > 12 hours, very dry mouth, very lightheaded)   Negative: Patient sounds very sick or weak to the triager   Negative: SEVERE (e.g., excruciating) throat pain   Negative: [1] Pus on tonsils (back of throat) AND [2]  fever AND [3] swollen neck lymph nodes ("glands")   Negative: [1] Rash AND [2] widespread (especially chest and abdomen)   Negative: Earache also present   Negative: Fever present > 3 days (72 hours)    Protocols used: SORE THROAT-A-AH      "

## 2022-02-28 ENCOUNTER — HOSPITAL ENCOUNTER (EMERGENCY)
Facility: OTHER | Age: 62
Discharge: HOME | End: 2022-02-28
Attending: EMERGENCY MEDICINE
Payer: COMMERCIAL

## 2022-02-28 VITALS
DIASTOLIC BLOOD PRESSURE: 66 MMHG | TEMPERATURE: 98 F | SYSTOLIC BLOOD PRESSURE: 151 MMHG | OXYGEN SATURATION: 99 % | HEART RATE: 71 BPM | BODY MASS INDEX: 25.75 KG/M2 | WEIGHT: 150 LBS | RESPIRATION RATE: 20 BRPM

## 2022-02-28 DIAGNOSIS — U07.1 COVID-19: ICD-10-CM

## 2022-02-28 DIAGNOSIS — J02.9 SORE THROAT: Primary | ICD-10-CM

## 2022-02-28 LAB
GROUP A STREP, MOLECULAR: NEGATIVE
HCV AB SERPL QL IA: NEGATIVE
HIV 1+2 AB+HIV1 P24 AG SERPL QL IA: NEGATIVE

## 2022-02-28 PROCEDURE — 87389 HIV-1 AG W/HIV-1&-2 AB AG IA: CPT | Performed by: EMERGENCY MEDICINE

## 2022-02-28 PROCEDURE — 25000003 PHARM REV CODE 250: Performed by: NURSE PRACTITIONER

## 2022-02-28 PROCEDURE — 96372 THER/PROPH/DIAG INJ SC/IM: CPT

## 2022-02-28 PROCEDURE — 86803 HEPATITIS C AB TEST: CPT | Performed by: EMERGENCY MEDICINE

## 2022-02-28 PROCEDURE — 87651 STREP A DNA AMP PROBE: CPT | Performed by: NURSE PRACTITIONER

## 2022-02-28 PROCEDURE — 99284 EMERGENCY DEPT VISIT MOD MDM: CPT | Mod: 25

## 2022-02-28 PROCEDURE — 63600175 PHARM REV CODE 636 W HCPCS: Performed by: NURSE PRACTITIONER

## 2022-02-28 RX ORDER — NAPROXEN 500 MG/1
500 TABLET ORAL EVERY 12 HOURS PRN
Qty: 10 TABLET | Refills: 0 | OUTPATIENT
Start: 2022-02-28 | End: 2022-12-22

## 2022-02-28 RX ORDER — KETOROLAC TROMETHAMINE 30 MG/ML
30 INJECTION, SOLUTION INTRAMUSCULAR; INTRAVENOUS
Status: COMPLETED | OUTPATIENT
Start: 2022-02-28 | End: 2022-02-28

## 2022-02-28 RX ORDER — FLUTICASONE PROPIONATE 50 MCG
2 SPRAY, SUSPENSION (ML) NASAL DAILY
Qty: 9.9 ML | Refills: 0 | OUTPATIENT
Start: 2022-02-28 | End: 2022-04-20

## 2022-02-28 RX ORDER — PROMETHAZINE HYDROCHLORIDE AND DEXTROMETHORPHAN HYDROBROMIDE 6.25; 15 MG/5ML; MG/5ML
5 SYRUP ORAL EVERY 6 HOURS PRN
Qty: 120 ML | Refills: 0 | Status: SHIPPED | OUTPATIENT
Start: 2022-02-28 | End: 2022-03-10

## 2022-02-28 RX ADMIN — KETOROLAC TROMETHAMINE 30 MG: 30 INJECTION, SOLUTION INTRAMUSCULAR at 10:02

## 2022-02-28 RX ADMIN — LIDOCAINE HYDROCHLORIDE 15 ML: 20 SOLUTION ORAL; TOPICAL at 10:02

## 2022-02-28 NOTE — Clinical Note
"Soraya Crooks" Catarina was seen and treated in our emergency department on 2/28/2022.  She may return to work on 03/01/2022.  I think or know I had COVID-19.  Stay home for 5 days.  2.   You can exit quarantine after 5 days as long as your symptoms are improving.   3.   Continue to wear a mask around other for 5 additional days.   *Loss of taste and smell may persist for weeks or months after recovery and need not delay the end of isolation.     If you have any questions or concerns, please don't hesitate to call.      Alisha Green NP"

## 2022-02-28 NOTE — ED PROVIDER NOTES
Encounter Date: 2/28/2022       History     Chief Complaint   Patient presents with    Sore Throat     Onset 2/19 progressively getting worse. Pt is able to swallow/eat/drink. Pt is fully vaccinated against covid. 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 Covid + on 2/24     Chief complaint:  Sore throat    61-year-old female presents for evaluation of sore throat.  Patient reports that she initially developed URI symptoms on February 19th.  She tested positive for COVID on February 24th.  She reports nasal congestion, runny nose, coughing, generalized body aches, fever, and sore throat.  She reports hoarseness.  She is able to drink and swallow without difficulty.  No drooling.  She has been taking over-the-counter medications.  This is the extent of patient's complaints for this ER encounter.     The history is provided by the patient.     Review of patient's allergies indicates:  No Known Allergies  Past Medical History:   Diagnosis Date    Diabetes mellitus     Gastroenteritis     Glaucoma (increased eye pressure)     Prediabetes 6/3/2019    Tinnitus      Past Surgical History:   Procedure Laterality Date    COLONOSCOPY N/A 6/7/2016    Procedure: COLONOSCOPY;  Surgeon: Katerine Ramsey MD;  Location: [address]);  Service: Endoscopy;  Laterality: N/A;    LAPAROSCOPIC CHOLECYSTECTOMY N/A 1/8/2021    Procedure: CHOLECYSTECTOMY, LAPAROSCOPIC;  Surgeon: Johan Wilkerson Jr., MD;  Location: [facility];  Service: General;  Laterality: N/A;    OOPHORECTOMY  1999    Unilateral oophorectomy     Family History   Problem Relation Age of Onset    Breast cancer Cousin 67    Hypertension Mother     Glaucoma Mother     Cataracts Mother     Diabetes Sister     Glaucoma Sister     Diabetes Sister     Colon cancer Neg Hx     Ovarian cancer Neg Hx     Stroke Neg Hx      Social History     Tobacco Use    Smoking  status: Former Smoker     Packs/day: 0.10     Years: 15.00     Pack years: 1.50     Types: Cigarettes     Quit date: 2005     Years since quittin.6    Smokeless tobacco: Never Used   Substance Use Topics    Alcohol use: No     Alcohol/week: 0.0 standard drinks    Drug use: No     Review of Systems   Constitutional: Positive for fatigue and fever.   HENT: Positive for congestion, rhinorrhea and sore throat.    Respiratory: Positive for cough. Negative for shortness of breath.    Cardiovascular: Negative for chest pain.   Gastrointestinal: Negative for abdominal pain.   Genitourinary: Negative for difficulty urinating.   Musculoskeletal: Positive for arthralgias and myalgias. Negative for back pain and neck pain.   Skin: Negative for rash and wound.   Neurological: Negative for weakness and headaches.   Psychiatric/Behavioral: Negative.        Physical Exam     Initial Vitals [22 0934]   BP Pulse Resp Temp SpO2   (!) 151/67 74 20 97.9 °F (36.6 °C) 99 %      MAP       --         Physical Exam    Vitals reviewed.  Constitutional: No distress.   HENT:   Head: Normocephalic and atraumatic.   Nose: Rhinorrhea present.   Mouth/Throat: Mucous membranes are normal. Posterior oropharyngeal erythema present. No oropharyngeal exudate, posterior oropharyngeal edema or tonsillar abscesses.   No significant swelling, uvular remains midline.  no signs of abscess.  Tolerating secretions without difficulty.  Intermittent hoarseness.  Swallowing without difficulty.   Eyes: Conjunctivae, EOM and lids are normal. Right pupil is round. Left pupil is round. Pupils are equal.   Cardiovascular: Normal rate, regular rhythm and normal heart sounds.   Pulmonary/Chest: Effort normal and breath sounds normal. No respiratory distress.   Musculoskeletal:         General: Normal range of motion.     Neurological: She is alert and oriented to person, place, and time. She has normal strength.   Skin: Skin is warm and dry. No rash  noted.   Psychiatric: She has a normal mood and affect. Her speech is normal and behavior is normal.         ED Course   Procedures  Labs Reviewed   GROUP A STREP, MOLECULAR   HIV 1 / 2 ANTIBODY    Narrative:     Release to patient->Immediate   HEPATITIS C ANTIBODY    Narrative:     Release to patient->Immediate          Imaging Results    None          Medications   ketorolac injection 30 mg (30 mg Intramuscular Given 2/28/22 1045)   magic mouthwash (diphenhydrAMINE 12.5 mg/5 mL 20 mL, aluminum & magnesium hydroxide-simethicone (MYLANTA) 20 mL, LIDOcaine HCl 2% (XYLOCAINE) 20 mL) solution (15 mLs Swish & Spit Given 2/28/22 1045)     Medical Decision Making:   Initial Assessment:   61-year-old female presents for evaluation of sore throat with recent COVID diagnosis.  ED Management:  Strep swab ordered.    Toradol and Magic mouthwash ordered.  Refer to patient course below for additional management.                  ED Course as of 02/28/22 1118   Mon Feb 28, 2022   1055 Group A Strep, Molecular: Negative [EW]   1114 Patient reports some improvement in symptoms since administration of Toradol and Magic mouthwash.  The patient is drinking water while here in the emergency room.    Strict return precautions discussed.    Patient/caregiver voices understanding and feels comfortable with discharge plan.    The patient/caregiver acknowledges that close follow up with medical provider is required. Instructed to follow up with PCP within 2 days. Patient/caregiver was given specific return precautions. The patient/caregiver agrees to comply with all instruction and directions given in the ER.  [EW]      ED Course User Index  [EW] Alisha Green NP             Clinical Impression:   Final diagnoses:  [J02.9] Sore throat (Primary)  [U07.1] COVID-19          ED Disposition Condition    Discharge Stable        ED Prescriptions     Medication Sig Dispense Start Date End Date Auth. Provider    promethazine-dextromethorphan  (PROMETHAZINE-DM) 6.25-15 mg/5 mL Syrp Take 5 mLs by mouth every 6 (six) hours as needed. 120 mL 2/28/2022 3/10/2022 Alisha Green NP    fluticasone propionate (FLONASE) 50 mcg/actuation nasal spray 2 sprays (100 mcg total) by Each Nostril route once daily. 9.9 mL 2/28/2022  Alisha Green NP    naproxen (NAPROSYN) 500 MG tablet Take 1 tablet (500 mg total) by mouth every 12 (twelve) hours as needed (pain). Take with food. 10 tablet 2/28/2022  Alisha Green NP    (Magic mouthwash) 1:1:1 diphenhydramine(Benadryl) 12.5mg/5ml liq, aluminum & magnesium hydroxide-simethicone (Maalox), LIDOcaine viscous 2% Swish and spit 5 mLs every 6 (six) hours as needed (mouth/throat pain). 90 mL 2/28/2022  Alisha Green NP        Follow-up Information     Follow up With Specialties Details Why Contact Info    Rashard Ojeda MD Family Medicine Schedule an appointment as soon as possible for a visit in 2 days  [address]  [address]  [facility] [address]  [phone]             Alisha Green NP  02/28/22 0223

## 2022-02-28 NOTE — ED TRIAGE NOTES
"Chief Complaint   Patient presents with    Sore Throat     Onset 2/19 progressively getting worse. Pt is able to swallow/eat/drink. Pt is fully vaccinated against covid. 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 Covid + on 2/24     61 year old female here today for worsening symptoms. States she started with nasal congestion, sore throat and cough on 2/19. Patient tested positive on 2/24th in the ER. Reports she feels like she is getting better but, she still has a sore throat and nasal congestion. Describe throat pain as "prickly feeling and burning". States she has tried lozenges, chloraseptic, and Nyquil liquid with minimal relief.  "

## 2022-02-28 NOTE — DISCHARGE INSTRUCTIONS
**Follow up with PCP in 24-48 hours. Return to ER with worsening of symptoms.     **Drink plenty fluids. Get plenty rest. Wash hands frequently.     I think or know I had COVID-19.  Stay home for 5 days.  2.   You can exit quarantine after 5 days as long as your symptoms are improving.   3.   Continue to wear a mask around other for 5 additional days.   *Loss of taste and smell may persist for weeks or months after recovery and need not delay the end of isolation.

## 2022-03-03 ENCOUNTER — PES CALL (OUTPATIENT)
Dept: ADMINISTRATIVE | Facility: CLINIC | Age: 62
End: 2022-03-03
Payer: COMMERCIAL

## 2022-03-08 ENCOUNTER — OFFICE VISIT (OUTPATIENT)
Dept: INTERNAL MEDICINE | Facility: CLINIC | Age: 62
End: 2022-03-08
Payer: COMMERCIAL

## 2022-03-08 VITALS
BODY MASS INDEX: 26.41 KG/M2 | DIASTOLIC BLOOD PRESSURE: 78 MMHG | OXYGEN SATURATION: 98 % | WEIGHT: 153.88 LBS | HEART RATE: 95 BPM | SYSTOLIC BLOOD PRESSURE: 124 MMHG

## 2022-03-08 DIAGNOSIS — Z86.16 HISTORY OF 2019 NOVEL CORONAVIRUS DISEASE (COVID-19): Primary | ICD-10-CM

## 2022-03-08 PROCEDURE — 3074F PR MOST RECENT SYSTOLIC BLOOD PRESSURE < 130 MM HG: ICD-10-PCS | Mod: CPTII,S$GLB,, | Performed by: PHYSICIAN ASSISTANT

## 2022-03-08 PROCEDURE — 99214 PR OFFICE/OUTPT VISIT, EST, LEVL IV, 30-39 MIN: ICD-10-PCS | Mod: S$GLB,,, | Performed by: PHYSICIAN ASSISTANT

## 2022-03-08 PROCEDURE — 3078F DIAST BP <80 MM HG: CPT | Mod: CPTII,S$GLB,, | Performed by: PHYSICIAN ASSISTANT

## 2022-03-08 PROCEDURE — 3008F BODY MASS INDEX DOCD: CPT | Mod: CPTII,S$GLB,, | Performed by: PHYSICIAN ASSISTANT

## 2022-03-08 PROCEDURE — 3074F SYST BP LT 130 MM HG: CPT | Mod: CPTII,S$GLB,, | Performed by: PHYSICIAN ASSISTANT

## 2022-03-08 PROCEDURE — 3008F PR BODY MASS INDEX (BMI) DOCUMENTED: ICD-10-PCS | Mod: CPTII,S$GLB,, | Performed by: PHYSICIAN ASSISTANT

## 2022-03-08 PROCEDURE — 1159F MED LIST DOCD IN RCRD: CPT | Mod: CPTII,S$GLB,, | Performed by: PHYSICIAN ASSISTANT

## 2022-03-08 PROCEDURE — 1159F PR MEDICATION LIST DOCUMENTED IN MEDICAL RECORD: ICD-10-PCS | Mod: CPTII,S$GLB,, | Performed by: PHYSICIAN ASSISTANT

## 2022-03-08 PROCEDURE — 99999 PR PBB SHADOW E&M-EST. PATIENT-LVL V: CPT | Mod: PBBFAC,,, | Performed by: PHYSICIAN ASSISTANT

## 2022-03-08 PROCEDURE — 3078F PR MOST RECENT DIASTOLIC BLOOD PRESSURE < 80 MM HG: ICD-10-PCS | Mod: CPTII,S$GLB,, | Performed by: PHYSICIAN ASSISTANT

## 2022-03-08 PROCEDURE — 99214 OFFICE O/P EST MOD 30 MIN: CPT | Mod: S$GLB,,, | Performed by: PHYSICIAN ASSISTANT

## 2022-03-08 PROCEDURE — 99999 PR PBB SHADOW E&M-EST. PATIENT-LVL V: ICD-10-PCS | Mod: PBBFAC,,, | Performed by: PHYSICIAN ASSISTANT

## 2022-03-08 PROCEDURE — 1160F PR REVIEW ALL MEDS BY PRESCRIBER/CLIN PHARMACIST DOCUMENTED: ICD-10-PCS | Mod: CPTII,S$GLB,, | Performed by: PHYSICIAN ASSISTANT

## 2022-03-08 PROCEDURE — 1160F RVW MEDS BY RX/DR IN RCRD: CPT | Mod: CPTII,S$GLB,, | Performed by: PHYSICIAN ASSISTANT

## 2022-03-08 RX ORDER — AZELASTINE 1 MG/ML
1 SPRAY, METERED NASAL 2 TIMES DAILY
Qty: 30 ML | Refills: 3 | Status: SHIPPED | OUTPATIENT
Start: 2022-03-08 | End: 2023-06-08

## 2022-03-08 RX ORDER — MONTELUKAST SODIUM 10 MG/1
10 TABLET ORAL NIGHTLY
Qty: 30 TABLET | Refills: 0 | Status: SHIPPED | OUTPATIENT
Start: 2022-03-08 | End: 2022-03-08

## 2022-03-08 NOTE — PROGRESS NOTES
"INTERNAL MEDICINE PROGRESS NOTE    CHIEF COMPLAINT     Chief Complaint   Patient presents with    Follow-up       HPI     Soraya Elmore is a 61 y.o. female who presents for a follow-up visit today.    PCP is Rashard Ojeda MD, patient is new to me.     Patient was seen in the ER for sore throat, tested positive for COVID on 2/24. She overall had very mild symptoms -was not hospitalized. Did not received any antiviral or MAB medications. Took OTC symptom mgmt with fair relief. She is still feeling fatigued with some nasal congestion. She reports that her usual tinnitus is worse than baseline as well as her IBS -she feels like "COVID made all my previous ailments worse".       Notes:  Nose started burning, scratchy throat and then coughing. Throat pain progressed. She never ran fever. She does report some diarrhea and vomiting. She is vaccinated for COVID x 2 (no booster). Lots of nasal congestion with some minor rhinorrhea. No chest pain or SOB.   Not hospitalized.  Took coricidin HPB for cough  Nyquil, Robitussin, chloraseptic lozenges, warm salt and water gargle.         Past Medical History:  Past Medical History:   Diagnosis Date    Diabetes mellitus     Gastroenteritis     Glaucoma (increased eye pressure)     Prediabetes 6/3/2019    Tinnitus        Home Medications:  Prior to Admission medications    Medication Sig Start Date End Date Taking? Authorizing Provider   (Magic mouthwash) 1:1:1 diphenhydramine(Benadryl) 12.5mg/5ml liq, aluminum & magnesium hydroxide-simethicone (Maalox), LIDOcaine viscous 2% Swish and spit 5 mLs every 6 (six) hours as needed (mouth/throat pain). 2/28/22   Alisha Green NP   azelastine (ASTELIN) 137 mcg (0.1 %) nasal spray 1 spray (137 mcg total) by Nasal route 2 (two) times daily. 4/16/20 4/16/21  Marily Coleman MD   Bifidobacterium infantis (ALIGN) 4 mg Cap Take 1 capsule (4 mg total) by mouth once daily. 6/3/19   Marily Coleman MD "   cholecalciferol, vitamin D3, 50,000 unit capsule Take 1 capsule (50,000 Units total) by mouth once a week. 11/6/19   Jeovanny Sidhu MD   ciclopirox (PENLAC) 8 % Soln Apply topically nightly. 11/11/21   Hayden Flores DPM   dicyclomine (BENTYL) 20 mg tablet Take 1 tablet (20 mg total) by mouth 2 (two) times daily. 8/13/21   Rajan Bateman MD   fluocinonide (LIDEX) 0.05 % external solution Apply topically daily as needed (scalp itching). 9/20/21   Saleem Soto MD   fluticasone propionate (FLONASE) 50 mcg/actuation nasal spray 2 sprays (100 mcg total) by Each Nostril route once daily. 2/28/22   Alisha Green NP   hydrocortisone 2.5 % cream Apply topically 2 (two) times daily as needed (flaking and itching rash). 9/20/21   Saleem Soto MD   ketoconazole (NIZORAL) 2 % cream Apply topically 2 (two) times daily as needed (itching and scaling). 9/20/21   Saleem Soto MD   ketoconazole (NIZORAL) 2 % shampoo Apply topically twice a week. Leave on for 5 to 10 minutes then rinse 9/20/21   Saleem Soto MD   latanoprost 0.005 % ophthalmic solution Place 1 drop into both eyes every evening. 7/27/21   Maribell Garcia, CARLOTTA   lidocaine HCL 2% (XYLOCAINE) 2 % jelly Apply topically as needed. Apply topically once nightly to affected part of foot/feet. 7/7/20   Hayden Flores DPM   naproxen (NAPROSYN) 500 MG tablet Take 1 tablet (500 mg total) by mouth every 12 (twelve) hours as needed (pain). Take with food. 2/28/22   Alisha Green NP   pantoprazole (PROTONIX) 20 MG tablet Take 2 tablets (40 mg total) by mouth once daily. 10/9/20 1/11/21  Anthony To MD   phenobarb-hyoscy-atropine-scop (DONNATAL) 16.2-0.1037 -0.0194 mg/5 mL Elix Take 5 mLs by mouth. 5/13/14   Historical Provider   promethazine-dextromethorphan (PROMETHAZINE-DM) 6.25-15 mg/5 mL Syrp Take 5 mLs by mouth every 6 (six) hours as needed. 2/28/22 3/10/22  Alisha Green NP   sucralfate (CARAFATE) 100 mg/mL suspension Take 10 mLs (1 g total) by  mouth 4 (four) times daily before meals and nightly. 12/23/20   Chet Vargas MD       Review of Systems:  Review of Systems   Constitutional: Negative for chills and fever.        Fatigue    HENT: Positive for congestion. Negative for sore throat and trouble swallowing.    Eyes: Negative for visual disturbance.   Respiratory: Positive for cough. Negative for shortness of breath.    Cardiovascular: Negative for chest pain.   Gastrointestinal: Negative for abdominal pain, constipation, diarrhea, nausea and vomiting.   Genitourinary: Negative for dysuria and flank pain.   Musculoskeletal: Negative for back pain, neck pain and neck stiffness.   Skin: Negative for rash.   Neurological: Negative for dizziness, syncope, weakness and headaches.   Psychiatric/Behavioral: Negative for confusion.       Health Maintainence:   Immunizations:  Health Maintenance       Date Due Completion Date    COVID-19 Vaccine (3 - Booster for Pfizer series) 05/04/2022 12/4/2021    Mammogram 07/14/2022 7/14/2021    Cervical Cancer Screening 06/03/2024 6/3/2019    Colorectal Cancer Screening 06/07/2026 3/10/2017    TETANUS VACCINE 07/28/2026 7/28/2016    Lipid Panel 11/10/2026 11/10/2021           PHYSICAL EXAM     /78 (BP Location: Left arm, Patient Position: Sitting)   Pulse 95   Wt 69.8 kg (153 lb 14.1 oz)   LMP 07/21/2006   SpO2 98%   BMI 26.41 kg/m²     Physical Exam  Vitals and nursing note reviewed.   Constitutional:       Appearance: Normal appearance.      Comments: Healthy appearing female in NAD or apparent pain. She makes good eye contact, speaks in clear full sentences and ambulates with ease.      HENT:      Head: Normocephalic and atraumatic.      Comments: No sinus TTP  Left TM      Nose: Nose normal.      Mouth/Throat:      Pharynx: Oropharynx is clear.   Eyes:      Conjunctiva/sclera: Conjunctivae normal.   Cardiovascular:      Rate and Rhythm: Normal rate and regular rhythm.      Pulses: Normal pulses.    Pulmonary:      Effort: No respiratory distress.      Comments: Lungs are CTA b/l  Abdominal:      General: Abdomen is flat.      Tenderness: There is no abdominal tenderness.   Musculoskeletal:         General: Normal range of motion.      Cervical back: No rigidity.   Skin:     General: Skin is warm and dry.      Capillary Refill: Capillary refill takes less than 2 seconds.      Findings: No rash.   Neurological:      General: No focal deficit present.      Mental Status: She is alert.      Gait: Gait normal.   Psychiatric:         Mood and Affect: Mood normal.         LABS     Lab Results   Component Value Date    HGBA1C 5.9 (H) 11/10/2021     CMP  Sodium   Date Value Ref Range Status   11/10/2021 141 136 - 145 mmol/L Final     Potassium   Date Value Ref Range Status   11/10/2021 3.8 3.5 - 5.1 mmol/L Final     Chloride   Date Value Ref Range Status   11/10/2021 103 95 - 110 mmol/L Final     CO2   Date Value Ref Range Status   11/10/2021 27 23 - 29 mmol/L Final     Glucose   Date Value Ref Range Status   11/10/2021 89 70 - 110 mg/dL Final     BUN   Date Value Ref Range Status   11/10/2021 16 6 - 20 mg/dL Final     Creatinine   Date Value Ref Range Status   11/10/2021 0.7 0.5 - 1.4 mg/dL Final     Calcium   Date Value Ref Range Status   11/10/2021 8.6 (L) 8.7 - 10.5 mg/dL Final     Total Protein   Date Value Ref Range Status   11/10/2021 6.9 6.0 - 8.4 g/dL Final     Albumin   Date Value Ref Range Status   11/10/2021 3.9 3.5 - 5.2 g/dL Final     Total Bilirubin   Date Value Ref Range Status   11/10/2021 0.6 0.1 - 1.0 mg/dL Final     Comment:     For infants and newborns, interpretation of results should be based  on gestational age, weight and in agreement with clinical  observations.    Premature Infant recommended reference ranges:  Up to 24 hours.............<8.0 mg/dL  Up to 48 hours............<12.0 mg/dL  3-5 days..................<15.0 mg/dL  6-29 days.................<15.0 mg/dL       Alkaline Phosphatase    Date Value Ref Range Status   11/10/2021 66 55 - 135 U/L Final     AST   Date Value Ref Range Status   11/10/2021 19 10 - 40 U/L Final     ALT   Date Value Ref Range Status   11/10/2021 17 10 - 44 U/L Final     Anion Gap   Date Value Ref Range Status   11/10/2021 11 8 - 16 mmol/L Final     eGFR if    Date Value Ref Range Status   11/10/2021 >60 >60 mL/min/1.73 m^2 Final     eGFR if non    Date Value Ref Range Status   11/10/2021 >60 >60 mL/min/1.73 m^2 Final     Comment:     Calculation used to obtain the estimated glomerular filtration  rate (eGFR) is the CKD-EPI equation.        Lab Results   Component Value Date    WBC 7.96 12/23/2020    HGB 12.9 12/23/2020    HCT 40.9 12/23/2020    MCV 89 12/23/2020     12/23/2020     Lab Results   Component Value Date    CHOL 167 11/10/2021    CHOL 169 11/05/2019    CHOL 176 06/03/2019     Lab Results   Component Value Date    HDL 41 11/10/2021    HDL 45 11/05/2019    HDL 41 06/03/2019     Lab Results   Component Value Date    LDLCALC 116.4 11/10/2021    LDLCALC 113.8 11/05/2019    LDLCALC 115.6 06/03/2019     Lab Results   Component Value Date    TRIG 48 11/10/2021    TRIG 51 11/05/2019    TRIG 97 06/03/2019     Lab Results   Component Value Date    CHOLHDL 24.6 11/10/2021    CHOLHDL 26.6 11/05/2019    CHOLHDL 23.3 06/03/2019     Lab Results   Component Value Date    TSH 2.009 11/10/2021       ASSESSMENT/PLAN     Soraya BriggsLaurenEriberto is a 61 y.o. female     Soraya was seen today for follow-up after COVID diagnosis. She is doing well, still feeling some fatigue. She has some tinnitus at baseline. There is no clinical abn noted on exam today. She does admit that tinnitus is a little more pronounced since COVID.     Diagnoses and all orders for this visit:    History of 2019 novel coronavirus disease (COVID-19)   -doing well, still has some HEENT inflammation   -will start Singulair and continue Astelin   -push hydration, rest when  can, RTC with any worsening symptoms   -Low threshold to refer to ENT if tinnitus continues to intensify.         Follow up with PCP in 3 months, sooner if needed.         Liliana Snyder PA-C   Department of Internal Medicine - [facility]   7:22 AM

## 2022-03-16 ENCOUNTER — OFFICE VISIT (OUTPATIENT)
Dept: INTERNAL MEDICINE | Facility: CLINIC | Age: 62
End: 2022-03-16
Payer: COMMERCIAL

## 2022-03-16 ENCOUNTER — HOSPITAL ENCOUNTER (OUTPATIENT)
Dept: RADIOLOGY | Facility: OTHER | Age: 62
Discharge: HOME | End: 2022-03-16
Attending: PHYSICIAN ASSISTANT
Payer: COMMERCIAL

## 2022-03-16 VITALS
SYSTOLIC BLOOD PRESSURE: 136 MMHG | BODY MASS INDEX: 26.79 KG/M2 | WEIGHT: 156.06 LBS | HEART RATE: 65 BPM | DIASTOLIC BLOOD PRESSURE: 70 MMHG | OXYGEN SATURATION: 100 %

## 2022-03-16 DIAGNOSIS — M25.512 ACUTE PAIN OF LEFT SHOULDER: Primary | ICD-10-CM

## 2022-03-16 DIAGNOSIS — H93.13 TINNITUS OF BOTH EARS: ICD-10-CM

## 2022-03-16 DIAGNOSIS — M25.512 ACUTE PAIN OF LEFT SHOULDER: ICD-10-CM

## 2022-03-16 PROCEDURE — 73030 X-RAY EXAM OF SHOULDER: CPT | Mod: TC,FY,LT

## 2022-03-16 PROCEDURE — 3075F PR MOST RECENT SYSTOLIC BLOOD PRESS GE 130-139MM HG: ICD-10-PCS | Mod: CPTII,S$GLB,, | Performed by: PHYSICIAN ASSISTANT

## 2022-03-16 PROCEDURE — 1159F PR MEDICATION LIST DOCUMENTED IN MEDICAL RECORD: ICD-10-PCS | Mod: CPTII,S$GLB,, | Performed by: PHYSICIAN ASSISTANT

## 2022-03-16 PROCEDURE — 3078F PR MOST RECENT DIASTOLIC BLOOD PRESSURE < 80 MM HG: ICD-10-PCS | Mod: CPTII,S$GLB,, | Performed by: PHYSICIAN ASSISTANT

## 2022-03-16 PROCEDURE — 73030 XR SHOULDER TRAUMA 3 VIEW LEFT: ICD-10-PCS | Mod: 26,LT,, | Performed by: INTERNAL MEDICINE

## 2022-03-16 PROCEDURE — 1159F MED LIST DOCD IN RCRD: CPT | Mod: CPTII,S$GLB,, | Performed by: PHYSICIAN ASSISTANT

## 2022-03-16 PROCEDURE — 3075F SYST BP GE 130 - 139MM HG: CPT | Mod: CPTII,S$GLB,, | Performed by: PHYSICIAN ASSISTANT

## 2022-03-16 PROCEDURE — 99999 PR PBB SHADOW E&M-EST. PATIENT-LVL V: ICD-10-PCS | Mod: PBBFAC,,, | Performed by: PHYSICIAN ASSISTANT

## 2022-03-16 PROCEDURE — 3078F DIAST BP <80 MM HG: CPT | Mod: CPTII,S$GLB,, | Performed by: PHYSICIAN ASSISTANT

## 2022-03-16 PROCEDURE — 1160F RVW MEDS BY RX/DR IN RCRD: CPT | Mod: CPTII,S$GLB,, | Performed by: PHYSICIAN ASSISTANT

## 2022-03-16 PROCEDURE — 99999 PR PBB SHADOW E&M-EST. PATIENT-LVL V: CPT | Mod: PBBFAC,,, | Performed by: PHYSICIAN ASSISTANT

## 2022-03-16 PROCEDURE — 3008F PR BODY MASS INDEX (BMI) DOCUMENTED: ICD-10-PCS | Mod: CPTII,S$GLB,, | Performed by: PHYSICIAN ASSISTANT

## 2022-03-16 PROCEDURE — 73030 X-RAY EXAM OF SHOULDER: CPT | Mod: 26,LT,, | Performed by: INTERNAL MEDICINE

## 2022-03-16 PROCEDURE — 99214 OFFICE O/P EST MOD 30 MIN: CPT | Mod: S$GLB,,, | Performed by: PHYSICIAN ASSISTANT

## 2022-03-16 PROCEDURE — 99214 PR OFFICE/OUTPT VISIT, EST, LEVL IV, 30-39 MIN: ICD-10-PCS | Mod: S$GLB,,, | Performed by: PHYSICIAN ASSISTANT

## 2022-03-16 PROCEDURE — 1160F PR REVIEW ALL MEDS BY PRESCRIBER/CLIN PHARMACIST DOCUMENTED: ICD-10-PCS | Mod: CPTII,S$GLB,, | Performed by: PHYSICIAN ASSISTANT

## 2022-03-16 PROCEDURE — 3008F BODY MASS INDEX DOCD: CPT | Mod: CPTII,S$GLB,, | Performed by: PHYSICIAN ASSISTANT

## 2022-03-16 RX ORDER — METHOCARBAMOL 500 MG/1
500 TABLET, FILM COATED ORAL NIGHTLY
Qty: 10 TABLET | Refills: 0 | Status: SHIPPED | OUTPATIENT
Start: 2022-03-16 | End: 2022-03-26

## 2022-03-16 RX ORDER — MELOXICAM 15 MG/1
15 TABLET ORAL DAILY
Qty: 14 TABLET | Refills: 0 | Status: SHIPPED | OUTPATIENT
Start: 2022-03-16 | End: 2022-03-30

## 2022-03-16 NOTE — PROGRESS NOTES
INTERNAL MEDICINE URGENT VISIT NOTE    CHIEF COMPLAINT     Chief Complaint   Patient presents with    Shoulder Pain       HPI     Soraya Elmore is a 61 y.o. female who presents for an urgent visit today.    PCP is Rashard Ojeda MD, patient is known to me.     She reports left sided shoulder and arm pain that started on her shift Friday night (4 days PTA)  Pain started while at work on Friday. 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   She denies SOB or exertional pain but pain is worse with moving the left shoulder and arm.  She describes the pain as throbbing and worse when laying on the left side at night. There is left arm numbness and tingling that comes and goes and is worse with certain positions.   She has been taking tylenol with minimal and temporary relief     She was diagnosed with COVID about 2 month ago and is doing better, she reports that her energy level is almost back to baseline.   No residual cough or uri symptoms.     No history of CAD, no HTN   The 10-year ASCVD risk score (Carpinteria DC Jr., et al., 2013) is: 6.2%    Values used to calculate the score:      Age: 61 years      Sex: Female      Is Non- : Yes      Diabetic: No      Tobacco smoker: No      Systolic Blood Pressure: 136 mmHg      Is BP treated: No      HDL Cholesterol: 41 mg/dL      Total Cholesterol: 167 mg/dL      She has been taking OTC ear drops that have helped but not resolved her tinnitus.       Past Medical History:  Past Medical History:   Diagnosis Date    Diabetes mellitus     Gastroenteritis     Glaucoma (increased eye pressure)     Prediabetes 6/3/2019    Tinnitus        Home Medications:  Prior to Admission medications    Medication Sig Start Date End Date Taking? Authorizing Provider   (Magic mouthwash) 1:1:1 diphenhydramine(Benadryl) 12.5mg/5ml liq, aluminum & magnesium hydroxide-simethicone (Maalox), LIDOcaine  viscous 2% Swish and spit 5 mLs every 6 (six) hours as needed (mouth/throat pain). 2/28/22  Yes Alisha Green NP   azelastine (ASTELIN) 137 mcg (0.1 %) nasal spray 1 spray (137 mcg total) by Nasal route 2 (two) times daily. 3/8/22 3/8/23 Yes Liliana Snyder PA-C   Bifidobacterium infantis (ALIGN) 4 mg Cap Take 1 capsule (4 mg total) by mouth once daily. 6/3/19  Yes Marily Coleman MD   cholecalciferol, vitamin D3, 50,000 unit capsule Take 1 capsule (50,000 Units total) by mouth once a week. 11/6/19  Yes Jeovanny Sidhu MD   ciclopirox (PENLAC) 8 % Soln Apply topically nightly. 11/11/21  Yes Hayden Flores DPM   dicyclomine (BENTYL) 20 mg tablet Take 1 tablet (20 mg total) by mouth 2 (two) times daily. 8/13/21  Yes Rajan Bateman MD   fluocinonide (LIDEX) 0.05 % external solution Apply topically daily as needed (scalp itching). 9/20/21  Yes Saleem Soto MD   fluticasone propionate (FLONASE) 50 mcg/actuation nasal spray 2 sprays (100 mcg total) by Each Nostril route once daily. 2/28/22  Yes Alisha Green NP   hydrocortisone 2.5 % cream Apply topically 2 (two) times daily as needed (flaking and itching rash). 9/20/21  Yes Saleem Soto MD   ketoconazole (NIZORAL) 2 % cream Apply topically 2 (two) times daily as needed (itching and scaling). 9/20/21  Yes Saleem Soto MD   ketoconazole (NIZORAL) 2 % shampoo Apply topically twice a week. Leave on for 5 to 10 minutes then rinse 9/20/21  Yes Saleem Soto MD   latanoprost 0.005 % ophthalmic solution Place 1 drop into both eyes every evening. 7/27/21  Yes Maribell Garcia, OD   lidocaine HCL 2% (XYLOCAINE) 2 % jelly Apply topically as needed. Apply topically once nightly to affected part of foot/feet. 7/7/20  Yes Hayden Flores DPM   montelukast (SINGULAIR) 10 mg tablet TAKE 1 TABLET(10 MG) BY MOUTH EVERY EVENING 3/8/22  Yes Liliana Snyder PA-C   naproxen (NAPROSYN) 500 MG tablet Take 1 tablet (500 mg total) by mouth every 12 (twelve) hours as  needed (pain). Take with food. 2/28/22  Yes Alisha Green NP   phenobarb-hyoscy-atropine-scop (DONNATAL) 16.2-0.1037 -0.0194 mg/5 mL Elix Take 5 mLs by mouth. 5/13/14  Yes Historical Provider   sucralfate (CARAFATE) 100 mg/mL suspension Take 10 mLs (1 g total) by mouth 4 (four) times daily before meals and nightly. 12/23/20  Yes Chet Vargas MD   pantoprazole (PROTONIX) 20 MG tablet Take 2 tablets (40 mg total) by mouth once daily. 10/9/20 1/11/21  Anthony To MD       Review of Systems:  Review of Systems   Constitutional: Negative for chills and fever.   HENT: Negative for sore throat and trouble swallowing.    Eyes: Negative for visual disturbance.   Respiratory: Negative for cough and shortness of breath.    Cardiovascular: Negative for chest pain.   Gastrointestinal: Negative for abdominal pain, constipation, diarrhea, nausea and vomiting.   Genitourinary: Negative for dysuria and flank pain.   Musculoskeletal: Negative for back pain, neck pain and neck stiffness.        Left shoulder pain   Left chest wall pain    Skin: Negative for rash.   Neurological: Negative for dizziness, syncope, weakness and headaches.   Psychiatric/Behavioral: Negative for confusion.       Health Maintainence:   Immunizations:  Health Maintenance       Date Due Completion Date    COVID-19 Vaccine (3 - Booster for Pfizer series) 05/04/2022 12/4/2021    Mammogram 07/14/2022 7/14/2021    Cervical Cancer Screening 06/03/2024 6/3/2019    Colorectal Cancer Screening 06/07/2026 3/10/2017    TETANUS VACCINE 07/28/2026 7/28/2016    Lipid Panel 11/10/2026 11/10/2021           PHYSICAL EXAM     /70 (BP Location: Right arm, Patient Position: Sitting)   Pulse 65   Wt 70.8 kg (156 lb 1.4 oz)   LMP 07/21/2006   SpO2 100%   BMI 26.79 kg/m²     Physical Exam  Vitals and nursing note reviewed.   Constitutional:       Appearance: Normal appearance.      Comments: Healthy appearing female in NAD or apparent pain. She makes good  eye contact, speaks in clear full sentences and ambulates with ease.      HENT:      Head: Normocephalic and atraumatic.      Nose: Nose normal.      Mouth/Throat:      Pharynx: Oropharynx is clear.   Eyes:      Conjunctiva/sclera: Conjunctivae normal.   Cardiovascular:      Rate and Rhythm: Normal rate and regular rhythm.      Pulses: Normal pulses.   Pulmonary:      Effort: No respiratory distress.   Abdominal:      Tenderness: There is no abdominal tenderness.   Musculoskeletal:         General: Normal range of motion.      Cervical back: No rigidity.      Comments: No C T or L midline bony TTP crepitus or step-offs.   There is painful and limited internal and external rotation. Ab and ADduction. Normal distal pulse and sensation to light touch. Normal  strength.   Reproducible chest wall TTP on the left side  No overlying skin changes    Skin:     General: Skin is warm and dry.      Capillary Refill: Capillary refill takes less than 2 seconds.      Findings: No rash.   Neurological:      General: No focal deficit present.      Mental Status: She is alert.      Gait: Gait normal.   Psychiatric:         Mood and Affect: Mood normal.         LABS     Lab Results   Component Value Date    HGBA1C 5.9 (H) 11/10/2021     CMP  Sodium   Date Value Ref Range Status   11/10/2021 141 136 - 145 mmol/L Final     Potassium   Date Value Ref Range Status   11/10/2021 3.8 3.5 - 5.1 mmol/L Final     Chloride   Date Value Ref Range Status   11/10/2021 103 95 - 110 mmol/L Final     CO2   Date Value Ref Range Status   11/10/2021 27 23 - 29 mmol/L Final     Glucose   Date Value Ref Range Status   11/10/2021 89 70 - 110 mg/dL Final     BUN   Date Value Ref Range Status   11/10/2021 16 6 - 20 mg/dL Final     Creatinine   Date Value Ref Range Status   11/10/2021 0.7 0.5 - 1.4 mg/dL Final     Calcium   Date Value Ref Range Status   11/10/2021 8.6 (L) 8.7 - 10.5 mg/dL Final     Total Protein   Date Value Ref Range Status   11/10/2021  6.9 6.0 - 8.4 g/dL Final     Albumin   Date Value Ref Range Status   11/10/2021 3.9 3.5 - 5.2 g/dL Final     Total Bilirubin   Date Value Ref Range Status   11/10/2021 0.6 0.1 - 1.0 mg/dL Final     Comment:     For infants and newborns, interpretation of results should be based  on gestational age, weight and in agreement with clinical  observations.    Premature Infant recommended reference ranges:  Up to 24 hours.............<8.0 mg/dL  Up to 48 hours............<12.0 mg/dL  3-5 days..................<15.0 mg/dL  6-29 days.................<15.0 mg/dL       Alkaline Phosphatase   Date Value Ref Range Status   11/10/2021 66 55 - 135 U/L Final     AST   Date Value Ref Range Status   11/10/2021 19 10 - 40 U/L Final     ALT   Date Value Ref Range Status   11/10/2021 17 10 - 44 U/L Final     Anion Gap   Date Value Ref Range Status   11/10/2021 11 8 - 16 mmol/L Final     eGFR if    Date Value Ref Range Status   11/10/2021 >60 >60 mL/min/1.73 m^2 Final     eGFR if non    Date Value Ref Range Status   11/10/2021 >60 >60 mL/min/1.73 m^2 Final     Comment:     Calculation used to obtain the estimated glomerular filtration  rate (eGFR) is the CKD-EPI equation.        Lab Results   Component Value Date    WBC 7.96 12/23/2020    HGB 12.9 12/23/2020    HCT 40.9 12/23/2020    MCV 89 12/23/2020     12/23/2020     Lab Results   Component Value Date    CHOL 167 11/10/2021    CHOL 169 11/05/2019    CHOL 176 06/03/2019     Lab Results   Component Value Date    HDL 41 11/10/2021    HDL 45 11/05/2019    HDL 41 06/03/2019     Lab Results   Component Value Date    LDLCALC 116.4 11/10/2021    LDLCALC 113.8 11/05/2019    LDLCALC 115.6 06/03/2019     Lab Results   Component Value Date    TRIG 48 11/10/2021    TRIG 51 11/05/2019    TRIG 97 06/03/2019     Lab Results   Component Value Date    CHOLHDL 24.6 11/10/2021    CHOLHDL 26.6 11/05/2019    CHOLHDL 23.3 06/03/2019     Lab Results   Component Value  Date    TSH 2.009 11/10/2021       ASSESSMENT/PLAN     Soraya Elmore is a 61 y.o. female     Soraya was seen today for shoulder pain.concern for rotator cuff tinidintis, will start NSAIDs, muscle relaxer, get x-ray and refer to PT. Low threshold to refer to Ortho if symptoms not improved. Will write note for pt to return to work on Saturday 3/19/2022    Diagnoses and all orders for this visit:    Acute pain of left shoulder    Tinnitus of both ears   -improving after COVID symptoms improved      Follow up with PCP in 2-3 weeks, sooner if needed     Patient was counseled on when and how to seek emergent care.       Liliana Snyder PA-C   Department of Internal Medicine - [facility]   9:40 AM

## 2022-03-16 NOTE — LETTER
March 16, 2022      [facility]  [address]  [facility] [address]  Phone: [phone]  Fax: [phone]       Patient: Soraya Elmore   YOB: 1960  Date of Visit: 03/16/2022    To Whom It May Concern:    Raul Elmore  was at [facility] on 03/16/2022. The patient may return to work on 3/19/2022  with no restrictions. If you have any questions or concerns, or if I can be of further assistance, please do not hesitate to contact me.    Sincerely,        Liliana Snyder PA-C

## 2022-04-12 ENCOUNTER — CLINICAL SUPPORT (OUTPATIENT)
Dept: REHABILITATION | Facility: OTHER | Age: 62
End: 2022-04-12
Payer: COMMERCIAL

## 2022-04-12 DIAGNOSIS — Z74.09 DECREASED STRENGTH, ENDURANCE, AND MOBILITY: ICD-10-CM

## 2022-04-12 DIAGNOSIS — R68.89 DECREASED STRENGTH, ENDURANCE, AND MOBILITY: ICD-10-CM

## 2022-04-12 DIAGNOSIS — R53.1 DECREASED STRENGTH, ENDURANCE, AND MOBILITY: ICD-10-CM

## 2022-04-12 DIAGNOSIS — M25.512 ACUTE PAIN OF LEFT SHOULDER: ICD-10-CM

## 2022-04-12 DIAGNOSIS — M75.42 IMPINGEMENT SYNDROME OF LEFT SHOULDER: ICD-10-CM

## 2022-04-12 PROBLEM — M25.671 DECREASED RANGE OF MOTION OF RIGHT ANKLE: Status: RESOLVED | Noted: 2020-08-21 | Resolved: 2022-04-12

## 2022-04-12 PROCEDURE — 97110 THERAPEUTIC EXERCISES: CPT | Mod: PN

## 2022-04-12 PROCEDURE — 97161 PT EVAL LOW COMPLEX 20 MIN: CPT | Mod: PN

## 2022-04-13 DIAGNOSIS — L21.9 SEBORRHEIC DERMATITIS: ICD-10-CM

## 2022-04-13 RX ORDER — KETOCONAZOLE 20 MG/ML
SHAMPOO, SUSPENSION TOPICAL
Qty: 120 ML | Refills: 6 | Status: SHIPPED | OUTPATIENT
Start: 2022-04-14

## 2022-04-13 NOTE — TELEPHONE ENCOUNTER
Spoke with pt on phone - pt asked about prescription medication and how to use it. Re-explained to pt where to use creams according to Dr. Trujillo's notes and use the lidex solution and keto shampoo for scalp. Pt asking for another refill of shampoo - informed will send Rx request to Dr. Trujillo. Pt thanked me for calling and answering questions.    ----- Message from Roxana Jernigan sent at 4/13/2022  2:41 PM CDT -----  Regarding: Patient advice  Contact: Pt  Pt called in regards to having questions/concerns about  medication       Please advise , Pt can be reached at [phone]

## 2022-04-19 PROBLEM — R68.89 DECREASED STRENGTH, ENDURANCE, AND MOBILITY: Status: ACTIVE | Noted: 2022-04-19

## 2022-04-19 PROBLEM — Z74.09 DECREASED STRENGTH, ENDURANCE, AND MOBILITY: Status: ACTIVE | Noted: 2022-04-19

## 2022-04-19 PROBLEM — M75.42 IMPINGEMENT SYNDROME OF LEFT SHOULDER: Status: ACTIVE | Noted: 2022-04-19

## 2022-04-19 PROBLEM — R53.1 DECREASED STRENGTH, ENDURANCE, AND MOBILITY: Status: ACTIVE | Noted: 2022-04-19

## 2022-04-19 NOTE — PLAN OF CARE
[facility]   Physical Therapy Initial Evaluation     Date: 4/12/2022   [facility]  [id number]    Therapy Diagnosis:   Encounter Diagnoses   Name Primary?    Acute pain of left shoulder     Impingement syndrome of left shoulder     Decreased strength, endurance, and mobility      Physician: Liliana Snyder, ALENA    Physician Orders: PT Eval and Treat   Medical Diagnosis from Referral: M25.512 (ICD-10-CM) - Acute pain of left shoulder  Evaluation Date: 4/12/2022  Authorization Period Expiration: 3/16/2023  Plan of Care Expiration: 7/12/2022  Progress Note Due: 5/12/2022  Visit # / Visits authorized: 1/ 1   FOTO: 1/3 on 4/12    Precautions: Standard     Time In: 10:30  Time Out: 11:15  Total Appointment Time (timed & untimed codes): 45 minutes      SUBJECTIVE     Date of onset: 3 weeks ago    History of current condition - Soraya reports: acute onset of L shoulder pain when attempting to lift and pull a sheet while making the bed in a patient room (works at [employer]). [place] a pop at the top of the shoulder at the time, but by the next day she was getting radiating down the left arm. MD gave her medication which has helped her pain reduce in intensity and frequency. C/c now is nagging pain with reaching, lifting, and work activities. Also has pain sleeping on L side.     Denies pain, numbness, or weakness in the hand. Denies dropping items. Able to work but occasionally has pain.    R hand dominant.      Falls: none    Imaging, bone scan films, shoulder, 2022: Mild acromioclavicular degenerative changes.    Prior Therapy: yes - OTW for foot pain in 2019  Social History:  lives with their spouse. 6 steps to enter home.  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[place], job duties: lifting, carrying, push/pull cart, making beds and helping patients  Prior Level of Function: independent  Current Level of Function: pain and difficulty with HHCs, work activities  using LUE    Pain:  Current 1/10, worst 3/10, best 1/10   Location: anterior and posterior L shoulder  Description: Aching and Dull  Aggravating Factors: sleeping on the L side, occasionally with work activities, reaching behind the back, OH reaching and lifting, carrying groceries  Easing Factors: prescribed Rx    Patients goals: be able to lift and reach without pain     Medical History:   Past Medical History:   Diagnosis Date    Diabetes mellitus     Gastroenteritis     Glaucoma (increased eye pressure)     Prediabetes 6/3/2019    Tinnitus        Surgical History:   Soraya BriggsLaurenEriberto  has a past surgical history that includes Oophorectomy (1999); Colonoscopy (N/A, 6/7/2016); and Laparoscopic cholecystectomy (N/A, 1/8/2021).    Medications:   Soraya has a current medication list which includes the following prescription(s): diphenhydramine hcl, azelastine, bifidobacterium infantis, cholecalciferol (vitamin d3), ciclopirox, dicyclomine, fluocinonide, fluticasone propionate, hydrocortisone, ketoconazole, ketoconazole, latanoprost, lidocaine hcl 2%, montelukast, naproxen, pantoprazole, phenobarb-hyoscy-atropine-scop, and sucralfate.    Allergies:   Review of patient's allergies indicates:  No Known Allergies       OBJECTIVE     Observation: elevated L shoulder, rounded shoulders, FHP, increased GHJ Nathanael with protracted scapula.. increased anterior positioning of HH under acromion on L    Palpation: TTP Supra T insert and ACJ    Cervical AROM:  Flexion/extension = WFL, tension in upper TSP noted with ext  SB: tension in UT LSB>RSB  Rotation: L = 65 deg, R = 70 deg    Shoulder  Right   Left  Pain/Dysfunction with Movement    AROM PROM MMT AROM PROM MMT    flexion 150 170 5 160 170 4+ MMT limited by pain   abduction 155 165 5 155 165 4+    adduction To midline - - To midline* - - *noted pulling in posterior shoulder   Internal rotation T10 45 deg 5 T8-9* 60 deg* 4 *reproduces C/C   ER at 0° abd 90 - 5  "85 90 4+      Scapular Strength: grossly poor    Elbow Strength: grossly 5/5    Flexibility:   - Pectoralis Major/Minor: poor  - Latissimus Dorsi: WNL  -Subscapularis: WNL  -Upper Traps / Levator Scap: min avalos    Joint Mobility: mod hypo Left GHJ Inf/AP glides    Special Tests:   -Sulcus Sign: -  -Biceps Load II: -  -Compression-Grind: -  - Yergasons Test: -  - Apprehension Sign: -  - Supraspinatus (Empty Can Test)  - Darling-Stewart  - Neers  - Speeds  -  Dynamometry (if indicated)    Scapular Control/Dyskinesis:     Normal / Subtle / Obvious  Comments    Left  obvious Winging, anterior tipping    Right  subtle  same as above but not as extreme           Limitation/Restriction for FOTO shoulder Survey    Therapist reviewed FOTO scores for Soraya Elmore on 4/12/2022.   FOTO documents entered into [employer] - see Media section.    Limitation Score: 38%         TREATMENT     Total Treatment time (time-based codes) separate from Evaluation: 25 minutes      Soraya received the treatments listed below:      therapeutic exercises to develop strength, endurance, ROM, flexibility, posture and core stabilization for 20 minutes including:  L SL Sleeper stretch 10 x 5" holds  Seated scap retractions with Nathanael ER 20 x GTB  Seated nathanael Abd 20 x GTB  HEP for work:   Posterior cuff stretch 3 x 10" holds   Seated scap retractions 1 x 10 x 5" holds  Patient education x 5'    Add next visit: 1/2 foam series, prone scapular series    Manual therapy: 5 min x joint mobs to GHJ all directions, TSP prone PAs      PATIENT EDUCATION AND HOME EXERCISES     Education provided:   - Patient educated regarding pathogenesis, diagnosis, protocol, prognosis, POC, and HEP, including use of visual assistance for understanding of anatomy and dysfunction. Written Home Exercises Provided with written and verbal instructions for frequency and duration of the following exercises: see list above. Pt educated on HEP and activity modifications " to reduce c/o pain and improve overall function.   - Encouraged use of HEP frequently throughout the day at work to promote mobility while reducing or preventing sx occurance  - Pt was educated in posture and body mechanics.  Use of a lumbar roll was recommended and demonstrated here today.  Purchase information provided.   - Pt also educated on use of modalities prn to reduce c/o pain and dysfunction.       Written Home Exercises Provided: yes. Exercises were reviewed and Soraya was able to demonstrate them prior to the end of the session.  Soraya demonstrated good  understanding of the education provided. See EMR under Patient Instructions for exercises provided during therapy sessions.    ASSESSMENT     Soraya is a 61 y.o. female referred to outpatient Physical Therapy with a medical diagnosis of acute pain of left shoulder. Patient presents with marked limitations in ROM, joint and myofascial mobility, flexibility, strength, postural awareness/endurance, motor control and coordination. S/s associated with referring diagnosis, with s/s associated with shoulder impingement due poor posture, RTC and scapular weakness. Impairments limit pt with all functional activities including lifting and work activities.      Patient prognosis is Good.   Patient will benefit from skilled outpatient Physical Therapy to address the deficits stated above and in the chart below, provide patient /family education, and to maximize patientt's level of independence.     Plan of care discussed with patient: Yes  Patient's spiritual, cultural and educational needs considered and patient is agreeable to the plan of care and goals as stated below:     Anticipated Barriers for therapy: standard    Medical Necessity is demonstrated by the following  History  Co-morbidities and personal factors that may impact the plan of care Co-morbidities:   advanced age, coping style/mechanism, difficulty sleeping, education level, excessive commute  time/distance, financial considerations and level of undertstanding of current condition    Personal Factors:   education level  coping style  social background  lifestyle     high   Examination  Body Structures and Functions, activity limitations and participation restrictions that may impact the plan of care Body Regions:   back  upper extremities  trunk    Body Systems:    gross symmetry  ROM  strength  gross coordinated movement  transitions  motor control  motor learning  joint mobility, flexibility, posture    Participation Restrictions:   ADLs, HHCs, sleeping, patient care and work activities    Activity limitations:   Learning and applying knowledge  no deficits    General Tasks and Commands  no deficits    Communication  no deficits    Mobility  lifting and carrying objects  fine hand use (grasping/picking up)  moving around using equipment (WC)  driving (bike, car, motorcycle)    Self care  dressing  looking after one's health    Domestic Life  shopping  cooking  doing house work (cleaning house, washing dishes, laundry)  assisting others    Interactions/Relationships  no deficits    Life Areas  employment    Community and Social Life  community life  recreation and leisure         high   Clinical Presentation stable and uncomplicated Low   Decision Making/ Complexity Score: Low     Goals:  Short Term Goals (5 Weeks):   1. Pt able to reach OH 5x for dressing/grooming activities with <2/10 pain.  2. Pt able to lift/carry >5#, >5 minutes, during household chores with <2/10 pain.  3. Pt able to reach behind back for wallet in back pocket 1x with <2/10 pain.  4. Pt able to perform prolonged OH activities with household chores with <2/10 pain.  5. Pt to demonstrate improved functional ability with FOTO limitation <=28% disability.    Long Term Goals (12 Weeks):   1. Pt independent with all dressing/grooming activities with <1/10 pain.  2. Pt able to reach/lift >5# OH 5x for household chores with <1/10 pain.  3.  Pt able to lift/carry >10#, >5 minutes, during household chores with <1/10 pain.  4. Pt able to reach behind back to hook bra straps 1x with <1/10 pain.  5. Pt able to perform prolonged OH activities with household chores with <1/10 pain.  6. Pt to demonstrate improved functional ability with FOTO limitation <=18% disability.      PLAN   Plan of care Certification: 4/12/2022 to 7/12/2022.    Outpatient Physical Therapy 2 times weekly for 12 weeks to include the following interventions: Aquatic Therapy, Cervical/Lumbar Traction, Electrical Stimulation prn, Iontophoresis (with dexamethasone prn), Manual Therapy, Moist Heat/ Ice, Neuromuscular Re-ed, Patient Education, Self Care, Therapeutic Activities and Therapeutic Exercise. Progress HEP towards D/C. Recommend F/U with MD if symptoms worsen or do not resolve. Patient may be seen by a PTA for treatment to carry out their plan of care.  Face-to-face conferences will be held.       Mary Weems, PT      I CERTIFY THE NEED FOR THESE SERVICES FURNISHED UNDER THIS PLAN OF TREATMENT AND WHILE UNDER MY CARE   Physician's comments:     Physician's Signature: ___________________________________________________

## 2022-04-20 ENCOUNTER — HOSPITAL ENCOUNTER (EMERGENCY)
Facility: OTHER | Age: 62
Discharge: HOME | End: 2022-04-20
Attending: EMERGENCY MEDICINE
Payer: COMMERCIAL

## 2022-04-20 ENCOUNTER — TELEPHONE (OUTPATIENT)
Dept: INTERNAL MEDICINE | Facility: CLINIC | Age: 62
End: 2022-04-20
Payer: COMMERCIAL

## 2022-04-20 VITALS
BODY MASS INDEX: 25.61 KG/M2 | DIASTOLIC BLOOD PRESSURE: 60 MMHG | HEIGHT: 64 IN | TEMPERATURE: 98 F | SYSTOLIC BLOOD PRESSURE: 122 MMHG | OXYGEN SATURATION: 98 % | RESPIRATION RATE: 18 BRPM | HEART RATE: 100 BPM | WEIGHT: 150 LBS

## 2022-04-20 DIAGNOSIS — J30.9 ALLERGIC RHINITIS, UNSPECIFIED SEASONALITY, UNSPECIFIED TRIGGER: Primary | ICD-10-CM

## 2022-04-20 DIAGNOSIS — Z20.822 COVID-19 RULED OUT: ICD-10-CM

## 2022-04-20 LAB
CTP QC/QA: YES
SARS-COV-2 RDRP RESP QL NAA+PROBE: NEGATIVE

## 2022-04-20 PROCEDURE — 99284 EMERGENCY DEPT VISIT MOD MDM: CPT | Mod: 25

## 2022-04-20 PROCEDURE — U0002 COVID-19 LAB TEST NON-CDC: HCPCS | Performed by: EMERGENCY MEDICINE

## 2022-04-20 RX ORDER — FLUTICASONE PROPIONATE 50 MCG
1 SPRAY, SUSPENSION (ML) NASAL 2 TIMES DAILY PRN
Qty: 15 G | Refills: 0 | Status: SHIPPED | OUTPATIENT
Start: 2022-04-20 | End: 2023-06-08

## 2022-04-20 RX ORDER — MONTELUKAST SODIUM 10 MG/1
10 TABLET ORAL NIGHTLY
Qty: 30 TABLET | Refills: 0 | Status: SHIPPED | OUTPATIENT
Start: 2022-04-20 | End: 2022-05-20

## 2022-04-20 NOTE — TELEPHONE ENCOUNTER
----- Message from Don Sims sent at 4/20/2022 11:55 AM CDT -----  Regarding: Call Back Request  Name of Who is Calling: LUIS REDMAN [[id number]]           What is the request in detail: Patient is requesting a call back to discuss condition.  She is having covid like symptoms and has been exposed to someone that tested positive.  She would like to know if Dr. Ojeda recommends she go to the ER or urgent care to be tested. Please assist.           Can the clinic reply by [facility]: NO           [facility] Back [facility]: [phone]

## 2022-04-20 NOTE — TELEPHONE ENCOUNTER
Spoke with pt, her symptoms include sneezing/coughing/congested. Had some chest pains, and muscle aches. Had diarrhea. This started late Saturday evening. She will go to ER/urgent care.

## 2022-04-21 NOTE — ED PROVIDER NOTES
Encounter Date: 2022       History     Chief Complaint   Patient presents with    COVID-19 Concerns     Pt presents to the ER with complaints of sore throat, sneezing with cough and congestion for the past four days. Pt has been exposed to someone who tested positive for Covid.       Patient is a 61-year-old female with documented past medical history presenting to the ED for cough, sore throat, sneezing and congestion for the past 4 days.  Patient states she was exposed to someone recently with COVID and is concerned she contracted it.  Patient denies taking anything for her symptoms.  Patient denies any shortness of breath, dizziness, abdominal pain, nausea, vomiting or diarrhea.    The history is provided by the patient, the spouse and medical records.     Review of patient's allergies indicates:  No Known Allergies  Past Medical History:   Diagnosis Date    Diabetes mellitus     Gastroenteritis     Glaucoma (increased eye pressure)     Prediabetes 6/3/2019    Tinnitus      Past Surgical History:   Procedure Laterality Date    COLONOSCOPY N/A 2016    Procedure: COLONOSCOPY;  Surgeon: Katerine Ramsey MD;  Location: [address];  Service: Endoscopy;  Laterality: N/A;    LAPAROSCOPIC CHOLECYSTECTOMY N/A 2021    Procedure: CHOLECYSTECTOMY, LAPAROSCOPIC;  Surgeon: Johan Wilkerson Jr., MD;  Location: [facility];  Service: General;  Laterality: N/A;    OOPHORECTOMY      Unilateral oophorectomy     Family History   Problem Relation Age of Onset    Breast cancer Cousin 67    Hypertension Mother     Glaucoma Mother     Cataracts Mother     Diabetes Sister     Glaucoma Sister     Diabetes Sister     Colon cancer Neg Hx     Ovarian cancer Neg Hx     Stroke Neg Hx      Social History     Tobacco Use    Smoking status: Former Smoker     Packs/day: 0.10     Years: 15.00     Pack years: 1.50     Types: Cigarettes     Quit date: 2005     Years since quittin.7    Smokeless  tobacco: Never Used   Substance Use Topics    Alcohol use: No     Alcohol/week: 0.0 standard drinks    Drug use: No     Review of Systems   Constitutional: Negative for activity change, appetite change and fever.   HENT: Positive for congestion, sinus pain, sneezing and sore throat.    Respiratory: Positive for cough. Negative for shortness of breath.    Cardiovascular: Negative for chest pain.   Gastrointestinal: Negative for abdominal pain, constipation, diarrhea, nausea and vomiting.   Genitourinary: Negative for dysuria.   Musculoskeletal: Negative for back pain.   Skin: Negative for rash.   Allergic/Immunologic: Negative for immunocompromised state.   Neurological: Negative for dizziness, weakness and headaches.   Hematological: Does not bruise/bleed easily.   All other systems reviewed and are negative.      Physical Exam     Initial Vitals [04/20/22 1919]   BP Pulse Resp Temp SpO2   122/60 100 18 98.1 °F (36.7 °C) 98 %      MAP       --         Physical Exam    Nursing note and vitals reviewed.  Constitutional: Vital signs are normal. She appears well-developed and well-nourished. She is Obese . She is cooperative. Face mask in place.   Slightly febrile but nontoxic appearing.     HENT:   Head: Normocephalic and atraumatic.   Nose: Mucosal edema and rhinorrhea present.   Mouth/Throat: Uvula is midline, oropharynx is clear and moist and mucous membranes are normal.   Eyes: Pupils are equal, round, and reactive to light.   Neck: Phonation normal.   Cardiovascular: Normal rate and regular rhythm.   Pulmonary/Chest: Effort normal and breath sounds normal.     Neurological: She is alert and oriented to person, place, and time. Gait normal. GCS eye subscore is 4. GCS verbal subscore is 5. GCS motor subscore is 6.   Skin: Skin is warm, dry and intact. Capillary refill takes more than 3 seconds.         ED Course   Procedures  Labs Reviewed   SARS-COV-2 RDRP GENE          Imaging Results    None           Medications - No data to display  Medical Decision Making:   Initial Assessment:   Emergent evaluation of a 62 yo female presenting for cough, sore throat, sneezing and congestion for the past 4 days.  Patient denies taking anything for her symptoms.  Patient states she had recent contact with a COVID positive individual and is concerned she contracted it.  Patient denies taking anything for her allergies.  On exam pt is A&Ox3. VSS. Nonfebrile and nontoxic appearing.  Rhinorrhea noted on exam.  Breath sounds clear bilaterally. Mucous membranes pink and moist. Tonsils with no redness, erythema or exudates. Abdomen soft and nontender. No rebound or guarding appreciated on exam.   BS WNL.  Pt speaking in full sentences.  Steady gait appreciated. Cap refill < 3 seconds.      Differential Diagnosis:   Differential diagnoses include but are not limited to viral URI including influenza type illness, coronavirus, bronchitis, pneumonia,  or reactive airway disease.      ED Management:  We will check COVID and reassess.      COVID negative in the ED. Patient advised to rotate Tylenol and ibuprofen as needed for fever and body aches. Take medications as prescribed.  No need to isolate or quarantine att.  Strict return to ED precautions discussed.  Patient verbalized understanding of this plan of care.  All questions and concerns addressed.    Patient is hemodynamically stable, vital signs are normal. Discharge instructions given. Return to ED precautions discussed. Follow up as directed. Pt verbalized understanding of this plan.  Pt is stable for discharge.                         Clinical Impression:   Final diagnoses:  [J30.9] Allergic rhinitis, unspecified seasonality, unspecified trigger (Primary)  [Z20.822] COVID-19 ruled out          ED Disposition Condition    Discharge Stable        ED Prescriptions     Medication Sig Dispense Start Date End Date Auth. Provider    montelukast (SINGULAIR) 10 mg tablet Take 1 tablet  (10 mg total) by mouth every evening. 30 tablet 4/20/2022 5/20/2022 Jessica Herring NP    fluticasone propionate (FLONASE) 50 mcg/actuation nasal spray 1 spray (50 mcg total) by Each Nostril route 2 (two) times daily as needed. 15 g 4/20/2022  Jessica Herring NP        Follow-up Information     Follow up With Specialties Details Why Contact Info    Rashard Ojeda MD Family Medicine Schedule an appointment as soon as possible for a visit  As needed [address]  [facility] [address]  [phone]             Jessica Herring NP  04/20/22 1958

## 2022-04-21 NOTE — ED PROVIDER NOTES
Source of History:  ***    Chief complaint:  COVID-19 Concerns (Pt presents to the ER with complaints of sore throat, sneezing with cough and congestion for the past four days. Pt has been exposed to someone who tested positive for Covid./)      HPI:  Soraya Elmore is a 61 y.o. female presenting with ***    This is the extent to the patients complaints today here in the emergency department.    ROS: As per HPI and below:  Constitutional: ***  Eyes: No visual changes.  ENT: *** No ear pain    Cardiovascular: No chest pain.  Respiratory: ***  GI: No abdominal pain.  No nausea or vomiting.  Genitourinary: No abnormal urination.  Neurologic: *** No focal weakness.  No numbness.  MSK: no back pain.  Integument: No rashes or lesions.  Hematologic: No easy bruising.  Endocrine: No excessive thirst or urination.    Review of patient's allergies indicates:  No Known Allergies    PMH:  As per HPI and below:  Past Medical History:   Diagnosis Date    Diabetes mellitus     Gastroenteritis     Glaucoma (increased eye pressure)     Prediabetes 6/3/2019    Tinnitus      Past Surgical History:   Procedure Laterality Date    COLONOSCOPY N/A 2016    Procedure: COLONOSCOPY;  Surgeon: Katerine Ramsey MD;  Location: [address];  Service: Endoscopy;  Laterality: N/A;    LAPAROSCOPIC CHOLECYSTECTOMY N/A 2021    Procedure: CHOLECYSTECTOMY, LAPAROSCOPIC;  Surgeon: Johan Wilkerson Jr., MD;  Location: [facility];  Service: General;  Laterality: N/A;    OOPHORECTOMY      Unilateral oophorectomy       Social History     Tobacco Use    Smoking status: Former Smoker     Packs/day: 0.10     Years: 15.00     Pack years: 1.50     Types: Cigarettes     Quit date: 2005     Years since quittin.7    Smokeless tobacco: Never Used   Substance Use Topics    Alcohol use: No     Alcohol/week: 0.0 standard drinks    Drug use: No       Physical Exam:    /60 (BP Location: Left arm, Patient Position:  "Lying)   Pulse 100   Temp 98.1 °F (36.7 °C) (Oral)   Resp 18   Ht 5' 4" (1.626 m)   Wt 68 kg (150 lb)   LMP 07/21/2006   SpO2 98%   BMI 25.75 kg/m²   Nursing note and vital signs reviewed.  Appearance:  No acute distress.  Comfortable.  Head: Normocephalic.  Atraumatic.    Eyes: No conjunctival injection.  No swelling.  Chest/Respiratory: Normal work of breathing.  No retractions.  No stridor.  Cardiovascular: Regular rhythm.  No appearance of shock.  No edema.  Abdomen:  No distention.    Musculoskeletal:  No deformities.  Normal range of motion.    Skin:  No rashes.  Good turgor.  Neurologic:  Normal movement and ambulation.  Mental Status:  Alert and oriented x 3.  Appropriate, conversant.     Labs that have been ordered have been independently reviewed and interpreted by myself.    I decided to obtain the patient's medical records.  Summary of Medical Records:  ***    MDM:    61 y.o. female with symptoms/ concern of COVID infection.  Vital signs are normal.  The patient is not hypoxic and has no respiratory distress.  Do not suspect sepsis or pneumonia.  Do not feel any further workup is indicated.  COVID test *** . Strict return precautions and self isolation recommendations are given to the patient.                   Diagnostic Impression:    No diagnosis found.        "

## 2022-04-21 NOTE — DISCHARGE INSTRUCTIONS
Your Covid is negative in the ER today.  No need to isolate/quarantine. Follow CDC guidelines for mask and social distancing recommendations.  Your symptoms are likely consistent with seasonal allergies.  Please start taking Singulair Flonase daily as needed.  Follow up as needed.     You were seen and evaluated in the ER today.  Your workup while you were here is reassuring for no acute causes of your symptoms.  Please follow-up with your PCP as needed.  Please return to the ED for any worsening symptoms such as chest pain, shortness of breath, fever not controlled with Tylenol or ibuprofen or uncontrolled pain.      Our goal in the emergency department is to always give you outstanding care and exceptional service. You may receive a survey by mail or e-mail in the next week regarding your experience in our ED. We would greatly appreciate your completing and returning the survey. Your feedback provides us with a way to recognize our staff who give very good care and it helps us learn how to improve when your experience was below our aspiration of excellence.

## 2022-05-05 ENCOUNTER — CLINICAL SUPPORT (OUTPATIENT)
Dept: REHABILITATION | Facility: OTHER | Age: 62
End: 2022-05-05
Payer: COMMERCIAL

## 2022-05-05 DIAGNOSIS — R53.1 DECREASED STRENGTH, ENDURANCE, AND MOBILITY: ICD-10-CM

## 2022-05-05 DIAGNOSIS — Z74.09 DECREASED STRENGTH, ENDURANCE, AND MOBILITY: ICD-10-CM

## 2022-05-05 DIAGNOSIS — R68.89 DECREASED STRENGTH, ENDURANCE, AND MOBILITY: ICD-10-CM

## 2022-05-05 DIAGNOSIS — M75.42 IMPINGEMENT SYNDROME OF LEFT SHOULDER: Primary | ICD-10-CM

## 2022-05-05 PROCEDURE — 97110 THERAPEUTIC EXERCISES: CPT | Mod: PN | Performed by: PHYSICAL THERAPIST

## 2022-05-05 NOTE — PROGRESS NOTES
"[facility]   Physical Therapy Treatment Note     Name: Soraya Perez [facility]  Clinic Number: [id number]    Therapy Diagnosis:   Encounter Diagnoses   Name Primary?    Impingement syndrome of left shoulder Yes    Decreased strength, endurance, and mobility      Physician: Liliana Snyder PA-C    Visit Date: 2022    Physician Orders: PT Eval and Treat   Medical Diagnosis from Referral: M25.512 (ICD-10-CM) - Acute pain of left shoulder  Evaluation Date: 2022  Authorization Period Expiration: 3/16/2023  Plan of Care Expiration: 2022  Progress Note Due: 2022  Visit # / Visits authorized:  (+ eval)  FOTO: 1/3 on      Precautions: Standard       PTA Visit #: 0/5     Time In: 1030  Time Out: 1115  Total Billable Time: 45 minutes    SUBJECTIVE     Pt reports: no new complaints today. Pain is mild this morning  She was compliant with home exercise program.  Response to previous treatment: no change  Functional change: no change    Pain: 1/10  Location: anterior and posterior L shoulder     OBJECTIVE     Objective Measures updated at progress report unless specified.     Treatment     Soraya received the treatments listed below:      therapeutic exercises to develop strength, endurance, ROM, flexibility, posture and core stabilization for 45 minutes includin/2 foam   pec stretch x 2 min   Supine punch x 20   HA x 20   Swim x 20   [place] YTB 20x  Prone   scap squeezes 10" x 10   Y's 3" x 10   T's 3" x 10 each (heavy verbal/visual/tactile cuing)    Inferior glide 5" x 20  Low row press 5" x 20    manual therapy techniques: Joint mobilizations and Soft tissue Mobilization were applied to the: L shoulder for 0 minutes, including:  None today        Patient Education and Home Exercises     Home Exercises Provided and Patient Education Provided     Education provided:   - therex rationale    Written Home Exercises Provided: yes. Exercises were reviewed and " Soraya was able to demonstrate them prior to the end of the session.  Soraya demonstrated good  understanding of the education provided. See EMR under Patient Instructions for exercises provided during therapy sessions    ASSESSMENT     Overall good tolerance to initial treatment session. Marked difficulty with scapular retraction with prone T. Heavy manual cuing but pt unable to perform independently. Good tolerance to isometrics good good scapular recruitment/retraction/depression noted.     Soraya Is progressing well towards her goals.   Pt prognosis is Good.     Pt will continue to benefit from skilled outpatient physical therapy to address the deficits listed in the problem list box on initial evaluation, provide pt/family education and to maximize pt's level of independence in the home and community environment.     Pt's spiritual, cultural and educational needs considered and pt agreeable to plan of care and goals.     Anticipated barriers to physical therapy: standard     Goals: IE 4/12/2022  Short Term Goals (5 Weeks):   1. Pt able to reach OH 5x for dressing/grooming activities with <2/10 pain. Progressing, not met  2. Pt able to lift/carry >5#, >5 minutes, during household chores with <2/10 pain. Progressing, not met  3. Pt able to reach behind back for wallet in back pocket 1x with <2/10 pain. Progressing, not met  4. Pt able to perform prolonged OH activities with household chores with <2/10 pain. Progressing, not met  5. Pt to demonstrate improved functional ability with FOTO limitation <=28% disability. Progressing, not met     Long Term Goals (12 Weeks):   1. Pt independent with all dressing/grooming activities with <1/10 pain. Progressing, not met  2. Pt able to reach/lift >5# OH 5x for household chores with <1/10 pain. Progressing, not met  3. Pt able to lift/carry >10#, >5 minutes, during household chores with <1/10 pain. Progressing, not met  4. Pt able to reach behind back to hook bra straps  1x with <1/10 pain. Progressing, not met  5. Pt able to perform prolonged OH activities with household chores with <1/10 pain. Progressing, not met  6. Pt to demonstrate improved functional ability with FOTO limitation <=18% disability. Progressing, not met      PLAN   Plan of care Certification: 4/12/2022 to 7/12/2022.     Continue per plan of care with focus on postural strength, stability, and flexibility     Chioma Fleming, PT

## 2022-05-10 ENCOUNTER — PATIENT OUTREACH (OUTPATIENT)
Dept: ADMINISTRATIVE | Facility: OTHER | Age: 62
End: 2022-05-10
Payer: COMMERCIAL

## 2022-05-15 ENCOUNTER — PATIENT MESSAGE (OUTPATIENT)
Dept: REHABILITATION | Facility: OTHER | Age: 62
End: 2022-05-15
Payer: COMMERCIAL

## 2022-08-04 DIAGNOSIS — Z12.31 OTHER SCREENING MAMMOGRAM: ICD-10-CM

## 2022-08-24 ENCOUNTER — PATIENT MESSAGE (OUTPATIENT)
Dept: INTERNAL MEDICINE | Facility: CLINIC | Age: 62
End: 2022-08-24
Payer: COMMERCIAL

## 2022-09-14 ENCOUNTER — HOSPITAL ENCOUNTER (OUTPATIENT)
Dept: RADIOLOGY | Facility: OTHER | Age: 62
Discharge: HOME | End: 2022-09-14
Attending: FAMILY MEDICINE
Payer: COMMERCIAL

## 2022-09-14 DIAGNOSIS — Z12.31 OTHER SCREENING MAMMOGRAM: ICD-10-CM

## 2022-09-14 PROCEDURE — 77067 SCR MAMMO BI INCL CAD: CPT | Mod: 26,,, | Performed by: RADIOLOGY

## 2022-09-14 PROCEDURE — 77067 MAMMO DIGITAL SCREENING BILAT WITH TOMO: ICD-10-PCS | Mod: 26,,, | Performed by: RADIOLOGY

## 2022-09-14 PROCEDURE — 77063 BREAST TOMOSYNTHESIS BI: CPT | Mod: 26,,, | Performed by: RADIOLOGY

## 2022-09-14 PROCEDURE — 77063 BREAST TOMOSYNTHESIS BI: CPT | Mod: TC

## 2022-09-14 PROCEDURE — 77063 MAMMO DIGITAL SCREENING BILAT WITH TOMO: ICD-10-PCS | Mod: 26,,, | Performed by: RADIOLOGY

## 2022-10-25 ENCOUNTER — IMMUNIZATION (OUTPATIENT)
Dept: PHARMACY | Facility: CLINIC | Age: 62
End: 2022-10-25
Payer: COMMERCIAL

## 2022-11-11 ENCOUNTER — TELEPHONE (OUTPATIENT)
Dept: INTERNAL MEDICINE | Facility: CLINIC | Age: 62
End: 2022-11-11
Payer: COMMERCIAL

## 2022-11-11 NOTE — TELEPHONE ENCOUNTER
Spoke with the pt who stated she has bee having occasional breast pain off and on for a few weeks. Pt stated it is in both breast and it feels like a sticking feeling. Informed pt I would put in a referral to the breast clinic and they will reach out to get her scheduled. Pt verbalized understanding. Pended breast clinic referral, unsure of diagnosis

## 2022-11-21 ENCOUNTER — OFFICE VISIT (OUTPATIENT)
Dept: INTERNAL MEDICINE | Facility: CLINIC | Age: 62
End: 2022-11-21
Payer: COMMERCIAL

## 2022-11-21 VITALS
OXYGEN SATURATION: 98 % | DIASTOLIC BLOOD PRESSURE: 62 MMHG | WEIGHT: 158.5 LBS | HEART RATE: 89 BPM | BODY MASS INDEX: 27.06 KG/M2 | HEIGHT: 64 IN | SYSTOLIC BLOOD PRESSURE: 129 MMHG

## 2022-11-21 DIAGNOSIS — N64.4 BREAST PAIN IN FEMALE: Primary | ICD-10-CM

## 2022-11-21 PROCEDURE — 99999 PR PBB SHADOW E&M-EST. PATIENT-LVL V: CPT | Mod: PBBFAC,,, | Performed by: PHYSICIAN ASSISTANT

## 2022-11-21 PROCEDURE — 3008F PR BODY MASS INDEX (BMI) DOCUMENTED: ICD-10-PCS | Mod: CPTII,S$GLB,, | Performed by: PHYSICIAN ASSISTANT

## 2022-11-21 PROCEDURE — 1159F PR MEDICATION LIST DOCUMENTED IN MEDICAL RECORD: ICD-10-PCS | Mod: CPTII,S$GLB,, | Performed by: PHYSICIAN ASSISTANT

## 2022-11-21 PROCEDURE — 1159F MED LIST DOCD IN RCRD: CPT | Mod: CPTII,S$GLB,, | Performed by: PHYSICIAN ASSISTANT

## 2022-11-21 PROCEDURE — 3078F PR MOST RECENT DIASTOLIC BLOOD PRESSURE < 80 MM HG: ICD-10-PCS | Mod: CPTII,S$GLB,, | Performed by: PHYSICIAN ASSISTANT

## 2022-11-21 PROCEDURE — 3078F DIAST BP <80 MM HG: CPT | Mod: CPTII,S$GLB,, | Performed by: PHYSICIAN ASSISTANT

## 2022-11-21 PROCEDURE — 1160F RVW MEDS BY RX/DR IN RCRD: CPT | Mod: CPTII,S$GLB,, | Performed by: PHYSICIAN ASSISTANT

## 2022-11-21 PROCEDURE — 99214 PR OFFICE/OUTPT VISIT, EST, LEVL IV, 30-39 MIN: ICD-10-PCS | Mod: S$GLB,,, | Performed by: PHYSICIAN ASSISTANT

## 2022-11-21 PROCEDURE — 1160F PR REVIEW ALL MEDS BY PRESCRIBER/CLIN PHARMACIST DOCUMENTED: ICD-10-PCS | Mod: CPTII,S$GLB,, | Performed by: PHYSICIAN ASSISTANT

## 2022-11-21 PROCEDURE — 3074F SYST BP LT 130 MM HG: CPT | Mod: CPTII,S$GLB,, | Performed by: PHYSICIAN ASSISTANT

## 2022-11-21 PROCEDURE — 99214 OFFICE O/P EST MOD 30 MIN: CPT | Mod: S$GLB,,, | Performed by: PHYSICIAN ASSISTANT

## 2022-11-21 PROCEDURE — 3008F BODY MASS INDEX DOCD: CPT | Mod: CPTII,S$GLB,, | Performed by: PHYSICIAN ASSISTANT

## 2022-11-21 PROCEDURE — 99999 PR PBB SHADOW E&M-EST. PATIENT-LVL V: ICD-10-PCS | Mod: PBBFAC,,, | Performed by: PHYSICIAN ASSISTANT

## 2022-11-21 PROCEDURE — 3074F PR MOST RECENT SYSTOLIC BLOOD PRESSURE < 130 MM HG: ICD-10-PCS | Mod: CPTII,S$GLB,, | Performed by: PHYSICIAN ASSISTANT

## 2022-11-21 RX ORDER — DICLOFENAC SODIUM 10 MG/G
2 GEL TOPICAL 4 TIMES DAILY
Qty: 100 G | Refills: 0 | Status: SHIPPED | OUTPATIENT
Start: 2022-11-21

## 2022-11-21 NOTE — PROGRESS NOTES
INTERNAL MEDICINE URGENT VISIT NOTE    CHIEF COMPLAINT     Chief Complaint   Patient presents with    Breast Pain       HPI     Soraya Elmore is a 62 y.o. female who presents for an urgent visit today.    PCP is Rashard Ojeda MD, patient is known to me.     Has a history of dense breast tissue with reports today of off and on bilateral breast pain.   Had mammogram in August - normal   Has had bilateral breast soreness when wearing bra too long -does not wear under-wire. She denies skin rashes or changes.   No nipple changes -yamile  no inversions or discharge.   No lumps or bumps on self breast exams.     She has annual exam scheduled with her PCP next week.       Past Medical History:  Past Medical History:   Diagnosis Date    Diabetes mellitus     Gastroenteritis     Glaucoma (increased eye pressure)     Prediabetes 6/3/2019    Tinnitus        Home Medications:  Prior to Admission medications    Medication Sig Start Date End Date Taking? Authorizing Provider   azelastine (ASTELIN) 137 mcg (0.1 %) nasal spray 1 spray (137 mcg total) by Nasal route 2 (two) times daily. 3/8/22 3/8/23 Yes Liliana Snyder PA-C   cholecalciferol, vitamin D3, 50,000 unit capsule Take 1 capsule (50,000 Units total) by mouth once a week. 11/6/19  Yes Jeovanny Sidhu MD   ciclopirox (PENLAC) 8 % Soln Apply topically nightly. 11/11/21  Yes Hayden Flores DPM   dicyclomine (BENTYL) 20 mg tablet TAKE 1 TABLET(20 MG) BY MOUTH TWICE DAILY 9/12/22  Yes Rashard Ojeda MD   fluocinonide (LIDEX) 0.05 % external solution Apply topically daily as needed (scalp itching). 9/20/21  Yes Saleem Soto MD   fluticasone propionate (FLONASE) 50 mcg/actuation nasal spray 1 spray (50 mcg total) by Each Nostril route 2 (two) times daily as needed. 4/20/22  Yes Jessica Herring NP   hydrocortisone 2.5 % cream Apply topically 2 (two) times daily as needed (flaking and itching rash). 9/20/21  Yes Saleem Soto MD   ketoconazole  (NIZORAL) 2 % cream Apply topically 2 (two) times daily as needed (itching and scaling). 9/20/21  Yes Saleem Soto MD   ketoconazole (NIZORAL) 2 % shampoo Apply topically twice a week. Leave on for 5 to 10 minutes then rinse 4/14/22  Yes Serenity Trujillo MD   latanoprost 0.005 % ophthalmic solution Place 1 drop into both eyes every evening. 7/27/21  Yes Maribell Garcia, CARLOTTA   lidocaine HCL 2% (XYLOCAINE) 2 % jelly Apply topically as needed. Apply topically once nightly to affected part of foot/feet. 7/7/20  Yes Hayden Flores DPM   naproxen (NAPROSYN) 500 MG tablet Take 1 tablet (500 mg total) by mouth every 12 (twelve) hours as needed (pain). Take with food. 2/28/22  Yes Alisha Green NP   pantoprazole (PROTONIX) 20 MG tablet Take 2 tablets (40 mg total) by mouth once daily. 10/9/20 11/21/22 Yes Anthony To MD   (Magic mouthwash) 1:1:1 diphenhydramine(Benadryl) 12.5mg/5ml liq, aluminum & magnesium hydroxide-simethicone (Maalox), LIDOcaine viscous 2% Swish and spit 5 mLs every 6 (six) hours as needed (mouth/throat pain).  Patient not taking: Reported on 11/21/2022 2/28/22   Alisha Green NP   Bifidobacterium infantis (ALIGN) 4 mg Cap Take 1 capsule (4 mg total) by mouth once daily.  Patient not taking: Reported on 11/21/2022 6/3/19   Marily Coleman MD   phenobarb-hyoscy-atropine-scop (DONNATAL) 16.2-0.1037 -0.0194 mg/5 mL Elix Take 5 mLs by mouth. 5/13/14   Historical Provider   sucralfate (CARAFATE) 100 mg/mL suspension Take 10 mLs (1 g total) by mouth 4 (four) times daily before meals and nightly.  Patient not taking: Reported on 11/21/2022 12/23/20   Chet Vargas MD       Review of Systems:  Review of Systems   Constitutional:  Negative for chills and fever.   HENT:  Negative for sore throat and trouble swallowing.    Eyes:  Negative for visual disturbance.   Respiratory:  Negative for cough and shortness of breath.    Cardiovascular:  Negative for chest pain.   Gastrointestinal:  Negative  "for abdominal pain, constipation, diarrhea, nausea and vomiting.   Genitourinary:  Negative for dysuria and flank pain.   Musculoskeletal:  Negative for back pain, neck pain and neck stiffness.   Skin:  Negative for rash.        Breast soreness    Neurological:  Negative for dizziness, syncope, weakness and headaches.   Psychiatric/Behavioral:  Negative for confusion.      Health Maintainence:   Immunizations:  Health Maintenance         Date Due Completion Date    COVID-19 Vaccine (3 - Booster for Pfizer series) 01/29/2022 12/4/2021    Mammogram 09/14/2023 9/14/2022    Cervical Cancer Screening 06/03/2024 6/3/2019    Colorectal Cancer Screening 06/07/2026 6/7/2016    TETANUS VACCINE 07/28/2026 7/28/2016    Lipid Panel 11/10/2026 11/10/2021             PHYSICAL EXAM     /62 (BP Location: Right arm, Patient Position: Sitting, BP Method: Medium (Automatic))   Pulse 89   Ht 5' 4" (1.626 m)   Wt 71.9 kg (158 lb 8.2 oz)   LMP 07/21/2006   SpO2 98%   BMI 27.21 kg/m²     Physical Exam  Vitals and nursing note reviewed.   Constitutional:       Appearance: Normal appearance.      Comments: Healthy appearing female in NAD or apparent pain. She makes good eye contact, speaks in clear full sentences and ambulates with ease.        HENT:      Head: Normocephalic and atraumatic.      Nose: Nose normal.      Mouth/Throat:      Pharynx: Oropharynx is clear.   Eyes:      Conjunctiva/sclera: Conjunctivae normal.   Cardiovascular:      Rate and Rhythm: Normal rate and regular rhythm.      Pulses: Normal pulses.   Pulmonary:      Effort: No respiratory distress.   Abdominal:      Tenderness: There is no abdominal tenderness.   Musculoskeletal:         General: Normal range of motion.      Cervical back: No rigidity.   Skin:     General: Skin is warm and dry.      Capillary Refill: Capillary refill takes less than 2 seconds.      Findings: No rash.      Comments: Bilateral breasts have no obvious asymmetry. Normal nipples. " No palpable lumps or bumps. TTP bilaterally to the lateral breast tissue at about 9 o'clock and also medially around 3 o'clock. No induration, fluctuance or palpable massess.   No TTP to underlying ribs/bony landmarks.    Neurological:      General: No focal deficit present.      Mental Status: She is alert.      Gait: Gait normal.   Psychiatric:         Mood and Affect: Mood normal.       LABS     Lab Results   Component Value Date    HGBA1C 5.9 (H) 11/10/2021     CMP  Sodium   Date Value Ref Range Status   11/10/2021 141 136 - 145 mmol/L Final     Potassium   Date Value Ref Range Status   11/10/2021 3.8 3.5 - 5.1 mmol/L Final     Chloride   Date Value Ref Range Status   11/10/2021 103 95 - 110 mmol/L Final     CO2   Date Value Ref Range Status   11/10/2021 27 23 - 29 mmol/L Final     Glucose   Date Value Ref Range Status   11/10/2021 89 70 - 110 mg/dL Final     BUN   Date Value Ref Range Status   11/10/2021 16 6 - 20 mg/dL Final     Creatinine   Date Value Ref Range Status   11/10/2021 0.7 0.5 - 1.4 mg/dL Final     Calcium   Date Value Ref Range Status   11/10/2021 8.6 (L) 8.7 - 10.5 mg/dL Final     Total Protein   Date Value Ref Range Status   11/10/2021 6.9 6.0 - 8.4 g/dL Final     Albumin   Date Value Ref Range Status   11/10/2021 3.9 3.5 - 5.2 g/dL Final     Total Bilirubin   Date Value Ref Range Status   11/10/2021 0.6 0.1 - 1.0 mg/dL Final     Comment:     For infants and newborns, interpretation of results should be based  on gestational age, weight and in agreement with clinical  observations.    Premature Infant recommended reference ranges:  Up to 24 hours.............<8.0 mg/dL  Up to 48 hours............<12.0 mg/dL  3-5 days..................<15.0 mg/dL  6-29 days.................<15.0 mg/dL       Alkaline Phosphatase   Date Value Ref Range Status   11/10/2021 66 55 - 135 U/L Final     AST   Date Value Ref Range Status   11/10/2021 19 10 - 40 U/L Final     ALT   Date Value Ref Range Status   11/10/2021  17 10 - 44 U/L Final     Anion Gap   Date Value Ref Range Status   11/10/2021 11 8 - 16 mmol/L Final     eGFR if    Date Value Ref Range Status   11/10/2021 >60 >60 mL/min/1.73 m^2 Final     eGFR if non    Date Value Ref Range Status   11/10/2021 >60 >60 mL/min/1.73 m^2 Final     Comment:     Calculation used to obtain the estimated glomerular filtration  rate (eGFR) is the CKD-EPI equation.        Lab Results   Component Value Date    WBC 7.96 12/23/2020    HGB 12.9 12/23/2020    HCT 40.9 12/23/2020    MCV 89 12/23/2020     12/23/2020     Lab Results   Component Value Date    CHOL 167 11/10/2021    CHOL 169 11/05/2019    CHOL 176 06/03/2019     Lab Results   Component Value Date    HDL 41 11/10/2021    HDL 45 11/05/2019    HDL 41 06/03/2019     Lab Results   Component Value Date    LDLCALC 116.4 11/10/2021    LDLCALC 113.8 11/05/2019    LDLCALC 115.6 06/03/2019     Lab Results   Component Value Date    TRIG 48 11/10/2021    TRIG 51 11/05/2019    TRIG 97 06/03/2019     Lab Results   Component Value Date    CHOLHDL 24.6 11/10/2021    CHOLHDL 26.6 11/05/2019    CHOLHDL 23.3 06/03/2019     Lab Results   Component Value Date    TSH 2.009 11/10/2021       ASSESSMENT/PLAN     Soraya BriggsLaurenWei is a 62 y.o. female     Soraya was seen today for breast pain. Non-specific on exam. No clear source of infection or atypical growth. With reassuring mammo in August of this year and with non-specific findings on exam, will refer to breast clinic for eval rather than ordering diagnostic imaging at this time. Will defer additional imaging to breast clinic. Patient is aware of and amenable to plan. She will follow-up with PCP for annual exam week as previously scheduled.     Diagnoses and all orders for this visit:    Breast pain in female  -     Ambulatory referral/consult to Breast Surgery; Future    Other orders  -     diclofenac sodium (VOLTAREN) 1 % Gel; Apply 2 g topically 4  (four) times daily.           Patient was counseled on when and how to seek emergent care.       Liliana Snyder PA-C   Department of Internal Medicine - [facility]   9:24 AM

## 2022-11-30 ENCOUNTER — OFFICE VISIT (OUTPATIENT)
Dept: INTERNAL MEDICINE | Facility: CLINIC | Age: 62
End: 2022-11-30
Attending: FAMILY MEDICINE
Payer: COMMERCIAL

## 2022-11-30 VITALS
HEIGHT: 64 IN | WEIGHT: 158.06 LBS | BODY MASS INDEX: 26.98 KG/M2 | SYSTOLIC BLOOD PRESSURE: 122 MMHG | HEART RATE: 78 BPM | OXYGEN SATURATION: 98 % | DIASTOLIC BLOOD PRESSURE: 68 MMHG

## 2022-11-30 DIAGNOSIS — N64.4 BREAST PAIN IN FEMALE: ICD-10-CM

## 2022-11-30 DIAGNOSIS — Z00.00 ANNUAL PHYSICAL EXAM: Primary | ICD-10-CM

## 2022-11-30 PROCEDURE — 1159F PR MEDICATION LIST DOCUMENTED IN MEDICAL RECORD: ICD-10-PCS | Mod: CPTII,S$GLB,, | Performed by: FAMILY MEDICINE

## 2022-11-30 PROCEDURE — 3074F SYST BP LT 130 MM HG: CPT | Mod: CPTII,S$GLB,, | Performed by: FAMILY MEDICINE

## 2022-11-30 PROCEDURE — 3008F BODY MASS INDEX DOCD: CPT | Mod: CPTII,S$GLB,, | Performed by: FAMILY MEDICINE

## 2022-11-30 PROCEDURE — 3078F PR MOST RECENT DIASTOLIC BLOOD PRESSURE < 80 MM HG: ICD-10-PCS | Mod: CPTII,S$GLB,, | Performed by: FAMILY MEDICINE

## 2022-11-30 PROCEDURE — 3078F DIAST BP <80 MM HG: CPT | Mod: CPTII,S$GLB,, | Performed by: FAMILY MEDICINE

## 2022-11-30 PROCEDURE — 99999 PR PBB SHADOW E&M-EST. PATIENT-LVL V: ICD-10-PCS | Mod: PBBFAC,,, | Performed by: FAMILY MEDICINE

## 2022-11-30 PROCEDURE — 99396 PREV VISIT EST AGE 40-64: CPT | Mod: S$GLB,,, | Performed by: FAMILY MEDICINE

## 2022-11-30 PROCEDURE — 1160F RVW MEDS BY RX/DR IN RCRD: CPT | Mod: CPTII,S$GLB,, | Performed by: FAMILY MEDICINE

## 2022-11-30 PROCEDURE — 3074F PR MOST RECENT SYSTOLIC BLOOD PRESSURE < 130 MM HG: ICD-10-PCS | Mod: CPTII,S$GLB,, | Performed by: FAMILY MEDICINE

## 2022-11-30 PROCEDURE — 1159F MED LIST DOCD IN RCRD: CPT | Mod: CPTII,S$GLB,, | Performed by: FAMILY MEDICINE

## 2022-11-30 PROCEDURE — 3008F PR BODY MASS INDEX (BMI) DOCUMENTED: ICD-10-PCS | Mod: CPTII,S$GLB,, | Performed by: FAMILY MEDICINE

## 2022-11-30 PROCEDURE — 1160F PR REVIEW ALL MEDS BY PRESCRIBER/CLIN PHARMACIST DOCUMENTED: ICD-10-PCS | Mod: CPTII,S$GLB,, | Performed by: FAMILY MEDICINE

## 2022-11-30 PROCEDURE — 99999 PR PBB SHADOW E&M-EST. PATIENT-LVL V: CPT | Mod: PBBFAC,,, | Performed by: FAMILY MEDICINE

## 2022-11-30 PROCEDURE — 99396 PR PREVENTIVE VISIT,EST,40-64: ICD-10-PCS | Mod: S$GLB,,, | Performed by: FAMILY MEDICINE

## 2022-11-30 NOTE — PROGRESS NOTES
"CHIEF COMPLAINT:  Annual    HISTORY OF PRESENT ILLNESS: The patient is a generally healthy 62 year-old BF.        She had complete blood work done as part of the workup for her gallbladder disease    REVIEW OF SYSTEMS:  GENERAL: No fever, chills, fatigability or weight loss.  SKIN: No rashes, itching or changes in color or texture of skin.  HEAD: No headaches or recent head trauma.  EYES: Visual acuity fine. No photophobia, ocular pain or diplopia.  EARS: Denies ear pain, discharge or vertigo.  NOSE: No loss of smell, no epistaxis or postnasal drip.  MOUTH & THROAT: No hoarseness or change in voice. No excessive gum bleeding.  NODES: Denies swollen glands.  CHEST: Denies GRIFFITHS, cyanosis, wheezing, cough and sputum production.  CARDIOVASCULAR: Denies chest pain, PND, orthopnea or reduced exercise tolerance.  ABDOMEN:  No weight loss. Denies diarrhea, hematemesis or blood in stool.  URINARY: No flank pain, dysuria or hematuria.  PERIPHERAL VASCULAR: No claudication or cyanosis.  MUSCULOSKELETAL: No joint stiffness or swelling. Denies back pain.  NEUROLOGIC: No history of seizures, paralysis, alteration of gait or coordination.    SOCIAL HISTORY: The patient does not smoke.  The patient consumes alcohol socially.  The patient is single.  PCT at [facility]    PHYSICAL EXAMINATION:   Blood pressure 122/68, pulse 78, height 5' 4" (1.626 m), weight 71.7 kg (158 lb 1.1 oz), last menstrual period 07/21/2006, SpO2 98 %.    APPEARANCE: Well nourished, well developed, in no acute distress.    HEAD: Normocephalic, atraumatic.  EYES: PERRL. EOMI.  Conjunctivae without injection and  anicteric  NOSE: Mucosa pink. Airway clear.  MOUTH & THROAT: No tonsillar enlargement. No pharyngeal erythema or exudate. No stridor.  NECK: Supple.   NODES: No cervical, axillary or inguinal lymph node enlargement.  CHEST: Lungs clear to auscultation.  No retractions are noted.  No rales or rhonchi are present.  CARDIOVASCULAR: Normal S1, S2. No rubs, " murmurs or gallops.  ABDOMEN: Bowel sounds normal. Not distended. Soft.  Appropriate postop tenderness noted.  No ascites is noted.  MUSCULOSKELETAL:  There is no clubbing, cyanosis, or edema of the extremities x4.  There is full range of motion of the lumbar spine.  There is full range of motion of the extremities x4.  There is no deformity noted.    NEUROLOGIC:       Normal speech development.      Hearing normal.      Normal gait.      Motor and sensory exams grossly normal.  PSYCHIATRIC: Patient is alert and oriented x3.  Thought processes are all normal.  There is no homicidality.  There is no suicidality.  There is no evidence of psychosis.    LABORATORY/RADIOLOGY:   Chart reviewed.  We reviewed recent blood work today.    ASSESSMENT:   Annual  Status post lap choly  GERD, on PPI  Chronic allergic rhinitis    PLAN:  PPI  Return to clinic in one year.

## 2022-12-21 ENCOUNTER — HOSPITAL ENCOUNTER (EMERGENCY)
Facility: OTHER | Age: 62
Discharge: HOME | End: 2022-12-22
Attending: EMERGENCY MEDICINE
Payer: COMMERCIAL

## 2022-12-21 DIAGNOSIS — R51.9 ACUTE NONINTRACTABLE HEADACHE, UNSPECIFIED HEADACHE TYPE: ICD-10-CM

## 2022-12-21 DIAGNOSIS — R11.2 NAUSEA AND VOMITING, UNSPECIFIED VOMITING TYPE: Primary | ICD-10-CM

## 2022-12-21 DIAGNOSIS — R07.81 RIB PAIN ON RIGHT SIDE: ICD-10-CM

## 2022-12-21 LAB
CTP QC/QA: YES
CTP QC/QA: YES
POC MOLECULAR INFLUENZA A AGN: NEGATIVE
POC MOLECULAR INFLUENZA B AGN: NEGATIVE
SARS-COV-2 RDRP RESP QL NAA+PROBE: NEGATIVE

## 2022-12-21 PROCEDURE — 99285 EMERGENCY DEPT VISIT HI MDM: CPT | Mod: 25

## 2022-12-21 PROCEDURE — 87635 SARS-COV-2 COVID-19 AMP PRB: CPT | Performed by: EMERGENCY MEDICINE

## 2022-12-21 RX ORDER — ONDANSETRON 2 MG/ML
4 INJECTION INTRAMUSCULAR; INTRAVENOUS
Status: COMPLETED | OUTPATIENT
Start: 2022-12-21 | End: 2022-12-22

## 2022-12-21 RX ORDER — KETOROLAC TROMETHAMINE 30 MG/ML
15 INJECTION, SOLUTION INTRAMUSCULAR; INTRAVENOUS
Status: COMPLETED | OUTPATIENT
Start: 2022-12-21 | End: 2022-12-22

## 2022-12-21 NOTE — Clinical Note
"Soraya Crooks" ChesterLaurenEriberto was seen and treated in our emergency department on 12/21/2022.  She may return to work on 12/27/2022.       If you have any questions or concerns, please don't hesitate to call.      Keiry Clifton LPN    "

## 2022-12-22 VITALS
SYSTOLIC BLOOD PRESSURE: 134 MMHG | OXYGEN SATURATION: 100 % | RESPIRATION RATE: 20 BRPM | BODY MASS INDEX: 26.09 KG/M2 | DIASTOLIC BLOOD PRESSURE: 73 MMHG | WEIGHT: 152 LBS | TEMPERATURE: 98 F | HEART RATE: 97 BPM

## 2022-12-22 LAB
ALBUMIN SERPL BCP-MCNC: 4.3 G/DL (ref 3.5–5.2)
ALP SERPL-CCNC: 80 U/L (ref 55–135)
ALT SERPL W/O P-5'-P-CCNC: 28 U/L (ref 10–44)
ANION GAP SERPL CALC-SCNC: 11 MMOL/L (ref 8–16)
AST SERPL-CCNC: 33 U/L (ref 10–40)
BACTERIA #/AREA URNS HPF: ABNORMAL /HPF
BASOPHILS # BLD AUTO: 0.01 K/UL (ref 0–0.2)
BASOPHILS NFR BLD: 0.1 % (ref 0–1.9)
BILIRUB SERPL-MCNC: 1 MG/DL (ref 0.1–1)
BILIRUB UR QL STRIP: NEGATIVE
BUN SERPL-MCNC: 12 MG/DL (ref 8–23)
CALCIUM SERPL-MCNC: 9.1 MG/DL (ref 8.7–10.5)
CHLORIDE SERPL-SCNC: 104 MMOL/L (ref 95–110)
CLARITY UR: CLEAR
CO2 SERPL-SCNC: 25 MMOL/L (ref 23–29)
COLOR UR: YELLOW
CREAT SERPL-MCNC: 0.8 MG/DL (ref 0.5–1.4)
DIFFERENTIAL METHOD: ABNORMAL
EOSINOPHIL # BLD AUTO: 0.1 K/UL (ref 0–0.5)
EOSINOPHIL NFR BLD: 0.6 % (ref 0–8)
ERYTHROCYTE [DISTWIDTH] IN BLOOD BY AUTOMATED COUNT: 13.1 % (ref 11.5–14.5)
EST. GFR  (NO RACE VARIABLE): >60 ML/MIN/1.73 M^2
GLUCOSE SERPL-MCNC: 121 MG/DL (ref 70–110)
GLUCOSE UR QL STRIP: NEGATIVE
HCT VFR BLD AUTO: 45.3 % (ref 37–48.5)
HGB BLD-MCNC: 14.6 G/DL (ref 12–16)
HGB UR QL STRIP: NEGATIVE
HYALINE CASTS #/AREA URNS LPF: 0 /LPF
IMM GRANULOCYTES # BLD AUTO: 0.03 K/UL (ref 0–0.04)
IMM GRANULOCYTES NFR BLD AUTO: 0.4 % (ref 0–0.5)
KETONES UR QL STRIP: NEGATIVE
LEUKOCYTE ESTERASE UR QL STRIP: ABNORMAL
LIPASE SERPL-CCNC: 13 U/L (ref 4–60)
LYMPHOCYTES # BLD AUTO: 1 K/UL (ref 1–4.8)
LYMPHOCYTES NFR BLD: 12.7 % (ref 18–48)
MCH RBC QN AUTO: 28.7 PG (ref 27–31)
MCHC RBC AUTO-ENTMCNC: 32.2 G/DL (ref 32–36)
MCV RBC AUTO: 89 FL (ref 82–98)
MICROSCOPIC COMMENT: ABNORMAL
MONOCYTES # BLD AUTO: 0.4 K/UL (ref 0.3–1)
MONOCYTES NFR BLD: 5.5 % (ref 4–15)
NEUTROPHILS # BLD AUTO: 6.4 K/UL (ref 1.8–7.7)
NEUTROPHILS NFR BLD: 80.7 % (ref 38–73)
NITRITE UR QL STRIP: NEGATIVE
NRBC BLD-RTO: 0 /100 WBC
PH UR STRIP: 6 [PH] (ref 5–8)
PLATELET # BLD AUTO: 277 K/UL (ref 150–450)
PMV BLD AUTO: 10.1 FL (ref 9.2–12.9)
POTASSIUM SERPL-SCNC: 4.5 MMOL/L (ref 3.5–5.1)
PROT SERPL-MCNC: 7.8 G/DL (ref 6–8.4)
PROT UR QL STRIP: ABNORMAL
RBC # BLD AUTO: 5.09 M/UL (ref 4–5.4)
RBC #/AREA URNS HPF: 8 /HPF (ref 0–4)
SODIUM SERPL-SCNC: 140 MMOL/L (ref 136–145)
SP GR UR STRIP: >1.03 (ref 1–1.03)
SQUAMOUS #/AREA URNS HPF: 2 /HPF
URN SPEC COLLECT METH UR: ABNORMAL
UROBILINOGEN UR STRIP-ACNC: NEGATIVE EU/DL
WBC # BLD AUTO: 7.97 K/UL (ref 3.9–12.7)
WBC #/AREA URNS HPF: 1 /HPF (ref 0–5)

## 2022-12-22 PROCEDURE — 83690 ASSAY OF LIPASE: CPT | Performed by: EMERGENCY MEDICINE

## 2022-12-22 PROCEDURE — 96375 TX/PRO/DX INJ NEW DRUG ADDON: CPT

## 2022-12-22 PROCEDURE — 96374 THER/PROPH/DIAG INJ IV PUSH: CPT

## 2022-12-22 PROCEDURE — 96361 HYDRATE IV INFUSION ADD-ON: CPT

## 2022-12-22 PROCEDURE — 85025 COMPLETE CBC W/AUTO DIFF WBC: CPT | Performed by: EMERGENCY MEDICINE

## 2022-12-22 PROCEDURE — 63600175 PHARM REV CODE 636 W HCPCS: Performed by: EMERGENCY MEDICINE

## 2022-12-22 PROCEDURE — 80053 COMPREHEN METABOLIC PANEL: CPT | Performed by: EMERGENCY MEDICINE

## 2022-12-22 PROCEDURE — 81000 URINALYSIS NONAUTO W/SCOPE: CPT | Performed by: EMERGENCY MEDICINE

## 2022-12-22 PROCEDURE — 25000003 PHARM REV CODE 250: Performed by: EMERGENCY MEDICINE

## 2022-12-22 RX ORDER — BUTALBITAL, ACETAMINOPHEN AND CAFFEINE 50; 325; 40 MG/1; MG/1; MG/1
1 TABLET ORAL
Status: COMPLETED | OUTPATIENT
Start: 2022-12-22 | End: 2022-12-22

## 2022-12-22 RX ORDER — IBUPROFEN 600 MG/1
600 TABLET ORAL EVERY 6 HOURS PRN
Qty: 20 TABLET | Refills: 0 | OUTPATIENT
Start: 2022-12-22 | End: 2023-05-05

## 2022-12-22 RX ORDER — ONDANSETRON 4 MG/1
4 TABLET, ORALLY DISINTEGRATING ORAL EVERY 8 HOURS PRN
Qty: 12 TABLET | Refills: 0 | Status: SHIPPED | OUTPATIENT
Start: 2022-12-22 | End: 2024-01-22 | Stop reason: SDUPTHER

## 2022-12-22 RX ADMIN — KETOROLAC TROMETHAMINE 15 MG: 30 INJECTION, SOLUTION INTRAMUSCULAR; INTRAVENOUS at 12:12

## 2022-12-22 RX ADMIN — ONDANSETRON 4 MG: 2 INJECTION INTRAMUSCULAR; INTRAVENOUS at 12:12

## 2022-12-22 RX ADMIN — SODIUM CHLORIDE 1000 ML: 0.9 INJECTION, SOLUTION INTRAVENOUS at 12:12

## 2022-12-22 RX ADMIN — BUTALBITAL, ACETAMINOPHEN, AND CAFFEINE 1 TABLET: 50; 325; 40 TABLET ORAL at 02:12

## 2022-12-22 NOTE — ED PROVIDER NOTES
Encounter Date: 12/21/2022    SCRIBE #1 NOTE: I, Chester Shine, am scribing for, and in the presence of,  Chet Vargas MD.     History     Chief Complaint   Patient presents with    Abdominal Pain    Flank Pain    Shortness of Breath    Cough    Nasal Congestion    Headache     Pt c/o right flank pain, abd pain, headache, body aches, congestion, coughing, sob, sore throat and nausea that started today. Pt states that she's been exposed flu, covid and rsv at work.      Time seen by provider: 11:29 PM    This is a 62 y.o. female with PMHx of GERD and IBS and PSHx of a cholecystectomy who presents with complaint of right flank pain and abdominal pain that began at 11:00 AM today when she started to eat a sandwich. Pt states that she took a bentyl that made the pain subside for a small amount of time before returning, described as abdominal cramping. She is also complaining of headache, nausea, and neck pain.  Pt does report vomiting upon arrival to the ED. She also endorses a cough and rhinorrhea that began last week. She works as a nurse technician, so she believes some of the symptoms could attribute to contact with sick patients. However, she denies any diarrhea, fever, hematemesis, sore throat, or urinary problems.     The history is provided by the patient.   Review of patient's allergies indicates:  No Known Allergies  Past Medical History:   Diagnosis Date    Diabetes mellitus     Gastroenteritis     Glaucoma (increased eye pressure)     Prediabetes 6/3/2019    Tinnitus      Past Surgical History:   Procedure Laterality Date    COLONOSCOPY N/A 6/7/2016    Procedure: COLONOSCOPY;  Surgeon: Katerine Ramsey MD;  Location: [address]);  Service: Endoscopy;  Laterality: N/A;    LAPAROSCOPIC CHOLECYSTECTOMY N/A 1/8/2021    Procedure: CHOLECYSTECTOMY, LAPAROSCOPIC;  Surgeon: Johan Wilkerson Jr., MD;  Location: [facility];  Service: General;  Laterality: N/A;    OOPHORECTOMY  1999    Unilateral oophorectomy      Family History   Problem Relation Age of Onset    Breast cancer Cousin 67    Hypertension Mother     Glaucoma Mother     Cataracts Mother     Diabetes Sister     Glaucoma Sister     Diabetes Sister     Colon cancer Neg Hx     Ovarian cancer Neg Hx     Stroke Neg Hx      Social History     Tobacco Use    Smoking status: Former     Packs/day: 0.10     Years: 15.00     Pack years: 1.50     Types: Cigarettes     Quit date: 2005     Years since quittin.4    Smokeless tobacco: Never   Substance Use Topics    Alcohol use: No     Alcohol/week: 0.0 standard drinks    Drug use: No     Review of Systems   Constitutional:  Negative for fever.   HENT:  Positive for rhinorrhea. Negative for congestion and sore throat.    Eyes:  Negative for redness.   Respiratory:  Positive for cough. Negative for shortness of breath.    Cardiovascular:  Negative for chest pain.   Gastrointestinal:  Positive for abdominal pain, nausea and vomiting. Negative for diarrhea.   Genitourinary:  Negative for difficulty urinating, dysuria, frequency and urgency.   Skin:  Negative for rash.   Neurological:  Positive for headaches.   Psychiatric/Behavioral:  Negative for confusion.      Physical Exam     Initial Vitals [22 2137]   BP Pulse Resp Temp SpO2   (!) 135/59 (!) 111 20 98 °F (36.7 °C) 100 %      MAP       --         Physical Exam    Constitutional: She appears well-developed and well-nourished. She is not diaphoretic. No distress.   HENT:   Head: Normocephalic and atraumatic.   Mouth/Throat: Mucous membranes are dry.   Eyes: Conjunctivae are normal.   Neck: Neck supple.   Cardiovascular:  Normal rate, regular rhythm, S1 normal, S2 normal, normal heart sounds and intact distal pulses.           No murmur heard.  Pulmonary/Chest: Breath sounds normal. She has no wheezes. She has no rhonchi. She has no rales.   Abdominal: Abdomen is soft. There is no abdominal tenderness. There is no rebound and no guarding.   Musculoskeletal:          General: No edema.      Cervical back: Neck supple.      Comments: Patient locates pain to right lateral lower ribs but no focal tenderness, no CVA tenderness     Neurological: She is alert and oriented to person, place, and time.   Skin: Skin is warm and dry.   Psychiatric: She has a normal mood and affect.       ED Course   Procedures  Labs Reviewed   CBC W/ AUTO DIFFERENTIAL - Abnormal; Notable for the following components:       Result Value    Gran % 80.7 (*)     Lymph % 12.7 (*)     All other components within normal limits   COMPREHENSIVE METABOLIC PANEL - Abnormal; Notable for the following components:    Glucose 121 (*)     All other components within normal limits   URINALYSIS, REFLEX TO URINE CULTURE - Abnormal; Notable for the following components:    Specific Gravity, UA >1.030 (*)     Protein, UA 1+ (*)     Leukocytes, UA Trace (*)     All other components within normal limits    Narrative:     Specimen Source->Urine   URINALYSIS MICROSCOPIC - Abnormal; Notable for the following components:    RBC, UA 8 (*)     All other components within normal limits    Narrative:     Specimen Source->Urine   LIPASE   SARS-COV-2 RDRP GENE   POCT INFLUENZA A/B MOLECULAR          Imaging Results              CT Renal Stone Study ABD Pelvis WO (Final result)  Result time 12/22/22 02:06:54      Final result by Aaron Centeno MD (12/22/22 02:06:54)                   Impression:      1.  No CT evidence of acute intra-abdominal abnormality.    2.  Significant colonic diverticulosis without evidence to suggest acute diverticulitis.    3.  Cholecystectomy.    4.  Calcified uterine fibroid.    5.  Additional findings as above.      Electronically signed by: Aaron Centeno MD  Date:    12/22/2022  Time:    02:06               Narrative:    EXAMINATION:  CT RENAL STONE STUDY ABD PELVIS WO    CLINICAL HISTORY:  Flank pain, kidney stone suspected;    TECHNIQUE:  Low dose axial images, sagittal and coronal reformations  were obtained from the lung bases to the pubic symphysis.  Contrast was not administered.    COMPARISON:  None    FINDINGS:  There are mild bandlike opacities at the lung bases suggesting platelike atelectasis or scarring.  There is no significant pleural effusion.  The visualized portions of the heart appear normal.    The liver is unremarkable in appearance on this limited non-contrast examination.  The gallbladder is surgically absent.  There is no intra-or extrahepatic biliary ductal dilatation.    The stomach is mildly distended, presumably with recently ingested oral contents.  Stomach is otherwise unremarkable.  The spleen and adrenal glands appear within normal limits.  No abnormal peripancreatic inflammatory change or fat stranding appreciated.  There is mild focal prominence of the main pancreatic duct measuring up to 0.5 cm.    The kidneys are normal in size and location. There is no evidence of hydronephrosis or nephrolithiasis.  No definite obstructing ureteral calculus identified.  Urinary bladder appears within normal limits.  There is apparent calcified fibroid at the level of the uterine fundus.  There is no significant free fluid in the pelvis.    The abdominal aorta is normal in course and caliber with mild atherosclerotic calcification along its course.  There is no retroperitoneal hematoma.    The visualized loops of small and large bowel show no evidence of obstruction or inflammation.  There is extensive colonic diverticulosis without evidence to suggest acute diverticulitis.  There is no CT evidence of acute appendicitis.  There are scattered small nonspecific subcentimeter mesenteric lymph nodes.  There is no ascites, portal venous gas, or free intraperitoneal air.    When viewed with bone windows the osseous structures are unremarkable.  The extraperitoneal soft tissues are unremarkable.                                       X-Ray Chest AP Portable (Final result)  Result time 12/22/22  00:13:12      Final result by Montana Vanessa MD (12/22/22 00:13:12)                   Impression:      No evidence of acute chest disease since the prior exam.      Electronically signed by: Montana Vanessa  Date:    12/22/2022  Time:    00:13               Narrative:    EXAMINATION:  XR CHEST AP PORTABLE    CLINICAL HISTORY:  Pleurodynia    TECHNIQUE:  Single frontal view of the chest was performed.    COMPARISON:  06/04/2020    FINDINGS:  The hazy opacity in the left lung base obscuring the left costophrenic sulcus appears stable.  There is no new infiltrate or effusion.  The cardiomediastinal silhouette appears stable.  Bony structures appear intact.                                       Medications   butalbital-acetaminophen-caffeine -40 mg per tablet 1 tablet (has no administration in time range)   sodium chloride 0.9% bolus 1,000 mL 1,000 mL (0 mLs Intravenous Stopped 12/22/22 0113)   ketorolac injection 15 mg (15 mg Intravenous Given 12/22/22 0019)   ondansetron injection 4 mg (4 mg Intravenous Given 12/22/22 0019)     Medical Decision Making:   History:   Old Medical Records: I decided to obtain old medical records.  Initial Assessment:       62-year-old female with history of GERD, IBS presents for evaluation of abdominal cramping and right flank pain.  Patient was at normal baseline, after eating this afternoon started to feel nausea and abdominal cramping.  She subsequently developed headache and neck pain, and pain in her right flank worse with any movement or deep breaths.  She had recurrent abdominal cramping with any attempted p.o. intake despite taking Bentyl, and on arrival had recurrent nausea with 1 episode of nonbloody emesis.  No diarrhea but she had 2 loose stools earlier today.  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  She has chronic cough and congestion for the past week, no fevers or urinary symptoms.  On  exam patient locates pain to right lateral inferior ribs but no focal tenderness, and no focal intra-abdominal tenderness.  She states she has had similar abdominal cramping with IBS in the past, but usually does not have this severity or associated headache or right flank pain.  Patient could have viral syndrome or gastroenteritis causing GI symptoms or IBS flare, though right rib/flank pain also would be atypical of this.  Will evaluate for UTI or renal colic.  Although pain gets worse with deep breaths, no SOB or risk factors for DVT/PE, and she is normal O2 sat on room air.  Less likely pneumonia or pleural effusion accounting for this pain.      Initial workup with negative COVID/flu, no elevated WBC, normal CMP including LFTs/lipase.  Patient felt much improved after IV Toradol/Zofran, not tolerating liquids without difficulty with no subsequent abdominal cramping.  She does complained of mild persistent headache, treated Fioricet; still no meningeal signs or neuro deficits to suggest CVA/ICH, no indication for emergent head imaging.  Chest x-ray also unremarkable.  She does have UA with 8 RBCs but no sign of UTI.  Given location of pain and microscopic hematuria, CT renal checked that shows no acute findings, no renal colic or intra-abdominal infection.  Patient is stable for outpatient management with suspected viral syndrome causing IBS symptoms, with Zofran and NSAIDs p.r.n., return precautions for any worsening symptoms or other concerns.        Independently Interpreted Test(s):   I have ordered and independently interpreted X-rays - see prior notes.  Clinical Tests:   Lab Tests: Ordered and Reviewed  Radiological Study: Ordered and Reviewed        Scribe Attestation:   Scribe #1: I performed the above scribed service and the documentation accurately describes the services I performed. I attest to the accuracy of the note.             I, Dr. Chet Vargas, personally performed the services described  in this documentation. All medical record entries made by the scribe were at my direction and in my presence.  I have reviewed the chart and agree that the record reflects my personal performance and is accurate and complete. Chet Vargas MD.          Clinical Impression:   Final diagnoses:  [R07.81] Rib pain on right side  [R11.2] Nausea and vomiting, unspecified vomiting type (Primary)  [R51.9] Acute nonintractable headache, unspecified headache type        ED Disposition Condition    Discharge Stable          ED Prescriptions       Medication Sig Dispense Start Date End Date Auth. Provider    ondansetron (ZOFRAN-ODT) 4 MG TbDL Take 1 tablet (4 mg total) by mouth every 8 (eight) hours as needed (Nausea). 12 tablet 12/22/2022 -- Chet Vargas MD    ibuprofen (ADVIL,MOTRIN) 600 MG tablet Take 1 tablet (600 mg total) by mouth every 6 (six) hours as needed for Pain. 20 tablet 12/22/2022 -- Chet Vargas MD          Follow-up Information       Follow up With Specialties Details Why Contact Info    [facility] Emergency Dept Emergency Medicine Go to  If symptoms worsen [address] [facility] [id number] [phone]             Chet Vargas MD  12/22/22 [id number]

## 2022-12-22 NOTE — ED NOTES
Continue current medication.  Continue working with individual therapy.  Current stressors include worry about her body & appearance, and trying to get pregnant.   Pt given labeled specimen cup. Pt ambulated to restroom.

## 2022-12-23 ENCOUNTER — PATIENT MESSAGE (OUTPATIENT)
Dept: INTERNAL MEDICINE | Facility: CLINIC | Age: 62
End: 2022-12-23
Payer: COMMERCIAL

## 2022-12-28 ENCOUNTER — OFFICE VISIT (OUTPATIENT)
Dept: SURGERY | Facility: CLINIC | Age: 62
End: 2022-12-28
Payer: COMMERCIAL

## 2022-12-28 VITALS
HEART RATE: 73 BPM | WEIGHT: 156 LBS | BODY MASS INDEX: 26.63 KG/M2 | DIASTOLIC BLOOD PRESSURE: 67 MMHG | SYSTOLIC BLOOD PRESSURE: 152 MMHG | HEIGHT: 64 IN

## 2022-12-28 DIAGNOSIS — N64.4 BREAST PAIN IN FEMALE: ICD-10-CM

## 2022-12-28 PROCEDURE — 3077F SYST BP >= 140 MM HG: CPT | Mod: CPTII,S$GLB,, | Performed by: NURSE PRACTITIONER

## 2022-12-28 PROCEDURE — 99203 OFFICE O/P NEW LOW 30 MIN: CPT | Mod: S$GLB,,, | Performed by: NURSE PRACTITIONER

## 2022-12-28 PROCEDURE — 3078F DIAST BP <80 MM HG: CPT | Mod: CPTII,S$GLB,, | Performed by: NURSE PRACTITIONER

## 2022-12-28 PROCEDURE — 3008F BODY MASS INDEX DOCD: CPT | Mod: CPTII,S$GLB,, | Performed by: NURSE PRACTITIONER

## 2022-12-28 PROCEDURE — 1159F MED LIST DOCD IN RCRD: CPT | Mod: CPTII,S$GLB,, | Performed by: NURSE PRACTITIONER

## 2022-12-28 PROCEDURE — 99999 PR PBB SHADOW E&M-EST. PATIENT-LVL IV: ICD-10-PCS | Mod: PBBFAC,,, | Performed by: NURSE PRACTITIONER

## 2022-12-28 PROCEDURE — 99203 PR OFFICE/OUTPT VISIT, NEW, LEVL III, 30-44 MIN: ICD-10-PCS | Mod: S$GLB,,, | Performed by: NURSE PRACTITIONER

## 2022-12-28 PROCEDURE — 99999 PR PBB SHADOW E&M-EST. PATIENT-LVL IV: CPT | Mod: PBBFAC,,, | Performed by: NURSE PRACTITIONER

## 2022-12-28 PROCEDURE — 1159F PR MEDICATION LIST DOCUMENTED IN MEDICAL RECORD: ICD-10-PCS | Mod: CPTII,S$GLB,, | Performed by: NURSE PRACTITIONER

## 2022-12-28 PROCEDURE — 3078F PR MOST RECENT DIASTOLIC BLOOD PRESSURE < 80 MM HG: ICD-10-PCS | Mod: CPTII,S$GLB,, | Performed by: NURSE PRACTITIONER

## 2022-12-28 PROCEDURE — 3077F PR MOST RECENT SYSTOLIC BLOOD PRESSURE >= 140 MM HG: ICD-10-PCS | Mod: CPTII,S$GLB,, | Performed by: NURSE PRACTITIONER

## 2022-12-28 PROCEDURE — 3008F PR BODY MASS INDEX (BMI) DOCUMENTED: ICD-10-PCS | Mod: CPTII,S$GLB,, | Performed by: NURSE PRACTITIONER

## 2022-12-28 NOTE — PROGRESS NOTES
"[facility]  Department of Surgery      REFERRING PROVIDER: Rashard Ojeda MD  [address] [facility] [address]  [place] [address]    Chief Complaint: Breast Pain (NP Eval joe breast pain )      Subjective:      Patient ID: Soraya Elmore is a 62 y.o. female who presents with bilateral breast pain. Patient reports pain comes and goes. Reports these episodes are ocassional. She was seen by her PCP who did an exam with tenderness. Reports this is new. PCP prescribed her diclofenac gel as needed for bilateral breast pain. Also reports tenderness and discomfort on the lateral side of chest and back. Does state she has pain with her current [employer] bra. Reports the pain improves when taking the bra off. However, does report she feels like she "needs support" when not wearing a bra. Does report improvement with the Voltaren gel. Does report recent weight gain (~10 lbs) manly in the breast.     Patient does routinely do self breast exams.  Patient has not noted a change on breast exam.  Patient denies nipple discharge. Patient denies to previous breast biopsy. Patient denies a personal history of breast cancer.    GYN History:  Age of menarche was 12.   Age of menopause was 44.    Patient is .   Age of first live birth was 26.   Patient did not use hormone therapy   Patient did not breast feed.    Family History:   Maternal Cousin - Breast Cancer     Past Medical History:   Diagnosis Date    Diabetes mellitus     Gastroenteritis     Glaucoma (increased eye pressure)     Prediabetes 6/3/2019    Tinnitus      Past Surgical History:   Procedure Laterality Date    COLONOSCOPY N/A 2016    Procedure: COLONOSCOPY;  Surgeon: Katerine Ramsey MD;  Location: [address];  Service: Endoscopy;  Laterality: N/A;    LAPAROSCOPIC CHOLECYSTECTOMY N/A 2021    Procedure: CHOLECYSTECTOMY, LAPAROSCOPIC;  Surgeon: Johan Wilkerson Jr., MD;  Location: [facility];  Service: General;  Laterality: N/A;    " OOPHORECTOMY  1999    Unilateral oophorectomy     Current Outpatient Medications on File Prior to Visit   Medication Sig Dispense Refill    azelastine (ASTELIN) 137 mcg (0.1 %) nasal spray 1 spray (137 mcg total) by Nasal route 2 (two) times daily. 30 mL 3    Bifidobacterium infantis (ALIGN) 4 mg Cap Take 1 capsule (4 mg total) by mouth once daily. 90 capsule 3    cholecalciferol, vitamin D3, 50,000 unit capsule Take 1 capsule (50,000 Units total) by mouth once a week. 12 capsule 3    ciclopirox (PENLAC) 8 % Soln Apply topically nightly. 6.6 mL 11    diclofenac sodium (VOLTAREN) 1 % Gel Apply 2 g topically 4 (four) times daily. 100 g 0    dicyclomine (BENTYL) 20 mg tablet TAKE 1 TABLET(20 MG) BY MOUTH TWICE DAILY 60 tablet 0    fluocinonide (LIDEX) 0.05 % external solution Apply topically daily as needed (scalp itching). 60 mL 6    hydrocortisone 2.5 % cream Apply topically 2 (two) times daily as needed (flaking and itching rash). 30 g 6    ibuprofen (ADVIL,MOTRIN) 600 MG tablet Take 1 tablet (600 mg total) by mouth every 6 (six) hours as needed for Pain. 20 tablet 0    ketoconazole (NIZORAL) 2 % cream Apply topically 2 (two) times daily as needed (itching and scaling). 30 g 6    ketoconazole (NIZORAL) 2 % shampoo Apply topically twice a week. Leave on for 5 to 10 minutes then rinse 120 mL 6    latanoprost 0.005 % ophthalmic solution Place 1 drop into both eyes every evening. 2.5 mL 6    lidocaine HCL 2% (XYLOCAINE) 2 % jelly Apply topically as needed. Apply topically once nightly to affected part of foot/feet. 30 mL 2    ondansetron (ZOFRAN-ODT) 4 MG TbDL Take 1 tablet (4 mg total) by mouth every 8 (eight) hours as needed (Nausea). 12 tablet 0    pantoprazole (PROTONIX) 20 MG tablet Take 2 tablets (40 mg total) by mouth once daily. 60 tablet 0    (Magic mouthwash) 1:1:1 diphenhydramine(Benadryl) 12.5mg/5ml liq, aluminum & magnesium hydroxide-simethicone (Maalox), LIDOcaine viscous 2% Swish and spit 5 mLs every 6  "(six) hours as needed (mouth/throat pain). (Patient not taking: Reported on 2022) 90 mL 0    fluticasone propionate (FLONASE) 50 mcg/actuation nasal spray 1 spray (50 mcg total) by Each Nostril route 2 (two) times daily as needed. (Patient not taking: Reported on 2022) 15 g 0    phenobarb-hyoscy-atropine-scop (DONNATAL) 16.2-0.1037 -0.0194 mg/5 mL Elix Take 5 mLs by mouth.      sucralfate (CARAFATE) 100 mg/mL suspension Take 10 mLs (1 g total) by mouth 4 (four) times daily before meals and nightly. (Patient not taking: Reported on 2022) 414 mL 0     No current facility-administered medications on file prior to visit.     Social History     Socioeconomic History    Marital status:    Occupational History    Occupation: patient care tech    Tobacco Use    Smoking status: Former     Packs/day: 0.10     Years: 15.00     Pack years: 1.50     Types: Cigarettes     Quit date: 2005     Years since quittin.4    Smokeless tobacco: Never   Substance and Sexual Activity    Alcohol use: No     Alcohol/week: 0.0 standard drinks    Drug use: No    Sexual activity: Not Currently     Partners: Male     Birth control/protection: Post-menopausal     Family History   Problem Relation Age of Onset    Breast cancer Cousin 67    Hypertension Mother     Glaucoma Mother     Cataracts Mother     Diabetes Sister     Glaucoma Sister     Diabetes Sister     Colon cancer Neg Hx     Ovarian cancer Neg Hx     Stroke Neg Hx         Review of Systems   Constitutional:  Negative for activity change, chills, fatigue and fever.   Respiratory:  Negative for cough, shortness of breath and wheezing.    Cardiovascular:  Negative for chest pain.   Musculoskeletal:  Negative for back pain.   Skin:  Negative for color change, pallor, rash and wound.   Objective:   BP (!) 152/67 (BP Location: Left arm, Patient Position: Sitting, BP Method: Large (Automatic))   Pulse 73   Ht 5' 4" (1.626 m)   Wt 70.8 kg (156 lb)   LMP " 07/21/2006   BMI 26.78 kg/m²     Physical Exam   Constitutional: She is oriented to person, place, and time.   Eyes: Right eye exhibits no discharge. Left eye exhibits no discharge.   Pulmonary/Chest: Effort normal. No respiratory distress. She has no wheezes. Right breast exhibits tenderness. Right breast exhibits no inverted nipple, no mass, no nipple discharge and no skin change. Left breast exhibits tenderness. Left breast exhibits no inverted nipple, no mass, no nipple discharge and no skin change. Breasts are symmetrical.       Abdominal: Normal appearance.   Musculoskeletal: Normal range of motion.   Lymphadenopathy:     She has no cervical adenopathy.   Neurological: She is alert and oriented to person, place, and time.   Skin: Skin is warm and dry. No erythema. No pallor.       Radiology review: Images personally reviewed by me in the clinic.   9/14/2022 Mammo Digital Screening Bilat w/ [facility]     History:  Patient is 61 y.o. and is seen for a screening mammogram.        Films Compared:  Compared to: 07/14/2021 Mammo Digital Screening Bilat w/ [facility], 06/16/2020 Mammo Digital Screening Bilat w/ [facility], and 06/11/2019 Mammo Digital Screening Bilat w/ [facility]      Findings:   This procedure was performed using tomosynthesis.   Computer-aided detection was utilized in the interpretation of this examination.     The breasts have scattered areas of fibroglandular density. There is no evidence of suspicious masses, microcalcifications or architectural distortion.     Impression:   No mammographic evidence of malignancy.     BI-RADS Category 1: Negative  Assessment:       1. Breast pain in female          Plan:   We discussed there was nothing concerning on exam today. Bilateral, diffuse breast tenderness on exam. No additional imaging recommended at this time.     Discussed OTC therapies such as acetaminophen or nonsteroidal anti-inflammatory drugs (NSAIDs). They can both be used to relieve breast pain. Topical NSAIDS  such as OTC Diclofenac (Volteran) gel can be used. Other recommendations include use of a supportive bra. Wearing a soft, supportive bra at night prevents the breasts from pulling down on the chest wall. Some women obtain relief from application of warm compresses or ice packs. Discussed bra fitting as well.     Discussed lifestyle recommendations such as decrease or elimination of caffeine. Also low-fat diet, high complex carbohydrate diet has been shown to be effective.      Return to clinic in 3 months or as needed for worsening pain not managed with therapies discussed above.     The patient is in agreement with the plan. Questions were encouraged and answered to patient's satisfaction. Soraya will call our office with any questions or concerns.

## 2023-01-04 ENCOUNTER — TELEPHONE (OUTPATIENT)
Dept: GASTROENTEROLOGY | Facility: CLINIC | Age: 63
End: 2023-01-04
Payer: COMMERCIAL

## 2023-01-04 ENCOUNTER — HOSPITAL ENCOUNTER (EMERGENCY)
Facility: OTHER | Age: 63
Discharge: LEFT BEFORE DISCHARGE | End: 2023-01-04
Payer: COMMERCIAL

## 2023-01-04 VITALS
RESPIRATION RATE: 18 BRPM | BODY MASS INDEX: 25.95 KG/M2 | HEIGHT: 64 IN | TEMPERATURE: 99 F | WEIGHT: 152 LBS | OXYGEN SATURATION: 98 % | SYSTOLIC BLOOD PRESSURE: 148 MMHG | DIASTOLIC BLOOD PRESSURE: 66 MMHG | HEART RATE: 90 BPM

## 2023-01-04 LAB
BACTERIA #/AREA URNS HPF: NORMAL /HPF
BILIRUB UR QL STRIP: NEGATIVE
CLARITY UR: CLEAR
COLOR UR: YELLOW
CTP QC/QA: YES
GLUCOSE UR QL STRIP: NEGATIVE
GROUP A STREP, MOLECULAR: NEGATIVE
HGB UR QL STRIP: NEGATIVE
HYALINE CASTS #/AREA URNS LPF: 0 /LPF
KETONES UR QL STRIP: NEGATIVE
LEUKOCYTE ESTERASE UR QL STRIP: NEGATIVE
MICROSCOPIC COMMENT: NORMAL
NITRITE UR QL STRIP: NEGATIVE
PH UR STRIP: 7 [PH] (ref 5–8)
POC MOLECULAR INFLUENZA A AGN: NEGATIVE
POC MOLECULAR INFLUENZA B AGN: NEGATIVE
PROT UR QL STRIP: ABNORMAL
RBC #/AREA URNS HPF: 2 /HPF (ref 0–4)
SARS-COV-2 RDRP RESP QL NAA+PROBE: NEGATIVE
SARS-COV-2 RDRP RESP QL NAA+PROBE: NEGATIVE
SP GR UR STRIP: 1.03 (ref 1–1.03)
SQUAMOUS #/AREA URNS HPF: 1 /HPF
URN SPEC COLLECT METH UR: ABNORMAL
UROBILINOGEN UR STRIP-ACNC: NEGATIVE EU/DL
WBC #/AREA URNS HPF: 1 /HPF (ref 0–5)

## 2023-01-04 PROCEDURE — 87651 STREP A DNA AMP PROBE: CPT | Performed by: PHYSICIAN ASSISTANT

## 2023-01-04 PROCEDURE — 81000 URINALYSIS NONAUTO W/SCOPE: CPT | Performed by: PHYSICIAN ASSISTANT

## 2023-01-04 PROCEDURE — 99900041 HC LEFT WITHOUT BEING SEEN- EMERGENCY

## 2023-01-04 PROCEDURE — 87635 SARS-COV-2 COVID-19 AMP PRB: CPT | Performed by: PHYSICIAN ASSISTANT

## 2023-01-04 NOTE — TELEPHONE ENCOUNTER
----- Message from Jinny Hewitt MA sent at 12/23/2022  4:18 PM CST -----  Contact: pt @ [phone]    ----- Message -----  From: Judith Farmer  Sent: 12/23/2022   4:14 PM CST  To: [facility] WHITING Staff    Soraya Elmore calling regarding Appointment Access  (message) for #pt is calling to get np appt for bad stomach pains, asking for call back

## 2023-01-04 NOTE — FIRST PROVIDER EVALUATION
Emergency Department TeleTriage Encounter Note      CHIEF COMPLAINT    Chief Complaint   Patient presents with    URI     Pt presents to the ER with complaints of a headache that started Sunday morning followed by a sore throat and cough that started Sunday night. Pt also reporting abdominal cramping that started Saturday; states she was seen in this ER on 12/22 for similar abdominal pain.         VITAL SIGNS   Initial Vitals [01/04/23 1117]   BP Pulse Resp Temp SpO2   (!) 148/66 90 18 98.6 °F (37 °C) 98 %      MAP       --            ALLERGIES    Review of patient's allergies indicates:  No Known Allergies    PROVIDER TRIAGE NOTE  Patient presents with 4 day history of lower abdominal pain, headache, sore throat, productive cough. She was seen in the ED 12/22 with diarrhea, but that had resolved.       ORDERS  Labs Reviewed   HIV 1 / 2 ANTIBODY   HEPATITIS C ANTIBODY   POCT INFLUENZA A/B MOLECULAR   SARS-COV-2 RDRP GENE       ED Orders (720h ago, onward)      Start Ordered     Status Ordering Provider    01/04/23 1121 01/04/23 1121  POCT Influenza A/B Molecular  Once         Final result NICOLE SINGH    01/04/23 1121 01/04/23 1121  POCT COVID-19 Rapid Screening  Once         Final result NICOLE SINGH    01/04/23 1118 01/04/23 1118  HIV 1/2 Ag/Ab (4th Gen)  STAT         Ordered NICOLE SINGH    01/04/23 1118 01/04/23 1118  Hepatitis C Antibody  STAT         Ordered NICOLE SINGH              Virtual Visit Note: The provider triage portion of this emergency department evaluation and documentation was performed via [employer], a HIPAA-compliant telemedicine application, in concert with a tele-presenter in the room. A face to face patient evaluation with one of my colleagues will occur once the patient is placed in an emergency department room.      DISCLAIMER: This note was prepared with shopkick voice recognition transcription software. Garbled syntax, mangled pronouns, and other bizarre  constructions may be attributed to that software system.

## 2023-05-04 ENCOUNTER — HOSPITAL ENCOUNTER (EMERGENCY)
Facility: OTHER | Age: 63
Discharge: HOME | End: 2023-05-05
Attending: EMERGENCY MEDICINE
Payer: COMMERCIAL

## 2023-05-04 DIAGNOSIS — R07.9 CHEST PAIN: ICD-10-CM

## 2023-05-04 DIAGNOSIS — R07.89 CHEST WALL PAIN: Primary | ICD-10-CM

## 2023-05-04 PROCEDURE — 83880 ASSAY OF NATRIURETIC PEPTIDE: CPT | Performed by: EMERGENCY MEDICINE

## 2023-05-04 PROCEDURE — 99285 EMERGENCY DEPT VISIT HI MDM: CPT | Mod: 25

## 2023-05-04 PROCEDURE — 85025 COMPLETE CBC W/AUTO DIFF WBC: CPT | Performed by: EMERGENCY MEDICINE

## 2023-05-04 PROCEDURE — 93005 ELECTROCARDIOGRAM TRACING: CPT

## 2023-05-04 PROCEDURE — 80053 COMPREHEN METABOLIC PANEL: CPT | Performed by: EMERGENCY MEDICINE

## 2023-05-04 PROCEDURE — 93010 ELECTROCARDIOGRAM REPORT: CPT | Mod: ,,, | Performed by: INTERNAL MEDICINE

## 2023-05-04 PROCEDURE — 93010 EKG 12-LEAD: ICD-10-PCS | Mod: ,,, | Performed by: INTERNAL MEDICINE

## 2023-05-04 PROCEDURE — 84484 ASSAY OF TROPONIN QUANT: CPT | Performed by: EMERGENCY MEDICINE

## 2023-05-04 RX ORDER — KETOROLAC TROMETHAMINE 30 MG/ML
15 INJECTION, SOLUTION INTRAMUSCULAR; INTRAVENOUS
Status: COMPLETED | OUTPATIENT
Start: 2023-05-05 | End: 2023-05-05

## 2023-05-04 NOTE — Clinical Note
"Soraya Crooks" ChesterLaurenEriberto was seen and treated in our emergency department on 5/4/2023.  She may return to work on 05/06/2023.       If you have any questions or concerns, please don't hesitate to call.      Raegan Lindsay MD    "

## 2023-05-04 NOTE — Clinical Note
"Soraya Mcguire (Louanda)Wei was seen and treated in our emergency department on 5/4/2023.  She may return to work on 05/06/2023.       If you have any questions or concerns, please don't hesitate to call.      Raegan Lindsay RN    "

## 2023-05-05 VITALS
BODY MASS INDEX: 25.61 KG/M2 | HEIGHT: 64 IN | HEART RATE: 67 BPM | RESPIRATION RATE: 14 BRPM | TEMPERATURE: 98 F | DIASTOLIC BLOOD PRESSURE: 63 MMHG | WEIGHT: 150 LBS | OXYGEN SATURATION: 100 % | SYSTOLIC BLOOD PRESSURE: 144 MMHG

## 2023-05-05 LAB
ALBUMIN SERPL BCP-MCNC: 3.5 G/DL (ref 3.5–5.2)
ALP SERPL-CCNC: 71 U/L (ref 55–135)
ALT SERPL W/O P-5'-P-CCNC: 12 U/L (ref 10–44)
ANION GAP SERPL CALC-SCNC: 9 MMOL/L (ref 8–16)
AST SERPL-CCNC: 15 U/L (ref 10–40)
BASOPHILS # BLD AUTO: 0.05 K/UL (ref 0–0.2)
BASOPHILS NFR BLD: 0.7 % (ref 0–1.9)
BILIRUB SERPL-MCNC: 0.4 MG/DL (ref 0.1–1)
BNP SERPL-MCNC: <10 PG/ML (ref 0–99)
BUN SERPL-MCNC: 15 MG/DL (ref 8–23)
CALCIUM SERPL-MCNC: 9 MG/DL (ref 8.7–10.5)
CHLORIDE SERPL-SCNC: 104 MMOL/L (ref 95–110)
CO2 SERPL-SCNC: 27 MMOL/L (ref 23–29)
CREAT SERPL-MCNC: 0.8 MG/DL (ref 0.5–1.4)
DIFFERENTIAL METHOD: ABNORMAL
EOSINOPHIL # BLD AUTO: 0.4 K/UL (ref 0–0.5)
EOSINOPHIL NFR BLD: 5.1 % (ref 0–8)
ERYTHROCYTE [DISTWIDTH] IN BLOOD BY AUTOMATED COUNT: 13.4 % (ref 11.5–14.5)
EST. GFR  (NO RACE VARIABLE): >60 ML/MIN/1.73 M^2
GLUCOSE SERPL-MCNC: 131 MG/DL (ref 70–110)
HCT VFR BLD AUTO: 37.1 % (ref 37–48.5)
HGB BLD-MCNC: 11.9 G/DL (ref 12–16)
IMM GRANULOCYTES # BLD AUTO: 0.01 K/UL (ref 0–0.04)
IMM GRANULOCYTES NFR BLD AUTO: 0.1 % (ref 0–0.5)
LYMPHOCYTES # BLD AUTO: 3 K/UL (ref 1–4.8)
LYMPHOCYTES NFR BLD: 43.3 % (ref 18–48)
MCH RBC QN AUTO: 28.7 PG (ref 27–31)
MCHC RBC AUTO-ENTMCNC: 32.1 G/DL (ref 32–36)
MCV RBC AUTO: 90 FL (ref 82–98)
MONOCYTES # BLD AUTO: 0.8 K/UL (ref 0.3–1)
MONOCYTES NFR BLD: 11.2 % (ref 4–15)
NEUTROPHILS # BLD AUTO: 2.7 K/UL (ref 1.8–7.7)
NEUTROPHILS NFR BLD: 39.6 % (ref 38–73)
NRBC BLD-RTO: 0 /100 WBC
PLATELET # BLD AUTO: 226 K/UL (ref 150–450)
PMV BLD AUTO: 10.1 FL (ref 9.2–12.9)
POTASSIUM SERPL-SCNC: 3.5 MMOL/L (ref 3.5–5.1)
PROT SERPL-MCNC: 6.4 G/DL (ref 6–8.4)
RBC # BLD AUTO: 4.14 M/UL (ref 4–5.4)
SODIUM SERPL-SCNC: 140 MMOL/L (ref 136–145)
TROPONIN I SERPL DL<=0.01 NG/ML-MCNC: <0.006 NG/ML (ref 0–0.03)
WBC # BLD AUTO: 6.81 K/UL (ref 3.9–12.7)

## 2023-05-05 PROCEDURE — 96374 THER/PROPH/DIAG INJ IV PUSH: CPT

## 2023-05-05 PROCEDURE — 63600175 PHARM REV CODE 636 W HCPCS: Performed by: EMERGENCY MEDICINE

## 2023-05-05 RX ORDER — DICYCLOMINE HYDROCHLORIDE 20 MG/1
20 TABLET ORAL 2 TIMES DAILY PRN
Qty: 60 TABLET | Refills: 0 | Status: SHIPPED | OUTPATIENT
Start: 2023-05-05 | End: 2023-06-08 | Stop reason: SDUPTHER

## 2023-05-05 RX ORDER — NAPROXEN 500 MG/1
500 TABLET ORAL 2 TIMES DAILY PRN
Qty: 30 TABLET | Refills: 0 | Status: SHIPPED | OUTPATIENT
Start: 2023-05-05

## 2023-05-05 RX ADMIN — KETOROLAC TROMETHAMINE 15 MG: 30 INJECTION, SOLUTION INTRAMUSCULAR; INTRAVENOUS at 12:05

## 2023-05-05 NOTE — ED NOTES
"Soraya Elmore, an 62 y.o. female presents to the ED reporting cp since 130 pm yesterday with hx of IBS and acid reflux. Pt reports taking her usual antiacids and protonix with no relief. Pt in NAD. Axo4       Chief Complaint   Patient presents with    Chest Pain     Patient to ED with c/o intermittent mid-sternal chest discomfort that radiates to left side under breast since 3pm. Patient reports pain on inspiration with non-productive cough x 3-4 days and frequent " belching" . Patient took Antacid with no relief.     Review of patient's allergies indicates:  No Known Allergies  Past Medical History:   Diagnosis Date    Diabetes mellitus     Gastroenteritis     Glaucoma (increased eye pressure)     Prediabetes 6/3/2019    Tinnitus        "

## 2023-05-05 NOTE — ED PROVIDER NOTES
"Encounter Date: 5/4/2023    SCRIBE #1 NOTE: I, Garykely Fernando, am scribing for, and in the presence of,  Chet Vargas MD.     History     Chief Complaint   Patient presents with    Chest Pain     Patient to ED with c/o intermittent mid-sternal chest discomfort that radiates to left side under breast since 3pm. Patient reports pain on inspiration with non-productive cough x 3-4 days and frequent " belching" . Patient took Antacid with no relief.     Time seen by provider: 11:52 PM    Soraya Elmore is a 62 y.o. female, with a PMHx of GERD and IBS, who presents to the ED with chest pain for the last 10 hours. Prior to onset, the patient reports eating later than usual and waking up from a nap when she felt a shooting pain in her chest. She notes the pain is exacerbated on inhalation and bending over and is accompanied by stomach cramps and nausea. Patient reports previous episodes of similar pain were alleviated with medication, however she denies any current relief after taking gas pills, antacids, and Pepto Bismal. Patient also reports coughing but denies any fevers, lung, or heart problems. This is the extent of the patient's complaints today in the Emergency Department.      The history is provided by the patient.   Review of patient's allergies indicates:  No Known Allergies  Past Medical History:   Diagnosis Date    Diabetes mellitus     Gastroenteritis     Glaucoma (increased eye pressure)     Prediabetes 6/3/2019    Tinnitus      Past Surgical History:   Procedure Laterality Date    COLONOSCOPY N/A 6/7/2016    Procedure: COLONOSCOPY;  Surgeon: Katerine Ramsey MD;  Location: [address];  Service: Endoscopy;  Laterality: N/A;    LAPAROSCOPIC CHOLECYSTECTOMY N/A 1/8/2021    Procedure: CHOLECYSTECTOMY, LAPAROSCOPIC;  Surgeon: Johan Wilkerson Jr., MD;  Location: [facility];  Service: General;  Laterality: N/A;    OOPHORECTOMY  1999    Unilateral oophorectomy     Family History   Problem Relation " Age of Onset    Breast cancer Cousin 67    Hypertension Mother     Glaucoma Mother     Cataracts Mother     Diabetes Sister     Glaucoma Sister     Diabetes Sister     Colon cancer Neg Hx     Ovarian cancer Neg Hx     Stroke Neg Hx      Social History     Tobacco Use    Smoking status: Former     Packs/day: 0.10     Years: 15.00     Pack years: 1.50     Types: Cigarettes     Quit date: 2005     Years since quittin.8    Smokeless tobacco: Never   Substance Use Topics    Alcohol use: No     Alcohol/week: 0.0 standard drinks    Drug use: No     Review of Systems   Constitutional:  Negative for fever.   HENT:  Negative for congestion.    Eyes:  Negative for redness.   Cardiovascular:  Positive for chest pain.   Gastrointestinal:  Positive for abdominal pain and nausea.   Genitourinary:  Negative for dysuria.   Skin:  Negative for rash.   Neurological:  Negative for headaches.   Psychiatric/Behavioral:  Negative for confusion.      Physical Exam     Initial Vitals [23 2306]   BP Pulse Resp Temp SpO2   (!) 154/65 88 16 98 °F (36.7 °C) 99 %      MAP       --         Physical Exam    Nursing note and vitals reviewed.  Constitutional: She appears well-developed and well-nourished. She is not diaphoretic. No distress.   HENT:   Head: Normocephalic and atraumatic.   Eyes: Conjunctivae are normal. No scleral icterus.   Neck: Neck supple.   Cardiovascular:  Normal rate, regular rhythm, normal heart sounds and intact distal pulses.           No murmur heard.  Pulmonary/Chest: Breath sounds normal. No respiratory distress. She has no wheezes. She has no rhonchi. She has no rales. She exhibits tenderness.   Chest wall tenderness at the left sternal border.   Abdominal: Abdomen is soft. There is no abdominal tenderness. There is no rebound and no guarding.   Musculoskeletal:         General: No edema.      Cervical back: Neck supple.     Neurological: She is alert and oriented to person, place, and time.   Skin: Skin  is warm and dry.   Psychiatric: She has a normal mood and affect.       ED Course   Procedures  Labs Reviewed   CBC W/ AUTO DIFFERENTIAL - Abnormal; Notable for the following components:       Result Value    Hemoglobin 11.9 (*)     All other components within normal limits   COMPREHENSIVE METABOLIC PANEL - Abnormal; Notable for the following components:    Glucose 131 (*)     All other components within normal limits   B-TYPE NATRIURETIC PEPTIDE   TROPONIN I     EKG Readings: (Independently Interpreted)   Rhythm: Normal Sinus Rhythm. Heart Rate: 71.   No acute ischemia, no change from previous.     Imaging Results              X-Ray Chest PA And Lateral (Final result)  Result time 05/05/23 00:11:17      Final result by Marychuy Gonzalez MD (05/05/23 00:11:17)                   Impression:      No acute cardiopulmonary process identified.      Electronically signed by: Marychuy Gonzalez MD  Date:    05/05/2023  Time:    00:11               Narrative:    EXAMINATION:  XR CHEST PA AND LATERAL    CLINICAL HISTORY:  Chest Pain;    TECHNIQUE:  PA and lateral views of the chest were performed.    COMPARISON:  December 2022.    FINDINGS:  Cardiac silhouette is normal in size.  Lungs are symmetrically expanded.  No evidence of focal consolidative process, pneumothorax, or significant pleural effusion.  No acute osseous abnormality identified.                                       Medications   ketorolac injection 15 mg (15 mg Intravenous Given 5/5/23 0024)     Medical Decision Making:   History:   Old Medical Records: I decided to obtain old medical records.  Initial Assessment:       62-year-old female with history of IBS, GERD presents for evaluation of persistent left-sided chest pain.  Initial onset this afternoon, she thought it may be related to her IBS and acid so took per Protonix and multiple other OTC antacids with no improvement.  She states pain is worse with deep breaths and certain chest wall movements, denies  any no injury or clear precipitant for any GERD or IBS flare.  She is only SOB when she has the pain, and reports a mild cough but no fevers or other new complaints.  On exam patient with left sternal border chest wall tenderness, normal O2 sat with no wheezing or other lung abnormalities, no abdominal tenderness, edema, or other concerning exam findings.  Initial EKG no sign of acute ischemia per my independent interpretation.  By description, symptoms could be related to chest wall strain or costochondritis, versus GERD and esophagitis.  She no clinical sign of DVT/PE, normal O2 sat on room air, no tachycardia or other features to suggest this.  Will do ACS workup and treat with IV Toradol, reassess.      Initial workup reassuring with no acute findings on labs, including negative troponin.  Chest x-ray per my independent so unremarkable with no intrathoracic abnormalities.  On reassessment after IV Toradol patient feels much improved, now only with mild chest wall soreness.  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  No evidence for any significant cardiopulmonary etiology of her chest pain, stable for discharge with NSAIDs p.r.n., and supportive care and rest from work for few days, and return precautions for any worsening pain or other concerns.      Independently Interpreted Test(s):   I have ordered and independently interpreted X-rays - see prior notes.  I have ordered and independently interpreted EKG Reading(s) - see prior notes  Clinical Tests:   Lab Tests: Ordered and Reviewed  Radiological Study: Ordered and Reviewed  Medical Tests: Reviewed and Ordered        Scribe Attestation:   Scribe #1: I performed the above scribed service and the documentation accurately describes the services I performed. I attest to the accuracy of the note.  I, Dr. Chet Vargas, personally performed  the services described in this documentation. All medical record entries made by the scribe were at my direction and in my presence.  I have reviewed the chart and agree that the record reflects my personal performance and is accurate and complete. Chet Vargas MD.                      Clinical Impression:   Final diagnoses:  [R07.9] Chest pain  [R07.89] Chest wall pain (Primary)        ED Disposition Condition    Discharge Stable          ED Prescriptions       Medication Sig Dispense Start Date End Date Auth. Provider    naproxen (NAPROSYN) 500 MG tablet Take 1 tablet (500 mg total) by mouth 2 (two) times daily as needed (Pain, take with meals). 30 tablet 5/5/2023 -- Chet Vargas MD    dicyclomine (BENTYL) 20 mg tablet Take 1 tablet (20 mg total) by mouth 2 (two) times daily as needed (Stomach cramps). 60 tablet 5/5/2023 -- Chet Vargas MD          Follow-up Information       Follow up With Specialties Details Why Contact Info    [facility] - Emergency Dept Emergency Medicine Go to  If symptoms worsen [address]  [facility] [id number] [phone]             Chet Vargas MD  05/05/23 6059

## 2023-05-09 ENCOUNTER — PATIENT MESSAGE (OUTPATIENT)
Dept: RESEARCH | Facility: HOSPITAL | Age: 63
End: 2023-05-09
Payer: COMMERCIAL

## 2023-06-02 ENCOUNTER — HOSPITAL ENCOUNTER (EMERGENCY)
Facility: OTHER | Age: 63
Discharge: HOME | End: 2023-06-02
Attending: EMERGENCY MEDICINE
Payer: COMMERCIAL

## 2023-06-02 VITALS
HEIGHT: 64 IN | DIASTOLIC BLOOD PRESSURE: 64 MMHG | HEART RATE: 71 BPM | TEMPERATURE: 98 F | WEIGHT: 150 LBS | BODY MASS INDEX: 25.61 KG/M2 | RESPIRATION RATE: 20 BRPM | OXYGEN SATURATION: 97 % | SYSTOLIC BLOOD PRESSURE: 143 MMHG

## 2023-06-02 DIAGNOSIS — M16.12 ARTHRITIS OF LEFT HIP: Primary | ICD-10-CM

## 2023-06-02 DIAGNOSIS — M25.552 PAIN OF LEFT HIP: ICD-10-CM

## 2023-06-02 DIAGNOSIS — M79.606 LEG PAIN: ICD-10-CM

## 2023-06-02 DIAGNOSIS — R73.03 PREDIABETES: ICD-10-CM

## 2023-06-02 LAB — POCT GLUCOSE: 105 MG/DL (ref 70–110)

## 2023-06-02 PROCEDURE — 25000003 PHARM REV CODE 250

## 2023-06-02 PROCEDURE — 82962 GLUCOSE BLOOD TEST: CPT

## 2023-06-02 PROCEDURE — 99284 EMERGENCY DEPT VISIT MOD MDM: CPT

## 2023-06-02 RX ORDER — METHOCARBAMOL 500 MG/1
500 TABLET, FILM COATED ORAL 4 TIMES DAILY
Qty: 20 TABLET | Refills: 0 | Status: SHIPPED | OUTPATIENT
Start: 2023-06-02 | End: 2023-06-07

## 2023-06-02 RX ORDER — NAPROXEN 500 MG/1
500 TABLET ORAL
Status: COMPLETED | OUTPATIENT
Start: 2023-06-02 | End: 2023-06-02

## 2023-06-02 RX ORDER — ORPHENADRINE CITRATE 100 MG/1
100 TABLET, EXTENDED RELEASE ORAL
Status: COMPLETED | OUTPATIENT
Start: 2023-06-02 | End: 2023-06-02

## 2023-06-02 RX ORDER — LIDOCAINE 50 MG/G
1 PATCH TOPICAL DAILY
Qty: 3 PATCH | Refills: 0 | Status: SHIPPED | OUTPATIENT
Start: 2023-06-02 | End: 2023-06-05

## 2023-06-02 RX ORDER — IBUPROFEN 600 MG/1
600 TABLET ORAL EVERY 6 HOURS PRN
Qty: 20 TABLET | Refills: 0 | Status: SHIPPED | OUTPATIENT
Start: 2023-06-02 | End: 2023-06-07

## 2023-06-02 RX ADMIN — ORPHENADRINE CITRATE 100 MG: 100 TABLET, EXTENDED RELEASE ORAL at 05:06

## 2023-06-02 RX ADMIN — NAPROXEN 500 MG: 500 TABLET ORAL at 04:06

## 2023-06-02 NOTE — ED TRIAGE NOTES
LLE pain since Wednesday AM - states feels like electric shock going from hip down front of leg to knee. 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 Pain started after working a 12 hr shift. States she took prescribed pain med with some relief and was able to work last night but pain has persisted into today. States h/o chronic lower back pain but never diagnosed with back problem.

## 2023-06-02 NOTE — FIRST PROVIDER EVALUATION
Medical screening examination initiated.  I have conducted a focused provider triage encounter, findings are as follows:    Brief history of present illness:  Sharp left lateral leg pain radiating down. Worse with weight bearing and ambulation. No relief with topical analgesics or oral analgesics. No urinary symptoms.     There were no vitals filed for this visit.    Pertinent physical exam:  tenderness to palpation of left hip and left upper leg.     Brief workup plan:  X-ray of left hip and femur. Will give Naproxen.    Preliminary workup initiated; this workup will be continued and followed by the physician or advanced practice provider that is assigned to the patient when roomed.

## 2023-06-02 NOTE — Clinical Note
"Soraya Crooks" Catarina was seen and treated in our emergency department on 6/2/2023.  She may return to work on 06/06/2023.       If you have any questions or concerns, please don't hesitate to call.      Julio Cesar Altamirano PA-C"

## 2023-06-02 NOTE — Clinical Note
"Soraya Crooks" Catarina was seen and treated in our emergency department on 6/2/2023.  She may return to work on 06/06/2023.  Mrs Briggs may return to work on 06/06/2023 with no restrictions.     If you have any questions or concerns, please don't hesitate to call.      VIRIDIANA Garcia RN    "

## 2023-06-02 NOTE — DISCHARGE INSTRUCTIONS
Please take ibuprofen or naproxen as needed for your pain.  Please try to stay off her feet as much as possible.  Please follow with PT and Orthopedics.  I provided a referral for you to establish with a new primary care doctor.

## 2023-06-02 NOTE — ED PROVIDER NOTES
"Encounter Date: 6/2/2023       History     Chief Complaint   Patient presents with    Leg Pain     Pt reports L leg pain x 1 day; pt states pain is in L thigh feels like "electricity down L leg" and she is having difficulty ambulating on L leg, pt ambulatory in triage with limp; pt denies any injury     62-year-old female who presents to the emergency department for evaluation of left hip pain that began yesterday after working a 12 hour shift. She also reports associated left lateral leg pain for 1 day. She describes the pain as "cramping and twisting" and states it feels like someone is twisting her muscles. Pain is exacerbated with weight bearing and ambulating. She denies recent falls or trauma.  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  She is unsure if she bumped into something.  She reports intermittent paresthesias of the left hip to the left shin.  No pain relief with topical analgesics or naproxen, last taken this morning.  She denies fever, chills, SOB, chest pain, leg swelling, calf pain, or urinary symptoms.  She does have a history of chronic lower back pain that was being managed with physical therapy.  States that she has not been to physical therapy in a while.  She denies bladder or bowel incontinence, urinary retention, or saddle anesthesia.    The history is provided by the patient ( at bedside.).   Review of patient's allergies indicates:  No Known Allergies  Past Medical History:   Diagnosis Date    Diabetes mellitus     Gastroenteritis     Glaucoma (increased eye pressure)     Prediabetes 6/3/2019    Tinnitus      Past Surgical History:   Procedure Laterality Date    COLONOSCOPY N/A 6/7/2016    Procedure: COLONOSCOPY;  Surgeon: Katerine Ramsey MD;  Location: [facility] ([address] [place] [address]);  Service: Endoscopy;  Laterality: N/A;    LAPAROSCOPIC CHOLECYSTECTOMY N/A 1/8/2021    Procedure: CHOLECYSTECTOMY, LAPAROSCOPIC;  Surgeon: Johan Wilkerson Jr., MD;  Location: [facility] OR;  " Service: General;  Laterality: N/A;    OOPHORECTOMY      Unilateral oophorectomy     Family History   Problem Relation Age of Onset    Breast cancer Cousin 67    Hypertension Mother     Glaucoma Mother     Cataracts Mother     Diabetes Sister     Glaucoma Sister     Diabetes Sister     Colon cancer Neg Hx     Ovarian cancer Neg Hx     Stroke Neg Hx      Social History     Tobacco Use    Smoking status: Former     Packs/day: 0.10     Years: 15.00     Pack years: 1.50     Types: Cigarettes     Quit date: 2005     Years since quittin.8    Smokeless tobacco: Never   Substance Use Topics    Alcohol use: No     Alcohol/week: 0.0 standard drinks    Drug use: No     Review of Systems   Constitutional:  Negative for chills and fever.   HENT:  Negative for congestion, rhinorrhea and sore throat.    Respiratory:  Negative for cough and shortness of breath.    Cardiovascular:  Negative for chest pain.   Gastrointestinal:  Negative for abdominal pain, diarrhea, nausea and vomiting.   Genitourinary:  Negative for dysuria, frequency and urgency.   Musculoskeletal:  Positive for back pain (chronic lower).        Positive for left hip pain.  Positive for left leg pain.   Skin:  Negative for rash.   Neurological:  Negative for dizziness and headaches.   Psychiatric/Behavioral:  Negative for confusion.      Physical Exam     Initial Vitals [23 1541]   BP Pulse Resp Temp SpO2   (!) 151/66 89 20 98.8 °F (37.1 °C) 96 %      MAP       --         Physical Exam    Nursing note and vitals reviewed.  Constitutional: She appears well-developed and well-nourished. No distress.   HENT:   Head: Normocephalic and atraumatic.   Mouth/Throat: Oropharynx is clear and moist.   Eyes: Conjunctivae and EOM are normal.   Neck: Neck supple.   Normal range of motion.  Cardiovascular:  Normal rate, regular rhythm, normal heart sounds and intact distal pulses.           Pulmonary/Chest: Breath sounds normal. No respiratory distress. She  has no wheezes. She has no rhonchi. She has no rales.   Abdominal: Abdomen is soft. Bowel sounds are normal. She exhibits no distension. There is no abdominal tenderness. There is no rebound and no guarding.   Musculoskeletal:         General: Normal range of motion.      Cervical back: Normal range of motion and neck supple.      Comments: No tenderness to palpation of the left hip.  No tenderness to palpation of the left leg.  No calf swelling or tenderness to palpation.  No overlying skin changes. No erythema. Not hot to touch.      Neurological: She is alert and oriented to person, place, and time. She has normal strength. No sensory deficit.   Sensation intact to light touch.  Neurovascularly intact.  Strength 5/5 in bilateral lower extremities.   Skin: Skin is warm and dry.   Psychiatric: She has a normal mood and affect. Her behavior is normal. Judgment and thought content normal.       ED Course   Procedures  Labs Reviewed   POCT GLUCOSE          Imaging Results              X-Ray Femur Ap/Lat Left (Final result)  Result time 06/02/23 17:06:41      Final result by Marychuy Gonzalez MD (06/02/23 17:06:41)                   Impression:      No acute displaced fracture seen.      Electronically signed by: Marychuy Gonzalez MD  Date:    06/02/2023  Time:    17:06               Narrative:    EXAMINATION:  XR HIP WITH PELVIS WHEN PERFORMED, 2 OR 3 VIEWS LEFT; XR FEMUR 2 VIEW LEFT    CLINICAL HISTORY:  hip pain; Pain in leg, unspecified    TECHNIQUE:  AP view of the pelvis and frog leg lateral view of the left hip were performed.  Left femur AP and lateral.    COMPARISON:  None    FINDINGS:  No evidence of acute displaced fracture, dislocation, or osseous destructive process.  There is mild bilateral hip joint space narrowing.                                       X-Ray Hip 2 or 3 views Left (with Pelvis when performed) (Final result)  Result time 06/02/23 17:06:41   Procedure changed from X-Ray Hip 4 or more views  Left (with Pelvis when performed)     Final result by Marychuy Gonzalez MD (06/02/23 17:06:41)                   Impression:      No acute displaced fracture seen.      Electronically signed by: Marychuy Gonzalez MD  Date:    06/02/2023  Time:    17:06               Narrative:    EXAMINATION:  XR HIP WITH PELVIS WHEN PERFORMED, 2 OR 3 VIEWS LEFT; XR FEMUR 2 VIEW LEFT    CLINICAL HISTORY:  hip pain; Pain in leg, unspecified    TECHNIQUE:  AP view of the pelvis and frog leg lateral view of the left hip were performed.  Left femur AP and lateral.    COMPARISON:  None    FINDINGS:  No evidence of acute displaced fracture, dislocation, or osseous destructive process.  There is mild bilateral hip joint space narrowing.                                       Medications   naproxen tablet 500 mg (500 mg Oral Given 6/2/23 1658)   orphenadrine 12 hr tablet 100 mg (100 mg Oral Given 6/2/23 1728)     Medical Decision Making:   Initial Assessment:   62-year-old female presenting to emergency department for evaluation of left hip and left leg pain for 1 day.  Reports pain began after working a 12 hour shift.  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  Denies recent trauma or falls.  No fever, paresthesias, leg swelling, skin changes.  Will give naproxen and muscle relaxer.  Will obtain x-rays of the hip and femur.  Clinical Tests:   Lab Tests: Ordered and Reviewed  Radiological Study: Ordered and Reviewed  ED Management:  No fracture or dislocation to the left hip or femur.  There is hip joint space narrowing.  Likely osteoarthritis.  I updated the patient on results.  Will discharge home with anti-inflammatories, muscle relaxer and lidocaine patches.  Will provide ambulatory referral to orthopedics and physical therapy.  She verbalized understanding and agreement with this plan of care.  She was given specific return precautions.  All questions and concerns addressed.                        Clinical Impression:   Final  diagnoses:  [M79.606] Leg pain  [M25.552] Pain of left hip  [R73.03] Prediabetes  [M16.12] Arthritis of left hip (Primary)        ED Disposition Condition    Discharge Stable          ED Prescriptions       Medication Sig Dispense Start Date End Date Auth. Provider    ibuprofen (ADVIL,MOTRIN) 600 MG tablet Take 1 tablet (600 mg total) by mouth every 6 (six) hours as needed for Pain. 20 tablet 6/2/2023 6/7/2023 Julio Cesar Altamirano PA-C    methocarbamoL (ROBAXIN) 500 MG Tab Take 1 tablet (500 mg total) by mouth 4 (four) times daily. for 5 days 20 tablet 6/2/2023 6/7/2023 Julio Cesar Altamirano PA-C    LIDOcaine (LIDODERM) 5 % Place 1 patch onto the skin once daily. Remove & Discard patch within 12 hours or as directed by MD for 3 days 3 patch 6/2/2023 6/5/2023 Julio Cesar Altamirano PA-C          Follow-up Information    None          Julio Cesar Altamirano PA-C  06/03/23 6482

## 2023-06-08 ENCOUNTER — OFFICE VISIT (OUTPATIENT)
Dept: INTERNAL MEDICINE | Facility: CLINIC | Age: 63
End: 2023-06-08
Payer: COMMERCIAL

## 2023-06-08 ENCOUNTER — LAB VISIT (OUTPATIENT)
Dept: LAB | Facility: HOSPITAL | Age: 63
End: 2023-06-08
Payer: COMMERCIAL

## 2023-06-08 ENCOUNTER — PATIENT MESSAGE (OUTPATIENT)
Dept: RESEARCH | Facility: HOSPITAL | Age: 63
End: 2023-06-08
Payer: COMMERCIAL

## 2023-06-08 VITALS
HEART RATE: 81 BPM | HEIGHT: 64 IN | SYSTOLIC BLOOD PRESSURE: 152 MMHG | RESPIRATION RATE: 10 BRPM | BODY MASS INDEX: 28.19 KG/M2 | DIASTOLIC BLOOD PRESSURE: 71 MMHG | WEIGHT: 165.13 LBS

## 2023-06-08 DIAGNOSIS — E55.9 VITAMIN D DEFICIENCY: ICD-10-CM

## 2023-06-08 DIAGNOSIS — T78.40XD ALLERGY, SUBSEQUENT ENCOUNTER: ICD-10-CM

## 2023-06-08 DIAGNOSIS — K59.1 FUNCTIONAL DIARRHEA: Primary | ICD-10-CM

## 2023-06-08 DIAGNOSIS — R73.03 PREDIABETES: ICD-10-CM

## 2023-06-08 DIAGNOSIS — K58.9 IRRITABLE BOWEL SYNDROME, UNSPECIFIED TYPE: ICD-10-CM

## 2023-06-08 DIAGNOSIS — J34.89 SINUS PRESSURE: ICD-10-CM

## 2023-06-08 DIAGNOSIS — K21.9 GASTROESOPHAGEAL REFLUX DISEASE WITHOUT ESOPHAGITIS: ICD-10-CM

## 2023-06-08 LAB
CHOLEST SERPL-MCNC: 164 MG/DL (ref 120–199)
CHOLEST/HDLC SERPL: 4.7 {RATIO} (ref 2–5)
ESTIMATED AVG GLUCOSE: 123 MG/DL (ref 68–131)
HBA1C MFR BLD: 5.9 % (ref 4–5.6)
HDLC SERPL-MCNC: 35 MG/DL (ref 40–75)
HDLC SERPL: 21.3 % (ref 20–50)
LDLC SERPL CALC-MCNC: 86.8 MG/DL (ref 63–159)
NONHDLC SERPL-MCNC: 129 MG/DL
TRIGL SERPL-MCNC: 211 MG/DL (ref 30–150)

## 2023-06-08 PROCEDURE — 82652 VIT D 1 25-DIHYDROXY: CPT

## 2023-06-08 PROCEDURE — 36415 COLL VENOUS BLD VENIPUNCTURE: CPT

## 2023-06-08 PROCEDURE — 3078F DIAST BP <80 MM HG: CPT | Mod: CPTII,S$GLB,,

## 2023-06-08 PROCEDURE — 99999 PR PBB SHADOW E&M-EST. PATIENT-LVL V: ICD-10-PCS | Mod: PBBFAC,,,

## 2023-06-08 PROCEDURE — 1160F RVW MEDS BY RX/DR IN RCRD: CPT | Mod: CPTII,S$GLB,,

## 2023-06-08 PROCEDURE — 3044F PR MOST RECENT HEMOGLOBIN A1C LEVEL <7.0%: ICD-10-PCS | Mod: CPTII,S$GLB,,

## 2023-06-08 PROCEDURE — 3008F BODY MASS INDEX DOCD: CPT | Mod: CPTII,S$GLB,,

## 2023-06-08 PROCEDURE — 3044F HG A1C LEVEL LT 7.0%: CPT | Mod: CPTII,S$GLB,,

## 2023-06-08 PROCEDURE — 1160F PR REVIEW ALL MEDS BY PRESCRIBER/CLIN PHARMACIST DOCUMENTED: ICD-10-PCS | Mod: CPTII,S$GLB,,

## 2023-06-08 PROCEDURE — 1159F PR MEDICATION LIST DOCUMENTED IN MEDICAL RECORD: ICD-10-PCS | Mod: CPTII,S$GLB,,

## 2023-06-08 PROCEDURE — 3008F PR BODY MASS INDEX (BMI) DOCUMENTED: ICD-10-PCS | Mod: CPTII,S$GLB,,

## 2023-06-08 PROCEDURE — 99999 PR PBB SHADOW E&M-EST. PATIENT-LVL V: CPT | Mod: PBBFAC,,,

## 2023-06-08 PROCEDURE — 99214 OFFICE O/P EST MOD 30 MIN: CPT | Mod: S$GLB,,,

## 2023-06-08 PROCEDURE — 83036 HEMOGLOBIN GLYCOSYLATED A1C: CPT

## 2023-06-08 PROCEDURE — 3077F SYST BP >= 140 MM HG: CPT | Mod: CPTII,S$GLB,,

## 2023-06-08 PROCEDURE — 3077F PR MOST RECENT SYSTOLIC BLOOD PRESSURE >= 140 MM HG: ICD-10-PCS | Mod: CPTII,S$GLB,,

## 2023-06-08 PROCEDURE — 80061 LIPID PANEL: CPT

## 2023-06-08 PROCEDURE — 1159F MED LIST DOCD IN RCRD: CPT | Mod: CPTII,S$GLB,,

## 2023-06-08 PROCEDURE — 3078F PR MOST RECENT DIASTOLIC BLOOD PRESSURE < 80 MM HG: ICD-10-PCS | Mod: CPTII,S$GLB,,

## 2023-06-08 PROCEDURE — 99214 PR OFFICE/OUTPT VISIT, EST, LEVL IV, 30-39 MIN: ICD-10-PCS | Mod: S$GLB,,,

## 2023-06-08 RX ORDER — FLUTICASONE PROPIONATE 50 MCG
1 SPRAY, SUSPENSION (ML) NASAL DAILY
Qty: 15.8 ML | Refills: 1 | Status: SHIPPED | OUTPATIENT
Start: 2023-06-08 | End: 2023-12-23

## 2023-06-08 RX ORDER — DICYCLOMINE HYDROCHLORIDE 20 MG/1
20 TABLET ORAL 2 TIMES DAILY PRN
Qty: 60 TABLET | Refills: 1 | Status: SHIPPED | OUTPATIENT
Start: 2023-06-08 | End: 2024-01-22

## 2023-06-08 RX ORDER — PANTOPRAZOLE SODIUM 20 MG/1
20 TABLET, DELAYED RELEASE ORAL DAILY
Qty: 30 TABLET | Refills: 10 | Status: SHIPPED | OUTPATIENT
Start: 2023-06-08 | End: 2024-05-03

## 2023-06-08 RX ORDER — LORATADINE 10 MG/1
10 TABLET ORAL DAILY
Qty: 30 TABLET | Refills: 3 | Status: SHIPPED | OUTPATIENT
Start: 2023-06-08 | End: 2023-10-11 | Stop reason: SDUPTHER

## 2023-06-08 NOTE — PATIENT INSTRUCTIONS
Saline nasal spray nightly.    Claritin (loratidine) or Benadryl, spray Flonase in each nose in the morning before work.     Continue miralax for constipation. Gas X or simethicone are good for abdominal distention.     Keep note of food triggers if possible.     Blood work today for diabetes check and Vit D.     Homework:   - check BP once daily (nightly when calm) for at least 3 times this week. Send me [employer] message of the numbers.

## 2023-06-08 NOTE — PROGRESS NOTES
Internal Medicine Clinic Note    Subjective     Chief Complaint: Establish Care    History of Present Illness:  Ms. Soraya Elmore is a 62 y.o. female with PMHx IBS, GERD, s/p cholecystectomy (2021), glaucoma and pre-diabetes here to establish care.    Today with complains with L eye discomfort with sinus congestion, sinus pressure.       Nasal congestion/sinus pressure ongoing for 15 years. Occurs first thing in the AM, sinus pressure, pain in eyes does not resolve until takes generic Bendaryl. Rainy seasons exacerbate symptoms.    Also with abdominal distention, lower abdominal cramping occasionally with severe cramping that is alleviated with Bentyl, also relieved when she has a bowel movement. Does not recall if she was dxed by GI or PCP. Alternates between issues with constipation (small bowel movements, feels incomplete, abdominal distention) and non-bloody diarrhea. CT abd showed diffuse diverticulosis w/o diverticulitis. Previously on probiotics but not currently taking them.     Glaucoma - uses latanoprost drops daily, no new visual disturbance    Review of Systems   Constitutional:  Negative for chills, fever, malaise/fatigue and weight loss.   HENT:  Positive for congestion and sinus pain. Negative for sore throat.    Eyes:  Negative for blurred vision and double vision.   Respiratory:  Negative for cough, hemoptysis, shortness of breath and stridor.    Cardiovascular:  Negative for chest pain and palpitations.   Gastrointestinal:  Positive for abdominal pain, constipation and diarrhea. Negative for blood in stool, melena, nausea and vomiting.   Genitourinary:  Negative for dysuria, frequency and urgency.   Musculoskeletal:  Negative for falls and myalgias.        (+) L hip pain   Neurological:  Negative for dizziness and headaches.   Psychiatric/Behavioral:  Negative for depression and substance abuse.      PAST HISTORY:     Past Medical History:   Diagnosis Date    Gastroenteritis     Glaucoma  (increased eye pressure)     Prediabetes 2019    Tinnitus        Past Surgical History:   Procedure Laterality Date    COLONOSCOPY N/A 2016    Procedure: COLONOSCOPY;  Surgeon: Katerine Ramsey MD;  Location: [address]);  Service: Endoscopy;  Laterality: N/A;    LAPAROSCOPIC CHOLECYSTECTOMY N/A 2021    Procedure: CHOLECYSTECTOMY, LAPAROSCOPIC;  Surgeon: Johan Wilkerson Jr., MD;  Location: [facility];  Service: General;  Laterality: N/A;    OOPHORECTOMY      Unilateral oophorectomy       Family History   Problem Relation Age of Onset    Breast cancer Cousin 67    Hypertension Mother     Glaucoma Mother     Cataracts Mother     Diabetes Sister     Glaucoma Sister     Diabetes Sister     Colon cancer Neg Hx     Ovarian cancer Neg Hx     Stroke Neg Hx        Social History     Socioeconomic History    Marital status:    Occupational History    Occupation: patient care tech    Tobacco Use    Smoking status: Former     Packs/day: 0.10     Years: 15.00     Pack years: 1.50     Types: Cigarettes     Quit date: 2005     Years since quittin.8    Smokeless tobacco: Never   Substance and Sexual Activity    Alcohol use: No     Alcohol/week: 0.0 standard drinks    Drug use: No    Sexual activity: Not Currently     Partners: Male     Birth control/protection: Post-menopausal       MEDICATIONS & ALLERGIES:     Current Outpatient Medications on File Prior to Visit   Medication Sig    Bifidobacterium infantis (ALIGN) 4 mg Cap Take 1 capsule (4 mg total) by mouth once daily.    cholecalciferol, vitamin D3, 50,000 unit capsule Take 1 capsule (50,000 Units total) by mouth once a week.    ciclopirox (PENLAC) 8 % Soln Apply topically nightly.    diclofenac sodium (VOLTAREN) 1 % Gel Apply 2 g topically 4 (four) times daily.    fluocinonide (LIDEX) 0.05 % external solution Apply topically daily as needed (scalp itching).    hydrocortisone 2.5 % cream Apply topically 2 (two) times daily as needed  (flaking and itching rash).    [] ibuprofen (ADVIL,MOTRIN) 600 MG tablet Take 1 tablet (600 mg total) by mouth every 6 (six) hours as needed for Pain.    ketoconazole (NIZORAL) 2 % shampoo Apply topically twice a week. Leave on for 5 to 10 minutes then rinse    latanoprost 0.005 % ophthalmic solution Place 1 drop into both eyes every evening.    [] methocarbamoL (ROBAXIN) 500 MG Tab Take 1 tablet (500 mg total) by mouth 4 (four) times daily. for 5 days    naproxen (NAPROSYN) 500 MG tablet Take 1 tablet (500 mg total) by mouth 2 (two) times daily as needed (Pain, take with meals).    ondansetron (ZOFRAN-ODT) 4 MG TbDL Take 1 tablet (4 mg total) by mouth every 8 (eight) hours as needed (Nausea).    [DISCONTINUED] (Magic mouthwash) 1:1:1 diphenhydramine(Benadryl) 12.5mg/5ml liq, aluminum & magnesium hydroxide-simethicone (Maalox), LIDOcaine viscous 2% Swish and spit 5 mLs every 6 (six) hours as needed (mouth/throat pain). (Patient not taking: Reported on 2022)    [DISCONTINUED] azelastine (ASTELIN) 137 mcg (0.1 %) nasal spray 1 spray (137 mcg total) by Nasal route 2 (two) times daily.    [DISCONTINUED] dicyclomine (BENTYL) 20 mg tablet Take 1 tablet (20 mg total) by mouth 2 (two) times daily as needed (Stomach cramps).    [DISCONTINUED] fluticasone propionate (FLONASE) 50 mcg/actuation nasal spray 1 spray (50 mcg total) by Each Nostril route 2 (two) times daily as needed. (Patient not taking: Reported on 2022)    [DISCONTINUED] ketoconazole (NIZORAL) 2 % cream Apply topically 2 (two) times daily as needed (itching and scaling).    [DISCONTINUED] lidocaine HCL 2% (XYLOCAINE) 2 % jelly Apply topically as needed. Apply topically once nightly to affected part of foot/feet.    [DISCONTINUED] pantoprazole (PROTONIX) 20 MG tablet Take 2 tablets (40 mg total) by mouth once daily.    [DISCONTINUED] phenobarb-hyoscy-atropine-scop (DONNATAL) 16.2-0.1037 -0.0194 mg/5 mL Elix Take 5 mLs by mouth.     "[DISCONTINUED] sucralfate (CARAFATE) 100 mg/mL suspension Take 10 mLs (1 g total) by mouth 4 (four) times daily before meals and nightly. (Patient not taking: Reported on 11/21/2022)     No current facility-administered medications on file prior to visit.       Review of patient's allergies indicates:  No Known Allergies    OBJECTIVE:     Vital Signs:  Vitals:    06/08/23 1337 06/08/23 1341   BP: (!) 155/66 (!) 152/71   BP Location: Right arm    Patient Position: Sitting    Pulse:  81   Resp: 10    Weight: Comment: accurate 74.9 kg (165 lb 2 oz)   Height: 5' 4" (1.626 m)        Body mass index is 28.34 kg/m².     Physical Exam  Constitutional:       General: She is not in acute distress.     Appearance: Normal appearance. She is not ill-appearing or toxic-appearing.   HENT:      Head: Normocephalic and atraumatic.      Nose: Congestion present. No rhinorrhea.   Eyes:      General: No scleral icterus.        Right eye: No discharge.         Left eye: No discharge.      Extraocular Movements: Extraocular movements intact.      Conjunctiva/sclera: Conjunctivae normal.   Neck:      Thyroid: No thyroid mass, thyromegaly or thyroid tenderness.   Cardiovascular:      Rate and Rhythm: Normal rate and regular rhythm.      Pulses: Normal pulses.   Pulmonary:      Effort: Pulmonary effort is normal. No respiratory distress.      Breath sounds: No stridor.   Abdominal:      General: Abdomen is flat. There is distension.      Palpations: Abdomen is soft.      Tenderness: There is no abdominal tenderness.   Musculoskeletal:         General: No signs of injury.      Cervical back: Neck supple. No rigidity.      Comments: Moving all extremities independently.    Skin:     General: Skin is warm and dry.      Findings: No bruising or rash.   Neurological:      General: No focal deficit present.      Mental Status: She is alert and oriented to person, place, and time. Mental status is at baseline.   Psychiatric:         Mood and " Affect: Mood normal.         Behavior: Behavior normal.     Laboratory  Lab Results   Component Value Date    WBC 6.81 05/04/2023    HGB 11.9 (L) 05/04/2023    HCT 37.1 05/04/2023    MCV 90 05/04/2023     05/04/2023     BMP  Lab Results   Component Value Date     05/04/2023    K 3.5 05/04/2023     05/04/2023    CO2 27 05/04/2023    BUN 15 05/04/2023    CREATININE 0.8 05/04/2023    CALCIUM 9.0 05/04/2023    ANIONGAP 9 05/04/2023    EGFRNORACEVR >60 05/04/2023     Lab Results   Component Value Date    INR 0.9 06/04/2020     Lab Results   Component Value Date    HGBA1C 5.9 (H) 11/10/2021       Diagnostic Results:  Labs: Reviewed  ECG: Reviewed  X-Ray: Reviewed  CT: Reviewed  MRI: Reviewed    Health Maintenance Due   Topic Date Due    COVID-19 Vaccine (3 - Pfizer series) 01/29/2022    Hemoglobin A1c (Prediabetes)  11/10/2022         ASSESSMENT & PLAN:   Ms. Soraya Elmore is a 62 y.o. female PMHx IBS, GERD, s/p cholecystectomy (2021), glaucoma and pre-diabetes here to establish care.    1. Functional diarrhea        -     Likely related to IBS, can consider fatty food interolance s/p cholecystectomy as she reports of loose, green floating stools at time    2. Prediabetes  -     Hemoglobin A1C; Future; Expected date: 06/08/2023  -     Lipid Panel; Future; Expected date: 06/08/2023    3. BMI 28.0-28.9,adult  -     Hemoglobin A1C; Future; Expected date: 06/08/2023  -     Lipid Panel; Future; Expected date: 06/08/2023    4. Sinus pressure  -     loratadine (CLARITIN) 10 mg tablet; Take 1 tablet (10 mg total) by mouth once daily.  Dispense: 30 tablet; Refill: 3  -     fluticasone propionate (FLONASE) 50 mcg/actuation nasal spray; 1 spray (50 mcg total) by Each Nostril route once daily.  Dispense: 15.8 mL; Refill: 1  -     sodium chloride (SALINE NASAL) 0.65 % nasal spray; 1 spray by Nasal route every evening.  Dispense: 60 mL; Refill: 3    5. Allergy, subsequent encounter  -     loratadine  (CLARITIN) 10 mg tablet; Take 1 tablet (10 mg total) by mouth once daily.  Dispense: 30 tablet; Refill: 3  -     fluticasone propionate (FLONASE) 50 mcg/actuation nasal spray; 1 spray (50 mcg total) by Each Nostril route once daily.  Dispense: 15.8 mL; Refill: 1  -     sodium chloride (SALINE NASAL) 0.65 % nasal spray; 1 spray by Nasal route every evening.  Dispense: 60 mL; Refill: 3    6. Vitamin D deficiency  -     Calcitriol; Future; Expected date: 06/08/2023    7. Irritable bowel syndrome, unspecified type  -     dicyclomine (BENTYL) 20 mg tablet; Take 1 tablet (20 mg total) by mouth 2 (two) times daily as needed (Stomach cramps).  Dispense: 60 tablet; Refill: 1    8.  GERD  -     pantoprazole (PROTONIX) 20 MG tablet; Take 1 tablet (20 mg total) by mouth once daily.  Dispense: 30 tablet; Refill: 10        Follow up in about 1 month (around 7/8/2023).      Dhara Storey MD  Internal Medicine, PGY-II   [facility]  [address], [place] [id number] [phone]

## 2023-06-08 NOTE — PROGRESS NOTES
[facility]  Physical Therapy Initial Evaluation    Name: Soraya Elmore  Clinic Number: [id number]    Therapy Diagnosis:   Encounter Diagnoses   Name Primary?    Pain of left hip     Pain in left hip     Acute pain of left knee      Physician: Julio Cesar Altamirano PA-C    Physician Orders: PT Eval and Treat   Medical Diagnosis from Referral: Pain of left hip (M25.552)  Evaluation Date: 6/9/2023  Authorization Period Expiration: 6/1/2024  Plan of Care Expiration: 9/1/2023  Visit # / Visits authorized: 1/ 1  Re-assessment: due 7/9/2023  FOTO: 6/9/2023 (1)     Time In: 8:28 am  Time Out: ***  Total Billable Time: *** minutes    Precautions: Standard and Diabetes    Subjective   Date of onset: about a week ago  History of current condition - Soraya reports: she was at work last Friday and noticed a little pain in her hip.  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  By the end of the day on Friday, she reports a feeling of electricity going down the leg, numbness in the foot, and tightness in the hip.  She reports residual pain in the knee and inner thigh after resting and medication. Denies prior issues with hip.       Medical History:   Past Medical History:   Diagnosis Date    Gastroenteritis     Glaucoma (increased eye pressure)     Prediabetes 06/03/2019    Tinnitus        Surgical History:   Soraya Elmore  has a past surgical history that includes Oophorectomy (1999); Colonoscopy (N/A, 6/7/2016); and Laparoscopic cholecystectomy (N/A, 1/8/2021).    Medications:   Soraya has a current medication list which includes the following prescription(s): bifidobacterium infantis, cholecalciferol (vitamin d3), ciclopirox, diclofenac sodium, dicyclomine, fluocinonide, fluticasone propionate, hydrocortisone, ketoconazole, latanoprost, loratadine, naproxen, ondansetron, pantoprazole, and sodium chloride.    Allergies:   Review of patient's allergies indicates:  No Known Allergies  "    Imaging, x-ray (hip): FINDINGS:  No evidence of acute displaced fracture, dislocation, or osseous destructive process.  There is mild bilateral hip joint space narrowing.     Impression:     No acute displaced fracture seen.    X-ray (femur) FINDINGS:  No evidence of acute displaced fracture, dislocation, or osseous destructive process.  There is mild bilateral hip joint space narrowing.     Impression:     No acute displaced fracture seen.          Prior Therapy: yes but not for current s/s   Social History: *** {LIVES WITH:[address]23}  Occupation: ***  Prior Level of Function: Pt independent with all ADLs, job responsibilities and participating in regular physical activity  Current Level of Function: Pt has difficulty with all ADLs, job responsibilities and participating in regular physical activity    Pain:  Current {0-10:74617::"0"}/10, worst {0-10:24051::"0"}/10, best {0-10:20507::"0"}/10   Location: left hip   Description: {Pain Description:39111}  Aggravating Factors: {Causes; Pain:10137}  Easing Factors: {Pain (activities that relieve):29511}    Pts goals: ***    Objective     Observation:   ***    Palpation:  ***    Range of Motion: ! = pain    Hip Right AROM/PROM Left AROM/PROM   Flexion ***/*** ***/***   Abduction ***/*** ***/***   Extension ***/*** ***/***   Ext. Rotation ***/*** ***/***   Int. Rotation ***/*** ***/***   ! = pain    Lumbar Degrees   Flexion ***     Extension ***     Left Side Bending ***   Right Side Bending ***   Left rotation   ***   Right Rotation   ***          MMT/Strength:    Hip Right Left   Flexion {AMB PT VESTIBULAR STRENGTH:81681} {AMB PT VESTIBULAR STRENGTH:32053}   Extension {AMB PT VESTIBULAR STRENGTH:58878} {AMB PT VESTIBULAR STRENGTH:52103}   Internal Rotation {AMB PT VESTIBULAR STRENGTH:59483} {AMB PT VESTIBULAR STRENGTH:50580}   External Rotation {AMB PT VESTIBULAR STRENGTH:93466} {AMB PT VESTIBULAR STRENGTH:25506}   Adduction {AMB PT VESTIBULAR STRENGTH:62494} {AMB PT " VESTIBULAR STRENGTH:15272}   Abduction {AMB PT VESTIBULAR STRENGTH:07031} {AMB PT VESTIBULAR STRENGTH:00033}   Knee     Flexion {AMB PT VESTIBULAR STRENGTH:39123} {AMB PT VESTIBULAR STRENGTH:59683}   Extension {AMB PT VESTIBULAR STRENGTH:29553} {AMB PT VESTIBULAR STRENGTH:20159}   Ankle     Dorsiflexion {AMB PT VESTIBULAR STRENGTH:74963} {AMB PT VESTIBULAR STRENGTH:52431}   Plantarflexion {AMB PT VESTIBULAR STRENGTH:28206} {AMB PT VESTIBULAR STRENGTH:71646}   Inversion {AMB PT VESTIBULAR STRENGTH:86711} {AMB PT VESTIBULAR STRENGTH:30876}   Eversion {AMB PT VESTIBULAR STRENGTH:55212} {AMB PT VESTIBULAR STRENGTH:38441}       Joint Mobility:   ***    Neuro/Sensation:   ***    DTR:   Right Left Comment   Patellar (L3-4) {Reflexes:59864} {Reflexes:04149}    Achilles (S1) {Reflexes:36268} {Reflexes:07759}        Neuro Dynamic Testing:    Sciatic nerve:      SLR: R = ***     L = ***   Femoral Nerve:    Femoral nerve test: ***    Girth:  ***     Flexibility:   ***  Ely's test: R = *** degrees ; L = *** degrees  Popliteal Angle: R = *** degrees ; L = *** degrees  Juliocesar's test: R = *** ; L = ***  Chester test: R = *** ; L = ***      Special Tests:  ***  Bridge Test: ***  Step down Test: R: *** ; L ***  DEJAH: ***  FADIR: ***  -Repeated Flexion: ***  -Repeated Ext: ***  -Piriformis Test: ***  -Prone Instability Test: ***  -Bridge Test: ***  -OH Squat: ***    Other:   ***  5x sit<>stand: *** seconds    Normal:   60-69: 11.4 seconds  70-79: 12.6 seconds  80-89: 12.7 seconds    30 seconds sit<>stand: *** reps    Normal:   Age Male Female   60-64 17 15   65-69 16 15   70-74 15 14   75-79 14 13   80-84 13 12   85-89 11 11   90-94 9 9        TUG: *** seconds (3x average)    Normal:  Community dwelling adult: >13.5 seconds  Older stroke patients: >14 seconds  Older adults already attending falls clinic: >15 seconds  Frail Elderly: >32.6 seconds  LE amputees >19 seconds  Parkinson's disease: >11.5 seconds  Hip OA: >10  "seconds  Vestibular disorders: >11.1 seconds    (Reference: https://www.sralab.org/rehabilitation-measures/timed-and-go#older-adults-and-geriatric-care)        CMS Impairment/Limitation/Restriction for FOTO Hip Survey    Therapist reviewed FOTO scores for Soraya Elmore on 6/9/2023.   FOTO documents entered into [employer] - see Media section.    Limitation Score: ***%  Category: {Blank single:87745::"Other","Self Care","Body Position","Carrying","Mobility"}    Current : {G Codes:01054}  Goal: {G Codes:23828}               TREATMENT   Treatment Time In: ***  Treatment Time Out: ***  Total Treatment time separate from Evaluation: *** minutes    Soraya received therapeutic exercises to develop {AMB PT PROGRESS OBJECTIVE:07065} for *** minutes including:  ***    Soraya received the following manual therapy techniques: {AMB PT PROGRESS MANUAL THERAPY:37153} were applied to the: *** for *** minutes, including:  ***      Home Exercises and Patient Education Provided    Education provided:   Patient was educated on initial evaluation findings and expectations as well as future PT services, procedures, and expectations for optimal compliance with therapy.     Written Home Exercises Provided: HEP will be provided at first treatment visit.     Assessment   Soraya is a 62 y.o. female referred to outpatient Physical Therapy with a medical diagnosis of Pain of left hip. Pt presents with decreased ROM, strength, flexibility, *** special tests, TTP with *** palpation, poor posture and *** causing increased pain and decreased participation with work, recreational and houseold duties.       Pt prognosis is Good.   Pt will benefit from skilled outpatient Physical Therapy to address the deficits stated above and in the chart below, provide pt/family education, and to maximize pt's level of independence to return to [facility].     Plan of care discussed with patient: Yes  Pt's spiritual, cultural and educational needs considered and " patient is agreeable to the plan of care and goals as stated below:     Anticipated Barriers for therapy: none    Medical Necessity is demonstrated by the followingMedical Necessity is demonstrated by the following  History  Co-morbidities and personal factors that may impact the plan of care [] LOW: no personal factors / co-morbidities  [] MODERATE: 1-2 personal factors / co-morbidities  [] HIGH: 3+ personal factors / co-morbidities    Moderate / High Support Documentation:   Co-morbidities affecting plan of care: ***    Personal Factors:   {Personal Factors:41968}     Examination  Body Structures and Functions, activity limitations and participation restrictions that may impact the plan of care [] LOW: addressing 1-2 elements  [] MODERATE: 3+ elements  [] HIGH: 4+ elements (please support below)    Moderate / High Support Documentation: ***     Clinical Presentation [] LOW: stable  [] MODERATE: Evolving  [] HIGH: Unstable     Decision Making/ Complexity Score: {Desc; low/moderate/high:897798}       Goals:    In 6 weeks,   Goal Status   Patient will be independent with HEP to promote improved therapy outcomes.  STG    2.  Patient will improve *** MMT to ***/5 to improve strength for functional tasks. STG    3. Patient will improve ***ROM by *** degrees to improve mobility and promote normal movement patterns.  STG    4. Patient will *** STG      Patient will improve ***sit<>stand to *** to improve functional strength and improve mobility.   2. Patient will improve TUG test to *** sec to improve walking speed for functional community ambulation    In 12 weeks,  Goal Status   5. Patient will be independent with progressed HEP to self manage symptoms.  LTG    6. Patient will improve *** MMT to ***/5 to improve strength for functional tasks.  LTG    7. Patient will improve *** SLS to 30 seconds to improve balance and decrease fall risk.  LTG    8. Patient will improve *** ROM by *** degrees to improve mobility and  promote normal movement patterns.  LTG    9. Patient will report walking for 30 minutes with <2/10 pain 5x/week to promote healthy lifestyle and regular physical exercise.  LTG    10. Patient will improve FOTO score to </= ***% limited to decrease perceived limitation with mobility LTG     LTG      2. Patient will improve *** sit<>stand to *** to improve functional strength and improve mobility.   3. Patient will improve TUG test to </= *** sec to improve walking speed and LE strength for functional community ambulation         Plan   Plan of care Certification: 6/9/2023 to 9/1/2023.    Outpatient Physical Therapy {NUMBERS 1-5:32516} times weekly for *** visits to include the following interventions: {TX PLAN:46487}    Pt will be seen by a physical therapist minimally every 6th visit or every 30 days.    Lindsay Pleitez, PT

## 2023-06-09 ENCOUNTER — PATIENT MESSAGE (OUTPATIENT)
Dept: INTERNAL MEDICINE | Facility: CLINIC | Age: 63
End: 2023-06-09
Payer: COMMERCIAL

## 2023-06-09 ENCOUNTER — CLINICAL SUPPORT (OUTPATIENT)
Dept: REHABILITATION | Facility: OTHER | Age: 63
End: 2023-06-09
Payer: COMMERCIAL

## 2023-06-09 DIAGNOSIS — R73.03 PREDIABETES: ICD-10-CM

## 2023-06-09 DIAGNOSIS — M25.562 ACUTE PAIN OF LEFT KNEE: ICD-10-CM

## 2023-06-09 DIAGNOSIS — M25.552 PAIN OF LEFT HIP: ICD-10-CM

## 2023-06-09 DIAGNOSIS — M25.552 PAIN IN LEFT HIP: ICD-10-CM

## 2023-06-09 DIAGNOSIS — E78.1 HIGH BLOOD TRIGLYCERIDES: Primary | ICD-10-CM

## 2023-06-09 DIAGNOSIS — E66.3 OVERWEIGHT (BMI 25.0-29.9): ICD-10-CM

## 2023-06-09 PROBLEM — Z74.09 DECREASED STRENGTH, ENDURANCE, AND MOBILITY: Status: RESOLVED | Noted: 2022-04-19 | Resolved: 2023-06-09

## 2023-06-09 PROBLEM — M75.42 IMPINGEMENT SYNDROME OF LEFT SHOULDER: Status: RESOLVED | Noted: 2022-04-19 | Resolved: 2023-06-09

## 2023-06-09 PROBLEM — R53.1 DECREASED STRENGTH, ENDURANCE, AND MOBILITY: Status: RESOLVED | Noted: 2022-04-19 | Resolved: 2023-06-09

## 2023-06-09 PROBLEM — R68.89 DECREASED STRENGTH, ENDURANCE, AND MOBILITY: Status: RESOLVED | Noted: 2022-04-19 | Resolved: 2023-06-09

## 2023-06-09 PROCEDURE — 97161 PT EVAL LOW COMPLEX 20 MIN: CPT | Mod: PN

## 2023-06-09 RX ORDER — ATORVASTATIN CALCIUM 40 MG/1
40 TABLET, FILM COATED ORAL DAILY
Qty: 90 TABLET | Refills: 0 | Status: SHIPPED | OUTPATIENT
Start: 2023-06-09 | End: 2024-01-22 | Stop reason: SDUPTHER

## 2023-06-09 NOTE — TELEPHONE ENCOUNTER
The 10-year ASCVD risk score (Obey RIVAS, et al., 2019) is: 9.4%    Values used to calculate the score:      Age: 62 years      Sex: Female      Is Non- : Yes      Diabetic: No      Tobacco smoker: No      Systolic Blood Pressure: 152 mmHg      Is BP treated: No      HDL Cholesterol: 35 mg/dL      Total Cholesterol: 164 mg/dL     ASCVD risk score >7.5.   She reports of lower blood pressure readings to <130s at home.   Instructed to keep log closely for follow-up as hypertension increases her risk of heart disease.  Will start statin at 40 mg daily atorvastatin as we work on lifestyle changes.

## 2023-06-09 NOTE — PLAN OF CARE
[facility]  Physical Therapy Initial Evaluation    Name: Soraya Elmore  Clinic Number: [id number]    Therapy Diagnosis:   Encounter Diagnoses   Name Primary?    Pain of left hip     Pain in left hip     Acute pain of left knee      Physician: Julio Cesar Altamirano PA-C    Physician Orders: PT Eval and Treat   Medical Diagnosis from Referral: Pain of left hip (M25.552)  Evaluation Date: 6/9/2023  Authorization Period Expiration: 6/1/2024  Plan of Care Expiration: 9/1/2023  Visit # / Visits authorized: 1/ 1  Re-assessment: due 7/9/2023  FOTO: 6/9/2023 (1)     Time In: 8:28 am  Time Out: 9:15 am  Total Billable Time: 00 minutes    Precautions: Standard and Diabetes    Subjective   Date of onset: about a week ago  History of current condition - Soraya reports: she was at work last Friday and noticed a little pain in her hip.  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  By the end of the day on Friday, she reports a feeling of electricity going down the leg, numbness in the foot, and tightness in the hip.  She reports residual pain in the knee and inner thigh after resting and medication. Denies prior issues with hip and no symptoms in the right. Overall symptoms have improved since initial incident.  Denies specific JEFFERY.      Popping, clicking, locking at the hip: catching at the hip with stooping and twisting that was present prior to hip pain   Falls in the last 6 months: none    Patient denies dizziness, headaches, blurry vision, nausea, vomiting, impaired sensations in groin and saddle region and changes in bowel/bladder.         Medical History:   Past Medical History:   Diagnosis Date    Gastroenteritis     Glaucoma (increased eye pressure)     Prediabetes 06/03/2019    Tinnitus        Surgical History:   Soraya Elmore  has a past surgical history that includes Oophorectomy (1999); Colonoscopy (N/A, 6/7/2016); and Laparoscopic cholecystectomy (N/A, 1/8/2021).    Medications:    Soraya has a current medication list which includes the following prescription(s): bifidobacterium infantis, cholecalciferol (vitamin d3), ciclopirox, diclofenac sodium, dicyclomine, fluocinonide, fluticasone propionate, hydrocortisone, ketoconazole, latanoprost, loratadine, naproxen, ondansetron, pantoprazole, and sodium chloride.    Allergies:   Review of patient's allergies indicates:  No Known Allergies     Imaging, x-ray (hip): FINDINGS:  No evidence of acute displaced fracture, dislocation, or osseous destructive process.  There is mild bilateral hip joint space narrowing.     Impression:     No acute displaced fracture seen.    X-ray (femur) FINDINGS:  No evidence of acute displaced fracture, dislocation, or osseous destructive process.  There is mild bilateral hip joint space narrowing.     Impression:     No acute displaced fracture seen.          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 straight; 12 hr shifts  Prior Level of Function: independent, no pain in the hip, able to complete work activities without increased pain   Current Level of Function: difficulty with stairs, increased pain with daily activities and work duties, increased discomfort with sitting    Pain:  Current 3/10, worst 5/10, best 1/10   Location: left hip   Description: Dull and Tingling, throbbing  Aggravating Factors: Bending, Lifting, and stepping up into her husbands truck, standing, walking, stooping  Easing Factors: pain medication and lidocane patches, bengay/topical agents    Pts goals: get rid of the pain     Objective     Observation:   Hamstring cramping on left with bridge testing, difficulty straightening leg and lifting, thigh cramps with SLR;   Ambulated independently into clinic without AD or significant gait  deviations.     Palpation:  TTP over lumbar paraspinals, left glutes, left piriformis; no TTP around knee joint line    Range of Motion: ! = pain    Hip Right AROM Left AROM   Flexion Normal  Normal   Abduction normal  normal   Extension 25% limited 25% limited   Ext. Rotation 50% limited 50% limited   Int. Rotation normal normal   ! = pain    Lumbar    Flexion To toes; Reduced hip pain     Extension 25% limited; Increased hip pain     Left Side Bending Below knee   Right Side Bending Below knee; increased stiffness   Left rotation   25% limited   Right Rotation   25% limited          MMT/Strength:    Hip Right Left   Flexion 5/5 5/5   Extension 3/5 4-/5   Internal Rotation 4+/5 5/5   External Rotation 4/5 5/5   Adduction 3-/5 4/5   Abduction 4+/5 5/5   Knee     Flexion 4+/5 4+/5   Extension 5/5 5/5   Ankle     Dorsiflexion 5/5 5/5   Plantarflexion 4+/5 4+/5       Neuro/Sensation:     DTR:   Right Left Comment   Patellar (L3-4) 1+ 2+        Neuro Dynamic Testing:    Sciatic nerve:      SLR: R = (-)     L = (-)     Flexibility:   Knee to opposite shoulder bilateral   Tightness bilateral in figure 4 position more on left than R      Special Tests:  DEJAH: (+)  FADIR: (-)  -Bridge Test: (+) with cramping on left with right knee extension    Other:     5x sit<>stand: 17 seconds    Normal:   60-69: 11.4 seconds  70-79: 12.6 seconds  80-89: 12.7 seconds    30 seconds sit<>stand: 8 reps    Normal:   Age Male Female   60-64 17 15   65-69 16 15   70-74 15 14   75-79 14 13   80-84 13 12   85-89 11 11   90-94 9 9        FOTO completed but score not recorded due to technical issues.  Will be performed again at next visit for intake score.     TREATMENT     Home Exercises and Patient Education Provided    Education provided:   Patient was educated on initial evaluation findings and expectations as well as future PT services, procedures, and expectations for optimal compliance with therapy.     Written Home Exercises Provided:  HEP will be provided at first treatment visit.     Assessment   Soraya is a 62 y.o. female referred to outpatient Physical Therapy with a medical diagnosis of Pain of left hip. Pt presents with decreased ROM, strength, flexibility, positive special tests indicating potential low back involvement , TTP with moderate palpation over lumbar paraspinals and left glute and piriformis, poor posture causing increased pain and decreased participation with work, recreational and houseold duties.       Pt prognosis is Good.   Pt will benefit from skilled outpatient Physical Therapy to address the deficits stated above and in the chart below, provide pt/family education, and to maximize pt's level of independence to return to OF.     Plan of care discussed with patient: Yes  Pt's spiritual, cultural and educational needs considered and patient is agreeable to the plan of care and goals as stated below:     Anticipated Barriers for therapy: none    Medical Necessity is demonstrated by the followingMedical Necessity is demonstrated by the following  History  Co-morbidities and personal factors that may impact the plan of care [x] LOW: no personal factors / co-morbidities  [] MODERATE: 1-2 personal factors / co-morbidities  [] HIGH: 3+ personal factors / co-morbidities    Moderate / High Support Documentation:   Co-morbidities affecting plan of care: diabetes    Personal Factors:   age  Work requirements     Examination  Body Structures and Functions, activity limitations and participation restrictions that may impact the plan of care [x] LOW: addressing 1-2 elements  [] MODERATE: 3+ elements  [] HIGH: 4+ elements (please support below)    Moderate / High Support Documentation: n/a     Clinical Presentation [x] LOW: stable  [] MODERATE: Evolving  [] HIGH: Unstable     Decision Making/ Complexity Score: low       Goals:    In 6 weeks,   Goal Status   Patient will be independent with HEP to promote improved therapy outcomes.  STG     2.  Patient will improve R MMT to 4/5 to improve strength for functional tasks. STG    3. Patient will improve lumbar ROM by 25% to improve mobility and promote normal movement patterns.  STG    4. Patient will improve 5x sit<>stand to 12 seconds to improve functional strength and improve mobility.  STG        In 12 weeks,  Goal Status   5. Patient will be independent with progressed HEP to self manage symptoms.  LTG    6. Patient will improve bilateral hip MMT to 4+/5 to improve strength for functional tasks.  LTG    7. Patient will improve hip ER RANGE OF MOTION by 25% to improve mobility and reduce tightness in the hip.  LTG    8. Patient will improve 30 second sit<>stand to 13 reps to improve functional strength and improve mobility.  LTG    9. Patient will stand for 1-2+ hours without an increased in symptoms to perform work duties.  LTG          Plan   Plan of care Certification: 6/9/2023 to 9/1/2023.    Outpatient Physical Therapy 1 times weekly for 12 visits to include the following interventions: Cervical/Lumbar Traction, Gait Training, Manual Therapy, Moist Heat/ Ice, Neuromuscular Re-ed, Patient Education, Therapeutic Activities, Therapeutic Exercise, and dry needling.     Pt will be seen by a physical therapist minimally every 6th visit or every 30 days.    Lindsay Pleitez, PT

## 2023-06-12 LAB — 1,25(OH)2D3 SERPL-MCNC: 50 PG/ML (ref 20–79)

## 2023-06-19 NOTE — PROGRESS NOTES
CC: left low back and hip pain     62 y.o. Female presents today for evaluation of her left low back and hip pain. She admits to left posterior hip pain beginning early June 2023 that she attributes to increased stress while at work. She states her pain was so severe 06/02/2023 she sought care from the ED. She gestures to the left posterior and lateral hip when asked where she hurts and indicates her pain radiating tot he left leg and endorses intermittent numbness to the toes on the left.  How long: Beginning early June 2023  What makes it better: Rest, Tylenol, lidocaine patches  What makes it worse: Standing for long periods of time, walking for long periods of time  Does it radiate: Admits to her pain radiating to the lateral aspect of the left leg.   Attempted treatments:Tylenol, topical pain relieving cream, lidocaine patches - which have all been helpful for her. Denies prior history of hip/lumbar spine surgery or injection.  Pain score: 4/10  Any mechanical symptoms: Denies   Feelings of instability: Admits to feelings of instability   Affecting ADLs: Admits to this problem affecting her ability to perform ADLs    Occupation: Patient care tech    PAST MEDICAL HISTORY:   Past Medical History:   Diagnosis Date    Gastroenteritis     Glaucoma (increased eye pressure)     Prediabetes 06/03/2019    Tinnitus        PAST SURGICAL HISTORY:   Past Surgical History:   Procedure Laterality Date    COLONOSCOPY N/A 6/7/2016    Procedure: COLONOSCOPY;  Surgeon: Katerine Ramsey MD;  Location: [address];  Service: Endoscopy;  Laterality: N/A;    LAPAROSCOPIC CHOLECYSTECTOMY N/A 1/8/2021    Procedure: CHOLECYSTECTOMY, LAPAROSCOPIC;  Surgeon: Johan Wilkerson Jr., MD;  Location: [facility];  Service: General;  Laterality: N/A;    OOPHORECTOMY  1999    Unilateral oophorectomy       FAMILY HISTORY:   Family History   Problem Relation Age of Onset    Breast cancer Cousin 67    Hypertension Mother     Glaucoma Mother      Cataracts Mother     Diabetes Sister     Glaucoma Sister     Diabetes Sister     Colon cancer Neg Hx     Ovarian cancer Neg Hx     Stroke Neg Hx        SOCIAL HISTORY:   Social History     Socioeconomic History    Marital status:    Occupational History    Occupation: patient care tech    Tobacco Use    Smoking status: Former     Packs/day: 0.10     Years: 15.00     Pack years: 1.50     Types: Cigarettes     Quit date: 2005     Years since quittin.9    Smokeless tobacco: Never   Substance and Sexual Activity    Alcohol use: No     Alcohol/week: 0.0 standard drinks    Drug use: No    Sexual activity: Not Currently     Partners: Male     Birth control/protection: Post-menopausal       MEDICATIONS:     Current Outpatient Medications:     atorvastatin (LIPITOR) 40 MG tablet, Take 1 tablet (40 mg total) by mouth once daily., Disp: 90 tablet, Rfl: 0    Bifidobacterium infantis (ALIGN) 4 mg Cap, Take 1 capsule (4 mg total) by mouth once daily., Disp: 90 capsule, Rfl: 3    cholecalciferol, vitamin D3, 50,000 unit capsule, Take 1 capsule (50,000 Units total) by mouth once a week., Disp: 12 capsule, Rfl: 3    ciclopirox (PENLAC) 8 % Soln, Apply topically nightly., Disp: 6.6 mL, Rfl: 11    diclofenac sodium (VOLTAREN) 1 % Gel, Apply 2 g topically 4 (four) times daily., Disp: 100 g, Rfl: 0    dicyclomine (BENTYL) 20 mg tablet, Take 1 tablet (20 mg total) by mouth 2 (two) times daily as needed (Stomach cramps)., Disp: 60 tablet, Rfl: 1    fluocinonide (LIDEX) 0.05 % external solution, Apply topically daily as needed (scalp itching)., Disp: 60 mL, Rfl: 6    fluticasone propionate (FLONASE) 50 mcg/actuation nasal spray, 1 spray (50 mcg total) by Each Nostril route once daily., Disp: 15.8 mL, Rfl: 1    hydrocortisone 2.5 % cream, Apply topically 2 (two) times daily as needed (flaking and itching rash)., Disp: 30 g, Rfl: 6    ketoconazole (NIZORAL) 2 % shampoo, Apply topically twice a week. Leave on for 5 to 10  "minutes then rinse, Disp: 120 mL, Rfl: 6    latanoprost 0.005 % ophthalmic solution, Place 1 drop into both eyes every evening., Disp: 2.5 mL, Rfl: 6    loratadine (CLARITIN) 10 mg tablet, Take 1 tablet (10 mg total) by mouth once daily., Disp: 30 tablet, Rfl: 3    naproxen (NAPROSYN) 500 MG tablet, Take 1 tablet (500 mg total) by mouth 2 (two) times daily as needed (Pain, take with meals)., Disp: 30 tablet, Rfl: 0    ondansetron (ZOFRAN-ODT) 4 MG TbDL, Take 1 tablet (4 mg total) by mouth every 8 (eight) hours as needed (Nausea)., Disp: 12 tablet, Rfl: 0    pantoprazole (PROTONIX) 20 MG tablet, Take 1 tablet (20 mg total) by mouth once daily., Disp: 30 tablet, Rfl: 10    sodium chloride (SALINE NASAL) 0.65 % nasal spray, 1 spray by Nasal route every evening., Disp: 60 mL, Rfl: 3    ALLERGIES:   Review of patient's allergies indicates:  No Known Allergies     PHYSICAL EXAMINATION:  /81   Ht 5' 4" (1.626 m)   Wt 74.8 kg (165 lb)   LMP 07/21/2006   BMI 28.32 kg/m²   Vitals signs and nursing note have been reviewed.  General: In no acute distress, well developed, well nourished, no diaphoresis  Eyes: EOM full and smooth, no eye redness or discharge  HENT: normocephalic and atraumatic, neck supple, trachea midline, no nasal discharge, no external ear redness or discharge  Cardiovascular: no LE edema  Lungs: respirations non-labored, no conversational dyspnea   Abd: non-distended, no rigidity  MSK: no amputation or deformity, no swelling of extremities  Neuro: AAOx3, CN2-12 grossly intact  Skin: No rashes, warm and dry  Psychiatric: cooperative, pleasant, mood and affect appropriate for age    Lumbar Spine: left lumbar region    Observation:    Normal cervicothoracolumbar curves.    No obvious pelvic obliquity while standing.    No edema, erythema, or ecchymosis noted in lumbosacral region.    No midline skin abnormalities.    No atrophy of lower limb musculature.    Tenderness:  + tenderness throughout the " lower left lumbar spine, iliolumbar region, posterior pelvis.  + tenderness over the left sacrum  No tenderness at the piriformis, greater/lesser trochanters.  No bony deformities or step-offs palpated.     Range of Motion:  Active flexion to 60°.   Active extension to 25°.   Active rotation to 30° bilaterally.    Active sidebending to 25° bilaterally.  Passive hip flexion to 135° bilaterally.    Passive hip internal rotation to 45° bilaterally.    Passive hip external rotation to 45° bilaterally.    Pain present with internal and external rotation of the hip    Strength Testing:  Hip flexion - 5/5  Hip extension - 5/5  Knee flexion - 5/5  Knee extension - 5/5  Dorsiflexion - 5/5  Plantarflexion - 5/5  Great toe extension - 5/5    Special Tests:    Seated straight leg raise - negative  Supine straight leg raise - positive left  Slump test - negative  Provocation maneuvers exhibit no worsening of symptoms.    DEJAH test - positive  FADIR test - positive  Log roll test - negative    Posture:  Upright and Anterior pelvic tilt with increased lumbar lordosis  Gait: Left antalgic     TART (Tissue texture abnormality, Asymmetry,  Restriction of motion and/or Tenderness) changes:    Head:    Cervical Spine  Thoracic Spine  Lumbar Spine   C1  T1 Neutral L1 Neutral   C2  T2 Neutral L2 Neutral   C3  T3 Neutral L3 Neutral   C4  T4 Neutral L4 ERSR   C5  T5 Neutral L5 ERSR   C6  T6 FRSR     C7  T7 ERSL       T8 Neutral       T9 ERSR       T10 Neutral       T11 Neutral       T12 Neutral       Ribs:    Upper Extremity:    Abdomen:    Pelvis:  Innominate:Left superior shear  Pubic bone:Left superior pubic shear    Sacrum:Left on Right sacral torsion  Fascial herniated trigger point at the left sacral base    Lower Extremity:  Myofascial restriction left lateral hip and left leg  External tibial torsion on the left    Key   F= Flexed   E = Extended   R = Rotated   N = Neutral   S = Sidebent   TTA = tissue texture abnormality   L/R/B  = left/right/bilateral (last letter)         Neurovascular Exam:  Sensation intact to light touch in the L2-S2 dermatomes bilaterally.   No pretibial edema or abnormal hair pattern of the shin.    Intact and symmetric DP and PT pulses bilaterally.  Normal gait without trendelenberg, heel walking, toe walking, and tandem walking.    IMAGIN. X-ray obtained 2023 due to left hip pain   2. X-ray images were reviewed personally by me and then directly with patient.  3. FINDINGS: X-ray images obtained demonstrate no fracture or dislocation, mild bilateral hip joint space narrowing  4. IMPRESSION: As above.     1. X-ray ordered due to left low back pain   2. X-ray images were reviewed personally by me and then directly with patient.  3. FINDINGS: X-ray images obtained demonstrate facet arthropathy of the lower lumbar spine.  No fracture or dislocation.  No obvious disc space narrowing.  4. IMPRESSION: As above.       ASSESSMENT:      ICD-10-CM ICD-9-CM   1. Pain of left hip  M25.552 719.45   2. Mechanical low back pain  M54.59 724.2   3. Somatic dysfunction of lumbar region  M99.03 739.3   4. Somatic dysfunction of pelvis region  M99.05 739.5   5. Somatic dysfunction of lower extremity  M99.06 739.6   6. Somatic dysfunction of sacral region  M99.04 739.4   7. Somatic dysfunction of thoracic region  M99.02 739.2         PLAN:  1-2.  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 She admits to radiating pain down her left leg and intermittent numbness affecting the left foot.    - XRs obtained 2023 and lumbar spine films obtained today. Images were personally reviewed with the patient. See above for further detail.    - Based on her description of pain/body language and somatic dysfunction identified on exam, I discussed osteopathic manipulation as a treatment  option today. She consents to evaluation and treatment. See below.    - HEP for 4-directional hip, double leg bridge, glute med/max, mini squat, abdominal bracing, ant marches prescribed today. Handouts provided, explained, and exercises were demonstrated as needed. Encouraged to do daily. 28040 HOME EXERCISE PROGRAM (HEP):  The patient was taught a homegoing physical therapy regimen as described above by provider with assistance of sports medicine assistant. The patient demonstrated understanding of the exercises and proper technique of their execution. This interaction took 15 minutes.       3-7. Somatic dysfunction of lumbar, pelvis, sacral, thoracic, lower extremity regions -     - OMT 5-6 regions. Oral consent obtained. Reviewed benefits and potential side effects. OMT indicated today due to signs and symptoms as well as local and remote somatic dysfunction findings and their related neurokinetic, lymphatic, fascial and/or arteriovenous body connections. OMT techniques used: Soft Tissue, Myofascial Release, Muscle Energy, High Velocity Low Amplitude, and Fascial Distortion Model. Treatment was tolerated well. Improvement noted in segmental mobility post-treatment in dysfunctional regions. There were no adverse events and no complications immediately following treatment. Advised plenty of water to help alleviate soreness.      Future planning includes - possibly more OMT if helpful and if indicated, add PT, advanced imaging if not improving    All questions were answered to the best of my ability and all concerns were addressed at this time.    Follow up in 3-4 weeks for above, or sooner if needed.      This note is dictated using the M*Modal Fluency Direct word recognition program. There are word recognition mistakes that are occasionally missed on review.    Total time spent face-to face with patient counseling or coordinating care including prognosis, differential diagnosis, risks and benefits of treatment,  instructions, compliance risk reductions as well as non-face-to-face time personally spent reviewing medial record, medical documentation, and coordination of care.     EST MINUTES X   [id number] 10-19    43710 20-29    [id number] 30-39    23227 40-54    NEW     [id number] 15-29    [id number] 30-44    [id number] 45-59 X   [id number] 60-74    PHONE      5-10    [id number] 11-20    [id number] 21-30

## 2023-06-21 ENCOUNTER — OFFICE VISIT (OUTPATIENT)
Dept: SPORTS MEDICINE | Facility: CLINIC | Age: 63
End: 2023-06-21
Payer: COMMERCIAL

## 2023-06-21 ENCOUNTER — APPOINTMENT (OUTPATIENT)
Dept: RADIOLOGY | Facility: OTHER | Age: 63
End: 2023-06-21
Attending: ORTHOPAEDIC SURGERY
Payer: COMMERCIAL

## 2023-06-21 VITALS
DIASTOLIC BLOOD PRESSURE: 81 MMHG | HEIGHT: 64 IN | WEIGHT: 165 LBS | SYSTOLIC BLOOD PRESSURE: 129 MMHG | BODY MASS INDEX: 28.17 KG/M2

## 2023-06-21 DIAGNOSIS — M99.02 SOMATIC DYSFUNCTION OF THORACIC REGION: ICD-10-CM

## 2023-06-21 DIAGNOSIS — M99.06 SOMATIC DYSFUNCTION OF LOWER EXTREMITY: ICD-10-CM

## 2023-06-21 DIAGNOSIS — M54.59 MECHANICAL LOW BACK PAIN: ICD-10-CM

## 2023-06-21 DIAGNOSIS — M99.05 SOMATIC DYSFUNCTION OF PELVIS REGION: ICD-10-CM

## 2023-06-21 DIAGNOSIS — M54.40 ACUTE LOW BACK PAIN WITH SCIATICA: ICD-10-CM

## 2023-06-21 DIAGNOSIS — M99.03 SOMATIC DYSFUNCTION OF LUMBAR REGION: ICD-10-CM

## 2023-06-21 DIAGNOSIS — M25.552 PAIN OF LEFT HIP: Primary | ICD-10-CM

## 2023-06-21 DIAGNOSIS — M99.04 SOMATIC DYSFUNCTION OF SACRAL REGION: ICD-10-CM

## 2023-06-21 PROCEDURE — 3008F PR BODY MASS INDEX (BMI) DOCUMENTED: ICD-10-PCS | Mod: CPTII,S$GLB,, | Performed by: ORTHOPAEDIC SURGERY

## 2023-06-21 PROCEDURE — 1159F MED LIST DOCD IN RCRD: CPT | Mod: CPTII,S$GLB,, | Performed by: ORTHOPAEDIC SURGERY

## 2023-06-21 PROCEDURE — 1160F PR REVIEW ALL MEDS BY PRESCRIBER/CLIN PHARMACIST DOCUMENTED: ICD-10-PCS | Mod: CPTII,S$GLB,, | Performed by: ORTHOPAEDIC SURGERY

## 2023-06-21 PROCEDURE — 1160F RVW MEDS BY RX/DR IN RCRD: CPT | Mod: CPTII,S$GLB,, | Performed by: ORTHOPAEDIC SURGERY

## 2023-06-21 PROCEDURE — 3074F PR MOST RECENT SYSTOLIC BLOOD PRESSURE < 130 MM HG: ICD-10-PCS | Mod: CPTII,S$GLB,, | Performed by: ORTHOPAEDIC SURGERY

## 2023-06-21 PROCEDURE — 72110 X-RAY EXAM L-2 SPINE 4/>VWS: CPT | Mod: TC

## 2023-06-21 PROCEDURE — 98927 PR OSTEOPATHIC MANIP,5-6 BODY REGN: ICD-10-PCS | Mod: S$GLB,,, | Performed by: ORTHOPAEDIC SURGERY

## 2023-06-21 PROCEDURE — 3044F HG A1C LEVEL LT 7.0%: CPT | Mod: CPTII,S$GLB,, | Performed by: ORTHOPAEDIC SURGERY

## 2023-06-21 PROCEDURE — 72110 X-RAY EXAM L-2 SPINE 4/>VWS: CPT | Mod: 26,,, | Performed by: RADIOLOGY

## 2023-06-21 PROCEDURE — 97110 PR THERAPEUTIC EXERCISES: ICD-10-PCS | Mod: S$GLB,,, | Performed by: ORTHOPAEDIC SURGERY

## 2023-06-21 PROCEDURE — 97110 THERAPEUTIC EXERCISES: CPT | Mod: S$GLB,,, | Performed by: ORTHOPAEDIC SURGERY

## 2023-06-21 PROCEDURE — 99999 PR PBB SHADOW E&M-EST. PATIENT-LVL IV: CPT | Mod: PBBFAC,,, | Performed by: ORTHOPAEDIC SURGERY

## 2023-06-21 PROCEDURE — 3074F SYST BP LT 130 MM HG: CPT | Mod: CPTII,S$GLB,, | Performed by: ORTHOPAEDIC SURGERY

## 2023-06-21 PROCEDURE — 99204 PR OFFICE/OUTPT VISIT, NEW, LEVL IV, 45-59 MIN: ICD-10-PCS | Mod: 25,S$GLB,, | Performed by: ORTHOPAEDIC SURGERY

## 2023-06-21 PROCEDURE — 3079F DIAST BP 80-89 MM HG: CPT | Mod: CPTII,S$GLB,, | Performed by: ORTHOPAEDIC SURGERY

## 2023-06-21 PROCEDURE — 3079F PR MOST RECENT DIASTOLIC BLOOD PRESSURE 80-89 MM HG: ICD-10-PCS | Mod: CPTII,S$GLB,, | Performed by: ORTHOPAEDIC SURGERY

## 2023-06-21 PROCEDURE — 99204 OFFICE O/P NEW MOD 45 MIN: CPT | Mod: 25,S$GLB,, | Performed by: ORTHOPAEDIC SURGERY

## 2023-06-21 PROCEDURE — 99999 PR PBB SHADOW E&M-EST. PATIENT-LVL IV: ICD-10-PCS | Mod: PBBFAC,,, | Performed by: ORTHOPAEDIC SURGERY

## 2023-06-21 PROCEDURE — 72110 XR LUMBAR SPINE COMPLETE 5 VIEW: ICD-10-PCS | Mod: 26,,, | Performed by: RADIOLOGY

## 2023-06-21 PROCEDURE — 3044F PR MOST RECENT HEMOGLOBIN A1C LEVEL <7.0%: ICD-10-PCS | Mod: CPTII,S$GLB,, | Performed by: ORTHOPAEDIC SURGERY

## 2023-06-21 PROCEDURE — 3008F BODY MASS INDEX DOCD: CPT | Mod: CPTII,S$GLB,, | Performed by: ORTHOPAEDIC SURGERY

## 2023-06-21 PROCEDURE — 98927 OSTEOPATH MANJ 5-6 REGIONS: CPT | Mod: S$GLB,,, | Performed by: ORTHOPAEDIC SURGERY

## 2023-06-21 PROCEDURE — 1159F PR MEDICATION LIST DOCUMENTED IN MEDICAL RECORD: ICD-10-PCS | Mod: CPTII,S$GLB,, | Performed by: ORTHOPAEDIC SURGERY

## 2023-06-26 ENCOUNTER — CLINICAL SUPPORT (OUTPATIENT)
Dept: REHABILITATION | Facility: OTHER | Age: 63
End: 2023-06-26
Payer: COMMERCIAL

## 2023-06-26 DIAGNOSIS — M25.562 ACUTE PAIN OF LEFT KNEE: ICD-10-CM

## 2023-06-26 DIAGNOSIS — M25.552 PAIN IN LEFT HIP: Primary | ICD-10-CM

## 2023-06-26 PROCEDURE — 97110 THERAPEUTIC EXERCISES: CPT | Mod: PN,CQ

## 2023-06-26 NOTE — PROGRESS NOTES
Physical Therapy Daily Treatment Note     [facility]  [id number]    Therapy Diagnosis:   Encounter Diagnoses   Name Primary?    Pain in left hip Yes    Acute pain of left knee      Physician: Julio Cesar Altamirano PA-C    Visit Date: 6/26/2023  Physician Orders: PT Eval and Treat   Medical Diagnosis from Referral: Pain of left hip (M25.552)  Evaluation Date: 6/9/2023  Authorization Period Expiration: 6/1/2024  Plan of Care Expiration: 9/1/2023  Visit # / Visits authorized: 2 1/ 1  Re-assessment: due 7/9/2023  FOTO: 6/9/2023 (2)    PTA visits: 1/5     Time In: 0915  Time Out: 1000  Total Billable Time: 45 minutes     Precautions: Standard and Diabetes     Subjective    Pt states feeling well w/ no c.o pn in L hip currently.  Pt mentioned being tired 2* working until 7:30 am.      Pt reports: she was compliant with home exercise program given last session.   Response to previous treatment:no adverse effects  Functional change: no change reported     Pain: 0/10  Location: left hip    Objective     Soraya received therapeutic exercises to develop strength, endurance, ROM, flexibility, posture, and core stabilization for 40 minutes including:  Ta activation in HL 20 x 3 sec hold   PPT in HL 20 x 3 sec hold   LTR on stability ball 2 min as sai  DKTC on stability ball 20 x 3 sec hold   HL hip add ball sq 20 x 3 sec hold   HL hip abd pilates ring 20 x 3 sec hold  HL clamshells RTB 20 x   Bridges x 10     Soraya received the following manual therapy techniques: Joint mobilizations and Manual traction were applied to the: L hip for 5 minutes, including:  Long axis hip distraction 5 x 30 sec w/ seat belt      Home Exercises Provided and Patient Education Provided     Education provided:   - pt edu on proper exercise technique.      Written Home Exercises Provided: Patient instructed to cont prior HEP. Exercises were reviewed and Soraya was able to demonstrate them prior to  the end of the session.  Soraya demonstrated good  understanding of the education provided. See EMR under Patient Instructions for exercises provided during therapy sessions.      Assessment     Pt sai tx well w/ no c/o pn.  Pt demonstrated increased glute and core strength during therex.  Pt cont to lack some core stability and LE strength.    Soraya Is progressing well towards her goals.   Pt prognosis is Good.     Pt will continue to benefit from skilled outpatient physical therapy to address the deficits listed in the problem list box on initial evaluation, provide pt/family education and to maximize pt's level of independence in the home and community environment.     Pt's spiritual, cultural and educational needs considered and pt agreeable to plan of care and goals.    Anticipated barriers to physical therapy: none    Goals: Goals:     In 6 weeks,    Goal Status   Patient will be independent with HEP to promote improved therapy outcomes.  STG  progressing, not met   2.  Patient will improve R MMT to 4/5 to improve strength for functional tasks. STG  progressing, not met   3. Patient will improve lumbar ROM by 25% to improve mobility and promote normal movement patterns.  STG  progressing, not met   4. Patient will improve 5x sit<>stand to 12 seconds to improve functional strength and improve mobility.  STG  progressing, not met         In 12 weeks,   Goal Status   5. Patient will be independent with progressed HEP to self manage symptoms.  LTG  progressing, not met   6. Patient will improve bilateral hip MMT to 4+/5 to improve strength for functional tasks.  LTG  progressing, not met   7. Patient will improve hip ER RANGE OF MOTION by 25% to improve mobility and reduce tightness in the hip.  LTG  progressing, not met   8. Patient will improve 30 second sit<>stand to 13 reps to improve functional strength and improve mobility.  LTG  progressing, not met   9. Patient will stand for 1-2+ hours without an  increased in symptoms to perform work duties.  LTG  progressing, not met        Plan     Cont to progress towards goals set by PT.  Work to improve lumbar ROM and core/ glute strength next visit.      Douglas Cardenas, PTA

## 2023-06-30 ENCOUNTER — TELEPHONE (OUTPATIENT)
Dept: HEPATOLOGY | Facility: HOSPITAL | Age: 63
End: 2023-06-30
Payer: COMMERCIAL

## 2023-06-30 DIAGNOSIS — I10 PRIMARY HYPERTENSION: Primary | ICD-10-CM

## 2023-06-30 RX ORDER — LOSARTAN POTASSIUM 25 MG/1
25 TABLET ORAL DAILY
Qty: 60 TABLET | Refills: 0 | Status: SHIPPED | OUTPATIENT
Start: 2023-06-30 | End: 2024-01-22

## 2023-06-30 NOTE — TELEPHONE ENCOUNTER
----- Message from Lorri Castillo MA sent at 6/19/2023  8:17 AM CDT -----  Contact: [phone]    ----- Message -----  From: Dee Beckham  Sent: 6/16/2023   4:59 PM CDT  To: Cordelia Jules Staff    Ms Lira is calling to report her BP readings for the past week    She also took her pulse rate as well    Please call her and advise @ # [phone]    ------------------------------------------------------------------------------------------------------------    Taken the week of 6/9 - 6/16 6/9 PM - 133/64 Pulse 98     6/10 AM - 128/65 Pulse 92    6/11 PM - 140/52 Pulse 72    6/12 AM - 140/50 Pulse 52    6/13 PM - 149/50 Pulse 72    6/14 PM - 149/51 Pulse 72    6/15 AM - 152/56 Pulse 72    6/16 AM - 131/57 Pulse 80

## 2023-07-24 ENCOUNTER — PATIENT MESSAGE (OUTPATIENT)
Dept: RESEARCH | Facility: HOSPITAL | Age: 63
End: 2023-07-24
Payer: COMMERCIAL

## 2023-07-24 NOTE — PROGRESS NOTES
CC: left low back and hip pain     Soraya is here today for follow up evaluation of her left low back and hip pain. Patient reports her pain is 1/10 today and states she is feeling 75% improved which she attributes to OMT and compliance with her HEP. She continues to appreciate pain when getting off work, but states she improves after a period of rest. She endorses occasional burning pain to the left anterior thigh and denies new falls or trauma since her last visit.     Recall from visit on 06/21/2023  62 y.o. Female presents today for evaluation of her left low back and hip pain. She admits to left posterior hip pain beginning early June 2023 that she attributes to increased stress while at work. She states her pain was so severe 06/02/2023 she sought care from the ED. She gestures to the left posterior and lateral hip when asked where she hurts and indicates her pain radiating tot he left leg and endorses intermittent numbness to the toes on the left.  How long: Beginning early June 2023  What makes it better: Rest, Tylenol, lidocaine patches  What makes it worse: Standing for long periods of time, walking for long periods of time  Does it radiate: Admits to her pain radiating to the lateral aspect of the left leg.   Attempted treatments:Tylenol, topical pain relieving cream, lidocaine patches - which have all been helpful for her. Denies prior history of hip/lumbar spine surgery or injection.  Pain score: 4/10  Any mechanical symptoms: Denies   Feelings of instability: Admits to feelings of instability   Affecting ADLs: Admits to this problem affecting her ability to perform ADLs    Occupation: Patient care tech    PAST MEDICAL HISTORY:   Past Medical History:   Diagnosis Date    Gastroenteritis     Glaucoma (increased eye pressure)     Prediabetes 06/03/2019    Tinnitus        PAST SURGICAL HISTORY:   Past Surgical History:   Procedure Laterality Date    COLONOSCOPY N/A 6/7/2016    Procedure: COLONOSCOPY;   Surgeon: Katerine Ramsey MD;  Location: [facility] ENDO (4TH FLR);  Service: Endoscopy;  Laterality: N/A;    LAPAROSCOPIC CHOLECYSTECTOMY N/A 2021    Procedure: CHOLECYSTECTOMY, LAPAROSCOPIC;  Surgeon: Johan Wilkerson Jr., MD;  Location: [facility] [place] OR;  Service: General;  Laterality: N/A;    OOPHORECTOMY      Unilateral oophorectomy       FAMILY HISTORY:   Family History   Problem Relation Age of Onset    Breast cancer Cousin 67    Hypertension Mother     Glaucoma Mother     Cataracts Mother     Diabetes Sister     Glaucoma Sister     Diabetes Sister     Colon cancer Neg Hx     Ovarian cancer Neg Hx     Stroke Neg Hx        SOCIAL HISTORY:   Social History     Socioeconomic History    Marital status:    Occupational History    Occupation: patient care tech    Tobacco Use    Smoking status: Former     Packs/day: 0.10     Years: 15.00     Pack years: 1.50     Types: Cigarettes     Quit date: 2005     Years since quittin.0    Smokeless tobacco: Never   Substance and Sexual Activity    Alcohol use: No     Alcohol/week: 0.0 standard drinks    Drug use: No    Sexual activity: Not Currently     Partners: Male     Birth control/protection: Post-menopausal       MEDICATIONS:     Current Outpatient Medications:     atorvastatin (LIPITOR) 40 MG tablet, Take 1 tablet (40 mg total) by mouth once daily., Disp: 90 tablet, Rfl: 0    Bifidobacterium infantis (ALIGN) 4 mg Cap, Take 1 capsule (4 mg total) by mouth once daily., Disp: 90 capsule, Rfl: 3    cholecalciferol, vitamin D3, 50,000 unit capsule, Take 1 capsule (50,000 Units total) by mouth once a week., Disp: 12 capsule, Rfl: 3    ciclopirox (PENLAC) 8 % Soln, Apply topically nightly., Disp: 6.6 mL, Rfl: 11    diclofenac sodium (VOLTAREN) 1 % Gel, Apply 2 g topically 4 (four) times daily., Disp: 100 g, Rfl: 0    dicyclomine (BENTYL) 20 mg tablet, Take 1 tablet (20 mg total) by mouth 2 (two) times daily as needed (Stomach cramps)., Disp: 60 tablet, Rfl: 1     "fluocinonide (LIDEX) 0.05 % external solution, Apply topically daily as needed (scalp itching)., Disp: 60 mL, Rfl: 6    fluticasone propionate (FLONASE) 50 mcg/actuation nasal spray, 1 spray (50 mcg total) by Each Nostril route once daily., Disp: 15.8 mL, Rfl: 1    hydrocortisone 2.5 % cream, Apply topically 2 (two) times daily as needed (flaking and itching rash)., Disp: 30 g, Rfl: 6    ketoconazole (NIZORAL) 2 % shampoo, Apply topically twice a week. Leave on for 5 to 10 minutes then rinse, Disp: 120 mL, Rfl: 6    latanoprost 0.005 % ophthalmic solution, Place 1 drop into both eyes every evening., Disp: 2.5 mL, Rfl: 6    loratadine (CLARITIN) 10 mg tablet, Take 1 tablet (10 mg total) by mouth once daily., Disp: 30 tablet, Rfl: 3    losartan (COZAAR) 25 MG tablet, Take 1 tablet (25 mg total) by mouth once daily., Disp: 60 tablet, Rfl: 0    naproxen (NAPROSYN) 500 MG tablet, Take 1 tablet (500 mg total) by mouth 2 (two) times daily as needed (Pain, take with meals)., Disp: 30 tablet, Rfl: 0    ondansetron (ZOFRAN-ODT) 4 MG TbDL, Take 1 tablet (4 mg total) by mouth every 8 (eight) hours as needed (Nausea)., Disp: 12 tablet, Rfl: 0    pantoprazole (PROTONIX) 20 MG tablet, Take 1 tablet (20 mg total) by mouth once daily., Disp: 30 tablet, Rfl: 10    sodium chloride (SALINE NASAL) 0.65 % nasal spray, 1 spray by Nasal route every evening., Disp: 60 mL, Rfl: 3    ALLERGIES:   Review of patient's allergies indicates:  No Known Allergies     PHYSICAL EXAMINATION:  BP (!) 143/80   Ht 5' 4" (1.626 m)   Wt 74.8 kg (165 lb)   LMP 07/21/2006   BMI 28.32 kg/m²   Vitals signs and nursing note have been reviewed.  General: In no acute distress, well developed, well nourished, no diaphoresis  Eyes: EOM full and smooth, no eye redness or discharge  HENT: normocephalic and atraumatic, neck supple, trachea midline, no nasal discharge, no external ear redness or discharge  Cardiovascular: no LE edema  Lungs: respirations " non-labored, no conversational dyspnea   Abd: non-distended, no rigidity  MSK: no amputation or deformity, no swelling of extremities  Neuro: AAOx3, CN2-12 grossly intact  Skin: No rashes, warm and dry  Psychiatric: cooperative, pleasant, mood and affect appropriate for age    Lumbar Spine: left lumbar region    Observation:    Normal cervicothoracolumbar curves.    No obvious pelvic obliquity while standing.    No edema, erythema, or ecchymosis noted in lumbosacral region.    No midline skin abnormalities.    No atrophy of lower limb musculature.    Tenderness:  + tenderness throughout the lower left lumbar spine, iliolumbar region, posterior pelvis.  + tenderness over the left sacrum  No tenderness at the piriformis, greater/lesser trochanters.  No bony deformities or step-offs palpated.     Range of Motion:  Active flexion to 60°.   Active extension to 25°.   Active rotation to 30° bilaterally.    Active sidebending to 25° bilaterally.  Passive hip flexion to 135° bilaterally.    Passive hip internal rotation to 45° bilaterally.    Passive hip external rotation to 45° bilaterally.    Pain present with internal and external rotation of the hip    Strength Testing:  Hip flexion - 5/5  Hip extension - 5/5  Knee flexion - 5/5  Knee extension - 5/5  Dorsiflexion - 5/5  Plantarflexion - 5/5  Great toe extension - 5/5    Special Tests:    Seated straight leg raise - negative  Supine straight leg raise - positive left  Slump test - negative  Provocation maneuvers exhibit no worsening of symptoms.    DEJAH test - positive  FADIR test - positive  Log roll test - negative    Posture:  Upright and Anterior pelvic tilt with increased lumbar lordosis  Gait: Left antalgic     TART (Tissue texture abnormality, Asymmetry,  Restriction of motion and/or Tenderness) changes:    Head:    Cervical Spine  Thoracic Spine  Lumbar Spine   C1  T1 Neutral L1 Neutral   C2  T2 Neutral L2 Neutral   C3  T3 Neutral L3 Neutral   C4  T4 Neutral L4  Neutral   C5  T5 Neutral L5 FRSR   C6  T6 Neutral     C7  T7 ERSL       T8 ERSL       T9 ERSL       T10 Neutral       T11 Neutral       T12 Neutral       Ribs:    Upper Extremity:    Abdomen:    Pelvis:  Innominate:Left anterior rotation  Pubic bone:Neutral    Sacrum:Left on Right sacral torsion    Lower Extremity:  Myofascial restriction left lateral hip  External tibial torsion on the left    Key   F= Flexed   E = Extended   R = Rotated   N = Neutral   S = Sidebent   TTA = tissue texture abnormality   L/R/B = left/right/bilateral (last letter)         Neurovascular Exam:  Sensation intact to light touch in the L2-S2 dermatomes bilaterally.   No pretibial edema or abnormal hair pattern of the shin.    Intact and symmetric DP and PT pulses bilaterally.  Normal gait without trendelenberg, heel walking, toe walking, and tandem walking.    IMAGIN. X-ray obtained 2023 due to left hip pain   2. X-ray images were reviewed personally by me and then directly with patient.  3. FINDINGS: X-ray images obtained demonstrate no fracture or dislocation, mild bilateral hip joint space narrowing  4. IMPRESSION: As above.     1. X-ray obtained 2023 due to left low back pain   2. X-ray images were reviewed personally by me and then directly with patient.  3. FINDINGS: X-ray images obtained demonstrate facet arthropathy of the lower lumbar spine.  No fracture or dislocation.  No obvious disc space narrowing.  4. IMPRESSION: As above.       ASSESSMENT:      ICD-10-CM ICD-9-CM   1. Pain of left hip  M25.552 719.45   2. Mechanical low back pain  M54.59 724.2   3. Somatic dysfunction of lumbar region  M99.03 739.3   4. Somatic dysfunction of sacral region  M99.04 739.4   5. Somatic dysfunction of lower extremity  M99.06 739.6   6. Somatic dysfunction of pelvis region  M99.05 739.5   7. Somatic dysfunction of thoracic region  M99.02 739.2           PLAN:  1-2.  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 She admits to radiating pain down her left leg and intermittent numbness affecting the left foot.    - She is now feeling 75% improved which she attributes to OMT and compliance with her HEP. She is very pleased with her progress at this time and has not noticed the pain radiating down her leg since the last visit where OMT was performed.     - Based on her description of pain/body language and somatic dysfunction identified on exam, I discussed osteopathic manipulation as a treatment option today. She consents to evaluation and treatment. See below.    - Continue with HEP for 4-directional hip, double leg bridge, glute med/max, mini squat, abdominal bracing, ant marches prescribed at initial visit.     - She also notes medial-sided left knee pain today.  This could be compensatory but also could be because of arthritis.  Updating imaging in the near future would be reasonable as last x-rays were from 2017.      3-7. Somatic dysfunction of lumbar, pelvis, sacral, thoracic, lower extremity regions -     - OMT 5-6 regions. Oral consent obtained. Reviewed benefits and potential side effects. OMT indicated today due to signs and symptoms as well as local and remote somatic dysfunction findings and their related neurokinetic, lymphatic, fascial and/or arteriovenous body connections. OMT techniques used: Soft Tissue, Myofascial Release, Muscle Energy, and Fascial Distortion Model. Treatment was tolerated well. Improvement noted in segmental mobility post-treatment in dysfunctional regions. There were no adverse events and no complications immediately following treatment. Advised plenty of water to help alleviate soreness.      Future planning includes - possibly more OMT if helpful and if indicated, add PT, advanced imaging if not improving    All questions were answered to the  best of my ability and all concerns were addressed at this time.    Follow up in 4 weeks for above, or sooner if needed.       This note is dictated using the M*Modal Fluency Direct word recognition program. There are word recognition mistakes that are occasionally missed on review.    Total time spent face-to face with patient counseling or coordinating care including prognosis, differential diagnosis, risks and benefits of treatment, instructions, compliance risk reductions as well as non-face-to-face time personally spent reviewing medial record, medical documentation, and coordination of care.     EST MINUTES X   09282 10-19    05333 20-29    88542 30-39 X   99215 40-54    NEW     76675 15-29    40723 30-44    55697 45-59    63694 60-74    PHONE      5-10    16388 11-20    26944 21-30

## 2023-07-27 ENCOUNTER — OFFICE VISIT (OUTPATIENT)
Dept: SPORTS MEDICINE | Facility: CLINIC | Age: 63
End: 2023-07-27
Payer: COMMERCIAL

## 2023-07-27 VITALS
BODY MASS INDEX: 28.17 KG/M2 | DIASTOLIC BLOOD PRESSURE: 80 MMHG | HEIGHT: 64 IN | WEIGHT: 165 LBS | SYSTOLIC BLOOD PRESSURE: 143 MMHG

## 2023-07-27 DIAGNOSIS — M99.02 SOMATIC DYSFUNCTION OF THORACIC REGION: ICD-10-CM

## 2023-07-27 DIAGNOSIS — M25.552 PAIN OF LEFT HIP: Primary | ICD-10-CM

## 2023-07-27 DIAGNOSIS — M54.59 MECHANICAL LOW BACK PAIN: ICD-10-CM

## 2023-07-27 DIAGNOSIS — M99.03 SOMATIC DYSFUNCTION OF LUMBAR REGION: ICD-10-CM

## 2023-07-27 DIAGNOSIS — M99.06 SOMATIC DYSFUNCTION OF LOWER EXTREMITY: ICD-10-CM

## 2023-07-27 DIAGNOSIS — M99.04 SOMATIC DYSFUNCTION OF SACRAL REGION: ICD-10-CM

## 2023-07-27 DIAGNOSIS — M99.05 SOMATIC DYSFUNCTION OF PELVIS REGION: ICD-10-CM

## 2023-07-27 PROCEDURE — 99999 PR PBB SHADOW E&M-EST. PATIENT-LVL III: CPT | Mod: PBBFAC,,, | Performed by: ORTHOPAEDIC SURGERY

## 2023-07-27 PROCEDURE — 1160F PR REVIEW ALL MEDS BY PRESCRIBER/CLIN PHARMACIST DOCUMENTED: ICD-10-PCS | Mod: CPTII,S$GLB,, | Performed by: ORTHOPAEDIC SURGERY

## 2023-07-27 PROCEDURE — 3044F PR MOST RECENT HEMOGLOBIN A1C LEVEL <7.0%: ICD-10-PCS | Mod: CPTII,S$GLB,, | Performed by: ORTHOPAEDIC SURGERY

## 2023-07-27 PROCEDURE — 3077F PR MOST RECENT SYSTOLIC BLOOD PRESSURE >= 140 MM HG: ICD-10-PCS | Mod: CPTII,S$GLB,, | Performed by: ORTHOPAEDIC SURGERY

## 2023-07-27 PROCEDURE — 3079F PR MOST RECENT DIASTOLIC BLOOD PRESSURE 80-89 MM HG: ICD-10-PCS | Mod: CPTII,S$GLB,, | Performed by: ORTHOPAEDIC SURGERY

## 2023-07-27 PROCEDURE — 99214 OFFICE O/P EST MOD 30 MIN: CPT | Mod: 25,S$GLB,, | Performed by: ORTHOPAEDIC SURGERY

## 2023-07-27 PROCEDURE — 3077F SYST BP >= 140 MM HG: CPT | Mod: CPTII,S$GLB,, | Performed by: ORTHOPAEDIC SURGERY

## 2023-07-27 PROCEDURE — 3008F PR BODY MASS INDEX (BMI) DOCUMENTED: ICD-10-PCS | Mod: CPTII,S$GLB,, | Performed by: ORTHOPAEDIC SURGERY

## 2023-07-27 PROCEDURE — 1159F PR MEDICATION LIST DOCUMENTED IN MEDICAL RECORD: ICD-10-PCS | Mod: CPTII,S$GLB,, | Performed by: ORTHOPAEDIC SURGERY

## 2023-07-27 PROCEDURE — 4010F ACE/ARB THERAPY RXD/TAKEN: CPT | Mod: CPTII,S$GLB,, | Performed by: ORTHOPAEDIC SURGERY

## 2023-07-27 PROCEDURE — 98927 OSTEOPATH MANJ 5-6 REGIONS: CPT | Mod: S$GLB,,, | Performed by: ORTHOPAEDIC SURGERY

## 2023-07-27 PROCEDURE — 3079F DIAST BP 80-89 MM HG: CPT | Mod: CPTII,S$GLB,, | Performed by: ORTHOPAEDIC SURGERY

## 2023-07-27 PROCEDURE — 4010F PR ACE/ARB THEARPY RXD/TAKEN: ICD-10-PCS | Mod: CPTII,S$GLB,, | Performed by: ORTHOPAEDIC SURGERY

## 2023-07-27 PROCEDURE — 3008F BODY MASS INDEX DOCD: CPT | Mod: CPTII,S$GLB,, | Performed by: ORTHOPAEDIC SURGERY

## 2023-07-27 PROCEDURE — 3044F HG A1C LEVEL LT 7.0%: CPT | Mod: CPTII,S$GLB,, | Performed by: ORTHOPAEDIC SURGERY

## 2023-07-27 PROCEDURE — 98927 PR OSTEOPATHIC MANIP,5-6 BODY REGN: ICD-10-PCS | Mod: S$GLB,,, | Performed by: ORTHOPAEDIC SURGERY

## 2023-07-27 PROCEDURE — 1159F MED LIST DOCD IN RCRD: CPT | Mod: CPTII,S$GLB,, | Performed by: ORTHOPAEDIC SURGERY

## 2023-07-27 PROCEDURE — 99999 PR PBB SHADOW E&M-EST. PATIENT-LVL III: ICD-10-PCS | Mod: PBBFAC,,, | Performed by: ORTHOPAEDIC SURGERY

## 2023-07-27 PROCEDURE — 1160F RVW MEDS BY RX/DR IN RCRD: CPT | Mod: CPTII,S$GLB,, | Performed by: ORTHOPAEDIC SURGERY

## 2023-07-27 PROCEDURE — 99214 PR OFFICE/OUTPT VISIT, EST, LEVL IV, 30-39 MIN: ICD-10-PCS | Mod: 25,S$GLB,, | Performed by: ORTHOPAEDIC SURGERY

## 2023-07-31 ENCOUNTER — PATIENT MESSAGE (OUTPATIENT)
Dept: RESEARCH | Facility: HOSPITAL | Age: 63
End: 2023-07-31
Payer: COMMERCIAL

## 2023-09-18 ENCOUNTER — TELEPHONE (OUTPATIENT)
Dept: SPORTS MEDICINE | Facility: CLINIC | Age: 63
End: 2023-09-18
Payer: COMMERCIAL

## 2023-09-18 NOTE — TELEPHONE ENCOUNTER
Message sent to Dr. Santo of patient expressing gratitude about her care.    Lluvia Miller MS, OTC  Clinical Assistant to Dr. Montana Santo

## 2023-10-11 DIAGNOSIS — T78.40XD ALLERGY, SUBSEQUENT ENCOUNTER: ICD-10-CM

## 2023-10-11 DIAGNOSIS — J34.89 SINUS PRESSURE: ICD-10-CM

## 2023-10-11 RX ORDER — CICLOPIROX 80 MG/ML
SOLUTION TOPICAL NIGHTLY
Qty: 6.6 ML | Refills: 11 | OUTPATIENT
Start: 2023-10-11

## 2023-10-11 NOTE — TELEPHONE ENCOUNTER
Left message to inform patient she hasn't had an appointment since 2021 with Dr. Flores. Patient will need to schedule an appointment.

## 2023-10-12 RX ORDER — LORATADINE 10 MG/1
10 TABLET ORAL DAILY
Qty: 90 TABLET | Refills: 1 | Status: SHIPPED | OUTPATIENT
Start: 2023-10-12 | End: 2024-04-09

## 2023-12-07 ENCOUNTER — TELEPHONE (OUTPATIENT)
Dept: INTERNAL MEDICINE | Facility: CLINIC | Age: 63
End: 2023-12-07
Payer: COMMERCIAL

## 2023-12-07 DIAGNOSIS — Z12.39 ENCOUNTER FOR SCREENING FOR MALIGNANT NEOPLASM OF BREAST, UNSPECIFIED SCREENING MODALITY: Primary | ICD-10-CM

## 2023-12-07 NOTE — TELEPHONE ENCOUNTER
----- Message from Carmen De La Cruz sent at 12/6/2023 12:07 PM CST -----  Contact: Soraya jairo  [phone]  Caller is requesting to schedule their annual screening mammogram appointment. Order is not listed in Epic.  Please enter order and contact patient to schedule.  Would the patient like a call back, or a response through their [facility] portal?:call back  Comments: Pt is calling to get orders put into the system to schedule an appt

## 2023-12-07 NOTE — TELEPHONE ENCOUNTER
----- Message from Carmen De La Cruz sent at 12/6/2023 12:07 PM CST -----  Contact: Soraya jairo  [phone]  Caller is requesting to schedule their annual screening mammogram appointment. Order is not listed in Epic.  Please enter order and contact patient to schedule.  Would the patient like a call back, or a response through their [facility] portal?:call back  Comments: Pt is calling to get orders put into the system to schedule an appt

## 2023-12-13 ENCOUNTER — OFFICE VISIT (OUTPATIENT)
Dept: PODIATRY | Facility: CLINIC | Age: 63
End: 2023-12-13
Payer: COMMERCIAL

## 2023-12-13 VITALS
BODY MASS INDEX: 28.17 KG/M2 | HEART RATE: 83 BPM | WEIGHT: 165 LBS | SYSTOLIC BLOOD PRESSURE: 162 MMHG | HEIGHT: 64 IN | DIASTOLIC BLOOD PRESSURE: 87 MMHG

## 2023-12-13 DIAGNOSIS — B35.1 ONYCHOMYCOSIS DUE TO DERMATOPHYTE: Primary | ICD-10-CM

## 2023-12-13 DIAGNOSIS — L84 CORN OR CALLUS: ICD-10-CM

## 2023-12-13 PROCEDURE — 99999 PR PBB SHADOW E&M-EST. PATIENT-LVL IV: ICD-10-PCS | Mod: PBBFAC,,, | Performed by: PODIATRIST

## 2023-12-13 PROCEDURE — 3079F PR MOST RECENT DIASTOLIC BLOOD PRESSURE 80-89 MM HG: ICD-10-PCS | Mod: CPTII,S$GLB,, | Performed by: PODIATRIST

## 2023-12-13 PROCEDURE — 3008F PR BODY MASS INDEX (BMI) DOCUMENTED: ICD-10-PCS | Mod: CPTII,S$GLB,, | Performed by: PODIATRIST

## 2023-12-13 PROCEDURE — 99213 OFFICE O/P EST LOW 20 MIN: CPT | Mod: S$GLB,,, | Performed by: PODIATRIST

## 2023-12-13 PROCEDURE — 1159F PR MEDICATION LIST DOCUMENTED IN MEDICAL RECORD: ICD-10-PCS | Mod: CPTII,S$GLB,, | Performed by: PODIATRIST

## 2023-12-13 PROCEDURE — 1159F MED LIST DOCD IN RCRD: CPT | Mod: CPTII,S$GLB,, | Performed by: PODIATRIST

## 2023-12-13 PROCEDURE — 4010F PR ACE/ARB THEARPY RXD/TAKEN: ICD-10-PCS | Mod: CPTII,S$GLB,, | Performed by: PODIATRIST

## 2023-12-13 PROCEDURE — 3077F SYST BP >= 140 MM HG: CPT | Mod: CPTII,S$GLB,, | Performed by: PODIATRIST

## 2023-12-13 PROCEDURE — 3044F PR MOST RECENT HEMOGLOBIN A1C LEVEL <7.0%: ICD-10-PCS | Mod: CPTII,S$GLB,, | Performed by: PODIATRIST

## 2023-12-13 PROCEDURE — 3044F HG A1C LEVEL LT 7.0%: CPT | Mod: CPTII,S$GLB,, | Performed by: PODIATRIST

## 2023-12-13 PROCEDURE — 4010F ACE/ARB THERAPY RXD/TAKEN: CPT | Mod: CPTII,S$GLB,, | Performed by: PODIATRIST

## 2023-12-13 PROCEDURE — 3008F BODY MASS INDEX DOCD: CPT | Mod: CPTII,S$GLB,, | Performed by: PODIATRIST

## 2023-12-13 PROCEDURE — 99213 PR OFFICE/OUTPT VISIT, EST, LEVL III, 20-29 MIN: ICD-10-PCS | Mod: S$GLB,,, | Performed by: PODIATRIST

## 2023-12-13 PROCEDURE — 99999 PR PBB SHADOW E&M-EST. PATIENT-LVL IV: CPT | Mod: PBBFAC,,, | Performed by: PODIATRIST

## 2023-12-13 PROCEDURE — 3079F DIAST BP 80-89 MM HG: CPT | Mod: CPTII,S$GLB,, | Performed by: PODIATRIST

## 2023-12-13 PROCEDURE — 3077F PR MOST RECENT SYSTOLIC BLOOD PRESSURE >= 140 MM HG: ICD-10-PCS | Mod: CPTII,S$GLB,, | Performed by: PODIATRIST

## 2023-12-13 RX ORDER — CICLOPIROX 80 MG/ML
SOLUTION TOPICAL NIGHTLY
Qty: 6.6 ML | Refills: 11 | Status: SHIPPED | OUTPATIENT
Start: 2023-12-13

## 2023-12-13 RX ORDER — UREA 40 %
CREAM (GRAM) TOPICAL DAILY
Qty: 85 G | Refills: 11 | Status: SHIPPED | OUTPATIENT
Start: 2023-12-13

## 2023-12-13 NOTE — PROGRESS NOTES
Patient complains of thick long discolored uncomfortable toenails toes 1-3 bilateral.  Gradual onset worsening over the past several months aggravated by socks shoes pressure increased ambulation.  Periodic debridement help symptoms.  Denies trauma and surgery.  No prior medical treatment.    Patient understands the with her current medical diagnoses trimming these nails is not a covered service by insurance.  She acknowledges this and verbally expresses wounds by 42 dollars for non-covered medical service.    Her pulses are palpable capillary refill is 3 sec toes were warm.  Skin is without ulceration or signs of infection or discoloration to visual inspection.  She does have generalized thick keratosis in the bottoms of both feet without open skin drainage pus tracking fluctuance malodor signs of infection.    Non-covered ft care:    With the patient's permission, I debrided all ten toenails with a sterile nipper and curette, removing all offending nail and debris.  Patient tolerated the procedure well and related significant relief.      Penlac, urea    Discussed conservative treatment with shoes of adequate dimensions, material, and style to alleviate symptoms and delay or prevent surgical intervention.               No follow-ups on file.

## 2023-12-14 ENCOUNTER — HOSPITAL ENCOUNTER (OUTPATIENT)
Dept: RADIOLOGY | Facility: OTHER | Age: 63
Discharge: HOME | End: 2023-12-14
Payer: COMMERCIAL

## 2023-12-14 DIAGNOSIS — Z12.31 ENCOUNTER FOR SCREENING MAMMOGRAM FOR BREAST CANCER: ICD-10-CM

## 2023-12-14 PROCEDURE — 77067 MAMMO DIGITAL SCREENING BILAT WITH TOMO: ICD-10-PCS | Mod: 26,,, | Performed by: RADIOLOGY

## 2023-12-14 PROCEDURE — 77063 BREAST TOMOSYNTHESIS BI: CPT | Mod: 26,,, | Performed by: RADIOLOGY

## 2023-12-14 PROCEDURE — 77063 MAMMO DIGITAL SCREENING BILAT WITH TOMO: ICD-10-PCS | Mod: 26,,, | Performed by: RADIOLOGY

## 2023-12-14 PROCEDURE — 77067 SCR MAMMO BI INCL CAD: CPT | Mod: TC

## 2023-12-14 PROCEDURE — 77067 SCR MAMMO BI INCL CAD: CPT | Mod: 26,,, | Performed by: RADIOLOGY

## 2024-01-22 ENCOUNTER — LAB VISIT (OUTPATIENT)
Dept: LAB | Facility: HOSPITAL | Age: 64
End: 2024-01-22
Payer: COMMERCIAL

## 2024-01-22 ENCOUNTER — OFFICE VISIT (OUTPATIENT)
Dept: INTERNAL MEDICINE | Facility: CLINIC | Age: 64
End: 2024-01-22
Payer: COMMERCIAL

## 2024-01-22 VITALS
BODY MASS INDEX: 29.21 KG/M2 | DIASTOLIC BLOOD PRESSURE: 80 MMHG | SYSTOLIC BLOOD PRESSURE: 152 MMHG | WEIGHT: 170.19 LBS

## 2024-01-22 DIAGNOSIS — R10.30 LOWER ABDOMINAL PAIN: ICD-10-CM

## 2024-01-22 DIAGNOSIS — R14.0 POSTPRANDIAL ABDOMINAL BLOATING: ICD-10-CM

## 2024-01-22 DIAGNOSIS — H25.13 NUCLEAR SCLEROSIS OF BOTH EYES: ICD-10-CM

## 2024-01-22 DIAGNOSIS — E55.9 VITAMIN D DEFICIENCY: ICD-10-CM

## 2024-01-22 DIAGNOSIS — R73.03 PREDIABETES: ICD-10-CM

## 2024-01-22 DIAGNOSIS — K21.9 GASTROESOPHAGEAL REFLUX DISEASE WITHOUT ESOPHAGITIS: Primary | ICD-10-CM

## 2024-01-22 DIAGNOSIS — E78.1 HIGH BLOOD TRIGLYCERIDES: ICD-10-CM

## 2024-01-22 DIAGNOSIS — K58.9 IRRITABLE BOWEL SYNDROME, UNSPECIFIED TYPE: ICD-10-CM

## 2024-01-22 DIAGNOSIS — E66.3 OVERWEIGHT (BMI 25.0-29.9): ICD-10-CM

## 2024-01-22 DIAGNOSIS — R19.7 INTERMITTENT DIARRHEA: ICD-10-CM

## 2024-01-22 DIAGNOSIS — I10 PRIMARY HYPERTENSION: ICD-10-CM

## 2024-01-22 DIAGNOSIS — R10.9 ABDOMINAL CRAMPING: ICD-10-CM

## 2024-01-22 PROBLEM — Z01.818 PREOP TESTING: Status: RESOLVED | Noted: 2021-01-08 | Resolved: 2024-01-22

## 2024-01-22 LAB
ESTIMATED AVG GLUCOSE: 128 MG/DL (ref 68–131)
HBA1C MFR BLD: 6.1 % (ref 4–5.6)

## 2024-01-22 PROCEDURE — 99999 PR PBB SHADOW E&M-EST. PATIENT-LVL V: CPT | Mod: PBBFAC,,,

## 2024-01-22 PROCEDURE — 99214 OFFICE O/P EST MOD 30 MIN: CPT | Mod: S$GLB,,,

## 2024-01-22 PROCEDURE — 4010F ACE/ARB THERAPY RXD/TAKEN: CPT | Mod: CPTII,S$GLB,,

## 2024-01-22 PROCEDURE — 3044F HG A1C LEVEL LT 7.0%: CPT | Mod: CPTII,S$GLB,,

## 2024-01-22 PROCEDURE — 83036 HEMOGLOBIN GLYCOSYLATED A1C: CPT

## 2024-01-22 PROCEDURE — 3008F BODY MASS INDEX DOCD: CPT | Mod: CPTII,S$GLB,,

## 2024-01-22 PROCEDURE — 3077F SYST BP >= 140 MM HG: CPT | Mod: CPTII,S$GLB,,

## 2024-01-22 PROCEDURE — 82306 VITAMIN D 25 HYDROXY: CPT

## 2024-01-22 PROCEDURE — 3079F DIAST BP 80-89 MM HG: CPT | Mod: CPTII,S$GLB,,

## 2024-01-22 PROCEDURE — 36415 COLL VENOUS BLD VENIPUNCTURE: CPT

## 2024-01-22 RX ORDER — DICYCLOMINE HYDROCHLORIDE 10 MG/1
10 CAPSULE ORAL 2 TIMES DAILY PRN
Qty: 60 CAPSULE | Refills: 1 | Status: SHIPPED | OUTPATIENT
Start: 2024-01-22 | End: 2024-04-08

## 2024-01-22 RX ORDER — ATORVASTATIN CALCIUM 40 MG/1
40 TABLET, FILM COATED ORAL DAILY
Qty: 90 TABLET | Refills: 3 | Status: SHIPPED | OUTPATIENT
Start: 2024-01-22 | End: 2025-01-21

## 2024-01-22 RX ORDER — LOSARTAN POTASSIUM 25 MG/1
25 TABLET ORAL DAILY
Qty: 60 TABLET | Refills: 0 | Status: SHIPPED | OUTPATIENT
Start: 2024-01-22 | End: 2024-04-08 | Stop reason: SDUPTHER

## 2024-01-22 RX ORDER — ONDANSETRON 4 MG/1
4 TABLET, ORALLY DISINTEGRATING ORAL EVERY 8 HOURS PRN
Qty: 90 TABLET | Refills: 0 | OUTPATIENT
Start: 2024-01-22

## 2024-01-22 NOTE — PATIENT INSTRUCTIONS
[phone] -> [facility] Scheduling Department,   1) to make appointment with stomach doctor (GI).   2) to make appointment with eye doctor (optometry).     Decrease Bentyl to 10 mg tablet as needed.    Thank you for trusting me with your care!

## 2024-01-22 NOTE — PROGRESS NOTES
Internal Medicine Clinic Note    Subjective     Chief Complaint: Abdominal Pain/Cramping, Health Maintenance    History of Present Illness:  Ms. Soraya Elmore is a 63 y.o. female with PMHx IBS, GERD, s/p cholecystectomy (2021), glaucoma and pre-diabetes here for 6 months follow-up.    #Glaucoma  Today with complains with ongoing, intermittent L eye discomfort, continues to use her latanoprost solution daily. Last seen by optometry in 2022. No new visual disturbance or eye redness.    #Allergic rhinitis  Daily claritin with some relief. Worse with the recent rainy/cold season. No fever/chills/colored nasal discharge.    #Hypertension  -130/80 with at home blood pressure cuff. Is elevated when she is in pain from cramping, in pain today. Tolerating Losartan 25 mg daily.     #Chronic lower abdominal pain  Worsening abdominal distention/gas, lower abdominal cramping occasionally with severe cramping that is alleviated with Bentyl, also relieved when she has a bowel movement. Has constipation when she takes the Bentyl 20 mg tablet. Does not recall if she was dxed by GI or PCP. Alternates between issues with constipation (small bowel movements, feels incomplete, abdominal distention) and non-bloody diarrhea. Miralax and probiotic helps with regular bowel movements (two times a day). Colonoscopy UTD.    Answers submitted by the patient for this visit:  Abdominal Pain Questionnaire (Submitted on 1/21/2024)  Chief Complaint: Abdominal pain  Chronicity: chronic  Onset: more than 1 year ago  Onset quality: undetermined  Frequency: intermittently  Episode duration: 1 Hours  Progression since onset: waxing and waning  Pain location: suprapubic region  Pain - numeric: 5/10  Pain quality: cramping, a sensation of fullness  anorexia: Yes  arthralgias: Yes  belching: Yes  flatus: Yes  hematochezia: No  Aggravated by: bowel movement, eating  Relieved by: activity, belching, bowel movements, passing flatus, sitting up,  standing  Diagnostic workup: CT scan  Pain severity: moderate  Treatments tried: antacids  Improvement on treatment: moderate  abdominal surgery: No  colon cancer: No  Crohn's disease: No  gallstones: Yes  GERD: Yes  irritable bowel syndrome: Yes  kidney stones: No  pancreatitis: No  PUD: No  ulcerative colitis: No  UTI: No    Review of Systems   Constitutional:  Negative for chills, fever, malaise/fatigue and weight loss.   HENT:  Positive for sinus pain. Negative for sore throat.    Eyes:  Positive for pain. Negative for blurred vision, double vision, discharge and redness.   Respiratory:  Negative for cough, hemoptysis, shortness of breath and stridor.    Cardiovascular:  Negative for chest pain, palpitations and leg swelling.   Gastrointestinal:  Positive for abdominal pain, constipation, diarrhea and nausea (intermittent). Negative for blood in stool, melena and vomiting.   Genitourinary:  Negative for dysuria, frequency, hematuria and urgency.   Musculoskeletal:  Negative for falls and myalgias.   Skin:  Negative for itching and rash.   Neurological:  Negative for dizziness and headaches.   Psychiatric/Behavioral:  Negative for depression and substance abuse. The patient is not nervous/anxious.        PAST HISTORY:     Past Medical History:   Diagnosis Date    Gastroenteritis     Glaucoma (increased eye pressure)     Prediabetes 06/03/2019    Tinnitus        Past Surgical History:   Procedure Laterality Date    COLONOSCOPY N/A 6/7/2016    Procedure: COLONOSCOPY;  Surgeon: Katerine Ramsey MD;  Location: [address];  Service: Endoscopy;  Laterality: N/A;    LAPAROSCOPIC CHOLECYSTECTOMY N/A 1/8/2021    Procedure: CHOLECYSTECTOMY, LAPAROSCOPIC;  Surgeon: Johan Wilkerson Jr., MD;  Location: [facility];  Service: General;  Laterality: N/A;    OOPHORECTOMY  1999    Unilateral oophorectomy       Family History   Problem Relation Age of Onset    Breast cancer Cousin 67    Hypertension Mother     Glaucoma Mother      Cataracts Mother     Diabetes Sister     Glaucoma Sister     Diabetes Sister     Colon cancer Neg Hx     Ovarian cancer Neg Hx     Stroke Neg Hx        Social History     Socioeconomic History    Marital status:    Occupational History    Occupation: patient care tech    Tobacco Use    Smoking status: Former     Current packs/day: 0.00     Average packs/day: 0.1 packs/day for 15.0 years (1.5 ttl pk-yrs)     Types: Cigarettes     Start date: 1990     Quit date: 2005     Years since quittin.5    Smokeless tobacco: Never   Substance and Sexual Activity    Alcohol use: No     Alcohol/week: 0.0 standard drinks of alcohol    Drug use: No    Sexual activity: Not Currently     Partners: Male     Birth control/protection: Post-menopausal     Social Determinants of Health     Financial Resource Strain: Medium Risk (2024)    Overall Financial Resource Strain (CARDIA)     Difficulty of Paying Living Expenses: Somewhat hard   Food Insecurity: Unknown (2024)    Hunger Vital Sign     Worried About Running Out of Food in the Last Year: Patient declined     Ran Out of Food in the Last Year: Never true   Transportation Needs: No Transportation Needs (2024)    PRAPARE - Transportation     Lack of Transportation (Medical): No     Lack of Transportation (Non-Medical): No   Physical Activity: Sufficiently Active (2024)    Exercise Vital Sign     Days of Exercise per Week: 3 days     Minutes of Exercise per Session: 60 min   Stress: Stress Concern Present (2024)    [employer] [place] [employer] - Occupational Stress Questionnaire     Feeling of Stress : To some extent   Social Connections: Unknown (2024)    Social Connection and Isolation Panel [NHANES]     Frequency of Communication with Friends and Family: Twice a week     Frequency of Social Gatherings with Friends and Family: Three times a week     Active Member of Clubs or Organizations: No     Attends Club or  Organization Meetings: Patient declined     Marital Status: Patient declined   Housing Stability: Low Risk  (1/21/2024)    Housing Stability Vital Sign     Unable to Pay for Housing in the Last Year: No     Number of Places Lived in the Last Year: 1     Unstable Housing in the Last Year: No       MEDICATIONS & ALLERGIES:     Current Outpatient Medications on File Prior to Visit   Medication Sig    Bifidobacterium infantis (ALIGN) 4 mg Cap Take 1 capsule (4 mg total) by mouth once daily.    cholecalciferol, vitamin D3, 50,000 unit capsule Take 1 capsule (50,000 Units total) by mouth once a week.    ciclopirox (PENLAC) 8 % Soln Apply topically nightly.    ciclopirox (PENLAC) 8 % Soln Apply topically nightly.    diclofenac sodium (VOLTAREN) 1 % Gel Apply 2 g topically 4 (four) times daily.    fluocinonide (LIDEX) 0.05 % external solution Apply topically daily as needed (scalp itching).    hydrocortisone 2.5 % cream Apply topically 2 (two) times daily as needed (flaking and itching rash).    ketoconazole (NIZORAL) 2 % shampoo Apply topically twice a week. Leave on for 5 to 10 minutes then rinse    latanoprost 0.005 % ophthalmic solution Place 1 drop into both eyes every evening.    loratadine (CLARITIN) 10 mg tablet Take 1 tablet (10 mg total) by mouth once daily.    naproxen (NAPROSYN) 500 MG tablet Take 1 tablet (500 mg total) by mouth 2 (two) times daily as needed (Pain, take with meals).    pantoprazole (PROTONIX) 20 MG tablet Take 1 tablet (20 mg total) by mouth once daily.    sodium chloride (SALINE NASAL) 0.65 % nasal spray 1 spray by Nasal route every evening.    urea (CARMOL) 40 % Crea Apply topically once daily.    [DISCONTINUED] dicyclomine (BENTYL) 20 mg tablet Take 1 tablet (20 mg total) by mouth 2 (two) times daily as needed (Stomach cramps).    [DISCONTINUED] ondansetron (ZOFRAN-ODT) 4 MG TbDL Take 1 tablet (4 mg total) by mouth every 8 (eight) hours as needed (Nausea).    [DISCONTINUED] atorvastatin  (LIPITOR) 40 MG tablet Take 1 tablet (40 mg total) by mouth once daily.    [DISCONTINUED] losartan (COZAAR) 25 MG tablet Take 1 tablet (25 mg total) by mouth once daily.     No current facility-administered medications on file prior to visit.       Review of patient's allergies indicates:  No Known Allergies    OBJECTIVE:     Vital Signs:  Vitals:    01/22/24 1321   BP: (!) 152/80   BP Location: Left arm   Patient Position: Sitting   Weight: 77.2 kg (170 lb 3.1 oz)       Body mass index is 29.21 kg/m².     Physical Exam  Constitutional:       General: She is not in acute distress.     Appearance: Normal appearance. She is obese. She is not ill-appearing or toxic-appearing.   HENT:      Head: Normocephalic and atraumatic.      Nose: No rhinorrhea.      Mouth/Throat:      Mouth: Mucous membranes are moist.   Eyes:      General: No scleral icterus.        Right eye: No discharge.         Left eye: No discharge.      Extraocular Movements: Extraocular movements intact.      Conjunctiva/sclera: Conjunctivae normal.   Neck:      Thyroid: No thyroid mass, thyromegaly or thyroid tenderness.   Cardiovascular:      Rate and Rhythm: Normal rate and regular rhythm.      Pulses: Normal pulses.   Pulmonary:      Effort: Pulmonary effort is normal. No respiratory distress.      Breath sounds: No stridor.   Abdominal:      General: Abdomen is flat. There is distension.      Palpations: Abdomen is soft.      Tenderness: There is abdominal tenderness (mild lower abdominal pain). There is no guarding or rebound.   Musculoskeletal:         General: No signs of injury.      Cervical back: Neck supple. No rigidity.      Right lower leg: No edema.      Left lower leg: No edema.      Comments: Moving all extremities independently.    Skin:     General: Skin is warm and dry.      Findings: No bruising or rash.   Neurological:      General: No focal deficit present.      Mental Status: She is alert and oriented to person, place, and time.  Mental status is at baseline.      Gait: Gait normal.   Psychiatric:         Mood and Affect: Mood normal.         Behavior: Behavior normal.       Laboratory  Lab Results   Component Value Date    WBC 6.81 05/04/2023    HGB 11.9 (L) 05/04/2023    HCT 37.1 05/04/2023    MCV 90 05/04/2023     05/04/2023     BMP  Lab Results   Component Value Date     05/04/2023    K 3.5 05/04/2023     05/04/2023    CO2 27 05/04/2023    BUN 15 05/04/2023    CREATININE 0.8 05/04/2023    CALCIUM 9.0 05/04/2023    ANIONGAP 9 05/04/2023    EGFRNORACEVR >60 05/04/2023     Lab Results   Component Value Date    INR 0.9 06/04/2020     Lab Results   Component Value Date    HGBA1C 5.9 (H) 06/08/2023       Diagnostic Results:  Labs: Reviewed  ECG: Reviewed  X-Ray: Reviewed  CT: Reviewed  MRI: Reviewed    Health Maintenance Due   Topic Date Due    RSV Vaccine (Age 60+ and Pregnant patients) (1 - 1-dose 60+ series) Never done    COVID-19 Vaccine (3 - 2023-24 season) 09/01/2023         ASSESSMENT & PLAN:   Ms. Soraya Elmore is a 63 y.o. female PMHx IBS, GERD, s/p cholecystectomy (2021), glaucoma and pre-diabetes here to establish care.    1. Gastroesophageal reflux disease without esophagitis  2. Irritable bowel syndrome, unspecified type  3. Postprandial abdominal bloating  4. Lower abdominal pain  5. Abdominal cramping  -     Ambulatory referral/consult to Gastroenterology; Future; Expected date: 01/29/2024  -     dicyclomine (BENTYL) 10 MG capsule; Take 1 capsule (10 mg total) by mouth 2 (two) times daily as needed (severe abdominal cramping).  Dispense: 60 capsule; Refill: 1  -     ondansetron (ZOFRAN-ODT) 4 MG TbDL; Take 1 tablet (4 mg total) by mouth every 8 (eight) hours as needed (Nausea).  Dispense: 90 tablet; Refill: 0    6. High blood triglycerides  -     atorvastatin (LIPITOR) 40 MG tablet; Take 1 tablet (40 mg total) by mouth once daily.  Dispense: 90 tablet; Refill: 3    7. Prediabetes  -     atorvastatin  (LIPITOR) 40 MG tablet; Take 1 tablet (40 mg total) by mouth once daily.  Dispense: 90 tablet; Refill: 3  -     Hemoglobin A1C; Future; Expected date: 01/22/2024    8. Overweight (BMI 25.0-29.9)  -     atorvastatin (LIPITOR) 40 MG tablet; Take 1 tablet (40 mg total) by mouth once daily.  Dispense: 90 tablet; Refill: 3    9. Primary hypertension  -     losartan (COZAAR) 25 MG tablet; Take 1 tablet (25 mg total) by mouth once daily.  Dispense: 60 tablet; Refill: 0    10. Nuclear sclerosis of both eyes  -     Ambulatory referral/consult to Optometry; Future; Expected date: 01/23/2024    11. Vitamin D deficiency  -     Continue supplementation    Follow up in about 3 months (around 4/22/2024).      Dhara Storey MD  Internal Medicine, PGY-III   [facility]  [address] [place] [address]  [phone]

## 2024-01-23 LAB — 25(OH)D3+25(OH)D2 SERPL-MCNC: 18 NG/ML (ref 30–96)

## 2024-01-28 NOTE — PROGRESS NOTES
"    [facility] Consultation Note    Reason for Consult:  The primary encounter diagnosis was Irritable bowel syndrome with diarrhea. Diagnoses of Irritable bowel syndrome, unspecified type, Postprandial abdominal bloating, Lower abdominal pain, and Irritable bowel syndrome with constipation were also pertinent to this visit.    PCP: Dhara Storey   [address] [facility] [id number]  Referring MD: Dhara Storey MD  [address] [place] [address]    HPI:  This is a 63 y.o. female here for evaluation of bloating.  Per primary care "Worsening abdominal distention/gas, lower abdominal cramping occasionally with severe cramping that is alleviated with Bentyl, also relieved when she has a bowel movement. Has constipation when she takes the Bentyl 20 mg tablet. Does not recall if she was dxed by GI or PCP. Alternates between issues with constipation (small bowel movements, feels incomplete, abdominal distention) and non-bloody diarrhea. Miralax and probiotic helps with regular bowel movements (two times a day). Colonoscopy UTD. "    Has tried miralax, antacid, bentyl    Diet: Lactose: sliced cheese, minimal milk   Fructose: Pears 2-3 times/month   (apples, cherries, mangoes, watermelon and pears,fruit juices, dried fruits and canned fruits,asparagus, artichoke and sugar snap peas, honey)   Chewing Gum: Trident    Carbonated beverages: YES   Drinking through a straw:   Eats fast or slow:    International Travel:  Food poisoning:  Celiac:  Antibiotics History: No  Medications (OCP/PPI/Steroids/NSAIDs):  Activity level:  Constipation:Yes        ROS:  Constitutional: No fevers, chills, No weight loss  ENT: No allergies  CV: No chest pain  Pulm: + cough, No shortness of breath  Ophtho: No vision changes  GI: see HPI  Derm: No rash  Heme: No lymphadenopathy, No bruising  MSK: No arthritis  : No dysuria, No hematuria  Endo: No hot or cold intolerance  Neuro: No syncope, No seizure  Psych: No " anxiety, No depression    Medical History:  has a past medical history of Gastroenteritis, Glaucoma (increased eye pressure), Prediabetes (06/03/2019), and Tinnitus.    Surgical History:  has a past surgical history that includes Oophorectomy (1999); Colonoscopy (N/A, 6/7/2016); and Laparoscopic cholecystectomy (N/A, 1/8/2021).    Family History: family history includes Breast cancer (age of onset: 67) in her cousin; Cataracts in her mother; Diabetes in her sister and sister; Glaucoma in her mother and sister; Hypertension in her mother..     Social History:  reports that she quit smoking about 18 years ago. Her smoking use included cigarettes. She started smoking about 33 years ago. She has a 1.5 pack-year smoking history. She has never used smokeless tobacco. She reports that she does not drink alcohol and does not use drugs.    Review of patient's allergies indicates:  No Known Allergies    Current Outpatient Medications on File Prior to Visit   Medication Sig Dispense Refill    atorvastatin (LIPITOR) 40 MG tablet Take 1 tablet (40 mg total) by mouth once daily. 90 tablet 3    Bifidobacterium infantis (ALIGN) 4 mg Cap Take 1 capsule (4 mg total) by mouth once daily. 90 capsule 3    cholecalciferol, vitamin D3, 50,000 unit capsule Take 1 capsule (50,000 Units total) by mouth once a week. 12 capsule 3    ciclopirox (PENLAC) 8 % Soln Apply topically nightly. 6.6 mL 11    ciclopirox (PENLAC) 8 % Soln Apply topically nightly. 6.6 mL 11    diclofenac sodium (VOLTAREN) 1 % Gel Apply 2 g topically 4 (four) times daily. 100 g 0    dicyclomine (BENTYL) 10 MG capsule Take 1 capsule (10 mg total) by mouth 2 (two) times daily as needed (severe abdominal cramping). 60 capsule 1    fluocinonide (LIDEX) 0.05 % external solution Apply topically daily as needed (scalp itching). 60 mL 6    hydrocortisone 2.5 % cream Apply topically 2 (two) times daily as needed (flaking and itching rash). 30 g 6    ketoconazole (NIZORAL) 2 %  "shampoo Apply topically twice a week. Leave on for 5 to 10 minutes then rinse 120 mL 6    latanoprost 0.005 % ophthalmic solution Place 1 drop into both eyes every evening. 2.5 mL 6    loratadine (CLARITIN) 10 mg tablet Take 1 tablet (10 mg total) by mouth once daily. 90 tablet 1    losartan (COZAAR) 25 MG tablet Take 1 tablet (25 mg total) by mouth once daily. 60 tablet 0    naproxen (NAPROSYN) 500 MG tablet Take 1 tablet (500 mg total) by mouth 2 (two) times daily as needed (Pain, take with meals). 30 tablet 0    ondansetron (ZOFRAN-ODT) 4 MG TbDL Take 1 tablet (4 mg total) by mouth every 8 (eight) hours as needed (Nausea). 90 tablet 0    pantoprazole (PROTONIX) 20 MG tablet Take 1 tablet (20 mg total) by mouth once daily. 30 tablet 10    sodium chloride (SALINE NASAL) 0.65 % nasal spray 1 spray by Nasal route every evening. 60 mL 3    urea (CARMOL) 40 % Crea Apply topically once daily. 85 g 11     No current facility-administered medications on file prior to visit.         Objective Findings:    Vital Signs:  /62   Pulse 88   Ht 5' 4" (1.626 m)   Wt 76.5 kg (168 lb 10.4 oz)   LMP 07/21/2006   BMI 28.95 kg/m²   Body mass index is 28.95 kg/m².    Physical Exam:  General Appearance: Well appearing in no acute distress  Head:   Normocephalic, without obvious abnormality  Eyes:    No scleral icterus, EOMI  ENT: Neck supple, Lips, mucosa, and tongue normal; teeth and gums normal  Lungs: CTA bilaterally in anterior and posterior fields, no wheezes, no crackles.  Heart:  Regular rate and rhythm, S1, S2 normal, no murmurs heard  Abdomen: Soft, non tender, non distended with positive bowel sounds in all four quadrants. No hepatosplenomegaly, ascites, or mass  Extremities: 2+ pulses, no clubbing, cyanosis or edema  Skin: No rash  Neurologic: CN II-XII intact      Labs:  Lab Results   Component Value Date    WBC 6.81 05/04/2023    HGB 11.9 (L) 05/04/2023    HCT 37.1 05/04/2023     05/04/2023    CHOL 164 " 06/08/2023    TRIG 211 (H) 06/08/2023    HDL 35 (L) 06/08/2023    ALT 12 05/04/2023    AST 15 05/04/2023     05/04/2023    K 3.5 05/04/2023     05/04/2023    CREATININE 0.8 05/04/2023    BUN 15 05/04/2023    CO2 27 05/04/2023    TSH 2.009 11/10/2021    INR 0.9 06/04/2020    HGBA1C 6.1 (H) 01/22/2024         Imaging:      Endoscopy:    Colon 2016 - benign rectal polyp  EGD 2020 - Hp negative    Assessment:  1. Irritable bowel syndrome with diarrhea    2. Irritable bowel syndrome, unspecified type    3. Postprandial abdominal bloating    4. Lower abdominal pain    5. Irritable bowel syndrome with constipation           Recommendations:  1. KUB  2. Linzess for IBS-C  3. Cut out gum and carbonated beverages  4. Add soluble fiber at night    Follow up in about 4 months (around 5/29/2024).      Order summary:         Thank you so much for allowing me to participate in the care of Soraya Lieberman MD

## 2024-01-29 ENCOUNTER — OFFICE VISIT (OUTPATIENT)
Dept: GASTROENTEROLOGY | Facility: CLINIC | Age: 64
End: 2024-01-29
Payer: COMMERCIAL

## 2024-01-29 ENCOUNTER — PATIENT MESSAGE (OUTPATIENT)
Dept: GASTROENTEROLOGY | Facility: CLINIC | Age: 64
End: 2024-01-29

## 2024-01-29 ENCOUNTER — HOSPITAL ENCOUNTER (OUTPATIENT)
Dept: RADIOLOGY | Facility: HOSPITAL | Age: 64
Discharge: HOME | End: 2024-01-29
Attending: INTERNAL MEDICINE
Payer: COMMERCIAL

## 2024-01-29 VITALS
DIASTOLIC BLOOD PRESSURE: 62 MMHG | HEART RATE: 88 BPM | SYSTOLIC BLOOD PRESSURE: 124 MMHG | HEIGHT: 64 IN | WEIGHT: 168.63 LBS | BODY MASS INDEX: 28.79 KG/M2

## 2024-01-29 DIAGNOSIS — K58.0 IRRITABLE BOWEL SYNDROME WITH DIARRHEA: Primary | ICD-10-CM

## 2024-01-29 DIAGNOSIS — K58.1 IRRITABLE BOWEL SYNDROME WITH CONSTIPATION: ICD-10-CM

## 2024-01-29 DIAGNOSIS — R14.0 POSTPRANDIAL ABDOMINAL BLOATING: ICD-10-CM

## 2024-01-29 DIAGNOSIS — R10.30 LOWER ABDOMINAL PAIN: ICD-10-CM

## 2024-01-29 DIAGNOSIS — K58.9 IRRITABLE BOWEL SYNDROME, UNSPECIFIED TYPE: ICD-10-CM

## 2024-01-29 PROCEDURE — 74018 RADEX ABDOMEN 1 VIEW: CPT | Mod: 26,,, | Performed by: RADIOLOGY

## 2024-01-29 PROCEDURE — 3078F DIAST BP <80 MM HG: CPT | Mod: CPTII,S$GLB,, | Performed by: INTERNAL MEDICINE

## 2024-01-29 PROCEDURE — 3074F SYST BP LT 130 MM HG: CPT | Mod: CPTII,S$GLB,, | Performed by: INTERNAL MEDICINE

## 2024-01-29 PROCEDURE — 3044F HG A1C LEVEL LT 7.0%: CPT | Mod: CPTII,S$GLB,, | Performed by: INTERNAL MEDICINE

## 2024-01-29 PROCEDURE — 99999 PR PBB SHADOW E&M-EST. PATIENT-LVL V: CPT | Mod: PBBFAC,,, | Performed by: INTERNAL MEDICINE

## 2024-01-29 PROCEDURE — 74018 RADEX ABDOMEN 1 VIEW: CPT | Mod: TC

## 2024-01-29 PROCEDURE — 4010F ACE/ARB THERAPY RXD/TAKEN: CPT | Mod: CPTII,S$GLB,, | Performed by: INTERNAL MEDICINE

## 2024-01-29 PROCEDURE — 3008F BODY MASS INDEX DOCD: CPT | Mod: CPTII,S$GLB,, | Performed by: INTERNAL MEDICINE

## 2024-01-29 PROCEDURE — 99204 OFFICE O/P NEW MOD 45 MIN: CPT | Mod: S$GLB,,, | Performed by: INTERNAL MEDICINE

## 2024-01-29 PROCEDURE — 1159F MED LIST DOCD IN RCRD: CPT | Mod: CPTII,S$GLB,, | Performed by: INTERNAL MEDICINE

## 2024-01-29 NOTE — PROGRESS NOTES
I have personally discussed  this patient and agree with the resident, and agree with  their note as stated above with the following thoughts:    Continue current medications.  Work on low-fat low-cholesterol diet.  Also discussed a GERD diet.  We will have her follow up with GI for her irritable bowel syndrome.  Answers submitted by the patient for this visit:  Abdominal Pain Questionnaire (Submitted on 1/21/2024)  Chief Complaint: Abdominal pain  Chronicity: chronic  Onset: more than 1 year ago  Onset quality: undetermined  Frequency: intermittently  Episode duration: 1 Hours  Progression since onset: waxing and waning  Pain location: suprapubic region  Pain - numeric: 5/10  Pain quality: cramping, a sensation of fullness  anorexia: Yes  arthralgias: Yes  belching: Yes  constipation: Yes  diarrhea: Yes  dysuria: No  fever: No  flatus: Yes  frequency: Yes  headaches: No  hematochezia: No  hematuria: No  melena: No  myalgias: No  nausea: Yes  weight loss: No  vomiting: No  Aggravated by: bowel movement, eating  Relieved by: activity, belching, bowel movements, passing flatus, sitting up, standing  Diagnostic workup: CT scan  Pain severity: moderate  Treatments tried: antacids  Improvement on treatment: moderate  abdominal surgery: No  colon cancer: No  Crohn's disease: No  gallstones: Yes  GERD: Yes  irritable bowel syndrome: Yes  kidney stones: No  pancreatitis: No  PUD: No  ulcerative colitis: No  UTI: No

## 2024-01-29 NOTE — PROGRESS NOTES
"GENERAL GI PATIENT INTAKE:    COVID symptoms in the last 7 days (runny nose, sore throat, congestion, cough, fever): No  PCP: Dhara Storey  If not PCP-  number given to establish [phone]: N/A    ALLERGIES REVIEWED:  N/A    CHIEF COMPLAINT:    Chief Complaint   Patient presents with    Abdominal Cramping    Diarrhea    Constipation    Bloated       VITAL SIGNS:  /62   Pulse 88   Ht 5' 4" (1.626 m)   Wt 76.5 kg (168 lb 10.4 oz)   LMP 07/21/2006   BMI 28.95 kg/m²      Change in medical, surgical, family or social history: No      REVIEWED MEDICATION LIST RECONCILED INCLUDING ABOVE MEDS:  Yes     "

## 2024-01-29 NOTE — PATIENT INSTRUCTIONS
Sunfiber - 6g/scoop. Guar gum and soluble fiber   Search [facility] for tomorrow's nutrition SunFiber    Harika's Tummy Fiber - 6g/scoop. Venita senegal prebiotic and soluble fiber

## 2024-01-30 ENCOUNTER — PATIENT MESSAGE (OUTPATIENT)
Dept: GASTROENTEROLOGY | Facility: CLINIC | Age: 64
End: 2024-01-30
Payer: COMMERCIAL

## 2024-02-08 ENCOUNTER — TELEPHONE (OUTPATIENT)
Dept: INTERNAL MEDICINE | Facility: CLINIC | Age: 64
End: 2024-02-08
Payer: COMMERCIAL

## 2024-02-08 DIAGNOSIS — K58.1 IRRITABLE BOWEL SYNDROME WITH CONSTIPATION: ICD-10-CM

## 2024-02-08 NOTE — TELEPHONE ENCOUNTER
----- Message from Juany Andersen sent at 2/8/2024 12:16 PM CST -----  Who Called: Patient    What is the request in detail: Requesting call back to discuss the following medication. Patient mentioned that this medication prescribed by her Gastro provider is too expensive and not covered by her insurance. Patient is hoping to know if there are alternatives. Please advise.     linaCLOtide (LINZESS) 72 mcg Cap capsule 90 capsule 1 1/29/2024 - No  Sig - Route: Take 1 capsule (72 mcg total) by mouth before breakfast. - Oral  Sent to pharmacy as: linaCLOtide (LINZESS) 72 mcg Cap capsule  Class: Normal    Can the clinic reply by [facility]? No    Best Call Back Number: [phone]    Additional Information:

## 2024-04-08 ENCOUNTER — OFFICE VISIT (OUTPATIENT)
Dept: INTERNAL MEDICINE | Facility: CLINIC | Age: 64
End: 2024-04-08
Payer: COMMERCIAL

## 2024-04-08 ENCOUNTER — LAB VISIT (OUTPATIENT)
Dept: LAB | Facility: HOSPITAL | Age: 64
End: 2024-04-08
Payer: COMMERCIAL

## 2024-04-08 VITALS
RESPIRATION RATE: 12 BRPM | DIASTOLIC BLOOD PRESSURE: 70 MMHG | WEIGHT: 172.38 LBS | HEART RATE: 85 BPM | HEIGHT: 64 IN | SYSTOLIC BLOOD PRESSURE: 160 MMHG | BODY MASS INDEX: 29.43 KG/M2

## 2024-04-08 DIAGNOSIS — D64.9 NORMOCYTIC ANEMIA: ICD-10-CM

## 2024-04-08 DIAGNOSIS — E55.9 VITAMIN D DEFICIENCY: ICD-10-CM

## 2024-04-08 DIAGNOSIS — S22.080S T12 COMPRESSION FRACTURE, SEQUELA: Primary | ICD-10-CM

## 2024-04-08 DIAGNOSIS — I10 PRIMARY HYPERTENSION: ICD-10-CM

## 2024-04-08 DIAGNOSIS — Z12.4 CERVICAL CANCER SCREENING: ICD-10-CM

## 2024-04-08 DIAGNOSIS — K63.5 POLYP OF COLON, UNSPECIFIED PART OF COLON, UNSPECIFIED TYPE: ICD-10-CM

## 2024-04-08 LAB
ALBUMIN SERPL BCP-MCNC: 4.2 G/DL (ref 3.5–5.2)
ALP SERPL-CCNC: 115 U/L (ref 55–135)
ALT SERPL W/O P-5'-P-CCNC: 30 U/L (ref 10–44)
ANION GAP SERPL CALC-SCNC: 10 MMOL/L (ref 8–16)
AST SERPL-CCNC: 26 U/L (ref 10–40)
BASOPHILS # BLD AUTO: 0.01 K/UL (ref 0–0.2)
BASOPHILS NFR BLD: 0.1 % (ref 0–1.9)
BILIRUB SERPL-MCNC: 0.7 MG/DL (ref 0.1–1)
BUN SERPL-MCNC: 15 MG/DL (ref 8–23)
CALCIUM SERPL-MCNC: 10.1 MG/DL (ref 8.7–10.5)
CHLORIDE SERPL-SCNC: 107 MMOL/L (ref 95–110)
CHOLEST SERPL-MCNC: 174 MG/DL (ref 120–199)
CHOLEST/HDLC SERPL: 3.5 {RATIO} (ref 2–5)
CO2 SERPL-SCNC: 25 MMOL/L (ref 23–29)
CREAT SERPL-MCNC: 0.8 MG/DL (ref 0.5–1.4)
DIFFERENTIAL METHOD BLD: ABNORMAL
EOSINOPHIL # BLD AUTO: 0 K/UL (ref 0–0.5)
EOSINOPHIL NFR BLD: 0 % (ref 0–8)
ERYTHROCYTE [DISTWIDTH] IN BLOOD BY AUTOMATED COUNT: 13.4 % (ref 11.5–14.5)
EST. GFR  (NO RACE VARIABLE): >60 ML/MIN/1.73 M^2
ESTIMATED AVG GLUCOSE: 131 MG/DL (ref 68–131)
GLUCOSE SERPL-MCNC: 151 MG/DL (ref 70–110)
HBA1C MFR BLD: 6.2 % (ref 4–5.6)
HCT VFR BLD AUTO: 44.3 % (ref 37–48.5)
HDLC SERPL-MCNC: 50 MG/DL (ref 40–75)
HDLC SERPL: 28.7 % (ref 20–50)
HGB BLD-MCNC: 13.8 G/DL (ref 12–16)
IMM GRANULOCYTES # BLD AUTO: 0.03 K/UL (ref 0–0.04)
IMM GRANULOCYTES NFR BLD AUTO: 0.4 % (ref 0–0.5)
IRON SERPL-MCNC: 78 UG/DL (ref 30–160)
LDLC SERPL CALC-MCNC: 116.4 MG/DL (ref 63–159)
LYMPHOCYTES # BLD AUTO: 0.9 K/UL (ref 1–4.8)
LYMPHOCYTES NFR BLD: 13.5 % (ref 18–48)
MCH RBC QN AUTO: 28 PG (ref 27–31)
MCHC RBC AUTO-ENTMCNC: 31.2 G/DL (ref 32–36)
MCV RBC AUTO: 90 FL (ref 82–98)
MONOCYTES # BLD AUTO: 0.2 K/UL (ref 0.3–1)
MONOCYTES NFR BLD: 3.5 % (ref 4–15)
NEUTROPHILS # BLD AUTO: 5.7 K/UL (ref 1.8–7.7)
NEUTROPHILS NFR BLD: 82.5 % (ref 38–73)
NONHDLC SERPL-MCNC: 124 MG/DL
NRBC BLD-RTO: 0 /100 WBC
PLATELET # BLD AUTO: 295 K/UL (ref 150–450)
PMV BLD AUTO: 10.8 FL (ref 9.2–12.9)
POTASSIUM SERPL-SCNC: 4 MMOL/L (ref 3.5–5.1)
PROT SERPL-MCNC: 7.9 G/DL (ref 6–8.4)
RBC # BLD AUTO: 4.93 M/UL (ref 4–5.4)
RETICS/RBC NFR AUTO: 1.8 % (ref 0.5–2.5)
SATURATED IRON: 20 % (ref 20–50)
SODIUM SERPL-SCNC: 142 MMOL/L (ref 136–145)
TOTAL IRON BINDING CAPACITY: 397 UG/DL (ref 250–450)
TRANSFERRIN SERPL-MCNC: 268 MG/DL (ref 200–375)
TRIGL SERPL-MCNC: 38 MG/DL (ref 30–150)
VIT B12 SERPL-MCNC: 661 PG/ML (ref 210–950)
WBC # BLD AUTO: 6.95 K/UL (ref 3.9–12.7)

## 2024-04-08 PROCEDURE — 83540 ASSAY OF IRON: CPT

## 2024-04-08 PROCEDURE — 3077F SYST BP >= 140 MM HG: CPT | Mod: CPTII,S$GLB,,

## 2024-04-08 PROCEDURE — 3044F HG A1C LEVEL LT 7.0%: CPT | Mod: CPTII,S$GLB,,

## 2024-04-08 PROCEDURE — 80053 COMPREHEN METABOLIC PANEL: CPT

## 2024-04-08 PROCEDURE — 36415 COLL VENOUS BLD VENIPUNCTURE: CPT

## 2024-04-08 PROCEDURE — 3008F BODY MASS INDEX DOCD: CPT | Mod: CPTII,S$GLB,,

## 2024-04-08 PROCEDURE — 82607 VITAMIN B-12: CPT

## 2024-04-08 PROCEDURE — 3078F DIAST BP <80 MM HG: CPT | Mod: CPTII,S$GLB,,

## 2024-04-08 PROCEDURE — 99999 PR PBB SHADOW E&M-EST. PATIENT-LVL V: CPT | Mod: PBBFAC,,,

## 2024-04-08 PROCEDURE — 99396 PREV VISIT EST AGE 40-64: CPT | Mod: S$GLB,,,

## 2024-04-08 PROCEDURE — 85045 AUTOMATED RETICULOCYTE COUNT: CPT

## 2024-04-08 PROCEDURE — 4010F ACE/ARB THERAPY RXD/TAKEN: CPT | Mod: CPTII,S$GLB,,

## 2024-04-08 PROCEDURE — 1160F RVW MEDS BY RX/DR IN RCRD: CPT | Mod: CPTII,S$GLB,,

## 2024-04-08 PROCEDURE — 1159F MED LIST DOCD IN RCRD: CPT | Mod: CPTII,S$GLB,,

## 2024-04-08 PROCEDURE — 83036 HEMOGLOBIN GLYCOSYLATED A1C: CPT

## 2024-04-08 PROCEDURE — 85025 COMPLETE CBC W/AUTO DIFF WBC: CPT

## 2024-04-08 PROCEDURE — 80061 LIPID PANEL: CPT

## 2024-04-08 RX ORDER — METHOCARBAMOL 500 MG/1
500 TABLET, FILM COATED ORAL
COMMUNITY
Start: 2024-04-08 | End: 2024-04-18

## 2024-04-08 RX ORDER — KETOROLAC TROMETHAMINE 10 MG/1
10 TABLET, FILM COATED ORAL EVERY 6 HOURS PRN
COMMUNITY
Start: 2024-04-08 | End: 2024-04-13

## 2024-04-08 RX ORDER — KETOROLAC TROMETHAMINE 30 MG/ML
30 INJECTION, SOLUTION INTRAMUSCULAR; INTRAVENOUS
Status: SHIPPED | OUTPATIENT
Start: 2024-04-08 | End: 2024-04-11

## 2024-04-08 RX ORDER — LOSARTAN POTASSIUM 25 MG/1
25 TABLET ORAL DAILY
Qty: 60 TABLET | Refills: 0 | Status: SHIPPED | OUTPATIENT
Start: 2024-04-08 | End: 2024-06-07

## 2024-04-08 RX ORDER — OXYCODONE AND ACETAMINOPHEN 5; 325 MG/1; MG/1
1 TABLET ORAL EVERY 6 HOURS PRN
COMMUNITY
Start: 2024-04-08 | End: 2024-04-11

## 2024-04-08 NOTE — PROGRESS NOTES
Internal Medicine Clinic Note    Subjective     Chief Complaint: Back Pain, Follow-up    History of Present Illness:  Ms. Soraya Elmore is a 63 y.o. female w/ PMHx of Vit D deficiency, HTN, recent T12 compression fracture, chronic abdominal bloating presenting for health maintenance.    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 She attempted to catch the patient and fell down herself, heard a pop in her back as she went down with inability to get herself up. She instantly started having severe back pain and was evaluated at the [facility] ED ([facility] is her workplace).  CT scan showing compression fracture of T12.  Stable with no red flag symptoms such as numbness tingling or urinary incontinence.  She was discharged with Toradol, Robaxin, oxycodone-acetaminophen.  States pain is relatively controlled if she does not bend over and takes Tylenol.  She was referred over to Ortho and will make an appointment. No back brace on discharge.    #HTN  Low salt diet and losartan tolerating well    #HLD  Continue statin 40 mg daily, working on cutting out fatty foods    #Vit D deficiency  On 50,000 units weekly, levels low at 18 in January 2024 with recent compression fracture.  Taking calcium OTC.    #Chronic abdominal bloating  Taking supplements that are helping although still with bloating.  Seen GI in 1/2024 who was gonna start her on Linzess but rx too expensive. Has not yet followed up.      Review of Systems   Constitutional:  Negative for chills and fever.   HENT:  Negative for congestion and sore throat.    Eyes:  Negative for double vision and photophobia.   Respiratory:  Negative for cough and shortness of breath.    Cardiovascular:  Negative for chest pain, palpitations and leg swelling.   Gastrointestinal:  Negative for blood in stool, diarrhea, nausea and vomiting.        (+) abdominal bloating   Genitourinary:   Negative for dysuria and urgency.   Musculoskeletal:  Positive for back pain and falls.   Skin:  Negative for itching and rash.   Neurological:  Negative for dizziness, tingling, weakness and headaches.   Psychiatric/Behavioral:  Negative for depression and hallucinations.        PAST HISTORY:     Past Medical History:   Diagnosis Date    Gastroenteritis     Glaucoma (increased eye pressure)     Prediabetes 2019    Tinnitus        Past Surgical History:   Procedure Laterality Date    COLONOSCOPY N/A 2016    Procedure: COLONOSCOPY;  Surgeon: Katerine Ramsey MD;  Location: [address]);  Service: Endoscopy;  Laterality: N/A;    LAPAROSCOPIC CHOLECYSTECTOMY N/A 2021    Procedure: CHOLECYSTECTOMY, LAPAROSCOPIC;  Surgeon: Johan Wilkerson Jr., MD;  Location: [facility];  Service: General;  Laterality: N/A;    OOPHORECTOMY      Unilateral oophorectomy       Family History   Problem Relation Age of Onset    Hypertension Mother     Glaucoma Mother     Cataracts Mother     Diabetes Sister     Glaucoma Sister     Diabetes Sister     Breast cancer Cousin 67    Colon cancer Neg Hx     Ovarian cancer Neg Hx     Stroke Neg Hx     Esophageal cancer Neg Hx        Social History     Socioeconomic History    Marital status:    Occupational History    Occupation: patient care tech    Tobacco Use    Smoking status: Former     Current packs/day: 0.00     Average packs/day: 0.1 packs/day for 15.0 years (1.5 ttl pk-yrs)     Types: Cigarettes     Start date: 1990     Quit date: 2005     Years since quittin.7    Smokeless tobacco: Never   Substance and Sexual Activity    Alcohol use: No     Alcohol/week: 0.0 standard drinks of alcohol    Drug use: No    Sexual activity: Not Currently     Partners: Male     Birth control/protection: Post-menopausal     Social Determinants of Health     Financial Resource Strain: Medium Risk (2024)    Overall Financial Resource Strain (CARDIA)     Difficulty of  Paying Living Expenses: Somewhat hard   Food Insecurity: Unknown (1/21/2024)    Hunger Vital Sign     Worried About Running Out of Food in the Last Year: Patient declined     Ran Out of Food in the Last Year: Never true   Transportation Needs: No Transportation Needs (1/21/2024)    PRAPARE - Transportation     Lack of Transportation (Medical): No     Lack of Transportation (Non-Medical): No   Physical Activity: Sufficiently Active (1/21/2024)    Exercise Vital Sign     Days of Exercise per Week: 3 days     Minutes of Exercise per Session: 60 min   Stress: Stress Concern Present (1/21/2024)    [employer] - Occupational Stress Questionnaire     Feeling of Stress : To some extent   Social Connections: Unknown (1/21/2024)    Social Connection and Isolation Panel [NHANES]     Frequency of Communication with Friends and Family: Twice a week     Frequency of Social Gatherings with Friends and Family: Three times a week     Active Member of Clubs or Organizations: No     Attends Club or Organization Meetings: Patient declined     Marital Status: Patient declined   Housing Stability: Low Risk  (1/21/2024)    Housing Stability Vital Sign     Unable to Pay for Housing in the Last Year: No     Number of Places Lived in the Last Year: 1     Unstable Housing in the Last Year: No       MEDICATIONS & ALLERGIES:     Current Outpatient Medications on File Prior to Visit   Medication Sig    ketorolac (TORADOL) 10 mg tablet Take 10 mg by mouth every 6 (six) hours as needed for Pain.    methocarbamoL (ROBAXIN) 500 MG Tab Take 500 mg by mouth.    oxyCODONE-acetaminophen (PERCOCET) 5-325 mg per tablet Take 1 tablet by mouth every 6 (six) hours as needed.    atorvastatin (LIPITOR) 40 MG tablet Take 1 tablet (40 mg total) by mouth once daily.    Bifidobacterium infantis (ALIGN) 4 mg Cap Take 1 capsule (4 mg total) by mouth once daily.    cholecalciferol, vitamin D3, 50,000 unit capsule Take 1 capsule (50,000  "Units total) by mouth once a week.    ciclopirox (PENLAC) 8 % Soln Apply topically nightly.    diclofenac sodium (VOLTAREN) 1 % Gel Apply 2 g topically 4 (four) times daily.    fluocinonide (LIDEX) 0.05 % external solution Apply topically daily as needed (scalp itching).    hydrocortisone 2.5 % cream Apply topically 2 (two) times daily as needed (flaking and itching rash).    ketoconazole (NIZORAL) 2 % shampoo Apply topically twice a week. Leave on for 5 to 10 minutes then rinse    latanoprost 0.005 % ophthalmic solution Place 1 drop into both eyes every evening.    loratadine (CLARITIN) 10 mg tablet Take 1 tablet (10 mg total) by mouth once daily.    ondansetron (ZOFRAN-ODT) 4 MG TbDL Take 1 tablet (4 mg total) by mouth every 8 (eight) hours as needed (Nausea).    pantoprazole (PROTONIX) 20 MG tablet Take 1 tablet (20 mg total) by mouth once daily.    sodium chloride (SALINE NASAL) 0.65 % nasal spray 1 spray by Nasal route every evening.    urea (CARMOL) 40 % Crea Apply topically once daily.    [DISCONTINUED] ciclopirox (PENLAC) 8 % Soln Apply topically nightly.    [DISCONTINUED] dicyclomine (BENTYL) 10 MG capsule Take 1 capsule (10 mg total) by mouth 2 (two) times daily as needed (severe abdominal cramping).    [DISCONTINUED] linaCLOtide (LINZESS) 72 mcg Cap capsule Take 1 capsule (72 mcg total) by mouth before breakfast.    [DISCONTINUED] losartan (COZAAR) 25 MG tablet Take 1 tablet (25 mg total) by mouth once daily.    [DISCONTINUED] naproxen (NAPROSYN) 500 MG tablet Take 1 tablet (500 mg total) by mouth 2 (two) times daily as needed (Pain, take with meals).     No current facility-administered medications on file prior to visit.       Review of patient's allergies indicates:  No Known Allergies    OBJECTIVE:     Vital Signs:  Vitals:    04/08/24 1310   BP: (!) 160/70  Comment: In moderate pain   Pulse: 85   Resp: 12   Weight: 78.2 kg (172 lb 6.4 oz)   Height: 5' 4" (1.626 m)       Body mass index is 29.59 " kg/m².     Physical Exam  Constitutional:       General: She is not in acute distress.     Appearance: She is overweight. She is not ill-appearing.   HENT:      Head: Normocephalic and atraumatic.      Nose: Nose normal. No congestion or rhinorrhea.      Mouth/Throat:      Mouth: Mucous membranes are moist.   Eyes:      General:         Right eye: No discharge.         Left eye: No discharge.      Extraocular Movements: Extraocular movements intact.      Conjunctiva/sclera: Conjunctivae normal.   Cardiovascular:      Rate and Rhythm: Normal rate and regular rhythm.      Heart sounds: No murmur heard.     No friction rub.   Pulmonary:      Effort: Pulmonary effort is normal. No respiratory distress.      Breath sounds: No stridor.   Abdominal:      General: Abdomen is flat. There is distension.      Palpations: Abdomen is soft.      Tenderness: There is no abdominal tenderness.   Musculoskeletal:         General: No signs of injury.      Cervical back: Neck supple. No rigidity.      Right lower leg: No edema.      Left lower leg: No edema.   Skin:     General: Skin is warm and dry.      Findings: No bruising or rash.   Neurological:      General: No focal deficit present.      Mental Status: She is alert and oriented to person, place, and time.      Gait: Gait abnormal (limited ROM 2/2 to pain).      Comments: Lumbar midline tenderness, no crepitus.  Paraspinal lumbar muscular tenderness.    Psychiatric:         Mood and Affect: Mood normal.         Behavior: Behavior normal.         Laboratory  Lab Results   Component Value Date    WBC 6.81 05/04/2023    HGB 11.9 (L) 05/04/2023    HCT 37.1 05/04/2023    MCV 90 05/04/2023     05/04/2023     BMP  Lab Results   Component Value Date     05/04/2023    K 3.5 05/04/2023     05/04/2023    CO2 27 05/04/2023    BUN 15 05/04/2023    CREATININE 0.8 05/04/2023    CALCIUM 9.0 05/04/2023    ANIONGAP 9 05/04/2023    EGFRNORACEVR >60 05/04/2023     Lab Results  "  Component Value Date    INR 0.9 06/04/2020     Lab Results   Component Value Date    HGBA1C 6.1 (H) 01/22/2024       Diagnostic Results:  Labs: Reviewed  CT: Reviewed    Health Maintenance Due   Topic Date Due    Cervical Cancer Screening  06/03/2024         ASSESSMENT & PLAN:   Ms. Soraya Elmore is a 63 y.o. female w/ PMHx of Vit D deficiency, HTN, recent T12 compression fracture, chronic abdominal bloating presenting for health maintenance.        -     *  1. T12 compression fracture, sequela  Overview:  ED visit on 4/7, XR showing "acute appearing mildly wedge shaped compression fracture of the superior endplate of T12. No lumbar spine fracture is identified. Follow-up as clinically indicated."    Orders:  -     DXA Bone Density Axial Skeleton 1 or more sites; Future; Expected date: 04/08/2024  -     Back/Cervical Brace For Home Use  -     ketorolac injection 30 mg    2. Vitamin D deficiency  -     DXA Bone Density Axial Skeleton 1 or more sites; Future; Expected date: 04/08/2024    3. Normocytic anemia  -     COMPREHENSIVE METABOLIC PANEL; Future; Expected date: 04/08/2024  -     CBC W/ AUTO DIFFERENTIAL; Future; Expected date: 04/08/2024  -     IRON AND TIBC; Future; Expected date: 04/08/2024  -     VITAMIN B12; Future; Expected date: 04/08/2024  -     RETICULOCYTES; Future; Expected date: 04/08/2024    4. Primary hypertension  -     Hemoglobin A1C; Future; Expected date: 04/08/2024  -     Lipid Panel; Future; Expected date: 04/08/2024  -     losartan (COZAAR) 25 MG tablet; Take 1 tablet (25 mg total) by mouth once daily.  Dispense: 60 tablet; Refill: 0    5. Polyp of colon, unspecified part of colon, unspecified type  Assessment & Plan:  C-scope completed in 2016. No FHx of colon cancer.   Colon polyp biopsy with colonic mucosa with submucosal lymphoid aggregate.        6. Cervical cancer screening  -     Ambulatory referral/consult to Obstetrics / Gynecology; Future; Expected date: " 04/15/2024        Follow up in about 3 months (around 7/8/2024).        Dhara Storey MD  Internal Medicine, PGY-III  [facility]  [address] [place] [address] [phone]

## 2024-04-08 NOTE — PROGRESS NOTES
63F with Vit D def, elevated BMI presenting for annual/followup with 1 CC of vertebral compression fracture.    Compression fracture - will do 1x toradol shot for pain today. Will instruct not to take other NSAIDs for next 24h with this. Back brace ordered; has ortho referral. Dexa scan ordered. She needs to reach out to us if she has trouble getting an ortho appt so we can help.    HTN - prescribed losartan    Anemia - obtain vitamin studies along with updated CBC - if iron deficiency will need colonoscopy and eval for abnormal uterine bleeding    HCM - update annual labs. Offer vaccines today. Will offer cervical cancer screening as well.

## 2024-04-08 NOTE — ASSESSMENT & PLAN NOTE
C-scope completed in 2016. No FHx of colon cancer.   Colon polyp biopsy with colonic mucosa with submucosal lymphoid aggregate.

## 2024-04-08 NOTE — PATIENT INSTRUCTIONS
Make appointment to follow up with Dr. Lieberman in GI for persistent abdominal bloating. *Keep a food log for us and GI to assess.*    Back Pain:  Can take Tylenol every 4-6 hours for pain relief, no more than 3000 mg max in 24 hours.     Take Toradol as instructed starting tomorrow as needed with food  Oxycodone from ED only for emergency, severe pain as it can be sedating.     Can buy back brace from [employer] or [employer] for support while waiting on ortho appointment.     Call to see if insurance will cover:  [facility]   [address]  [place] [address]  Phone: [phone]

## 2024-04-08 NOTE — PROGRESS NOTES
I have reviewed the notes, assessments, and/or procedures performed by Dr. Storey, I concur with her/his documentation of Soraya Elmore.  Date of Service: 4/8/2024

## 2024-04-08 NOTE — LETTER
April 8, 2024    Soraya Elmore  [address] [place] [id number]             [facility]  [address]  [facility] [address]  Phone: [phone]  Fax: [phone] To Whom This May Concern:    Mrs. Elmore was seen by me after an injury - she will require at minimum until April 14th, 2024 and may required extended time off pending recovery process.    She is to return with work restrictions, light duty and no weight bearing over 10 lbs.     If you have any questions or concerns, please don't hesitate to call.    Sincerely,        Dhara Storey MD

## 2024-04-16 ENCOUNTER — PATIENT MESSAGE (OUTPATIENT)
Dept: INTERNAL MEDICINE | Facility: CLINIC | Age: 64
End: 2024-04-16
Payer: COMMERCIAL

## 2024-04-30 ENCOUNTER — PATIENT MESSAGE (OUTPATIENT)
Dept: INTERNAL MEDICINE | Facility: CLINIC | Age: 64
End: 2024-04-30
Payer: COMMERCIAL

## 2024-04-30 ENCOUNTER — HOSPITAL ENCOUNTER (OUTPATIENT)
Dept: RADIOLOGY | Facility: CLINIC | Age: 64
Discharge: HOME | End: 2024-04-30
Payer: COMMERCIAL

## 2024-04-30 DIAGNOSIS — S22.080S T12 COMPRESSION FRACTURE, SEQUELA: ICD-10-CM

## 2024-04-30 DIAGNOSIS — E55.9 VITAMIN D DEFICIENCY: ICD-10-CM

## 2024-04-30 PROCEDURE — 77080 DXA BONE DENSITY AXIAL: CPT | Mod: 26,,, | Performed by: INTERNAL MEDICINE

## 2024-04-30 PROCEDURE — 77080 DXA BONE DENSITY AXIAL: CPT | Mod: TC

## 2024-05-08 ENCOUNTER — TELEPHONE (OUTPATIENT)
Dept: OPTOMETRY | Facility: CLINIC | Age: 64
End: 2024-05-08
Payer: COMMERCIAL

## 2024-05-08 NOTE — TELEPHONE ENCOUNTER
----- Message from Gena Farah sent at 5/8/2024  3:55 PM CDT -----  Type:  Needs Medical Advice    Who Called: PT   Would the patient rather a call back or a response via [facility]? Callback   Best Call Back Number:  [phone]  Additional Information: Caller is requesting a callback

## 2024-05-13 ENCOUNTER — MEDICATION MANAGEMENT (OUTPATIENT)
Dept: INTERNAL MEDICINE | Facility: CLINIC | Age: 64
End: 2024-05-13
Payer: COMMERCIAL

## 2024-05-13 DIAGNOSIS — E55.9 VITAMIN D DEFICIENCY: ICD-10-CM

## 2024-05-13 DIAGNOSIS — M80.00XD OSTEOPOROSIS WITH CURRENT PATHOLOGICAL FRACTURE WITH ROUTINE HEALING, UNSPECIFIED OSTEOPOROSIS TYPE, SUBSEQUENT ENCOUNTER: Primary | ICD-10-CM

## 2024-05-13 DIAGNOSIS — S22.080S T12 COMPRESSION FRACTURE, SEQUELA: ICD-10-CM

## 2024-05-13 DIAGNOSIS — M81.0 OSTEOPOROSIS OF LUMBAR SPINE: ICD-10-CM

## 2024-05-13 RX ORDER — CALCIUM CARBONATE 500(1250)
1 TABLET,CHEWABLE ORAL DAILY
Qty: 90 TABLET | Refills: 3 | Status: SHIPPED | OUTPATIENT
Start: 2024-05-13 | End: 2025-05-13

## 2024-05-13 NOTE — PROGRESS NOTES
Counseled on DEXA results - osteoporosis of femur and lumbar spine with history of compression fracture.  Continue Vit D 50k weekly, start calcium 1404-0083 mg daily, likely will start bone reabsorptive agent if pt unable to get into endocrine in a timely manner due to appointment availabilities.  Endocrine referral for long-term management of osteoporosis.    Labs follow-up: Vit D, BMP (calcium borderline high last visit), and PTH.   Pt verbalized understanding and instructed to call back with any questions or concerns.

## 2024-05-13 NOTE — PROGRESS NOTES
I have reviewed the notes, assessments, and/or procedures performed by Dr. Storey, I concur with her/his documentation of Soraya Elmore.  Date of Service: 5/13/2024

## 2024-05-22 ENCOUNTER — OFFICE VISIT (OUTPATIENT)
Dept: ENDOCRINOLOGY | Facility: CLINIC | Age: 64
End: 2024-05-22
Payer: COMMERCIAL

## 2024-05-22 VITALS
OXYGEN SATURATION: 99 % | HEIGHT: 64 IN | SYSTOLIC BLOOD PRESSURE: 140 MMHG | HEART RATE: 87 BPM | BODY MASS INDEX: 29.45 KG/M2 | WEIGHT: 172.5 LBS | DIASTOLIC BLOOD PRESSURE: 78 MMHG

## 2024-05-22 DIAGNOSIS — M80.00XD OSTEOPOROSIS WITH CURRENT PATHOLOGICAL FRACTURE WITH ROUTINE HEALING, UNSPECIFIED OSTEOPOROSIS TYPE, SUBSEQUENT ENCOUNTER: ICD-10-CM

## 2024-05-22 DIAGNOSIS — S22.080S T12 COMPRESSION FRACTURE, SEQUELA: ICD-10-CM

## 2024-05-22 DIAGNOSIS — M81.0 OSTEOPOROSIS OF LUMBAR SPINE: Primary | ICD-10-CM

## 2024-05-22 PROCEDURE — G2211 COMPLEX E/M VISIT ADD ON: HCPCS | Mod: S$GLB,,, | Performed by: INTERNAL MEDICINE

## 2024-05-22 PROCEDURE — 3078F DIAST BP <80 MM HG: CPT | Mod: CPTII,S$GLB,, | Performed by: INTERNAL MEDICINE

## 2024-05-22 PROCEDURE — 1159F MED LIST DOCD IN RCRD: CPT | Mod: CPTII,S$GLB,, | Performed by: INTERNAL MEDICINE

## 2024-05-22 PROCEDURE — 3008F BODY MASS INDEX DOCD: CPT | Mod: CPTII,S$GLB,, | Performed by: INTERNAL MEDICINE

## 2024-05-22 PROCEDURE — 3077F SYST BP >= 140 MM HG: CPT | Mod: CPTII,S$GLB,, | Performed by: INTERNAL MEDICINE

## 2024-05-22 PROCEDURE — 99999 PR PBB SHADOW E&M-EST. PATIENT-LVL V: CPT | Mod: PBBFAC,,, | Performed by: INTERNAL MEDICINE

## 2024-05-22 PROCEDURE — 4010F ACE/ARB THERAPY RXD/TAKEN: CPT | Mod: CPTII,S$GLB,, | Performed by: INTERNAL MEDICINE

## 2024-05-22 PROCEDURE — 99204 OFFICE O/P NEW MOD 45 MIN: CPT | Mod: S$GLB,,, | Performed by: INTERNAL MEDICINE

## 2024-05-22 PROCEDURE — 3044F HG A1C LEVEL LT 7.0%: CPT | Mod: CPTII,S$GLB,, | Performed by: INTERNAL MEDICINE

## 2024-05-22 RX ORDER — DEXAMETHASONE 1 MG/1
1 TABLET ORAL ONCE
Qty: 1 TABLET | Refills: 0 | Status: SHIPPED | OUTPATIENT
Start: 2024-05-22 | End: 2024-05-22

## 2024-05-22 NOTE — PROGRESS NOTES
"Subjective:      Patient ID: Soraya Elmore is a 63 y.o. female.    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  Pain is 3/10     Current treatment: denies   Previous medication use and side effects:denies     Falls: denies other falls   Fractures:  no other falls     DXA: 4/2024   T score -3.8 at LS  FRAX 9.8% and 2%    Dental visits: not really, partial dentures upper     Diet:   Calcium intake: cheese, dark leafy greens, spinach, broccoli    Supplements:   Calcium: just started, previously intermittent  Vitamin D: daily   MVI: daily     Exercise: 2 - 3 days a week prior to fall   Balance: yes      Menopause: 20 years ago   HRT history: denies     High Risk Medications:  Glucocorticoid History: HC cream as needed  Aromatase Inhibitors: denies   GnRH agonists: denies   PPI: daily   Diabetes medicines: denies   Anti-seizure medication: denies     Denies radiation to bones.   Personal history of kidney stones: denies   Family history of kidney stones: denies   Family history of osteoporosis or other bone disease: mother and older sister   Family history of fractures:  denies       Review of Systems  Denies recent illness    Objective:   Physical Exam  Vitals:    05/22/24 0909   BP: (!) 140/78   BP Location: Left arm   Patient Position: Sitting   BP Method: Medium (Manual)   Pulse: 87   SpO2: 99%   Weight: 78.3 kg (172 lb 8.2 oz)   Height: 5' 4" (1.626 m)       BP Readings from Last 3 Encounters:   05/22/24 (!) 140/78   04/08/24 (!) 160/70   01/29/24 124/62     Wt Readings from Last 1 Encounters:   05/22/24 0909 78.3 kg (172 lb 8.2 oz)         Body mass index is 29.61 kg/m².    Lab Review:   Lab Results   Component Value Date    HGBA1C 6.2 (H) 04/08/2024     Lab Results   Component Value Date    CHOL 174 04/08/2024    HDL 50 04/08/2024    LDLCALC 116.4 04/08/2024    TRIG 38 04/08/2024 "    CHOLHDL 28.7 04/08/2024     Lab Results   Component Value Date     04/08/2024    K 4.0 04/08/2024     04/08/2024    CO2 25 04/08/2024     (H) 04/08/2024    BUN 15 04/08/2024    CREATININE 0.8 04/08/2024    CALCIUM 10.1 04/08/2024    PROT 7.9 04/08/2024    ALBUMIN 4.2 04/08/2024    BILITOT 0.7 04/08/2024    ALKPHOS 115 04/08/2024    AST 26 04/08/2024    ALT 30 04/08/2024    ANIONGAP 10 04/08/2024    ESTGFRAFRICA >60 11/10/2021    EGFRNONAA >60 11/10/2021    TSH 2.009 11/10/2021           Assessment and Plan     Osteoporosis of lumbar spine  Risk factors: early postmenopausal status without MHT, family history and vitamin D deficiency  Traumatic compression fracture following a fall with T scores -3.8 and Z scores of -3.0   FRAX not elevated     Due for DXA scan in 4/2026, discussed importance of scheduling in the same location to allow for comparison    Secondary evaluation: PTH, renal function, 24 hr urine for kiki/creat, TTG/IgA and hypercortisolism  Serum calcium minimally elevated 10.1 mg/dl. Normal creatinine, thyroid function tests and liver enzymes are normal.     Continue calcium and vitamin D  Continue to stay active, exercise and focus on strength and core    Treatment options and potential side effects discussed for anabolic medications.       T12 compression fracture, sequela  Pain control is much improved   Ideally anabolic therapy for osteoporosis following evaluation for secondary osteo.

## 2024-05-22 NOTE — ASSESSMENT & PLAN NOTE
Pain control is much improved   Ideally anabolic therapy for osteoporosis following evaluation for secondary osteo.

## 2024-05-22 NOTE — ASSESSMENT & PLAN NOTE
Risk factors: early postmenopausal status without MHT, family history and vitamin D deficiency  Traumatic compression fracture following a fall with T scores -3.8 and Z scores of -3.0   FRAX not elevated     Due for DXA scan in 4/2026, discussed importance of scheduling in the same location to allow for comparison    Secondary evaluation: PTH, renal function, 24 hr urine for kiki/creat, TTG/IgA and hypercortisolism  Serum calcium minimally elevated 10.1 mg/dl. Normal creatinine, thyroid function tests and liver enzymes are normal.     Continue calcium and vitamin D  Continue to stay active, exercise and focus on strength and core    Treatment options and potential side effects discussed for anabolic medications.

## 2024-05-22 NOTE — PATIENT INSTRUCTIONS
Here are the instructions for the dexamethasone suppression test.    Please take 1 mg dexamethasone between 11 PM-12 AM. Then have your blood drawn at 8-9 AM the next morning.    I have sent the prescription of dexamethasone to your pharmacy and entered the blood tests for you.     HOW TO COLLECT AN ACCURATE   24 HOUR URINE SPECIMEN     I want you to collect EVERY drop of your urine during a 24 hour period. I do not care what the volume of the urine is as long as it represents every drop that you pass during that 24 hour period. If you need to have a bowel movement, you may separate the urine but I want every drop.     Begin the collection on a morning which is convenient for you. At that time, pass your urine, flush it down the toilet and note the exact time. Now you have an empty bladder and empty bottle. That starts the collection. Collect every drop all during the day and night until exactly the same time the next morning, when you should pass your urine and add it to the bottle.     Bring the closed container back to the same laboratory that issued the empty container.       The day you turn in the collection you will do some of the labs (not the cortisol)    Two medications that help build bone tymlos and forteo. I would use whichever your insurance prefers.     I would recommend starting a medicine called Forteo/Tymlos, which are given as a subcutaneous injection once per day. The medication will help to rebuild your bone density and hopefully prevent further fractures. It's given for a total of two years, followed by an alternative medication to consolidate the gains from the Forteo.   Side effects are usually very mild. The most common thing is light headedness that can occur within an hour of taking the medication. Usually this is mild and can be remedied by taking the medication at night time before bed. In rat studies using much higher doses than what we use clinically, the medication was linked to a  rare bone cancer called osteosarcoma. There are observational studies looking for this type of cancer in patients who have used forteo, and to date, none have been reported. This is an ongoing study. But to be cautious we do not use in patients who have other risk factors for bone cancer such as direct radiation to the bone.     Please let me know what questions/concerns you have and if you are in agreement to starting the medication.    [email or web address]      Please don't hesitate to contact me if you have any questions or concerns.

## 2024-05-28 ENCOUNTER — LAB VISIT (OUTPATIENT)
Dept: LAB | Facility: HOSPITAL | Age: 64
End: 2024-05-28
Attending: INTERNAL MEDICINE
Payer: COMMERCIAL

## 2024-05-28 DIAGNOSIS — M80.00XD OSTEOPOROSIS WITH CURRENT PATHOLOGICAL FRACTURE WITH ROUTINE HEALING, UNSPECIFIED OSTEOPOROSIS TYPE, SUBSEQUENT ENCOUNTER: ICD-10-CM

## 2024-05-28 DIAGNOSIS — S22.080S T12 COMPRESSION FRACTURE, SEQUELA: ICD-10-CM

## 2024-05-28 DIAGNOSIS — M81.0 OSTEOPOROSIS OF LUMBAR SPINE: ICD-10-CM

## 2024-05-28 LAB
ALBUMIN SERPL BCP-MCNC: 3.9 G/DL (ref 3.5–5.2)
ANION GAP SERPL CALC-SCNC: 11 MMOL/L (ref 8–16)
BUN SERPL-MCNC: 11 MG/DL (ref 8–23)
CALCIUM SERPL-MCNC: 9.4 MG/DL (ref 8.7–10.5)
CHLORIDE SERPL-SCNC: 107 MMOL/L (ref 95–110)
CO2 SERPL-SCNC: 25 MMOL/L (ref 23–29)
CORTIS SERPL-MCNC: 8.1 UG/DL (ref 4.3–22.4)
CREAT SERPL-MCNC: 0.8 MG/DL (ref 0.5–1.4)
EST. GFR  (NO RACE VARIABLE): >60 ML/MIN/1.73 M^2
GLUCOSE SERPL-MCNC: 117 MG/DL (ref 70–110)
PHOSPHATE SERPL-MCNC: 4.1 MG/DL (ref 2.7–4.5)
POTASSIUM SERPL-SCNC: 3.8 MMOL/L (ref 3.5–5.1)
PTH-INTACT SERPL-MCNC: 39.7 PG/ML (ref 9–77)
SODIUM SERPL-SCNC: 143 MMOL/L (ref 136–145)

## 2024-05-28 PROCEDURE — 83520 IMMUNOASSAY QUANT NOS NONAB: CPT | Performed by: INTERNAL MEDICINE

## 2024-05-28 PROCEDURE — 82533 TOTAL CORTISOL: CPT | Performed by: INTERNAL MEDICINE

## 2024-05-28 PROCEDURE — 83970 ASSAY OF PARATHORMONE: CPT | Performed by: INTERNAL MEDICINE

## 2024-05-28 PROCEDURE — 80069 RENAL FUNCTION PANEL: CPT | Performed by: INTERNAL MEDICINE

## 2024-05-29 ENCOUNTER — PATIENT MESSAGE (OUTPATIENT)
Dept: ENDOCRINOLOGY | Facility: HOSPITAL | Age: 64
End: 2024-05-29
Payer: COMMERCIAL

## 2024-05-29 DIAGNOSIS — S22.080S T12 COMPRESSION FRACTURE, SEQUELA: ICD-10-CM

## 2024-05-29 DIAGNOSIS — M81.0 OSTEOPOROSIS OF LUMBAR SPINE: Primary | ICD-10-CM

## 2024-05-29 LAB — TRYPTASE LEVEL: 2.5 NG/ML

## 2024-05-29 RX ORDER — ABALOPARATIDE 2000 UG/ML
80 INJECTION, SOLUTION SUBCUTANEOUS DAILY
Qty: 1.56 ML | Refills: 11 | Status: ACTIVE | OUTPATIENT
Start: 2024-05-29 | End: 2024-06-14

## 2024-05-29 NOTE — TELEPHONE ENCOUNTER
Normal calcium/pth/renal function   No radiation exposure   Due to compression fracture to start anabolic therapy with tymlos     Results for orders placed or performed in visit on 05/28/24   Calcium, Timed Urine [facility]; 24 Hours   Result Value Ref Range    Urine Volume 880 mL    Urine Collection Duration 24 Hr    Calcium, Urine 13.3 0.0 - 15.0 mg/dL    Calcium, 24 Hr Urine 5 4 - 12 mg/Hr    Calcium, Urine (mg/spec) 117 mg/Spec   Creatinine, urine, timed 24 Hours   Result Value Ref Range    Urine Volume 880 mL    Urine Collection Duration 24 Hr    Creatinine, Urine 138.0 15.0 - 325.0 mg/dL    Creatinine, Timed Urine 50.6 40.0 - 75.0 mg/Hr    Creatinine, Urinr (mg/spec) 1214.4 mg/Spec

## 2024-05-31 ENCOUNTER — PATIENT MESSAGE (OUTPATIENT)
Dept: SURGERY | Facility: CLINIC | Age: 64
End: 2024-05-31
Payer: COMMERCIAL

## 2024-05-31 RX ORDER — DICLOFENAC SODIUM 10 MG/G
2 GEL TOPICAL 4 TIMES DAILY
Qty: 100 G | Refills: 0 | Status: SHIPPED | OUTPATIENT
Start: 2024-05-31 | End: 2024-06-04 | Stop reason: SDUPTHER

## 2024-06-04 DIAGNOSIS — N64.4 BREAST PAIN: Primary | ICD-10-CM

## 2024-06-04 RX ORDER — DICLOFENAC SODIUM 10 MG/G
2 GEL TOPICAL 4 TIMES DAILY
Qty: 100 G | Refills: 0 | Status: SHIPPED | OUTPATIENT
Start: 2024-06-04

## 2024-06-14 ENCOUNTER — TELEPHONE (OUTPATIENT)
Dept: INTERNAL MEDICINE | Facility: CLINIC | Age: 64
End: 2024-06-14
Payer: COMMERCIAL

## 2024-06-20 ENCOUNTER — OFFICE VISIT (OUTPATIENT)
Dept: OBSTETRICS AND GYNECOLOGY | Facility: CLINIC | Age: 64
End: 2024-06-20
Attending: OBSTETRICS & GYNECOLOGY
Payer: COMMERCIAL

## 2024-06-20 VITALS
SYSTOLIC BLOOD PRESSURE: 120 MMHG | DIASTOLIC BLOOD PRESSURE: 68 MMHG | WEIGHT: 171.5 LBS | BODY MASS INDEX: 29.28 KG/M2 | HEIGHT: 64 IN

## 2024-06-20 DIAGNOSIS — Z01.419 WELL FEMALE EXAM WITH ROUTINE GYNECOLOGICAL EXAM: Primary | ICD-10-CM

## 2024-06-20 PROBLEM — K82.9 GALL BLADDER DISEASE: Status: RESOLVED | Noted: 2021-01-08 | Resolved: 2024-06-20

## 2024-06-20 PROCEDURE — 3044F HG A1C LEVEL LT 7.0%: CPT | Mod: CPTII,S$GLB,, | Performed by: OBSTETRICS & GYNECOLOGY

## 2024-06-20 PROCEDURE — 99396 PREV VISIT EST AGE 40-64: CPT | Mod: S$GLB,,, | Performed by: OBSTETRICS & GYNECOLOGY

## 2024-06-20 PROCEDURE — 3008F BODY MASS INDEX DOCD: CPT | Mod: CPTII,S$GLB,, | Performed by: OBSTETRICS & GYNECOLOGY

## 2024-06-20 PROCEDURE — 99999 PR PBB SHADOW E&M-EST. PATIENT-LVL IV: CPT | Mod: PBBFAC,,, | Performed by: OBSTETRICS & GYNECOLOGY

## 2024-06-20 PROCEDURE — 4010F ACE/ARB THERAPY RXD/TAKEN: CPT | Mod: CPTII,S$GLB,, | Performed by: OBSTETRICS & GYNECOLOGY

## 2024-06-20 PROCEDURE — 3078F DIAST BP <80 MM HG: CPT | Mod: CPTII,S$GLB,, | Performed by: OBSTETRICS & GYNECOLOGY

## 2024-06-20 PROCEDURE — 1159F MED LIST DOCD IN RCRD: CPT | Mod: CPTII,S$GLB,, | Performed by: OBSTETRICS & GYNECOLOGY

## 2024-06-20 PROCEDURE — 88175 CYTOPATH C/V AUTO FLUID REDO: CPT | Performed by: OBSTETRICS & GYNECOLOGY

## 2024-06-20 PROCEDURE — 3074F SYST BP LT 130 MM HG: CPT | Mod: CPTII,S$GLB,, | Performed by: OBSTETRICS & GYNECOLOGY

## 2024-06-20 PROCEDURE — 1160F RVW MEDS BY RX/DR IN RCRD: CPT | Mod: CPTII,S$GLB,, | Performed by: OBSTETRICS & GYNECOLOGY

## 2024-06-20 NOTE — PROGRESS NOTES
SUBJECTIVE:   63 y.o. female   for routine gyn exam. Patient's last menstrual period was 2006..  She has no unusual complaints. No PMB. No HRT          Past Medical History:   Diagnosis Date    Gastroenteritis     Glaucoma (increased eye pressure)     Osteoporosis     Prediabetes 2019    Tinnitus      Past Surgical History:   Procedure Laterality Date    COLONOSCOPY N/A 2016    Procedure: COLONOSCOPY;  Surgeon: Katerine Ramsey MD;  Location: [address]);  Service: Endoscopy;  Laterality: N/A;    LAPAROSCOPIC CHOLECYSTECTOMY N/A 2021    Procedure: CHOLECYSTECTOMY, LAPAROSCOPIC;  Surgeon: Johan Wilkerson Jr., MD;  Location: [facility];  Service: General;  Laterality: N/A;    OOPHORECTOMY      Unilateral oophorectomy     Social History     Socioeconomic History    Marital status:    Occupational History    Occupation: patient care tech    Tobacco Use    Smoking status: Former     Current packs/day: 0.00     Average packs/day: 0.1 packs/day for 15.0 years (1.5 ttl pk-yrs)     Types: Cigarettes     Start date: 1990     Quit date: 2005     Years since quittin.9     Passive exposure: Past    Smokeless tobacco: Never   Substance and Sexual Activity    Alcohol use: No     Alcohol/week: 0.0 standard drinks of alcohol    Drug use: No    Sexual activity: Not Currently     Partners: Male     Birth control/protection: Post-menopausal     Social Determinants of Health     Financial Resource Strain: Medium Risk (2024)    Overall Financial Resource Strain (CARDIA)     Difficulty of Paying Living Expenses: Somewhat hard   Food Insecurity: Unknown (2024)    Hunger Vital Sign     Worried About Running Out of Food in the Last Year: Patient declined     Ran Out of Food in the Last Year: Never true   Transportation Needs: No Transportation Needs (2024)    PRAPARE - Transportation     Lack of Transportation (Medical): No     Lack of Transportation (Non-Medical): No    Physical Activity: Sufficiently Active (2024)    Exercise Vital Sign     Days of Exercise per Week: 3 days     Minutes of Exercise per Session: 60 min   Stress: Stress Concern Present (2024)    [employer] - Occupational Stress Questionnaire     Feeling of Stress : To some extent   Housing Stability: Low Risk  (2024)    Housing Stability Vital Sign     Unable to Pay for Housing in the Last Year: No     Number of Places Lived in the Last Year: 1     Unstable Housing in the Last Year: No     Family History   Problem Relation Name Age of Onset    Hypertension Mother      Glaucoma Mother      Cataracts Mother      Diabetes Sister Ana     Glaucoma Sister Catherine     Diabetes Sister Lauren     Breast cancer Cousin  67    Colon cancer Neg Hx      Ovarian cancer Neg Hx      Stroke Neg Hx      Esophageal cancer Neg Hx       OB History    Para Term  AB Living   1 1 1 0 0 1   SAB IAB Ectopic Multiple Live Births   0 0 0 0 1      # Outcome Date GA Lbr Wang/2nd Weight Sex Type Anes PTL Lv   1 Term     F Vag-Spont   OTONIEL         Current Outpatient Medications   Medication Sig Dispense Refill    atorvastatin (LIPITOR) 40 MG tablet Take 1 tablet (40 mg total) by mouth once daily. 90 tablet 3    Bifidobacterium infantis (ALIGN) 4 mg Cap Take 1 capsule (4 mg total) by mouth once daily. 90 capsule 3    calcium carbonate (CALCIUM 500) 500 mg calcium (1,250 mg) chewable tablet Take 1 tablet (500 mg total) by mouth once daily. 90 tablet 3    cholecalciferol, vitamin D3, 50,000 unit capsule Take 1 capsule (50,000 Units total) by mouth once a week. 12 capsule 3    ciclopirox (PENLAC) 8 % Soln Apply topically nightly. 6.6 mL 11    diclofenac sodium (VOLTAREN) 1 % Gel Apply 2 g topically 4 (four) times daily. 100 g 0    fluocinonide (LIDEX) 0.05 % external solution Apply topically daily as needed (scalp itching). 60 mL 6    hydrocortisone 2.5 % cream Apply topically 2 (two) times  daily as needed (flaking and itching rash). 30 g 6    ketoconazole (NIZORAL) 2 % shampoo Apply topically twice a week. Leave on for 5 to 10 minutes then rinse 120 mL 6    latanoprost 0.005 % ophthalmic solution Place 1 drop into both eyes every evening. 2.5 mL 6    ondansetron (ZOFRAN-ODT) 4 MG TbDL Take 1 tablet (4 mg total) by mouth every 8 (eight) hours as needed (Nausea). 90 tablet 0    sodium chloride (SALINE NASAL) 0.65 % nasal spray 1 spray by Nasal route every evening. 60 mL 3    urea (CARMOL) 40 % Crea Apply topically once daily. 85 g 11    loratadine (CLARITIN) 10 mg tablet Take 1 tablet (10 mg total) by mouth once daily. 90 tablet 1    losartan (COZAAR) 25 MG tablet Take 1 tablet (25 mg total) by mouth once daily. (Patient not taking: Reported on 5/22/2024) 60 tablet 0    pantoprazole (PROTONIX) 20 MG tablet Take 1 tablet (20 mg total) by mouth once daily. 30 tablet 10     No current facility-administered medications for this visit.     Allergies: Patient has no known allergies.     ROS:  Constitutional: no weight loss, weight gain, fever, fatigue  Eyes:  No vision changes, glasses/contacts  ENT/Mouth: No ulcers, sinus problems, ears ringing, headache  Cardiovascular: No inability to lie flat, chest pain, exercise intolerance, swelling, heart palpitations  Respiratory: No wheezing, coughing blood, shortness of breath, or cough  Gastrointestinal: No diarrhea, bloody stool, nausea/vomiting, constipation, gas, hemorrhoids  Genitourinary: No blood in urine, painful urination, urgency of urination, frequency of urination, incomplete emptying, incontinence, abnormal bleeding, painful periods, heavy periods, vaginal discharge, vaginal odor, painful intercourse, sexual problems, bleeding after intercourse.  Musculoskeletal: No muscle weakness  Skin/Breast: No painful breasts, nipple discharge, masses, rash, ulcers  Neurological: No passing out, seizures, numbness, headache  Endocrine: No diabetes, hypothyroid,  "hyperthyroid, hot flashes, hair loss, abnormal hair growth, acne  Psychiatric: No depression, crying  Hematologic: No bruises, bleeding, swollen lymph nodes, anemia.      OBJECTIVE:   The patient appears well, alert, oriented x 3, in no distress.  /68 (BP Location: Left arm, Patient Position: Sitting, BP Method: Large (Manual))   Ht 5' 4" (1.626 m)   Wt 77.8 kg (171 lb 8.3 oz)   LMP 07/21/2006   BMI 29.44 kg/m²   NECK: no thyromegaly, trachea midline  SKIN: no acne, striae, hirsutism  CHEST: CTAB  CV: RRR  BREAST EXAM: breasts appear normal, no suspicious masses, no skin or nipple changes or axillary nodes  ABDOMEN: no hernias, masses, or hepatosplenomegaly  GENITALIA: normal external genitalia, no erythema, no discharge  URETHRA: normal urethra, normal urethral meatus  VAGINA: Normal  CERVIX: no lesions or cervical motion tenderness  UTERUS: normal size, contour, position, consistency, mobility, non-tender  ADNEXA: no mass, fullness, tenderness      ASSESSMENT:   1. Well female exam with routine gynecological exam  Liquid-Based Pap Smear, Screening          PLAN:   Orders Placed This Encounter    Liquid-Based Pap Smear, Screening     Discussed healthy lifestyle including regular exercise, healthy eating, etc.  Return to clinic in 1 year    "

## 2024-06-21 LAB
CLINICAL INFO: NORMAL
DATE OF PREVIOUS PAP: NORMAL
DATE PREVIOUS BX: NO
LMP START DATE: NORMAL
SPECIMEN SOURCE CVX/VAG CYTO: NORMAL

## 2024-06-25 ENCOUNTER — PATIENT MESSAGE (OUTPATIENT)
Dept: ENDOCRINOLOGY | Facility: CLINIC | Age: 64
End: 2024-06-25
Payer: COMMERCIAL

## 2024-06-28 ENCOUNTER — PATIENT MESSAGE (OUTPATIENT)
Dept: ENDOCRINOLOGY | Facility: CLINIC | Age: 64
End: 2024-06-28
Payer: COMMERCIAL

## 2024-07-02 ENCOUNTER — OFFICE VISIT (OUTPATIENT)
Dept: OPTOMETRY | Facility: CLINIC | Age: 64
End: 2024-07-02
Payer: COMMERCIAL

## 2024-07-02 DIAGNOSIS — H52.13 MYOPIA OF BOTH EYES WITH ASTIGMATISM AND PRESBYOPIA: ICD-10-CM

## 2024-07-02 DIAGNOSIS — H52.203 MYOPIA OF BOTH EYES WITH ASTIGMATISM AND PRESBYOPIA: ICD-10-CM

## 2024-07-02 DIAGNOSIS — H52.4 MYOPIA OF BOTH EYES WITH ASTIGMATISM AND PRESBYOPIA: ICD-10-CM

## 2024-07-02 DIAGNOSIS — H40.1131 PRIMARY OPEN ANGLE GLAUCOMA (POAG) OF BOTH EYES, MILD STAGE: Primary | ICD-10-CM

## 2024-07-02 DIAGNOSIS — H25.13 NUCLEAR SCLEROSIS OF BOTH EYES: ICD-10-CM

## 2024-07-02 PROCEDURE — 92133 CPTRZD OPH DX IMG PST SGM ON: CPT | Mod: S$GLB,,, | Performed by: OPTOMETRIST

## 2024-07-02 PROCEDURE — 99999 PR PBB SHADOW E&M-EST. PATIENT-LVL III: CPT | Mod: PBBFAC,,, | Performed by: OPTOMETRIST

## 2024-07-02 PROCEDURE — 3044F HG A1C LEVEL LT 7.0%: CPT | Mod: CPTII,S$GLB,, | Performed by: OPTOMETRIST

## 2024-07-02 PROCEDURE — 1159F MED LIST DOCD IN RCRD: CPT | Mod: CPTII,S$GLB,, | Performed by: OPTOMETRIST

## 2024-07-02 PROCEDURE — 92015 DETERMINE REFRACTIVE STATE: CPT | Mod: S$GLB,,, | Performed by: OPTOMETRIST

## 2024-07-02 PROCEDURE — 4010F ACE/ARB THERAPY RXD/TAKEN: CPT | Mod: CPTII,S$GLB,, | Performed by: OPTOMETRIST

## 2024-07-02 PROCEDURE — 92014 COMPRE OPH EXAM EST PT 1/>: CPT | Mod: S$GLB,,, | Performed by: OPTOMETRIST

## 2024-07-02 PROCEDURE — 1160F RVW MEDS BY RX/DR IN RCRD: CPT | Mod: CPTII,S$GLB,, | Performed by: OPTOMETRIST

## 2024-07-02 NOTE — PROGRESS NOTES
HPI    NORMA: 1 year ago   Last DFE: 1 year ago   Chief complaint (CC): 64 yo F here for annual eye exam. Pt sates she has   been having floaters OU over 5 years.  Pt states she gets occasional   flashes OU. Pt states she suffers with dry eyes OU.  Pt states she gets   pressure headaches at times because of eye pain OU. Pt denies eye pain OU   today.   Glasses? Yes  Contacts? No  H/o eye surgery, injections or laser: No  H/o eye injury: No  Known eye conditions? (+)POAG OU (+)cataracts OU  Family h/o eye conditions? Glaucoma-sister, mother  Eye gtts? Latanoprost QHS OU  AT's PRN OU     (+) Flashes (+)  Floaters (-) Mucous   (-)  Tearing (-) Itching (+) Burning   (+) Headaches (+) Eye Pain/discomfort (none today) (-) Irritation   (-)  Redness (-) Double vision (+) Blurry vision    Diabetic? borderline  A1c? Lab Results       Component                Value               Date                       HGBA1C                   6.2 (H)             04/08/2024                  Last edited by Ger Campos, OD on 7/2/2024  1:36 PM.            Assessment /Plan     For exam results, see Encounter Report.        Primary open angle glaucoma (POAG) of both eyes, mild stage   - IOP adequate OU (13/12) with CCT (550/525)   - Target midteens   - OCT RNFL today (7/2/24) Superior and inferior temporal thinning OU    - Last VF (2/1/21) Reliable OU; scattered non specific defects OU   - Continue Latanoprost QHS OU   - RTC 1 month for VF 24-2 and IOP check.     Nuclear sclerosis of both eyes  - Not visually significant. Monitor.    Myopia of both eyes with astigmatism and presbyopia   - New Spectacle Rx given, discussed different options for glasses.

## 2024-07-05 ENCOUNTER — PATIENT OUTREACH (OUTPATIENT)
Dept: ADMINISTRATIVE | Facility: HOSPITAL | Age: 64
End: 2024-07-05
Payer: COMMERCIAL

## 2024-07-05 NOTE — PROGRESS NOTES
Health Maintenance Due   Topic Date Due    RSV Vaccine (Age 60+ and Pregnant patients) (1 - 1-dose 60+ series) Never done    COVID-19 Vaccine (3 - 2023-24 season) 09/01/2023    Health Maintenance reviewed, updated and links triggered. ([place]) 7/5/24

## 2024-08-05 ENCOUNTER — TELEPHONE (OUTPATIENT)
Dept: OPTOMETRY | Facility: CLINIC | Age: 64
End: 2024-08-05
Payer: COMMERCIAL

## 2024-08-05 ENCOUNTER — TELEPHONE (OUTPATIENT)
Dept: OPHTHALMOLOGY | Facility: CLINIC | Age: 64
End: 2024-08-05
Payer: COMMERCIAL

## 2024-08-06 ENCOUNTER — OFFICE VISIT (OUTPATIENT)
Dept: OPTOMETRY | Facility: CLINIC | Age: 64
End: 2024-08-06
Payer: COMMERCIAL

## 2024-08-06 DIAGNOSIS — H40.1131 PRIMARY OPEN ANGLE GLAUCOMA (POAG) OF BOTH EYES, MILD STAGE: Primary | ICD-10-CM

## 2024-08-06 PROCEDURE — 99999 PR PBB SHADOW E&M-EST. PATIENT-LVL III: CPT | Mod: PBBFAC,,, | Performed by: OPTOMETRIST

## 2024-08-06 PROCEDURE — 92012 INTRM OPH EXAM EST PATIENT: CPT | Mod: S$GLB,,, | Performed by: OPTOMETRIST

## 2024-08-06 PROCEDURE — 1159F MED LIST DOCD IN RCRD: CPT | Mod: CPTII,S$GLB,, | Performed by: OPTOMETRIST

## 2024-08-06 PROCEDURE — 92083 EXTENDED VISUAL FIELD XM: CPT | Mod: S$GLB,,, | Performed by: OPTOMETRIST

## 2024-08-06 PROCEDURE — 3044F HG A1C LEVEL LT 7.0%: CPT | Mod: CPTII,S$GLB,, | Performed by: OPTOMETRIST

## 2024-08-06 PROCEDURE — 4010F ACE/ARB THERAPY RXD/TAKEN: CPT | Mod: CPTII,S$GLB,, | Performed by: OPTOMETRIST

## 2024-08-06 PROCEDURE — 1160F RVW MEDS BY RX/DR IN RCRD: CPT | Mod: CPTII,S$GLB,, | Performed by: OPTOMETRIST

## 2024-08-06 RX ORDER — LATANOPROST 50 UG/ML
1 SOLUTION/ DROPS OPHTHALMIC NIGHTLY
Qty: 2.5 ML | Refills: 6 | Status: SHIPPED | OUTPATIENT
Start: 2024-08-06

## 2024-08-16 ENCOUNTER — OFFICE VISIT (OUTPATIENT)
Dept: INTERNAL MEDICINE | Facility: CLINIC | Age: 64
End: 2024-08-16
Payer: COMMERCIAL

## 2024-08-16 VITALS
BODY MASS INDEX: 29.32 KG/M2 | OXYGEN SATURATION: 98 % | HEIGHT: 64 IN | HEART RATE: 76 BPM | DIASTOLIC BLOOD PRESSURE: 62 MMHG | WEIGHT: 171.75 LBS | SYSTOLIC BLOOD PRESSURE: 131 MMHG

## 2024-08-16 DIAGNOSIS — K58.9 IRRITABLE BOWEL SYNDROME, UNSPECIFIED TYPE: ICD-10-CM

## 2024-08-16 DIAGNOSIS — K21.9 GASTROESOPHAGEAL REFLUX DISEASE WITHOUT ESOPHAGITIS: ICD-10-CM

## 2024-08-16 DIAGNOSIS — H93.11 TINNITUS OF RIGHT EAR: ICD-10-CM

## 2024-08-16 DIAGNOSIS — R73.03 PREDIABETES: Primary | ICD-10-CM

## 2024-08-16 DIAGNOSIS — H93.8X1 IRRITATION OF EAR, RIGHT: ICD-10-CM

## 2024-08-16 PROCEDURE — 99999 PR PBB SHADOW E&M-EST. PATIENT-LVL IV: CPT | Mod: PBBFAC,,, | Performed by: STUDENT IN AN ORGANIZED HEALTH CARE EDUCATION/TRAINING PROGRAM

## 2024-08-16 RX ORDER — ONDANSETRON 4 MG/1
4 TABLET, ORALLY DISINTEGRATING ORAL EVERY 8 HOURS PRN
Qty: 90 TABLET | Refills: 0 | Status: SHIPPED | OUTPATIENT
Start: 2024-08-16

## 2024-08-16 NOTE — PROGRESS NOTES
[facility]  Subjective:       Patient ID: Soraya Elmore is a 63 y.o. female.  Chief Complaint: establish care.   History was obtained from the patient and supplemented through chart review.    HPI:  Patient is a 63 y.o. female who presents for the above chief complaint.     Notes that her left ear bothers her frequently. There is a lot of noise in the left ear. She keeps a cotton ball in the ear to help block out the ringing noise in her ear.    Has osteoporosis diagnosed after her vertebral compression fracture. She discussed different therapies with endocrinology but is still deciding which therapy to use.    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   She was not drive and relies on her  for transportation however her  is ill and transportation has been difficult for her lately.    She has IBS.  Is established with GI.  Has documented history of hypertension however blood pressure today looks good off therapy.    She has prediabetes.  Notes that she drinks a lot of soft drinks.      Medical History  Past Medical History:   Diagnosis Date    Gastroenteritis     Glaucoma (increased eye pressure)     Osteoporosis     Prediabetes 06/03/2019    Tinnitus          Surgical hx, family hx, social hx   Family History   Problem Relation Name Age of Onset    Hypertension Mother Jeni     Glaucoma Mother Jeni     Cataracts Mother Jeni     Arthritis Mother Jeni     COPD Mother Jeni     Heart disease Mother Jeni     Diabetes Sister [place]     Glaucoma Sister [place]     Diabetes Sister Lauren     Breast cancer Cousin  67    Asthma Daughter Derraniece     Colon cancer Neg Hx      Ovarian cancer Neg Hx      Stroke Neg Hx      Esophageal cancer Neg Hx      Macular degeneration Neg Hx      Retinal detachment Neg Hx       Past Surgical History:   Procedure Laterality Date    CHOLECYSTECTOMY  January 8,2021    COLONOSCOPY N/A  2016    Procedure: COLONOSCOPY;  Surgeon: Katerine Ramsey MD;  Location: [facility] ENDO ([address] [place] [address]);  Service: Endoscopy;  Laterality: N/A;    LAPAROSCOPIC CHOLECYSTECTOMY N/A 2021    Procedure: CHOLECYSTECTOMY, LAPAROSCOPIC;  Surgeon: Johan Wilkerson Jr., MD;  Location: [facility] OR;  Service: General;  Laterality: N/A;    OOPHORECTOMY  1999    Unilateral oophorectomy     Social History     Socioeconomic History    Marital status:    Occupational History    Occupation: patient care tech    Tobacco Use    Smoking status: Former     Current packs/day: 0.00     Average packs/day: 0.8 packs/day for 29.0 years (22.5 ttl pk-yrs)     Types: Cigarettes     Start date: 1990     Quit date: 2005     Years since quittin.0     Passive exposure: Past    Smokeless tobacco: Never   Substance and Sexual Activity    Alcohol use: No    Drug use: No    Sexual activity: Not Currently     Partners: Male     Birth control/protection: Abstinence, Post-menopausal     Social Determinants of Health     Financial Resource Strain: Medium Risk (2024)    Overall Financial Resource Strain (CARDIA)     Difficulty of Paying Living Expenses: Somewhat hard   Food Insecurity: Unknown (2024)    Hunger Vital Sign     Worried About Running Out of Food in the Last Year: Patient declined     Ran Out of Food in the Last Year: Never true   Transportation Needs: No Transportation Needs (2024)    PRAPARE - Transportation     Lack of Transportation (Medical): No     Lack of Transportation (Non-Medical): No   Physical Activity: Sufficiently Active (2024)    Exercise Vital Sign     Days of Exercise per Week: 3 days     Minutes of Exercise per Session: 60 min   Stress: Stress Concern Present (2024)    [employer] - Occupational Stress Questionnaire     Feeling of Stress : To some extent   Housing Stability: Low Risk  (2024)    Housing Stability Vital Sign     Unable to Pay for  "Housing in the Last Year: No     Number of Places Lived in the Last Year: 1     Unstable Housing in the Last Year: No     Immunization History   Administered Date(s) Administered    COVID-19, MRNA, LN-S, PF (Pfizer) (Purple Cap) 10/26/2021, 12/04/2021    Influenza - Quadrivalent - PF *Preferred* (6 months and older) 09/15/2020, 10/15/2021, 10/24/2022, 10/26/2023    Tdap 07/28/2016    Zoster Recombinant 07/18/2019, 09/16/2019     Current Outpatient Medications   Medication Instructions    atorvastatin (LIPITOR) 40 mg, Oral, Daily    Bifidobacterium infantis (ALIGN) 4 mg, Oral, Daily    calcium carbonate (CALCIUM 500) 500 mg, Oral, Daily    carbamide peroxide (DEBROX) 6.5 % otic solution 5 drops, Right Ear, 2 times daily    cholecalciferol (vitamin D3) 50,000 Units, Oral, Weekly    ciclopirox (PENLAC) 8 % Soln Topical (Top), Nightly    diclofenac sodium (VOLTAREN) 2 g, Topical (Top), 4 times daily    fluocinonide (LIDEX) 0.05 % external solution Topical (Top), Daily PRN    hydrocortisone 2.5 % cream Topical (Top), 2 times daily PRN    latanoprost 0.005 % ophthalmic solution 1 drop, Both Eyes, Nightly    ondansetron (ZOFRAN-ODT) 4 mg, Oral, Every 8 hours PRN    pantoprazole (PROTONIX) 20 mg, Oral, Daily    urea (CARMOL) 40 % Crea Topical (Top), Daily     Objective:      Body mass index is 29.48 kg/m².  Vitals:    08/16/24 0956   BP: 131/62   Pulse: 76   SpO2: 98%   Weight: 77.9 kg (171 lb 11.8 oz)   Height: 5' 4" (1.626 m)   PainSc:   3   PainLoc: Back     Physical Exam  Constitutional:       General: She is not in acute distress.  HENT:      Head: Normocephalic.      Nose: Nose normal.      Mouth/Throat:      Mouth: Mucous membranes are moist.      Pharynx: No oropharyngeal exudate.   Eyes:      Extraocular Movements: Extraocular movements intact.   Neck:      Thyroid: No thyromegaly.   Cardiovascular:      Rate and Rhythm: Normal rate and regular rhythm.      Pulses: Normal pulses.   Pulmonary:      Effort: Pulmonary " effort is normal. No respiratory distress.      Breath sounds: No wheezing or rales.   Abdominal:      General: Abdomen is flat.   Musculoskeletal:      Right lower leg: No edema.      Left lower leg: No edema.   Skin:     General: Skin is warm and dry.   Neurological:      General: No focal deficit present.      Mental Status: She is alert.   Psychiatric:         Mood and Affect: Mood normal.         Behavior: Behavior normal.           Lab Results   Component Value Date    WBC 6.95 04/08/2024    HGB 13.8 04/08/2024    HCT 44.3 04/08/2024     04/08/2024    CHOL 174 04/08/2024    TRIG 38 04/08/2024    HDL 50 04/08/2024    ALT 30 04/08/2024    AST 26 04/08/2024     05/28/2024    K 3.8 05/28/2024     05/28/2024    CREATININE 0.8 05/28/2024    BUN 11 05/28/2024    CO2 25 05/28/2024    TSH 2.009 11/10/2021    INR 0.9 06/04/2020    HGBA1C 6.2 (H) 04/08/2024       The 10-year ASCVD risk score (Obey RIVAS, et al., 2019) is: 6%    Values used to calculate the score:      Age: 63 years      Sex: Female      Is Non- : Yes      Diabetic: No      Tobacco smoker: No      Systolic Blood Pressure: 131 mmHg      Is BP treated: No      HDL Cholesterol: 50 mg/dL      Total Cholesterol: 174 mg/dL  Assessment:       1. Prediabetes    2. Gastroesophageal reflux disease without esophagitis    3. Irritable bowel syndrome, unspecified type    4. Tinnitus of right ear    5. Irritation of ear, right          Plan:   Seen to establish care.  She was doing well today.  Recheck labs for prediabetes.  Advised she cut out soft drinks and only drink water to prevent glucose spikes.  She requested refill of Zofran which she was p.r.n..    Needs to be on therapy for osteoporosis.  Advised her to make a decision and discuss this further with her endocrinologist.  Notes that transportation to [facility] has been an issue.  We reviewed bus routes from her house to [facility].    Refer to ENT for  tinnitus.    As per HPI blood pressure looks good off antihypertensives.  Monitor for now.    Follow up in about 4 months    Prediabetes  -     Hemoglobin A1C; Future; Expected date: 11/16/2024  -     CBC Without Differential; Future; Expected date: 11/16/2024  -     COMPREHENSIVE METABOLIC PANEL; Future; Expected date: 11/16/2024    Gastroesophageal reflux disease without esophagitis  -     ondansetron (ZOFRAN-ODT) 4 MG TbDL; Take 1 tablet (4 mg total) by mouth every 8 (eight) hours as needed (Nausea).  Dispense: 90 tablet; Refill: 0    Irritable bowel syndrome, unspecified type  -     ondansetron (ZOFRAN-ODT) 4 MG TbDL; Take 1 tablet (4 mg total) by mouth every 8 (eight) hours as needed (Nausea).  Dispense: 90 tablet; Refill: 0    Tinnitus of right ear  -     Ambulatory referral/consult to ENT; Future; Expected date: 08/23/2024    Irritation of ear, right  -     carbamide peroxide (DEBROX) 6.5 % otic solution; Place 5 drops into the right ear 2 (two) times daily.  Dispense: 15 mL; Refill: 0        Health Maintenance    All of your core healthy metrics are met.    No follow-ups on file. or sooner prn          Jonathan Moya  [facility]  [address] [address]  Phone [phone]  Fax [phone]    This note is dictated using the M*Modal Fluency Direct word recognition program. It may contain word recognition mistakes or wrong word substitutions (commonly he/she and is/was substitutions) that were missed on review.

## 2024-08-20 ENCOUNTER — TELEPHONE (OUTPATIENT)
Dept: INTERNAL MEDICINE | Facility: CLINIC | Age: 64
End: 2024-08-20
Payer: COMMERCIAL

## 2024-08-20 NOTE — TELEPHONE ENCOUNTER
Patient received wrong AVS pertaining to a different patient. Patient had difficulty acquiring medication from pharmacy as she did not have correct AVS with prescribed medications.    Called patient and informed her of the correct medications assigned on LOV 8/16/24. Tried to assist with accessing AVS from last visit but patient's [facility] was not working. Patient verbalized understanding.

## 2024-08-20 NOTE — TELEPHONE ENCOUNTER
----- Message from Jabari Ornelas sent at 8/20/2024  3:56 PM CDT -----  Name of Who is Calling:LUIS REDMAN [[id number]]            What is the request in detail:PT wants a call back she got another PT information with her meds and paperwork not hers please assist        She got the wrong meds and wrong paperwork call back ASAP            Can the clinic reply by [facility]: No         What Number to Call Back if not in [facility]: [phone]

## 2024-08-30 ENCOUNTER — PATIENT MESSAGE (OUTPATIENT)
Dept: INTERNAL MEDICINE | Facility: CLINIC | Age: 64
End: 2024-08-30
Payer: COMMERCIAL

## 2024-08-30 DIAGNOSIS — I83.90 VARICOSE VEINS OF LOWER EXTREMITY, UNSPECIFIED LATERALITY, UNSPECIFIED WHETHER COMPLICATED: ICD-10-CM

## 2024-08-30 DIAGNOSIS — K21.9 GASTROESOPHAGEAL REFLUX DISEASE WITHOUT ESOPHAGITIS: ICD-10-CM

## 2024-08-30 DIAGNOSIS — D22.9 NUMEROUS MOLES: Primary | ICD-10-CM

## 2024-09-03 RX ORDER — PANTOPRAZOLE SODIUM 20 MG/1
20 TABLET, DELAYED RELEASE ORAL DAILY
Qty: 30 TABLET | Refills: 10 | Status: SHIPPED | OUTPATIENT
Start: 2024-09-03 | End: 2025-07-30

## 2024-09-03 NOTE — TELEPHONE ENCOUNTER
No care due was identified.  [facility] Embedded Care Due Messages. Reference number: [id number].   9/03/2024 9:26:49 AM CDT

## 2024-09-06 ENCOUNTER — TELEPHONE (OUTPATIENT)
Dept: INTERNAL MEDICINE | Facility: CLINIC | Age: 64
End: 2024-09-06
Payer: COMMERCIAL

## 2024-10-02 ENCOUNTER — HOSPITAL ENCOUNTER (EMERGENCY)
Facility: OTHER | Age: 64
Discharge: HOME | End: 2024-10-02
Attending: EMERGENCY MEDICINE
Payer: COMMERCIAL

## 2024-10-02 VITALS
HEIGHT: 64 IN | WEIGHT: 170 LBS | SYSTOLIC BLOOD PRESSURE: 174 MMHG | BODY MASS INDEX: 29.02 KG/M2 | DIASTOLIC BLOOD PRESSURE: 74 MMHG | TEMPERATURE: 98 F | OXYGEN SATURATION: 99 % | RESPIRATION RATE: 17 BRPM | HEART RATE: 92 BPM

## 2024-10-02 DIAGNOSIS — M79.604 LUMBAR PAIN WITH RADIATION DOWN RIGHT LEG: Primary | ICD-10-CM

## 2024-10-02 DIAGNOSIS — M54.50 LUMBAR PAIN WITH RADIATION DOWN RIGHT LEG: Primary | ICD-10-CM

## 2024-10-02 PROCEDURE — 99284 EMERGENCY DEPT VISIT MOD MDM: CPT

## 2024-10-02 PROCEDURE — 25000003 PHARM REV CODE 250: Performed by: EMERGENCY MEDICINE

## 2024-10-02 RX ORDER — METHOCARBAMOL 750 MG/1
1500 TABLET, FILM COATED ORAL 3 TIMES DAILY PRN
Qty: 30 TABLET | Refills: 0 | Status: SHIPPED | OUTPATIENT
Start: 2024-10-02 | End: 2024-10-07

## 2024-10-02 RX ORDER — METHOCARBAMOL 750 MG/1
1500 TABLET, FILM COATED ORAL
Status: COMPLETED | OUTPATIENT
Start: 2024-10-02 | End: 2024-10-02

## 2024-10-02 RX ORDER — NAPROXEN 500 MG/1
500 TABLET ORAL 2 TIMES DAILY PRN
Qty: 28 TABLET | Refills: 0 | Status: SHIPPED | OUTPATIENT
Start: 2024-10-02 | End: 2024-10-16

## 2024-10-02 RX ORDER — DICLOFENAC SODIUM 10 MG/G
2 GEL TOPICAL 3 TIMES DAILY PRN
Qty: 100 G | Refills: 0 | Status: SHIPPED | OUTPATIENT
Start: 2024-10-02

## 2024-10-02 RX ORDER — NAPROXEN 500 MG/1
500 TABLET ORAL
Status: COMPLETED | OUTPATIENT
Start: 2024-10-02 | End: 2024-10-02

## 2024-10-02 RX ORDER — LIDOCAINE 50 MG/G
PATCH TOPICAL
Qty: 15 PATCH | Refills: 1 | Status: SHIPPED | OUTPATIENT
Start: 2024-10-02

## 2024-10-02 RX ADMIN — METHOCARBAMOL 1500 MG: 750 TABLET ORAL at 10:10

## 2024-10-02 RX ADMIN — NAPROXEN 500 MG: 500 TABLET ORAL at 10:10

## 2024-10-03 NOTE — ED TRIAGE NOTES
Juliadelia Elmore, a 64 y.o. female presents to the ED c/o low back pain that radiates to the right hip down to her right leg that began yesterday.     Patient is A&Ox4. Denies any other complaints. ED workup in progress. Safety measures in place; side rails up x2. Call light within pt reach. Plan of care ongoing.    Chief Complaint   Patient presents with    Leg Pain     Pt c/o R hip pain that radiates to R leg. Onset was ~ 2 days ago while pt was lying in bed on R. Pt is currently going to PT for Fx to lower spine. Pt reports normal sensation to R foot. +CSM distally. No recent trauma.     Hip Pain

## 2024-10-03 NOTE — ED PROVIDER NOTES
"  Source of History:  Medical record, patient  Independent history obtained from : spouse    Chief complaint:  Per triage note: "Leg Pain (Pt c/o R hip pain that radiates to R leg. Onset was ~ 2 days ago while pt was lying in bed on R. Pt is currently going to PT for Fx to lower spine. Pt reports normal sensation to R foot. +CSM distally. No recent trauma. ) and Hip Pain  "    HPI:    Patient presents with right hip and back pain. She states the pain suddenly started 2 nights ago after standing up from lying on side. Patient describes the pain as a dull "squeezing pain" that radiates down her right leg. She notes movement and sitting up straight exacerbates the pain. Patient reports taking Tylenol for relief. Associated symptoms include numbness and mild tingling at her lateral right upper leg but none currently. She reports having frequent urination every 30 minutes and worsened IBS symptoms. Patient denies a fever.  This is the extent of the patient's complaints at this time.    ROS:   See HPI for pertinent Review of Systems  General: No fever.   HENT: No facial pain.  Eyes: No eye pain.   Cardiovascular: No chest pain.   Respiratory: No dyspnea.   GI: No abdominal pain. No n/v. No stool incontinence/retention.   : No urine incontinence/retention.  Neuro: No saddle anesthesia. No focal strength or sensation deficits.   Musculoskeletal: Notes back pain. No swelling.        Review of patient's allergies indicates:  No Known Allergies    PMH:  As per HPI and below:  Past Medical History:   Diagnosis Date    Gastroenteritis     Glaucoma (increased eye pressure)     Osteoporosis     Prediabetes 06/03/2019    Tinnitus        Past Surgical History:   Procedure Laterality Date    CHOLECYSTECTOMY  January 8,2021    COLONOSCOPY N/A 06/07/2016    Procedure: COLONOSCOPY;  Surgeon: Katerine Ramsey MD;  Location: [address]);  Service: Endoscopy;  Laterality: N/A;    LAPAROSCOPIC CHOLECYSTECTOMY N/A 01/08/2021    " "Procedure: CHOLECYSTECTOMY, LAPAROSCOPIC;  Surgeon: Johan Wilkerson Jr., MD;  Location: [facility];  Service: General;  Laterality: N/A;    OOPHORECTOMY  1999    Unilateral oophorectomy       Social History     Tobacco Use    Smoking status: Former     Current packs/day: 0.00     Average packs/day: 0.8 packs/day for 29.0 years (22.5 ttl pk-yrs)     Types: Cigarettes     Start date: 1990     Quit date: 2005     Years since quittin.2     Passive exposure: Past    Smokeless tobacco: Never   Substance Use Topics    Alcohol use: No    Drug use: No       Physical Exam:      Nursing note and vitals reviewed.  BP (!) 174/74 (BP Location: Left arm, Patient Position: Sitting)   Pulse 92   Temp 98.1 °F (36.7 °C) (Oral)   Resp 17   Ht 5' 4" (1.626 m)   Wt 77.1 kg (170 lb)   LMP 2006   SpO2 99%   Breastfeeding No   BMI 29.18 kg/m²   Nursing note and vitals reviewed.  Constitutional: AAOx3. Well-developed and well-nourished. No distress.  HENT:   Mouth/Throat: Oropharynx is clear and moist.  Eyes: Pupils are equal, round, and reactive to light. No discharge. Anicteric.  Neck: Normal range of motion. Neck supple. No midline tenderness, stepoffs, or deformities.  Cardiovascular: Normal rate.  Pulmonary/Chest: Effort normal.  Abdominal: Soft. Bowel sounds normal. No distension and no mass. There is no tenderness. There is no rebound, no guarding.  Musculoskeletal: Normal range of motion. No midline spinal tenderness. No stepoffs or deformities. Focal left lumbar paraspinal tenderness and spasm.   No focal hip tenderness.   Neurological: Alert and oriented to person, place, and time. No gross cranial nerve deficit. Coordination normal. No UE/LE light  touch or strength deficits.   Skin: Skin is warm and dry.   Ext: 2+ radial pulses  Psychiatric: Behavior is normal. Judgment normal.        Medical Decision Making / Independent Interpretations / External Records Reviewed:      ED Course as of 10/02/24 " "2329   Wed Oct 02, 2024   2208 MDM:  Soraya Elmore is a 64 y.o. female with history of prediabetes, compression spinal fracture presents with CC low back pain. No bowel/bladder incontinence/retention, no weakness, ambulating normally. Denies fevers, chills, nightsweats, LE weakness or paresthesias, saddle anesthesia, DM Hx, recent procedure, or trauma.   She had a physical therapy session after these symptoms started.  She states that she was barely able to lift her right leg due to pain and spasm.  After stretching, heating pad and "suction cups" were applied during her session, she had much improved mobility and pain.   My initial differential diagnosis included ruptured AAA, epidural abscess, cauda equina syndrome, fracture, disc herniation, sciatica, and musculoskeletal back pain.     Exam with no stepoff/deformity to lower lumbar spine, neuro intact, no abdominal mass, gait intact, no signs of infection and appears nontoxic.  At present no evidence of cauda equina, acute neurologic compromise, or AAA.   Advanced imaging was considered given her history of traumatic spinal compression fracture, however she has no acute neurologic findings, has focal tenderness consistent with strain / spasm.   By history and physical exam, I believe the patient has muscle spasm with lumbar strain.     After dose of naproxen and robaxin, patient reports significant improvement of her pain, and notes much better mobility and flexibility.  She feels comfortable with discharge.  Plan is maximal supportive care, continued follow up with her physical therapy provider    --  External notes and sources reviewed: specialist documentation and external hospital emergency department encounter.   --    Risks of morbidity/mortality from patient management:    --  Additional Medical Decision Making: Prescription drug management and Decision regarding advanced imaging  --         [RC]      ED Course User Index  [RC] Diallo Beach MD "          Medications   naproxen tablet 500 mg (500 mg Oral Given 10/2/24 2224)   methocarbamoL tablet 1,500 mg (1,500 mg Oral Given 10/2/24 2226)              Diagnostic Impression:    1. Lumbar pain with radiation down right leg         ED Disposition Condition    Discharge Good          Future Appointments   Date Time Provider Department Center   12/9/2024  9:30 AM LAB, SAME DAY [facility]   12/17/2024  9:20 AM Jonathan Moya MD [facility] IM [facility]   12/23/2024  9:00 AM Harika Hartman AU.D [facility] AUDIO [address]   12/23/2024  9:30 AM Toribio Nugent MD [facility] ENT [address]   1/8/2025 10:00 AM Serenity Trujillo MD [facility] DERM [facility]      ED Prescriptions       Medication Sig Dispense Start Date End Date Auth. Provider    naproxen (NAPROSYN) 500 MG tablet Take 1 tablet (500 mg total) by mouth 2 (two) times daily as needed (pain). 28 tablet 10/2/2024 10/16/2024 Diallo Beach MD    LIDOcaine (LIDODERM) 5 % Apply to affected area. Use as directed. May use 4% lidocaine patch as alternative. 15 patch 10/2/2024 -- Diallo Beach MD    diclofenac sodium (VOLTAREN) 1 % Gel Apply 2 g topically 3 (three) times daily as needed (muscle spasm pain). Apply 2 grams to affected area 3 times daily as needed 100 g 10/2/2024 -- Diallo Beach MD    methocarbamoL (ROBAXIN) 750 MG Tab Take 2 tablets (1,500 mg total) by mouth 3 (three) times daily as needed (Muscle spasm pain). 30 tablet 10/2/2024 10/7/2024 Diallo Beach MD          Follow-up Information       Follow up With Specialties Details Why Contact Info    Jonathan Moya MD Internal Medicine Schedule an appointment as soon as possible for a visit   [address]  [address]  [facility] [address] [phone]      Jonathan Moya MD Internal Medicine Schedule an appointment as soon as possible for a visit  For recheck with your primary care doctor [address]  [address]  [facility] [address]  [phone]               ---  I, Karon Mccann, scribed for, and in the presence of, Dr. Beach. I performed the scribed service and the documentation accurately describes the services I performed. I attest to the accuracy of the note.     Physician Attestation for Scribe:   I, Diallo Beach MD, reviewed documentation as scribed in my presence, which is both accurate and complete.       Diallo Beach MD  10/02/24 1347

## 2024-10-03 NOTE — DISCHARGE INSTRUCTIONS
Thank you for letting us take care of you today! It was nice meeting you, and I hope you feel better soon.     Call your primary care doctor to make the first available appointment.     Keep all your medical appointments.     Take your regular medication as prescribed. Contact your primary care provider before running out of medication, or for any problems obtaining them.    Do not drive or operate heavy machinery while taking opioid or muscle relaxing medications. Examples include norco, percocet, xanax, valium, flexeril.     Overuse or long term use of pain and sedating medication may lead to addiction, dependence, organ failure, family and work problems, legal problems, accidental overdose, and death.    If you do not have health insurance, you probably can afford it:  Call [phone] ([facility] hotline) or go to [email or web address].gov    Your evaluation in the ER does not suggest any emergent or life threatening medical condition requiring admission or immediate intervention beyond that provided in the ER.   However, the signs of a serious problem sometimes take more time to appear.     Do not hesitate to return to the ER if any of the following occur:    Weakness, dizziness, fainting, or loss of consciousness   Fever of 100.4ºF (38ºC) or higher  Any worse symptoms  Any new or concerning symptoms   You were seen for your back pain.  At this time, it does not appear your pain is from a dangerous cause.     You have injured the muscles (strain) or ligaments (sprain) around the spine. Muscle spasm is often present and adds to the pain.     Do your activities as tolerated. Bedrest will probably make your back pain worse.  Take NSAIDs regularly over the next 1-2 days. Do not exceed the maximum recommended daily dose.     Take all your medications exactly as prescribed.  Call your primary care provider to make the first available appointment.     A back sprain or muscle strain usually gets better in 2-3 weeks. If pain  continues and does not respond to medical treatment after 3-4 weeks contact your primary care doctor or return to the ER.     Do not drive or operate heavy machinery while taking valium, lortab, percocet or other sedating medications. Prolonged or overuse of drugs prescribed for pain, sedation or muscle relaxation may lead to addiction, dependence, family problems, legal problems, organ failure, death.      RETURN TO THE ER if any of the following occur:    Pain becomes worse or spreads into your arms or legs   Weakness, numbness or pain in one or both arms or legs   Loss of bowel or bladder control   Numbness in the groin area   Difficulty walking  New or worse pain: if it feels different, becomes more severe, lasts longer, or begins to spread into your shoulder, arm, neck, jaw or back   Shortness of breath or increased pain with breathing   Cough with dark colored sputum (phlegm) or blood   Weakness, dizziness, fainting, falling out, or loss of consciousness   Fever of 100.4ºF (38ºC) or higher  Any new or concerning symptoms

## 2024-11-01 ENCOUNTER — OFFICE VISIT (OUTPATIENT)
Dept: INTERNAL MEDICINE | Facility: CLINIC | Age: 64
End: 2024-11-01
Payer: COMMERCIAL

## 2024-11-01 VITALS
SYSTOLIC BLOOD PRESSURE: 122 MMHG | HEIGHT: 64 IN | OXYGEN SATURATION: 96 % | DIASTOLIC BLOOD PRESSURE: 60 MMHG | WEIGHT: 173.94 LBS | HEART RATE: 76 BPM | BODY MASS INDEX: 29.7 KG/M2

## 2024-11-01 DIAGNOSIS — Z23 NEEDS FLU SHOT: Primary | ICD-10-CM

## 2024-11-01 DIAGNOSIS — M79.604 RIGHT LEG PAIN: ICD-10-CM

## 2024-11-01 DIAGNOSIS — Z12.31 ENCOUNTER FOR SCREENING MAMMOGRAM FOR BREAST CANCER: ICD-10-CM

## 2024-11-01 PROCEDURE — 99999 PR PBB SHADOW E&M-EST. PATIENT-LVL IV: CPT | Mod: PBBFAC,,, | Performed by: STUDENT IN AN ORGANIZED HEALTH CARE EDUCATION/TRAINING PROGRAM

## 2024-12-09 ENCOUNTER — LAB VISIT (OUTPATIENT)
Dept: LAB | Facility: OTHER | Age: 64
End: 2024-12-09
Attending: STUDENT IN AN ORGANIZED HEALTH CARE EDUCATION/TRAINING PROGRAM
Payer: COMMERCIAL

## 2024-12-09 DIAGNOSIS — R73.03 PREDIABETES: ICD-10-CM

## 2024-12-09 LAB
ALBUMIN SERPL BCP-MCNC: 3.9 G/DL (ref 3.5–5.2)
ALP SERPL-CCNC: 74 U/L (ref 40–150)
ALT SERPL W/O P-5'-P-CCNC: 18 U/L (ref 10–44)
ANION GAP SERPL CALC-SCNC: 7 MMOL/L (ref 8–16)
AST SERPL-CCNC: 22 U/L (ref 10–40)
BILIRUB SERPL-MCNC: 0.8 MG/DL (ref 0.1–1)
BUN SERPL-MCNC: 11 MG/DL (ref 8–23)
CALCIUM SERPL-MCNC: 9.1 MG/DL (ref 8.7–10.5)
CHLORIDE SERPL-SCNC: 107 MMOL/L (ref 95–110)
CO2 SERPL-SCNC: 27 MMOL/L (ref 23–29)
CREAT SERPL-MCNC: 0.8 MG/DL (ref 0.5–1.4)
ERYTHROCYTE [DISTWIDTH] IN BLOOD BY AUTOMATED COUNT: 13.2 % (ref 11.5–14.5)
EST. GFR  (NO RACE VARIABLE): >60 ML/MIN/1.73 M^2
ESTIMATED AVG GLUCOSE: 126 MG/DL (ref 68–131)
GLUCOSE SERPL-MCNC: 111 MG/DL (ref 70–110)
HBA1C MFR BLD: 6 % (ref 4–5.6)
HCT VFR BLD AUTO: 40.3 % (ref 37–48.5)
HGB BLD-MCNC: 12.7 G/DL (ref 12–16)
MCH RBC QN AUTO: 27.9 PG (ref 27–31)
MCHC RBC AUTO-ENTMCNC: 31.5 G/DL (ref 32–36)
MCV RBC AUTO: 88 FL (ref 82–98)
PLATELET # BLD AUTO: 262 K/UL (ref 150–450)
PMV BLD AUTO: 9.2 FL (ref 9.2–12.9)
POTASSIUM SERPL-SCNC: 3.9 MMOL/L (ref 3.5–5.1)
PROT SERPL-MCNC: 6.7 G/DL (ref 6–8.4)
RBC # BLD AUTO: 4.56 M/UL (ref 4–5.4)
SODIUM SERPL-SCNC: 141 MMOL/L (ref 136–145)
WBC # BLD AUTO: 6.97 K/UL (ref 3.9–12.7)

## 2024-12-09 PROCEDURE — 80053 COMPREHEN METABOLIC PANEL: CPT | Performed by: STUDENT IN AN ORGANIZED HEALTH CARE EDUCATION/TRAINING PROGRAM

## 2024-12-09 PROCEDURE — 36415 COLL VENOUS BLD VENIPUNCTURE: CPT | Performed by: STUDENT IN AN ORGANIZED HEALTH CARE EDUCATION/TRAINING PROGRAM

## 2024-12-09 PROCEDURE — 85027 COMPLETE CBC AUTOMATED: CPT | Performed by: STUDENT IN AN ORGANIZED HEALTH CARE EDUCATION/TRAINING PROGRAM

## 2024-12-09 PROCEDURE — 83036 HEMOGLOBIN GLYCOSYLATED A1C: CPT | Performed by: STUDENT IN AN ORGANIZED HEALTH CARE EDUCATION/TRAINING PROGRAM

## 2024-12-17 ENCOUNTER — TELEPHONE (OUTPATIENT)
Dept: INTERNAL MEDICINE | Facility: CLINIC | Age: 64
End: 2024-12-17
Payer: COMMERCIAL

## 2024-12-17 ENCOUNTER — TELEPHONE (OUTPATIENT)
Dept: ORTHOPEDICS | Facility: CLINIC | Age: 64
End: 2024-12-17
Payer: COMMERCIAL

## 2024-12-17 ENCOUNTER — HOSPITAL ENCOUNTER (OUTPATIENT)
Dept: RADIOLOGY | Facility: OTHER | Age: 64
Discharge: HOME | End: 2024-12-17
Attending: STUDENT IN AN ORGANIZED HEALTH CARE EDUCATION/TRAINING PROGRAM
Payer: COMMERCIAL

## 2024-12-17 ENCOUNTER — OFFICE VISIT (OUTPATIENT)
Dept: INTERNAL MEDICINE | Facility: CLINIC | Age: 64
End: 2024-12-17
Payer: COMMERCIAL

## 2024-12-17 VITALS
DIASTOLIC BLOOD PRESSURE: 63 MMHG | BODY MASS INDEX: 29.66 KG/M2 | SYSTOLIC BLOOD PRESSURE: 133 MMHG | HEIGHT: 64 IN | OXYGEN SATURATION: 99 % | WEIGHT: 173.75 LBS | HEART RATE: 72 BPM

## 2024-12-17 DIAGNOSIS — K58.9 IRRITABLE BOWEL SYNDROME, UNSPECIFIED TYPE: ICD-10-CM

## 2024-12-17 DIAGNOSIS — E66.3 OVERWEIGHT (BMI 25.0-29.9): ICD-10-CM

## 2024-12-17 DIAGNOSIS — M25.562 CHRONIC PAIN OF LEFT KNEE: ICD-10-CM

## 2024-12-17 DIAGNOSIS — M25.562 CHRONIC PAIN OF LEFT KNEE: Primary | ICD-10-CM

## 2024-12-17 DIAGNOSIS — R73.03 PREDIABETES: ICD-10-CM

## 2024-12-17 DIAGNOSIS — Z12.31 ENCOUNTER FOR SCREENING MAMMOGRAM FOR BREAST CANCER: ICD-10-CM

## 2024-12-17 DIAGNOSIS — E78.1 HIGH BLOOD TRIGLYCERIDES: ICD-10-CM

## 2024-12-17 DIAGNOSIS — G89.29 CHRONIC PAIN OF LEFT KNEE: ICD-10-CM

## 2024-12-17 DIAGNOSIS — G89.29 CHRONIC PAIN OF LEFT KNEE: Primary | ICD-10-CM

## 2024-12-17 DIAGNOSIS — M80.00XD OSTEOPOROSIS WITH CURRENT PATHOLOGICAL FRACTURE WITH ROUTINE HEALING, UNSPECIFIED OSTEOPOROSIS TYPE, SUBSEQUENT ENCOUNTER: ICD-10-CM

## 2024-12-17 PROCEDURE — 3008F BODY MASS INDEX DOCD: CPT | Mod: CPTII,S$GLB,, | Performed by: STUDENT IN AN ORGANIZED HEALTH CARE EDUCATION/TRAINING PROGRAM

## 2024-12-17 PROCEDURE — 77063 BREAST TOMOSYNTHESIS BI: CPT | Mod: 26,,, | Performed by: RADIOLOGY

## 2024-12-17 PROCEDURE — 77067 SCR MAMMO BI INCL CAD: CPT | Mod: 26,,, | Performed by: RADIOLOGY

## 2024-12-17 PROCEDURE — 73565 X-RAY EXAM OF KNEES: CPT | Mod: 26,,, | Performed by: RADIOLOGY

## 2024-12-17 PROCEDURE — 73565 X-RAY EXAM OF KNEES: CPT | Mod: TC,FY

## 2024-12-17 PROCEDURE — 3075F SYST BP GE 130 - 139MM HG: CPT | Mod: CPTII,S$GLB,, | Performed by: STUDENT IN AN ORGANIZED HEALTH CARE EDUCATION/TRAINING PROGRAM

## 2024-12-17 PROCEDURE — 77067 SCR MAMMO BI INCL CAD: CPT | Mod: TC

## 2024-12-17 PROCEDURE — 99214 OFFICE O/P EST MOD 30 MIN: CPT | Mod: S$GLB,,, | Performed by: STUDENT IN AN ORGANIZED HEALTH CARE EDUCATION/TRAINING PROGRAM

## 2024-12-17 PROCEDURE — 1159F MED LIST DOCD IN RCRD: CPT | Mod: CPTII,S$GLB,, | Performed by: STUDENT IN AN ORGANIZED HEALTH CARE EDUCATION/TRAINING PROGRAM

## 2024-12-17 PROCEDURE — 3044F HG A1C LEVEL LT 7.0%: CPT | Mod: CPTII,S$GLB,, | Performed by: STUDENT IN AN ORGANIZED HEALTH CARE EDUCATION/TRAINING PROGRAM

## 2024-12-17 PROCEDURE — 73562 X-RAY EXAM OF KNEE 3: CPT | Mod: TC,FY,LT

## 2024-12-17 PROCEDURE — 4010F ACE/ARB THERAPY RXD/TAKEN: CPT | Mod: CPTII,S$GLB,, | Performed by: STUDENT IN AN ORGANIZED HEALTH CARE EDUCATION/TRAINING PROGRAM

## 2024-12-17 PROCEDURE — 99999 PR PBB SHADOW E&M-EST. PATIENT-LVL V: CPT | Mod: PBBFAC,,, | Performed by: STUDENT IN AN ORGANIZED HEALTH CARE EDUCATION/TRAINING PROGRAM

## 2024-12-17 PROCEDURE — 3078F DIAST BP <80 MM HG: CPT | Mod: CPTII,S$GLB,, | Performed by: STUDENT IN AN ORGANIZED HEALTH CARE EDUCATION/TRAINING PROGRAM

## 2024-12-17 RX ORDER — ATORVASTATIN CALCIUM 40 MG/1
40 TABLET, FILM COATED ORAL DAILY
Qty: 90 TABLET | Refills: 3 | Status: SHIPPED | OUTPATIENT
Start: 2024-12-17 | End: 2025-12-17

## 2024-12-17 NOTE — TELEPHONE ENCOUNTER
"Faxed the following referral below to:    Your fax has been successfully sent to [phone] at [phone].  ------------------------------------------------------------  From: [id number]  ------------------------------------------------------------  12/17/2024 9:58:21 AM Transmission Record          Sent to [phone] with remote ID "[email or web address]"          Result: (0/339;0/0) Success          Page record: 1 - 3          Elapsed time: 01:18 on channel 63           Request Summary [[id number]]   Procedure: Ambulatory referral/consult to Gastroenterology Status: Needs Scheduling (Send-to-Patient Pending)   Requested appt date: 12/24/2024 Authorizing: Jonathan Moya MD in [facility] INTERNAL MEDICINE   Referral: [id number] (Authorized)       Expires: 1/17/2026 Priority: Routine   Diagnosis: Irritable bowel syndrome, unspecified type [K58.9]   Order Class: External Referral Referred to Provider: [facility]     "

## 2024-12-17 NOTE — TELEPHONE ENCOUNTER
----- Message from Nurse Shabazz sent at 12/17/2024 11:36 AM CST -----  Regarding: FW: appt    ----- Message -----  From: Zoë Bhatt  Sent: 12/17/2024  11:12 AM CST  To: Kayla Pugh Staff  Subject: appt                                             Type:  Patient Returning Call        Name of who is calling:pt         What is request in detail:pt is requesting a call back in regards to appt, pt has a referral from her pcp and would like to see drKing Please assist.         Can clinic reply by [facility]:no        What number to call back if not in [facility]:[phone]

## 2024-12-17 NOTE — PROGRESS NOTES
[facility]  Subjective:     Patient ID: Soraya Elmore is a 64 y.o. female.  History of Present Illness    CHIEF COMPLAINT:  Patient presents today for routine follow up.    IBS:  She reports morning exacerbation of IBS symptoms with bloating and increased frequency of bowel movements. She is currently under the care of Dr. Lieberman on [facility] for management.    MUSCULOSKELETAL:  She reports left knee pain following a fall incident, characterized by burning sensation on the lateral aspect at night. She has difficulty with stairs and knee flexion, particularly affecting her ability to descend stairs. She has a history of untreated osteoporosis with prior bone fracture.    DIET:  She has discontinued soda consumption but currently drinks vitamin water with sugar.    LABS:  A1C was 6.0, decreased from 6.2, indicating pre-diabetes.    IMAGING:  Mammogram recently completed, pending results.    MEDICATIONS:  She takes Lipitor for hyperlipidemia and OTC calcium carbonate with vitamin D3.       Current Outpatient Medications   Medication Instructions    atorvastatin (LIPITOR) 40 mg, Oral, Daily    Bifidobacterium infantis (ALIGN) 4 mg, Oral, Daily    calcium carbonate (CALCIUM 500) 500 mg, Oral, Daily    carbamide peroxide (DEBROX) 6.5 % otic solution 5 drops, Right Ear, 2 times daily    cholecalciferol (vitamin D3) 50,000 Units, Oral, Weekly    ciclopirox (PENLAC) 8 % Soln Topical (Top), Nightly    diclofenac sodium (VOLTAREN) 2 g, Topical (Top), 3 times daily PRN, Apply 2 grams to affected area 3 times daily as needed    fluocinonide (LIDEX) 0.05 % external solution Topical (Top), Daily PRN    hydrocortisone 2.5 % cream Topical (Top), 2 times daily PRN    latanoprost 0.005 % ophthalmic solution 1 drop, Both Eyes, Nightly    LIDOcaine (LIDODERM) 5 % Apply to affected area. Use as directed. May use 4% lidocaine patch as alternative.    ondansetron (ZOFRAN-ODT) 4 mg, Oral, Every 8 hours  "PRN    pantoprazole (PROTONIX) 20 mg, Oral, Daily    urea (CARMOL) 40 % Crea Topical (Top), Daily     Objective:      Body mass index is 29.82 kg/m².  Vitals:    12/17/24 0917   BP: 133/63   Pulse: 72   SpO2: 99%   Weight: 78.8 kg (173 lb 11.6 oz)   Height: 5' 4" (1.626 m)   PainSc:   2   PainLoc: Back     Physical Exam   Physical Exam    General: No acute distress. Normal appearance.  Head: Normocephalic.  Eyes: Extraocular movements intact.  Cardiovascular: Normal rate and rhythm. No murmur heard.  Lungs: Pulmonary effort is normal. Normal breath sounds. No wheezing. No rales.  Abdomen: Flat.  Musculoskeletal: No edema lower extremities.  Skin: Warm. Dry.  Neurological: Alert.  Psychiatric: Normal mood. Normal behavior.  Vitals: Blood pressure 133/63.       Assessment:       1. Chronic pain of left knee    2. High blood triglycerides    3. Prediabetes    4. Overweight (BMI 25.0-29.9)    5. Osteoporosis with current pathological fracture with routine healing, unspecified osteoporosis type, subsequent encounter    6. Irritable bowel syndrome, unspecified type        Plan:   Assessment & Plan    PLAN SUMMARY:  Dietary recommendations: avoid added sugars, limit bread consumption  Refilled Lipitor for 1 year  Referred to [facility] for IBS management  Continue over-counter calcium carbonate with vitamin D3  Referred to orthopedics for knee evaluation  Ordered knee x-rays  Follow up in 6 months; contact office after mammogram results  Messaged endocrinology to clarify osteoporosis treatment    PREDIABETES:  Prediabetes remains diet-controlled; A1C stable at 6.0, down from 6.2.  Educated on hidden sources of added sugar in foods and drinks; discussed vitamin water containing sugar unless specifically labeled as zero sugar.  Patient to avoid drinks with added sugar, including regular vitamin water; patient to choose water or zero-sugar options for hydration.  Recommend limiting bread consumption.    LEFT KNEE " PAIN:  Chronic left knee pain requires orthopedic evaluation; knee x-rays ordered.  Referred to orthopedics for knee evaluation.    OSTEOPOROSIS:  Untreated osteoporosis; endocrinology previously discussed anabolic therapy, but patient resistant to injections.  Messaged endocrinology to clarify appropriate osteoporosis treatment; considering bisphosphonate if needed.  Continued over-counter calcium carbonate with vitamin D3.    IRRITABLE BOWEL SYNDROME (IBS):  IBS symptoms persist; patient interested in transitioning care for convenience/transportation issues; referred to [facility] for IBS management.    HYPERLIPIDEMIA:  Refilled Lipitor (cholesterol medication) for 1-year supply.    LAB ABNORMALITY:  Explained [facility] lab result abnormality as not clinically significant.    FOLLOW-UP:  Follow up in 6 months;         Chronic pain of left knee  -     Ambulatory referral/consult to Orthopedics; Future; Expected date: 12/24/2024  -     X-Ray Knee AP Standing Bilateral; Future; Expected date: 12/17/2024  -     X-Ray Knee 3 View Left; Future; Expected date: 12/17/2024    High blood triglycerides  -     atorvastatin (LIPITOR) 40 MG tablet; Take 1 tablet (40 mg total) by mouth once daily.  Dispense: 90 tablet; Refill: 3    Prediabetes  -     atorvastatin (LIPITOR) 40 MG tablet; Take 1 tablet (40 mg total) by mouth once daily.  Dispense: 90 tablet; Refill: 3    Overweight (BMI 25.0-29.9)  -     atorvastatin (LIPITOR) 40 MG tablet; Take 1 tablet (40 mg total) by mouth once daily.  Dispense: 90 tablet; Refill: 3    Osteoporosis with current pathological fracture with routine healing, unspecified osteoporosis type, subsequent encounter    Irritable bowel syndrome, unspecified type  -     Ambulatory referral/consult to Gastroenterology; Future; Expected date: 12/24/2024        Tests to Keep You Healthy    Mammogram: SCHEDULED  Colon Cancer Screening: Met on 6/7/2016  Cervical Cancer Screening: Met on 6/20/2024  Last Blood  Pressure <= 139/89 (12/17/2024): Yes    Follow up in about 6 months (around 6/17/2025). or sooner prn (as needed)          Jonathan Moya  [facility]  [address] [facility]  [address] [place] [address]  Phone [phone]  Fax [phone]    Part of this note is dictated using the [employer] Direct word recognition program. It may contain word recognition mistakes or wrong word substitutions (commonly he/she and is/was substitutions) that were missed on review.    Part of this note was generated with the assistance of ambient listening technology. Verbal consent was obtained by the patient and accompanying visitor(s) for the recording of patient appointment to facilitate this note. I attest to having reviewed and edited the generated note for accuracy, though some syntax or spelling errors may persist. Please contact the author of this note for any clarification.

## 2024-12-18 ENCOUNTER — TELEPHONE (OUTPATIENT)
Dept: ENDOCRINOLOGY | Facility: CLINIC | Age: 64
End: 2024-12-18
Payer: COMMERCIAL

## 2024-12-18 DIAGNOSIS — M81.0 OSTEOPOROSIS OF LUMBAR SPINE: Primary | ICD-10-CM

## 2025-01-13 ENCOUNTER — TELEPHONE (OUTPATIENT)
Dept: INTERNAL MEDICINE | Facility: CLINIC | Age: 65
End: 2025-01-13
Payer: COMMERCIAL

## 2025-01-13 NOTE — TELEPHONE ENCOUNTER
Fax from [facility] (dated 1/10/204- IBS visit)reviewed and placed in my file cabinet. Fax will be sent to scan after 3 months!

## 2025-01-25 ENCOUNTER — TELEPHONE (OUTPATIENT)
Dept: OTOLARYNGOLOGY | Facility: CLINIC | Age: 65
End: 2025-01-25
Payer: COMMERCIAL

## 2025-01-27 ENCOUNTER — OFFICE VISIT (OUTPATIENT)
Dept: OTOLARYNGOLOGY | Facility: CLINIC | Age: 65
End: 2025-01-27
Payer: COMMERCIAL

## 2025-01-27 ENCOUNTER — CLINICAL SUPPORT (OUTPATIENT)
Dept: OTOLARYNGOLOGY | Facility: CLINIC | Age: 65
End: 2025-01-27
Payer: COMMERCIAL

## 2025-01-27 VITALS
BODY MASS INDEX: 29.53 KG/M2 | WEIGHT: 173 LBS | HEIGHT: 64 IN | HEART RATE: 91 BPM | SYSTOLIC BLOOD PRESSURE: 150 MMHG | DIASTOLIC BLOOD PRESSURE: 74 MMHG

## 2025-01-27 DIAGNOSIS — H93.A3 PULSATILE TINNITUS OF BOTH EARS: Primary | Chronic | ICD-10-CM

## 2025-01-27 DIAGNOSIS — H93.13 TINNITUS OF BOTH EARS: ICD-10-CM

## 2025-01-27 DIAGNOSIS — H90.3 SENSORINEURAL HEARING LOSS (SNHL) OF BOTH EARS: ICD-10-CM

## 2025-01-27 DIAGNOSIS — H90.3 SENSORINEURAL HEARING LOSS (SNHL) OF BOTH EARS: Primary | ICD-10-CM

## 2025-01-27 PROCEDURE — 92557 COMPREHENSIVE HEARING TEST: CPT | Mod: S$GLB,,,

## 2025-01-27 PROCEDURE — 3078F DIAST BP <80 MM HG: CPT | Mod: CPTII,S$GLB,, | Performed by: STUDENT IN AN ORGANIZED HEALTH CARE EDUCATION/TRAINING PROGRAM

## 2025-01-27 PROCEDURE — 92567 TYMPANOMETRY: CPT | Mod: S$GLB,,,

## 2025-01-27 PROCEDURE — 3008F BODY MASS INDEX DOCD: CPT | Mod: CPTII,S$GLB,, | Performed by: STUDENT IN AN ORGANIZED HEALTH CARE EDUCATION/TRAINING PROGRAM

## 2025-01-27 PROCEDURE — 99204 OFFICE O/P NEW MOD 45 MIN: CPT | Mod: S$GLB,,, | Performed by: STUDENT IN AN ORGANIZED HEALTH CARE EDUCATION/TRAINING PROGRAM

## 2025-01-27 PROCEDURE — 1160F RVW MEDS BY RX/DR IN RCRD: CPT | Mod: CPTII,S$GLB,, | Performed by: STUDENT IN AN ORGANIZED HEALTH CARE EDUCATION/TRAINING PROGRAM

## 2025-01-27 PROCEDURE — 1159F MED LIST DOCD IN RCRD: CPT | Mod: CPTII,S$GLB,, | Performed by: STUDENT IN AN ORGANIZED HEALTH CARE EDUCATION/TRAINING PROGRAM

## 2025-01-27 PROCEDURE — 3077F SYST BP >= 140 MM HG: CPT | Mod: CPTII,S$GLB,, | Performed by: STUDENT IN AN ORGANIZED HEALTH CARE EDUCATION/TRAINING PROGRAM

## 2025-01-27 NOTE — PROGRESS NOTES
Ear, Nose, & Throat  Otolaryngology - Head & Neck Surgery        Subjective:     Chief Complaint:   Chief Complaint   Patient presents with    Tinnitus       Soraya Elmore is a 64 y.o. female who was referred to me by Dr. Jonathan Moya in consultation for pulsatile tinnitus.    Presents today with 4-5 years bilateral, right worse than left, pulsatile tinnitus.  She reports a whooshing noise.  She is unsure whether this is synchronous with her heartbeat.  She does have a history of bifrontal and occipital headaches and floaters in her vision.  History of glaucoma. Last saw optometry 08/2024. No noted papilledema. She also reports some issues with imbalance; however, these are difficult for her to describe.  She denies any room spinning vertigo.  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  Denies any loss of consciousness.    She has not have a significant otologic history.  Denies any subjective HL, otalgia, otorrhea, hyperacusis, or autophony.  Past Medical History  Active Ambulatory Problems     Diagnosis Date Noted    Nuclear sclerosis of both eyes 05/10/2017    Prediabetes 06/03/2019    Plantar fasciitis 08/21/2020    Foot pain, right 08/21/2020    Equinus contracture of ankle 08/21/2020    Gastroesophageal reflux disease without esophagitis 06/08/2023    Vitamin D deficiency 06/08/2023    Pain in left hip 06/09/2023    Acute pain of left knee 06/09/2023    T12 compression fracture, sequela 04/08/2024    Primary hypertension 04/08/2024    Normocytic anemia 04/08/2024    Polyp of colon 04/08/2024    Osteoporosis of lumbar spine 05/13/2024    Osteoporosis with current pathological fracture with routine healing 05/13/2024     Resolved Ambulatory Problems     Diagnosis Date Noted    Annual physical exam     Decreased range of motion of right ankle 08/21/2020    Preop testing 01/08/2021    Gall bladder disease 01/08/2021    Impingement syndrome of left shoulder  04/19/2022    Decreased strength, endurance, and mobility 04/19/2022     Past Medical History:   Diagnosis Date    Gastroenteritis     Glaucoma (increased eye pressure)     Osteoporosis     Tinnitus        Past Surgical History  She has a past surgical history that includes Oophorectomy (01/01/1999); Colonoscopy (N/A, 06/07/2016); Laparoscopic cholecystectomy (N/A, 01/08/2021); and Cholecystectomy (January 8,2021).    Past Surgical History:   Procedure Laterality Date    CHOLECYSTECTOMY  January 8,2021    COLONOSCOPY N/A 06/07/2016    Procedure: COLONOSCOPY;  Surgeon: Katerine Ramsey MD;  Location: [address]);  Service: Endoscopy;  Laterality: N/A;    LAPAROSCOPIC CHOLECYSTECTOMY N/A 01/08/2021    Procedure: CHOLECYSTECTOMY, LAPAROSCOPIC;  Surgeon: Johan Wilkerson Jr., MD;  Location: [facility];  Service: General;  Laterality: N/A;    OOPHORECTOMY  01/01/1999    Unilateral oophorectomy        Family History  Her family history includes Arthritis in her mother; Asthma in her daughter; Breast cancer (age of onset: 67) in her cousin; COPD in her mother; Cataracts in her mother; Diabetes in her sister and sister; Glaucoma in her mother and sister; Heart disease in her mother; Hypertension in her mother.    Social History  She reports that she quit smoking about 19 years ago. Her smoking use included cigarettes. She started smoking about 34 years ago. She has a 22.5 pack-year smoking history. She has been exposed to tobacco smoke. She has never used smokeless tobacco. She reports that she does not drink alcohol and does not use drugs.    Allergies  She has No Known Allergies.    Medications  She has a current medication list which includes the following prescription(s): atorvastatin, bifidobacterium infantis, calcium carbonate, carbamide peroxide, cholecalciferol (vitamin d3), ciclopirox, diclofenac sodium, fluocinonide, hydrocortisone, latanoprost, lidocaine, ondansetron, pantoprazole, and urea.    ROS:  Pertinent  "positive and negative review of systems as noted in HPI.     Objective:     BP (!) 150/74 (BP Location: Left arm, Patient Position: Sitting)   Pulse 91   Ht 5' 4" (1.626 m)   Wt 78.5 kg (173 lb)   LMP 07/21/2006   BMI 29.70 kg/m²    Physical Exam  Constitutional: Well appearing, communicating. No acute distress  Voice: Euphonic  Eyes: Conjunctiva WNL, Pupils reactive  Head/Face: House Brackmann I Bilaterally.  Right Ear:    Auricle normally developed   EAC: normal   Tympanic membrane: intact   Middle Ear: No effusion present and Ossicles in normal position  Left Ear:    Auricle normally developed   EAC: normal   Tympanic membrane: intact   Middle Ear: No effusion present and Ossicles in normal position  Vestibular:    Spontaneous Nystagmus: none  Neuro/Psychiatric:     Affect: Appropriate   Eyes: EOMI intact  Respiratory: No increased WOB, no stridor    Data Review:   LABS  Hemoglobin (g/dL)   Date Value   12/09/2024 12.7     WBC (K/uL)   Date Value   12/09/2024 6.97     Platelets (K/uL)   Date Value   12/09/2024 262     Creatinine (mg/dL)   Date Value   12/09/2024 0.8     Calcium (mg/dL)   Date Value   12/09/2024 9.1     INR (no units)   Date Value   06/04/2020 0.9    (HGB:1,HCT:1,WBC:1,PLT:1,NA:1,K:1,CREATININE:1,BUN:1,GLU:1,POCTGLUCOSE:1,CALCIUM:1, A1C:1)@    I have independently reviewed the lab results shown above. Findings significant for the conditions being treated include:  Normal    IMAGING    No pertinent imaging available    AUDIO    I have independently reviewed the following audiogram and reviewed the report. Findings as follows:     Pure Tone Audiometry: Symmetric  Right - Normal (0-25 dB) to Mild (26-40 dB) high frequency sensorineural hearing loss   Left - Normal (0-25 dB) to Mild (26-40 dB) high frequency sensorineural hearing loss     Tympanometry  Right: Type A  Left: Type A    Word Discrimination Score  Right: 100%  Left 100%    Acoustic Reflexes  Right Stimulation - Right reflex: DNT   " Left Reflex: DNT  Left Stimulation - Right reflex: DNT   Left Reflex: DNT    Procedures:       Assessment:     1. Pulsatile tinnitus of both ears    2. Sensorineural hearing loss (SNHL) of both ears        Plan:     I had a long discussion with the patient regarding her condition and the further workup and management options.      Right worse than left mild pulsatile tinnitus which resolves with jugular compression.  As I discussed with the patient, I have recommended a MRV discrete for vascular abnormalities.    Bilateral symptoms in the setting of headaches and visual abnormalities raise concern for IIH.  May consider neurology consult pending imaging.    Recommend repeat audiogram in 1 year.    Voice recognition software was used in the creation of this note/communication and any sound-alike errors which may have occurred from its use should be taken in context when interpreting. If such errors prevent a clear understanding of the note/communication, please contact the office for clarification.     Orders Placed This Encounter   Procedures    MRV Brain With & W/O Contrast

## 2025-01-27 NOTE — PROGRESS NOTES
"Soraya Elmore, a 64 y.o. female, was seen today in the clinic for an audiologic evaluation. Ms. Elmore reported decreased hearing in both ears an noted the right ear is worse than the left. She reported intermittent tinnitus that she described as "ringing, buzzing, and pulsing." She noted the tinnitus is in both ears but is worse in the right ear. Patient denied otalgia, aural fullness, and dizziness.    Tympanometry revealed Type A tympanogram in the right ear and Type A tympanogram in the left ear. Audiogram results revealed normal hearing sensitivity sloping to mild sensorineural hearing loss (SNHL) in the right ear and normal hearing sensitivity with a mild SNHL from 3016-0716 Hz in the left ear.  Speech reception thresholds were noted at 20 dBHL in the right ear and 20 dBHL in the left ear.  Speech discrimination scores were 100% in the right ear and 100% in the left ear.    Recommendations:  Otologic evaluation  Annual audiogram or sooner if change is perceived  Hearing protection in noise        "

## 2025-01-30 ENCOUNTER — TELEPHONE (OUTPATIENT)
Dept: INTERNAL MEDICINE | Facility: CLINIC | Age: 65
End: 2025-01-30
Payer: COMMERCIAL

## 2025-01-30 NOTE — TELEPHONE ENCOUNTER
Spoke with patient and scheduled patient appointment for orthopedics below:     2/20/2025 3:40 PM (40 min)  Micha   NEW PROBLEM - SPORTS/ORTHO   Authorized   [id number] ([place])   [place] Santo, DO

## 2025-01-30 NOTE — TELEPHONE ENCOUNTER
----- Message from Summer sent at 1/30/2025  2:34 PM CST -----  Regarding: appt  Type:  Patient Returning Call        Name of who is calling:pt        What is request in detail:pt is requesting a call back in regards to appt, pt had orthopedic appt but stated that dr was too far out. Pt wants to know can you find her one closer to her location, please assist.          Can clinic reply by [facility]:no        What number to call back if not in [facility]: [phone]

## 2025-02-19 NOTE — PROGRESS NOTES
CC: left knee pain    64 y.o. Female presents today for evaluation of her left knee pain.  She points to the medial knee when asked where she hurts and describes it as a burning pain.  She denies any numbness or tingling.  When describing the initial injury in April 20, 2024, she states that she was involved in a patient lift/transfer and she suffered a fall due to positioning in the weight of the patient.  She is unsure exactly how she fell, but she was in an extreme amount of pain in her back that she was unable to get off of her knees for approximately 10 minutes, maybe longer.  She was evaluated shortly after this and it was determined that she suffered a spinal fracture.  She denies any other injury or trauma to the knee in between April 20, 2024 and now.  She had x-rays obtained in December of 2024 which ruled out fracture, but does show medial compartment osteoarthritis.    How long:  First noticed about 1 month after her injury to her spine in April 2024.  She does not remember specifically injuring her knee at this time but states that there was intense pain in her back because of the fracture.  What makes it better:  Activity avoidance  What makes it worse:  Going up and downstairs especially down, twisting of the knee  Does it radiate:  Denies radiation of pain  Attempted treatments:  Pain medications (on tramadol for her back), rest  Pain score:  7/10  Any mechanical symptoms:  Denies  Feelings of instability:  Denies  Affect on ADLs:  Yes, difficulty ambulating throughout the house    Occupation:  Patient care technician      PAST MEDICAL HISTORY:   Past Medical History:   Diagnosis Date    Gastroenteritis     Glaucoma (increased eye pressure)     Osteoporosis     Prediabetes 06/03/2019    Tinnitus        PAST SURGICAL HISTORY:   Past Surgical History:   Procedure Laterality Date    CHOLECYSTECTOMY  January 8,2021    COLONOSCOPY N/A 06/07/2016    Procedure: COLONOSCOPY;  Surgeon: Katerine Ramsey MD;   "Location: [facility] (4TH FLR);  Service: Endoscopy;  Laterality: N/A;    LAPAROSCOPIC CHOLECYSTECTOMY N/A 01/08/2021    Procedure: CHOLECYSTECTOMY, LAPAROSCOPIC;  Surgeon: Johan Wilkerson Jr., MD;  Location: [facility];  Service: General;  Laterality: N/A;    OOPHORECTOMY  01/01/1999    Unilateral oophorectomy       FAMILY HISTORY:   Family History   Problem Relation Name Age of Onset    Hypertension Mother Jeni     Glaucoma Mother Jeni     Cataracts Mother Jeni     Arthritis Mother Jeni     COPD Mother Jeni     Heart disease Mother Jeni     Diabetes Sister Dixie     Glaucoma Sister Dixie     Diabetes Sister Lauren     Breast cancer Cousin  67    Asthma Daughter Derraniece     Colon cancer Neg Hx      Ovarian cancer Neg Hx      Stroke Neg Hx      Esophageal cancer Neg Hx      Macular degeneration Neg Hx      Retinal detachment Neg Hx         SOCIAL HISTORY:   Social History[1]    MEDICATIONS:   Current Medications[2]    ALLERGIES:   Review of patient's allergies indicates:  No Known Allergies     PHYSICAL EXAMINATION:  BP (!) 168/77 (Patient Position: Sitting)   Ht 5' 4" (1.626 m)   Wt 78.5 kg (173 lb 1 oz)   LMP 07/21/2006   BMI 29.71 kg/m²   Vitals signs and nursing note have been reviewed.  General: In no acute distress, well developed, well nourished, no diaphoresis  Eyes: EOM full and smooth, no eye redness or discharge  HENT: normocephalic and atraumatic, neck supple, trachea midline, no nasal discharge, no external ear redness or discharge  Cardiovascular: 2+ and symmetric DP pulses bilaterally, no LE edema  Lungs: respirations non-labored, no conversational dyspnea   Abd: non-distended, no rigidity  MSK: no amputation or deformity, no swelling of extremities  Neuro: AAOx3, CN2-12 grossly intact  Skin: No rashes, warm and dry  Psychiatric: cooperative, pleasant, mood and affect appropriate for age    MUSCULOSKELETAL EXAM:    LEFT KNEE EXAMINATION   Affected side is compared to contralateral " knee     Observation:  No edema, erythema, ecchymosis, or effusion noted.  No muscle atrophy of the thighs and calves noted.  No obvious bony deformities noted.   No genu valgus/varum noted. No recurvatum noted.      Tenderness:   Patella - + medial   Lateral joint line - none  Quad tendon - none   Medial joint line - +  Patellar tendon - none  Medial plica - none  Tibial tubercle - none   Lateral plica - none  Pes anserine - none   MCL prox - none  Distal ITB - none   MCL distal - none  MFC - none    LCL prox - none  LFC - none    LCL distal - none  Tibia - none    Fibula - none    No obvious bursae, plicae, popliteal cysts, or tendon derangement palpated.          ROM:  Active extension to 0° on left without hyperextension, lag, crepitus, or patellar J sign.   Active extension to 0° on right without hyperextension, lag, crepitus, or patellar J sign.   Active flexion to 125° on left and 135° on right.  Decreased left due to pain    Strength: (bilaterally) (*pain)  Knee Flexion - 5/5 on left* and 5/5 on right  Knee Extension - 5/5 on left* and 5/5 on right  Hip Flexion - 5/5 on left and 5/5 on right  Hip Extension - 5/5 on left and 5/5 on right  Ankle dorsiflexion - 5/5 on left and 5/5 on right  Ankle Plantarflexion - 5/5 on left and 5/5 on right    Patellofemoral Exam:  Patellar ballottement - negative  Bulge sign - negative  Patellar grind - negative  No patellar laxity with medial and lateral translation   No apprehension with medial and lateral patellar translation.     Meniscus Testing:     No pain with terminal extension and flexion.  Nubias test - negative   Bounce home test - negative    Ligament Testing:  Lachman's test - negative  No laxity with anterior drawer.  No laxity with posterior drawer.    No laxity with varus testing at 0 and 30 degrees.  No laxity with valgus testing at 0 and 30 degrees.    IT Band Testing:  Juliocesars test - negative   Noble Compression test - negative    Neurovascular  Examination:   Left antalgic gait  Sensation intact to light touch in the obturator, lateral/intermediate/medial/posterior femoral cutaneous, saphenous, and common peroneal nerves bilaterally.  Pulses intact at the DP and PT arteries bilaterally.    Capillary refill intact <2 seconds in all toes bilaterally.      IMAGIN. X-ray obtained 2024 due to left knee pain   2. X-ray images were reviewed personally by me and then directly with patient.  3. FINDINGS: X-ray images obtained demonstrate DJD and a varus deformity. No fracture dislocation bone destruction or OCD seen.   4. IMPRESSION: As above.      ASSESSMENT:      ICD-10-CM ICD-9-CM   1. Chronic pain of left knee  M25.562 719.46    G89.29 338.29   2. Primary osteoarthritis of left knee  M17.12 715.16         PLAN:  1-2.  Chronic knee pain/osteoarthritis -     - Soraya presents today for left medial knee pain that she states started approximately 1 month after her injury at work (spinal fracture, 2024).  Since that time she has had persistent pain with going up and down stairs and has medial knee pain certain knee twisting motions.    - XRs obtained 2024 and images were personally reviewed with the patient. See above for further detail.    - We reviewed the natural history of osteoarthritis and a multipronged treatment approach. We reviewed the importance of addressing three different aspects to best manage this condition. Controlling the intra-articular immune reaction through medications and/or injections, improving local stability through bracing and/or injection, and improving functional stability through hip, core, and ankle strength, stability and mobility which may benefit from formal physical therapy.    - Celebrex 200mg BID prescribed and advised to take with food and twice daily for 2 weeks, then as needed.    - HEP for knee OA prescribed today. Handouts printed, provided, explained, and exercises were demonstrated as needed.  Encouraged to do daily. 52419 HOME EXERCISE PROGRAM (HEP):  The patient was taught a homegoing physical therapy regimen as described above by provider with assistance of sports medicine assistant. The patient demonstrated understanding of the exercises and proper technique of their execution. This process took 15 minutes.       Future planning includes - possible IA CSI if not improved with above, MRI if symptoms worsen, consider bracing for use during work or exercise    All questions were answered to the best of my ability and all concerns were addressed at this time.    Follow up in 4-6 weeks for above, or sooner if needed.      This note is dictated using the M*Modal Fluency Direct word recognition program. There are word recognition mistakes that are occasionally missed on review.               [1]   Social History  Socioeconomic History    Marital status:    Occupational History    Occupation: patient care tech    Tobacco Use    Smoking status: Former     Current packs/day: 0.00     Average packs/day: 0.8 packs/day for 29.0 years (22.5 ttl pk-yrs)     Types: Cigarettes     Start date: 1990     Quit date: 2005     Years since quittin.6     Passive exposure: Past    Smokeless tobacco: Never   Substance and Sexual Activity    Alcohol use: No    Drug use: No    Sexual activity: Not Currently     Partners: Male     Birth control/protection: Abstinence, Post-menopausal     Social Drivers of Health     Financial Resource Strain: Medium Risk (2024)    Overall Financial Resource Strain (CARDIA)     Difficulty of Paying Living Expenses: Somewhat hard   Food Insecurity: Unknown (2024)    Hunger Vital Sign     Worried About Running Out of Food in the Last Year: Patient declined     Ran Out of Food in the Last Year: Never true   Transportation Needs: No Transportation Needs (2024)    PRAPARE - Transportation     Lack of Transportation (Medical): No     Lack of Transportation  (Non-Medical): No   Physical Activity: Sufficiently Active (1/21/2024)    Exercise Vital Sign     Days of Exercise per Week: 3 days     Minutes of Exercise per Session: 60 min   Stress: Stress Concern Present (1/21/2024)    [employer] [place] [employer] - Occupational Stress Questionnaire     Feeling of Stress : To some extent   Housing Stability: Low Risk  (1/21/2024)    Housing Stability Vital Sign     Unable to Pay for Housing in the Last Year: No     Number of Places Lived in the Last Year: 1     Unstable Housing in the Last Year: No   [2]   Current Outpatient Medications:     atorvastatin (LIPITOR) 40 MG tablet, Take 1 tablet (40 mg total) by mouth once daily., Disp: 90 tablet, Rfl: 3    Bifidobacterium infantis (ALIGN) 4 mg Cap, Take 1 capsule (4 mg total) by mouth once daily., Disp: 90 capsule, Rfl: 3    calcium carbonate (CALCIUM 500) 500 mg calcium (1,250 mg) chewable tablet, Take 1 tablet (500 mg total) by mouth once daily., Disp: 90 tablet, Rfl: 3    carbamide peroxide (DEBROX) 6.5 % otic solution, Place 5 drops into the right ear 2 (two) times daily., Disp: 15 mL, Rfl: 0    celecoxib (CELEBREX) 200 MG capsule, Take 1 capsule (200 mg total) by mouth 2 (two) times daily., Disp: 60 capsule, Rfl: 0    cholecalciferol, vitamin D3, 50,000 unit capsule, Take 1 capsule (50,000 Units total) by mouth once a week., Disp: 12 capsule, Rfl: 3    ciclopirox (PENLAC) 8 % Soln, Apply topically nightly., Disp: 6.6 mL, Rfl: 11    diclofenac sodium (VOLTAREN) 1 % Gel, Apply 2 g topically 3 (three) times daily as needed (muscle spasm pain). Apply 2 grams to affected area 3 times daily as needed, Disp: 100 g, Rfl: 0    fluocinonide (LIDEX) 0.05 % external solution, Apply topically daily as needed (scalp itching)., Disp: 60 mL, Rfl: 6    hydrocortisone 2.5 % cream, Apply topically 2 (two) times daily as needed (flaking and itching rash)., Disp: 30 g, Rfl: 6    latanoprost 0.005 % ophthalmic solution, Place 1 drop  into both eyes every evening., Disp: 2.5 mL, Rfl: 6    LIDOcaine (LIDODERM) 5 %, Apply to affected area. Use as directed. May use 4% lidocaine patch as alternative., Disp: 15 patch, Rfl: 1    ondansetron (ZOFRAN-ODT) 4 MG TbDL, Take 1 tablet (4 mg total) by mouth every 8 (eight) hours as needed (Nausea)., Disp: 90 tablet, Rfl: 0    pantoprazole (PROTONIX) 20 MG tablet, Take 1 tablet (20 mg total) by mouth once daily., Disp: 30 tablet, Rfl: 10    urea (CARMOL) 40 % Crea, Apply topically once daily., Disp: 85 g, Rfl: 11

## 2025-02-20 ENCOUNTER — OFFICE VISIT (OUTPATIENT)
Dept: SPORTS MEDICINE | Facility: CLINIC | Age: 65
End: 2025-02-20
Payer: OTHER MISCELLANEOUS

## 2025-02-20 VITALS
SYSTOLIC BLOOD PRESSURE: 168 MMHG | WEIGHT: 173.06 LBS | HEIGHT: 64 IN | BODY MASS INDEX: 29.55 KG/M2 | DIASTOLIC BLOOD PRESSURE: 77 MMHG

## 2025-02-20 DIAGNOSIS — M17.12 PRIMARY OSTEOARTHRITIS OF LEFT KNEE: ICD-10-CM

## 2025-02-20 DIAGNOSIS — M25.562 CHRONIC PAIN OF LEFT KNEE: Primary | ICD-10-CM

## 2025-02-20 DIAGNOSIS — G89.29 CHRONIC PAIN OF LEFT KNEE: Primary | ICD-10-CM

## 2025-02-20 RX ORDER — CELECOXIB 200 MG/1
200 CAPSULE ORAL 2 TIMES DAILY
Qty: 60 CAPSULE | Refills: 0 | Status: SHIPPED | OUTPATIENT
Start: 2025-02-20

## 2025-03-01 ENCOUNTER — HOSPITAL ENCOUNTER (EMERGENCY)
Facility: OTHER | Age: 65
Discharge: HOME | End: 2025-03-01
Attending: EMERGENCY MEDICINE
Payer: COMMERCIAL

## 2025-03-01 VITALS
DIASTOLIC BLOOD PRESSURE: 73 MMHG | HEART RATE: 100 BPM | HEIGHT: 64 IN | RESPIRATION RATE: 20 BRPM | TEMPERATURE: 98 F | WEIGHT: 170 LBS | SYSTOLIC BLOOD PRESSURE: 179 MMHG | BODY MASS INDEX: 29.02 KG/M2 | OXYGEN SATURATION: 99 %

## 2025-03-01 DIAGNOSIS — U07.1 COVID-19: Primary | ICD-10-CM

## 2025-03-01 DIAGNOSIS — U07.1 COVID-19 VIRUS DETECTED: ICD-10-CM

## 2025-03-01 LAB
CTP QC/QA: YES
CTP QC/QA: YES
POC MOLECULAR INFLUENZA A AGN: NEGATIVE
POC MOLECULAR INFLUENZA B AGN: NEGATIVE
SARS-COV-2 RDRP RESP QL NAA+PROBE: POSITIVE

## 2025-03-01 PROCEDURE — 87635 SARS-COV-2 COVID-19 AMP PRB: CPT | Performed by: NURSE PRACTITIONER

## 2025-03-01 PROCEDURE — 99282 EMERGENCY DEPT VISIT SF MDM: CPT

## 2025-03-01 NOTE — ED TRIAGE NOTES
Pt arrived to ED complaining of cough, congestion, fever, rhinorrhea, and sneezing for the past week states her  has been sick and now she's caught it.

## 2025-03-01 NOTE — FIRST PROVIDER EVALUATION
Emergency Department TeleTriage Encounter Note      CHIEF COMPLAINT    Chief Complaint   Patient presents with    URI     URI symptoms for over a week took OTC meds.       VITAL SIGNS   Initial Vitals [03/01/25 1708]   BP Pulse Resp Temp SpO2   (!) 179/73 100 18 98.2 °F (36.8 °C) 99 %      MAP       --            ALLERGIES    Review of patient's allergies indicates:   Allergen Reactions    Aspirin        PROVIDER TRIAGE NOTE  This is a teletriage evaluation of a 64 y.o. female presenting to the ED complaining of cough, congestion, fever, rhinorrhea, and sneezing for the past week.     Alert, no distress.     Initial orders will be placed and care will be transferred to an alternate provider when patient is roomed for a full evaluation. Any additional orders and the final disposition will be determined by that provider.         ORDERS  Labs Reviewed   SARS-COV-2 RDRP GENE   POCT INFLUENZA A/B MOLECULAR       ED Orders (720h ago, onward)      Start Ordered     Status Ordering Provider    03/01/25 1724 03/01/25 1723  POCT COVID-19 Rapid Screening  Once         Ordered AUSTYN BOJORQUEZ    03/01/25 1724 03/01/25 1723  POCT Influenza A/B Molecular  Once         Ordered AUSTYN BOJORQUEZ              Virtual Visit Note: The provider triage portion of this emergency department evaluation and documentation was performed via [employer], a HIPAA-compliant telemedicine application, in concert with a tele-presenter in the room. A face to face patient evaluation with one of my colleagues will occur once the patient is placed in an emergency department room.      DISCLAIMER: This note was prepared with [employer] voice recognition transcription software. Garbled syntax, mangled pronouns, and other bizarre constructions may be attributed to that software system.

## 2025-03-02 ENCOUNTER — NURSE TRIAGE (OUTPATIENT)
Dept: ADMINISTRATIVE | Facility: CLINIC | Age: 65
End: 2025-03-02
Payer: COMMERCIAL

## 2025-03-02 NOTE — TELEPHONE ENCOUNTER
Patient was enrolled in the Covid-19 Home Monitoring Program on 3/01/25. Patient states she was diagnosed with Covid-19 in the ED on yesterday, 3/01/25 and advised to use OTC medications to treat her symptoms. Patient states c/o continued coughing and use of Nyquil for management of symptoms.     CARE ADVICE given per COVID-19 - DIAGNOSED OR SUSPECTED (Adult) guideline. Patient provided the following care advice:    COUGH MEDICINES: * COUGH DROPS: Over-the-counter cough drops can help a lot, especially for mild coughs. They soothe an irritated throat and remove the tickle sensation in the back of the throat. Cough drops are easy to carry with you.   * COUGH SYRUP WITH DEXTROMETHORPHAN: An over-the-counter cough syrup can help your cough. The most common cough suppressant in over-the-counter cough medicines is dextromethorphan.   * HOME REMEDY - HARD CANDY: Hard candy works just as well as over-the-counter cough drops. People who have diabetes should use sugar-free candy.   * HOME REMEDY - HONEY: This old home remedy has been shown to help decrease coughing at night. The adult dosage is 2 teaspoons (10 ml) at bedtime.    HUMIDIFIER: * If the air is dry, use a humidifier in the bedroom. * Dry air makes coughs worse.    Patient also advised to contact the [facility] for any worsening symptoms. Patient states understanding of care advice.     Reason for Disposition   [1] COVID-19 diagnosed by positive lab test (e.g., PCR, rapid self-test kit) AND [2] mild symptoms (e.g., cough, fever, others) AND [3] no complications or SOB    Additional Information   Negative: SEVERE difficulty breathing (e.g., struggling for each breath, speaks in single words)   Negative: Difficult to awaken or acting confused (e.g., disoriented, slurred speech)   Negative: Bluish (or gray) lips or face now   Negative: Shock suspected (e.g., cold/pale/clammy skin, too weak to stand, low BP, rapid pulse)   Negative: Sounds like a  life-threatening emergency to the triager   Negative: [1] Diagnosed or suspected COVID-19 AND [2] symptoms lasting 3 or more weeks   Negative: [1] COVID-19 exposure AND [2] no symptoms   Negative: COVID-19 vaccine reaction suspected (e.g., fever, headache, muscle aches) occurring 1 to 3 days after getting vaccine   Negative: COVID-19 vaccine, questions about   Negative: [1] Lives with someone known to have influenza (flu test positive) AND [2] flu-like symptoms (e.g., cough, runny nose, sore throat, SOB; with or without fever)   Negative: [1] Possible COVID-19 symptoms AND [2] triager concerned about severity of symptoms or other causes   Negative: COVID-19 and breastfeeding, questions about   Negative: SEVERE or constant chest pain or pressure  (Exception: Mild central chest pain, present only when coughing.)   Negative: MODERATE difficulty breathing (e.g., speaks in phrases, SOB even at rest, pulse 100-120)   Negative: [1] Headache AND [2] stiff neck (can't touch chin to chest)   Negative: Oxygen level (e.g., pulse oximetry) 90 percent or lower   Negative: Chest pain or pressure  (Exception: MILD central chest pain, present only when coughing.)   Negative: [1] Drinking very little AND [2] dehydration suspected (e.g., no urine > 12 hours, very dry mouth, very lightheaded)   Negative: Patient sounds very sick or weak to the triager   Negative: MILD difficulty breathing (e.g., minimal/no SOB at rest, SOB with walking, pulse <100)   Negative: Fever > 103 F (39.4 C)   Negative: [1] Fever > 101 F (38.3 C) AND [2] age > 60 years   Negative: [1] Fever > 100.0 F (37.8 C) AND [2] bedridden (e.g., CVA, chronic illness, recovering from surgery)   Negative: Oxygen level (e.g., pulse oximetry) 91 to 94 percent   Negative: [1] HIGH RISK patient (e.g., weak immune system, age > 64 years, obesity with BMI 30 or higher, pregnant, chronic lung disease or other chronic medical condition) AND [2] COVID symptoms (e.g., cough, fever)   (Exceptions: Already seen by PCP and no new or worsening symptoms.)   Negative: [1] HIGH RISK patient AND [2] influenza is widespread in the community AND [3] ONE OR MORE respiratory symptoms: cough, sore throat, runny or stuffy nose   Negative: [1] HIGH RISK patient AND [2] influenza exposure within the last 7 days AND [3] ONE OR MORE respiratory symptoms: cough, sore throat, runny or stuffy nose   Negative: Fever present > 3 days (72 hours)   Negative: [1] Fever returns after gone for over 24 hours AND [2] symptoms worse or not improved   Negative: [1] Continuous (nonstop) coughing interferes with work or school AND [2] no improvement using cough treatment per Care Advice   Negative: [1] COVID-19 infection suspected by caller or triager AND [2] mild symptoms (cough, fever, or others) AND [3] negative COVID-19 rapid test   Negative: Cough present > 3 weeks   Negative: [1] COVID-19 diagnosed by positive lab test (e.g., PCR, rapid self-test kit) AND [2] NO symptoms (e.g., cough, fever, others)    Protocols used: Coronavirus (COVID-19) Diagnosed or [id number]

## 2025-03-02 NOTE — ED PROVIDER NOTES
Encounter Date: 3/1/2025       History     Chief Complaint   Patient presents with    URI     URI symptoms for over a week took OTC meds.     64-year-old female presents today with cough congestion fever fatigue and muscle aches the past 3 days.  She reports her  recently had viral URI symptoms.  She states he is getting better but she is getting worse.  Denies any chest pain or shortness of breath.  Denies any nausea vomiting or diarrhea.  Patient also reports a sore throat and runny nose.  Denies any abdominal pain.  Denies any dysuria hematuria.  Reports slightly decreased appetite however is drinking still.  Denies any other symptoms.    The history is provided by the patient. No  was used.     Review of patient's allergies indicates:   Allergen Reactions    Aspirin      Past Medical History:   Diagnosis Date    Gastroenteritis     Glaucoma (increased eye pressure)     Hypertension     Osteoporosis     Prediabetes 06/03/2019    Tinnitus      Past Surgical History:   Procedure Laterality Date    CHOLECYSTECTOMY  January 8,2021    COLONOSCOPY N/A 06/07/2016    Procedure: COLONOSCOPY;  Surgeon: Katerine Ramsey MD;  Location: [address];  Service: Endoscopy;  Laterality: N/A;    LAPAROSCOPIC CHOLECYSTECTOMY N/A 01/08/2021    Procedure: CHOLECYSTECTOMY, LAPAROSCOPIC;  Surgeon: Johan Wilkerson Jr., MD;  Location: [facility];  Service: General;  Laterality: N/A;    OOPHORECTOMY  01/01/1999    Unilateral oophorectomy     Family History   Problem Relation Name Age of Onset    Hypertension Mother Jeni     Glaucoma Mother Jeni     Cataracts Mother Jeni     Arthritis Mother Jeni     COPD Mother Jeni     Heart disease Mother Jeni     Diabetes Sister Ana     Glaucoma Sister Whitharral     Diabetes Sister Lauren     Breast cancer Cousin  67    Asthma Daughter Derraniece     Colon cancer Neg Hx      Ovarian cancer Neg Hx      Stroke Neg Hx      Esophageal cancer Neg Hx      Macular  degeneration Neg Hx      Retinal detachment Neg Hx       Social History[1]  Review of Systems    Physical Exam     Initial Vitals [03/01/25 1708]   BP Pulse Resp Temp SpO2   (!) 179/73 100 18 98.2 °F (36.8 °C) 99 %      MAP       --         Physical Exam    Nursing note and vitals reviewed.  Constitutional: She appears well-developed and well-nourished.   HENT:   Head: Normocephalic and atraumatic.   Eyes: Conjunctivae and EOM are normal.   Neck:   Normal range of motion.  Cardiovascular:  Normal rate and regular rhythm.           Pulmonary/Chest: No respiratory distress.   Abdominal: She exhibits no distension.   Musculoskeletal:         General: Normal range of motion.      Cervical back: Normal range of motion.     Neurological: She is alert and oriented to person, place, and time. She has normal strength. No cranial nerve deficit. GCS score is 15. GCS eye subscore is 4. GCS verbal subscore is 5. GCS motor subscore is 6.   Skin: Skin is warm and dry. Capillary refill takes less than 2 seconds.   Psychiatric: She has a normal mood and affect. Thought content normal.         ED Course   Procedures  Labs Reviewed   SARS-COV-2 RDRP GENE - Abnormal       Result Value    POC Rapid COVID Positive (*)      Acceptable Yes     POCT INFLUENZA A/B MOLECULAR    POC Molecular Influenza A Ag Negative      POC Molecular Influenza B Ag Negative       Acceptable Yes            Imaging Results    None          Medications - No data to display  Medical Decision Making  Patient was seen in the Emergency Department with symptoms consistent with a viral respiratory illness. The patient was tested for covid and positive. Based on the most recent recommendations from our hospital administration/infectious disease department at this time, the patient does NOT meet admission criteria for COVID-19. This was explained to the patient. Patient is tolerating PO. Vital signs and exam do not indicate sepsis, hypoxia  nor respiratory distress, and in my professional opinion the patient is well enough for discharge home.  Ambulatory pulse ox was 99%.  No signs of airway compromise or hypokalemia.  I have low suspicion for bacterial pneumonia or pulmonary embolus.  Do not suspect heart failure.  The patient was provided with discharge instructions on self-care and how to quarantine at home. I reinforced this advice and the dangers to family and public with failure to comply.I  do not think antibiotics are indicated.  I did have a detailed conversation with patient that neither antibiotics for a viral infection nor steroids for outpatient treatment of COVID will be beneficial and would risk unnecessary harm. We will proceed with symptomatic treatment. Return precautions discussed with the patient.  Patient understands and agrees with discharge instructions and strict return precautions.                                          Clinical Impression:  Final diagnoses:  [U07.1] COVID-19 (Primary)          ED Disposition Condition    Discharge Stable          ED Prescriptions    None       Follow-up Information       Follow up With Specialties Details Why Contact Info    Jonathan Moya MD Internal Medicine Go in 1 week  [address] [facility] [facility] [address]  [facility] [address]  [phone]      [facility] Emergency Dept Emergency Medicine Go to  As needed, If symptoms worsen [address] [facility] [id number] [phone]               [1]   Social History  Tobacco Use    Smoking status: Former     Current packs/day: 0.00     Average packs/day: 0.8 packs/day for 29.0 years (22.5 ttl pk-yrs)     Types: Cigarettes     Start date: 1990     Quit date: 2005     Years since quittin.6     Passive exposure: Past    Smokeless tobacco: Never   Substance Use Topics    Alcohol use: No    Drug use: No        Bon Yates MD  [phone]

## 2025-03-03 ENCOUNTER — ON-DEMAND VIRTUAL (OUTPATIENT)
Dept: URGENT CARE | Facility: CLINIC | Age: 65
End: 2025-03-03
Payer: COMMERCIAL

## 2025-03-03 ENCOUNTER — NURSE TRIAGE (OUTPATIENT)
Dept: ADMINISTRATIVE | Facility: CLINIC | Age: 65
End: 2025-03-03
Payer: COMMERCIAL

## 2025-03-03 DIAGNOSIS — R05.9 COUGH, UNSPECIFIED TYPE: ICD-10-CM

## 2025-03-03 DIAGNOSIS — U07.1 COVID-19: Primary | ICD-10-CM

## 2025-03-03 RX ORDER — PROMETHAZINE HYDROCHLORIDE AND DEXTROMETHORPHAN HYDROBROMIDE 6.25; 15 MG/5ML; MG/5ML
5 SYRUP ORAL EVERY 4 HOURS PRN
Qty: 118 ML | Refills: 0 | Status: SHIPPED | OUTPATIENT
Start: 2025-03-03 | End: 2025-03-13

## 2025-03-04 NOTE — PROGRESS NOTES
Subjective:      Patient ID: Soraya Elmore is a 64 y.o. female.    Vitals:  vitals were not taken for this visit.     Chief Complaint: COVID-19 Concerns      Visit Type: TELE AUDIOVISUAL    Past Medical History:   Diagnosis Date    Gastroenteritis     Glaucoma (increased eye pressure)     Hypertension     Osteoporosis     Prediabetes 06/03/2019    Tinnitus      Past Surgical History:   Procedure Laterality Date    CHOLECYSTECTOMY  January 8,2021    COLONOSCOPY N/A 06/07/2016    Procedure: COLONOSCOPY;  Surgeon: Katerine Ramsey MD;  Location: [address]);  Service: Endoscopy;  Laterality: N/A;    LAPAROSCOPIC CHOLECYSTECTOMY N/A 01/08/2021    Procedure: CHOLECYSTECTOMY, LAPAROSCOPIC;  Surgeon: Johan Wilkerson Jr., MD;  Location: [facility];  Service: General;  Laterality: N/A;    OOPHORECTOMY  01/01/1999    Unilateral oophorectomy     Review of patient's allergies indicates:   Allergen Reactions    Aspirin      Medications Ordered Prior to Encounter[1]  Family History   Problem Relation Name Age of Onset    Hypertension Mother Jeni     Glaucoma Mother Jeni     Cataracts Mother Jeni     Arthritis Mother Jeni     COPD Mother Jeni     Heart disease Mother Jeni     Diabetes Sister Muldrow     Glaucoma Sister Muldrow     Diabetes Sister Lauren     Breast cancer Cousin  67    Asthma Daughter Derraniece     Colon cancer Neg Hx      Ovarian cancer Neg Hx      Stroke Neg Hx      Esophageal cancer Neg Hx      Macular degeneration Neg Hx      Retinal detachment Neg Hx         Medications Ordered                [id number] - [place], [place] - [address] AT [facility] [address]   [address] [facility] [id number]    Telephone: [phone]   Fax: [phone]   Hours: Not open 24 hours                         E-Prescribed (1 of 1)              promethazine-dextromethorphan (PROMETHAZINE-DM) 6.25-15 mg/5 mL Syrp    Sig: Take 5 mLs by mouth every 4 (four) hours as  needed (cough).       Start: 3/3/25     Quantity: 118 mL Refills: 0                           Ohs Peq Odvv Intake    3/3/2025  6:36 PM CST - Filed by Patient   What is your current physical address in the event of a medical emergency? [address] [place] [address]   Are you able to take your vital signs? No   Please attach any relevant images or files    Is your employer contracted with [facility]? No         Pt diagnosed with COVID 19 a few days ago.  Cough uncontrolled with Robitussin and has been unable to sleep at night. Requesting stronger cough med.  Two patient identifiers were used-name was repeated verbally as well as date of birth.  The patient was located in their home in the [facility].          Respiratory:  Positive for cough.         Objective:   The physical exam was conducted virtually.  Physical Exam   Constitutional: She is oriented to person, place, and time. No distress.   HENT:   Head: Normocephalic and atraumatic.   Neck: Neck supple.   Pulmonary/Chest: Effort normal. No respiratory distress.   Abdominal: Normal appearance.   Musculoskeletal: Normal range of motion.         General: Normal range of motion.   Neurological: no focal deficit. She is alert and oriented to person, place, and time.   Skin: Skin is not pale.   Psychiatric: Her behavior is normal. Mood, judgment and thought content normal.       Assessment:     1. COVID-19    2. Cough, unspecified type        Plan:       COVID-19    Cough, unspecified type    Other orders  -     promethazine-dextromethorphan (PROMETHAZINE-DM) 6.25-15 mg/5 mL Syrp; Take 5 mLs by mouth every 4 (four) hours as needed (cough).  Dispense: 118 mL; Refill: 0      Patient Instructions   - Stay hydrated, drink plenty of water, and REST.  - OTC guaifenesin (plain Mucinex) for thick nasal/sinus congestion.    - OTC Robitussin DM or Mucinex DM for congestion with cough (guaifenesin + dextromethorphan).  - OTC Flonase or Nasonex for sinus  congestion.   - OTC Simply Saline (e.g. Arm & Hammer) for irritated and raw nasal passages.  - OTC Vicks VapoCOOL spray and Cepacol throat lozenges for sore throat.  - OTC ibuprofen or acetaminophen alternating every 4-6 hours as needed for aches/pains/high fever.  - OTC Airborne or Emergen-C have ingredients like Zinc, Echinacea, and vitamin-C to help boost the immune system. Elderberry is also good for this.   Cough/mucus expectorant:  Guaifenesin (Robitussin)  Menthol ointment (Vicks) (topically)     Cough suppressant:  Dextromethoraphan (Delsym)     Coughing is a normal body mechanism for removing secretions from lungs.   We suggest you avoid cough suppressants unless your cough is continuous or causing a disruption in your every day activities or sleep/rest.   If you were given a prescription for a cough suppressant, take it at night or when you are at home because it will make you drowsy.     Sore throat:  Cough drops  Throat spray  Salt water gargles  Warm water, honey, and a capful of Apple Cider vinegar gargles     Adults may take 2 teaspoons of honey nightly to help with a cough. This can be mixed with a mug of warm water and the juice of half of a lemon.    Children over the age of 5 years can be given 1 teaspoon nightly for cough. Honey should never be given to children less than 1 year old.       Thank you for choosing [facility] today.     PLEASE ONLY TAKE MEDICATIONS APPROVED BY YOUR PRIMARY CARE PROVIDER AND SPECIALISTS.     Please understand that you've received Virtual treatment only and that you may be released before all your medical problems are known or treated. You, the patient, will arrange for follow up care as instructed. Follow up with your PCP/specialty clinic as directed in the next 1-2 weeks if not improved or as needed. If your condition significantly worsens, we recommend that you receive another evaluation at the emergency room.              Present with the patient at the  time of consultation: TELEMED PRESENT WITH PATIENT: None           [1]   Current Outpatient Medications on File Prior to Visit   Medication Sig Dispense Refill    atorvastatin (LIPITOR) 40 MG tablet Take 1 tablet (40 mg total) by mouth once daily. 90 tablet 3    Bifidobacterium infantis (ALIGN) 4 mg Cap Take 1 capsule (4 mg total) by mouth once daily. 90 capsule 3    calcium carbonate (CALCIUM 500) 500 mg calcium (1,250 mg) chewable tablet Take 1 tablet (500 mg total) by mouth once daily. 90 tablet 3    carbamide peroxide (DEBROX) 6.5 % otic solution Place 5 drops into the right ear 2 (two) times daily. 15 mL 0    celecoxib (CELEBREX) 200 MG capsule Take 1 capsule (200 mg total) by mouth 2 (two) times daily. 60 capsule 0    cholecalciferol, vitamin D3, 50,000 unit capsule Take 1 capsule (50,000 Units total) by mouth once a week. 12 capsule 3    ciclopirox (PENLAC) 8 % Soln Apply topically nightly. 6.6 mL 11    diclofenac sodium (VOLTAREN) 1 % Gel Apply 2 g topically 3 (three) times daily as needed (muscle spasm pain). Apply 2 grams to affected area 3 times daily as needed 100 g 0    fluocinonide (LIDEX) 0.05 % external solution Apply topically daily as needed (scalp itching). 60 mL 6    hydrocortisone 2.5 % cream Apply topically 2 (two) times daily as needed (flaking and itching rash). 30 g 6    latanoprost 0.005 % ophthalmic solution Place 1 drop into both eyes every evening. 2.5 mL 6    LIDOcaine (LIDODERM) 5 % Apply to affected area. Use as directed. May use 4% lidocaine patch as alternative. 15 patch 1    ondansetron (ZOFRAN-ODT) 4 MG TbDL Take 1 tablet (4 mg total) by mouth every 8 (eight) hours as needed (Nausea). 90 tablet 0    pantoprazole (PROTONIX) 20 MG tablet Take 1 tablet (20 mg total) by mouth once daily. 30 tablet 10    urea (CARMOL) 40 % Crea Apply topically once daily. 85 g 11     No current facility-administered medications on file prior to visit.

## 2025-03-04 NOTE — TELEPHONE ENCOUNTER
Patient is enrolled in the Covid-19 Monitoring Program as of 3/01/25. Patient states c/o continued constant coughing. Patient states she has used OTC Robitussin Cough Syrup, Honey at bedtime, drank a cup of warm green tea and used steam in the shower for relief of symptoms and symptoms persist.     Patient advised to See PCP or Schedule an On Demand Virtual Visit within 24 Hours. Patient also advised to contact the [facility] for any worsening symptoms. Patient states understanding of care advice.    Reason for Disposition   [1] Continuous (nonstop) coughing interferes with work or school AND [2] no improvement using cough treatment per Care Advice    Additional Information   Negative: SEVERE difficulty breathing (e.g., struggling for each breath, speaks in single words)   Negative: Difficult to awaken or acting confused (e.g., disoriented, slurred speech)   Negative: Bluish (or gray) lips or face now   Negative: Shock suspected (e.g., cold/pale/clammy skin, too weak to stand, low BP, rapid pulse)   Negative: Sounds like a life-threatening emergency to the triager   Negative: [1] Diagnosed or suspected COVID-19 AND [2] symptoms lasting 3 or more weeks   Negative: [1] COVID-19 exposure AND [2] no symptoms   Negative: COVID-19 vaccine reaction suspected (e.g., fever, headache, muscle aches) occurring 1 to 3 days after getting vaccine   Negative: COVID-19 vaccine, questions about   Negative: [1] Lives with someone known to have influenza (flu test positive) AND [2] flu-like symptoms (e.g., cough, runny nose, sore throat, SOB; with or without fever)   Negative: [1] Possible COVID-19 symptoms AND [2] triager concerned about severity of symptoms or other causes   Negative: COVID-19 and breastfeeding, questions about   Negative: SEVERE or constant chest pain or pressure  (Exception: Mild central chest pain, present only when coughing.)   Negative: MODERATE difficulty breathing (e.g., speaks in phrases, SOB  even at rest, pulse 100-120)   Negative: [1] Headache AND [2] stiff neck (can't touch chin to chest)   Negative: Oxygen level (e.g., pulse oximetry) 90 percent or lower   Negative: Chest pain or pressure  (Exception: MILD central chest pain, present only when coughing.)   Negative: [1] Drinking very little AND [2] dehydration suspected (e.g., no urine > 12 hours, very dry mouth, very lightheaded)   Negative: Patient sounds very sick or weak to the triager   Negative: MILD difficulty breathing (e.g., minimal/no SOB at rest, SOB with walking, pulse <100)   Negative: Fever > 103 F (39.4 C)   Negative: [1] Fever > 101 F (38.3 C) AND [2] age > 60 years   Negative: [1] Fever > 100.0 F (37.8 C) AND [2] bedridden (e.g., CVA, chronic illness, recovering from surgery)   Negative: Oxygen level (e.g., pulse oximetry) 91 to 94 percent   Negative: [1] HIGH RISK patient (e.g., weak immune system, age > 64 years, obesity with BMI 30 or higher, pregnant, chronic lung disease or other chronic medical condition) AND [2] COVID symptoms (e.g., cough, fever)  (Exceptions: Already seen by PCP and no new or worsening symptoms.)   Negative: [1] HIGH RISK patient AND [2] influenza is widespread in the community AND [3] ONE OR MORE respiratory symptoms: cough, sore throat, runny or stuffy nose   Negative: [1] HIGH RISK patient AND [2] influenza exposure within the last 7 days AND [3] ONE OR MORE respiratory symptoms: cough, sore throat, runny or stuffy nose   Negative: Fever present > 3 days (72 hours)   Negative: [1] Fever returns after gone for over 24 hours AND [2] symptoms worse or not improved    Protocols used: Coronavirus (COVID-19) Diagnosed or [id number]

## 2025-03-28 ENCOUNTER — NURSE TRIAGE (OUTPATIENT)
Dept: ADMINISTRATIVE | Facility: CLINIC | Age: 65
End: 2025-03-28
Payer: COMMERCIAL

## 2025-03-28 NOTE — TELEPHONE ENCOUNTER
Patient states she was diagnosed with COVID the weekend before [place] and she still has a cough and mucus in her throat. She says she breathes a little hard when she starts coughing. Care advice discussed, understanding verbalized. [facility]VV offered and declined. No available appts at [facility] location within 3 days. Patient states she will go to urgent care. Please contact caller directly to discuss any further care advice.    Reason for Disposition   PERSISTING SYMPTOMS OF COVID-19 and NO medical visit for COVID-19 in past 2 weeks    Additional Information   Negative: SEVERE difficulty breathing (e.g., struggling for each breath, speaks in single words)   Negative: SEVERE weakness (e.g., can't stand or can barely walk) and new-onset or WORSE   Negative: Difficult to awaken or acting confused (e.g., disoriented, slurred speech)   Negative: Bluish (or gray) lips or face now   Negative: Sounds like a life-threatening emergency to the triager   Negative: MODERATE difficulty breathing (e.g., speaks in phrases, SOB even at rest, pulse 100-120) and new-onset or WORSE   Negative: MODERATE difficulty breathing and oxygen level (e.g., pulse oximetry) 91 to 94%   Negative: Oxygen level (e.g., pulse oximetry) 90% or lower   Negative: MODERATE difficulty breathing (e.g., speaks in phrases, SOB even at rest, pulse 100-120)   Negative: Drinking very little and dehydration suspected (e.g., no urine > 12 hours, very dry mouth, very lightheaded)   Negative: Patient sounds very sick or weak to the triager   Negative: MILD difficulty breathing (e.g., minimal/no SOB at rest, SOB with walking, pulse <100) and new-onset   Negative: Oxygen level (e.g., pulse oximetry) 91 to 94%   Negative: PERSISTING SYMPTOMS OF COVID-19 and NEW symptom and could be serious   Negative: Caller has URGENT question and triager unable to answer question   Negative: PERSISTING SYMPTOMS OF COVID-19 and symptoms WORSE   Negative: Caller has NON-URGENT question  and triager unable to answer    Protocols used: Coronavirus (COVID-19) Persisting Symptoms Follow-up [facility]

## 2025-04-03 ENCOUNTER — OFFICE VISIT (OUTPATIENT)
Dept: URGENT CARE | Facility: CLINIC | Age: 65
End: 2025-04-03
Payer: COMMERCIAL

## 2025-04-03 VITALS
WEIGHT: 170.44 LBS | TEMPERATURE: 99 F | HEART RATE: 87 BPM | HEIGHT: 64 IN | DIASTOLIC BLOOD PRESSURE: 71 MMHG | OXYGEN SATURATION: 97 % | BODY MASS INDEX: 29.1 KG/M2 | RESPIRATION RATE: 18 BRPM | SYSTOLIC BLOOD PRESSURE: 143 MMHG

## 2025-04-03 DIAGNOSIS — R52 BODY ACHES: ICD-10-CM

## 2025-04-03 DIAGNOSIS — R09.81 NASAL CONGESTION: ICD-10-CM

## 2025-04-03 DIAGNOSIS — R09.82 POST-NASAL DRIP: ICD-10-CM

## 2025-04-03 DIAGNOSIS — U09.9 POST COVID-19 CONDITION, UNSPECIFIED: ICD-10-CM

## 2025-04-03 DIAGNOSIS — R05.9 COUGH, UNSPECIFIED TYPE: Primary | ICD-10-CM

## 2025-04-03 PROCEDURE — 99203 OFFICE O/P NEW LOW 30 MIN: CPT | Mod: S$GLB,,, | Performed by: NURSE PRACTITIONER

## 2025-04-03 RX ORDER — METHOCARBAMOL 750 MG/1
750-1500 TABLET, FILM COATED ORAL
COMMUNITY

## 2025-04-03 RX ORDER — DICLOFENAC SODIUM 10 MG/G
GEL TOPICAL
COMMUNITY

## 2025-04-03 RX ORDER — PROMETHAZINE HYDROCHLORIDE AND DEXTROMETHORPHAN HYDROBROMIDE 6.25; 15 MG/5ML; MG/5ML
5 SYRUP ORAL EVERY 6 HOURS PRN
Qty: 118 ML | Refills: 0 | Status: SHIPPED | OUTPATIENT
Start: 2025-04-03 | End: 2025-04-13

## 2025-04-03 RX ORDER — KETOROLAC TROMETHAMINE 10 MG/1
TABLET, FILM COATED ORAL
COMMUNITY

## 2025-04-03 RX ORDER — ACETAMINOPHEN 500 MG
1000 TABLET ORAL
Status: COMPLETED | OUTPATIENT
Start: 2025-04-03 | End: 2025-04-03

## 2025-04-03 RX ORDER — ATORVASTATIN CALCIUM 40 MG/1
TABLET, FILM COATED ORAL
COMMUNITY

## 2025-04-03 RX ORDER — GUAIFENESIN 600 MG/1
1200 TABLET, EXTENDED RELEASE ORAL 2 TIMES DAILY
Qty: 40 TABLET | Refills: 0 | Status: SHIPPED | OUTPATIENT
Start: 2025-04-03 | End: 2025-04-13

## 2025-04-03 RX ORDER — ONDANSETRON 4 MG/1
TABLET, ORALLY DISINTEGRATING ORAL
COMMUNITY

## 2025-04-03 RX ORDER — CETIRIZINE HYDROCHLORIDE 10 MG/1
10 TABLET ORAL DAILY
Qty: 30 TABLET | Refills: 0 | Status: SHIPPED | OUTPATIENT
Start: 2025-04-03 | End: 2025-05-03

## 2025-04-03 RX ORDER — FLUTICASONE PROPIONATE 50 MCG
1 SPRAY, SUSPENSION (ML) NASAL DAILY
Qty: 9.9 ML | Refills: 0 | Status: SHIPPED | OUTPATIENT
Start: 2025-04-03 | End: 2025-04-10

## 2025-04-03 RX ORDER — DICYCLOMINE HYDROCHLORIDE 10 MG/1
CAPSULE ORAL
COMMUNITY

## 2025-04-03 RX ORDER — BENZONATATE 100 MG/1
200 CAPSULE ORAL 3 TIMES DAILY PRN
Qty: 60 CAPSULE | Refills: 0 | Status: SHIPPED | OUTPATIENT
Start: 2025-04-03 | End: 2025-04-13

## 2025-04-03 RX ORDER — PANTOPRAZOLE SODIUM 20 MG/1
TABLET, DELAYED RELEASE ORAL
COMMUNITY

## 2025-04-03 RX ADMIN — Medication 1000 MG: at 02:04

## 2025-04-03 NOTE — PATIENT INSTRUCTIONS
"Discharge instructions for Post Covid -19 statues with cough and nasal congestion and post nasal drip.  Start Mucinex as prescribed  Start Zyrtec as prescribed    Drink plenty of fluids so you won't get dehydrated.      Avoid taking Decongestants such as pseudoephedrine (ex. Sudafed) or phenylephrine (ex. Mucinex FastMax, Dayquil, Nyquil, or any combo cold meds that say "cold," "sinus" or "-D").        - Cough recommendations:   Warm tea with honey can help with cough. Honey is a natural cough suppressant.  - Dextromethorphan (DM) is a cough suppressant over the counter (ie. mucinex DM OR delsym).        - Congestion recommendations:      - Mucinex (guaifenesin) twice a day (or as directed) to help loosen mucous.       - Fever/Pain recommendations:  Alternate Tylenol or Ibuprofen as directed for fever/pain.   - Motrin/Ibuprofen every 6-8 hours for pain and inflammation. Do not take ibuprofen if you have a history of GI bleeding, kidney disease, or if you take blood thinners.    - Tylenol/acetaminophen every 6-8 hours for added pain relief.  Avoid tylenol if you have a history of liver disease.       -Sore throat recommendations: Warm fluids, warm salt water gargles, throat lozenges, tea, honey, soup, or drinking something cold or frozen.  Throat lozenges or sprays help reduce pain. Gargling with warm saltwater (1/4 teaspoon of salt in 1/2 cup of warm water) or an OTC anesthetic gargle may be useful for irritation.      - Go to the ER if you develop new or worsening symptoms.      When to seek medical advice  Call your healthcare provider right away if any of these occur:  Fever that is poorly controlled with OTC fever reducing medication  New or worsening ear pain, sinus pain, or headache  Stiff neck  You can't swallow liquids or you can't open your mouth wide because of throat pain  Signs of dehydration. These include very dark urine or no urine, sunken eyes, and dizziness.  Trouble breathing or noisy " breathing  Muffled voice  Rash

## 2025-04-03 NOTE — PROGRESS NOTES
"Subjective:      Patient ID: Soraya Elmore is a 64 y.o. female.    Vitals:  height is 5' 4" (1.626 m) and weight is 77.3 kg (170 lb 6.7 oz). Her oral temperature is 98.9 °F (37.2 °C). Her blood pressure is 143/71 (abnormal) and her pulse is 87. Her respiration is 18 and oxygen saturation is 97%.     Chief Complaint: COVID-19 Concerns    Pt presents today w/  productive cough accompanied w/ nasal congestion and sore throat ; onset approx 4x weeks ago. Pt c/o chills, headache , hoarse voice, sinus pressure and sneezing. Pt denies fever , emesis and sob. Pt was seen in ER 03/01/25 and had a virtual visit 03/03/25 dx Covid and RX promethazine DM ; no relief. Pt states sx are unchanged . Pt has hx GERD and HTN. Pt also tried to self medicate at home w/  lisandra seltzer cold ,and mucinex;no relief.  Patient has not taken anything for pain or discomfort patient feels fatigued with body ache and thick postnasal drip that patient feels she has trouble coughing up.    Sinus Problem  This is a chronic problem. The current episode started 1 to 4 weeks ago. The problem has been gradually worsening since onset. There has been no fever. Her pain is at a severity of 5/10. The pain is moderate. Associated symptoms include chills, congestion, coughing, headaches, a hoarse voice, sinus pressure, sneezing and a sore throat. Pertinent negatives include no diaphoresis, ear pain, neck pain, shortness of breath or swollen glands. Treatments tried: promethazine syrup, lisandra seltzer cold , mucinex. The treatment provided mild relief.       Constitution: Positive for chills. Negative for sweating.   HENT:  Positive for congestion, sinus pressure and sore throat. Negative for ear pain.    Neck: Negative for neck pain.   Respiratory:  Positive for cough. Negative for shortness of breath.    Allergic/Immunologic: Positive for sneezing.   Neurological:  Positive for headaches.      Objective:     Physical Exam   Constitutional: She is " oriented to person, place, and time. She appears well-developed. She is cooperative.  Non-toxic appearance. She does not appear ill. No distress. awake  HENT:   Head: Normocephalic and atraumatic.   Ears:   Right Ear: Hearing, tympanic membrane, external ear and ear canal normal.   Left Ear: Hearing, tympanic membrane, external ear and ear canal normal.   Nose: Mucosal edema and congestion present. No rhinorrhea or nasal deformity. No epistaxis. Right sinus exhibits no maxillary sinus tenderness and no frontal sinus tenderness. Left sinus exhibits no maxillary sinus tenderness and no frontal sinus tenderness.   Mouth/Throat: Uvula is midline and mucous membranes are normal. No trismus in the jaw. Normal dentition. No uvula swelling. Posterior oropharyngeal erythema present. No oropharyngeal exudate, posterior oropharyngeal edema, tonsillar abscesses or cobblestoning.   Post nasal drip      Comments: Post nasal drip  Eyes: Conjunctivae and lids are normal. Pupils are equal, round, and reactive to light. No scleral icterus. Extraocular movement intact   Neck: Trachea normal and phonation normal. Neck supple. No thyromegaly present. No edema present. No erythema present. No neck rigidity present.   Cardiovascular: Normal rate, regular rhythm, S1 normal, S2 normal, normal heart sounds and normal pulses.   Pulmonary/Chest: Effort normal and breath sounds normal. No respiratory distress. She has no decreased breath sounds. She has no wheezes. She has no rhonchi. She has no rales. She exhibits no tenderness.   Abdominal: Normal appearance and bowel sounds are normal. Soft. flat abdomen   Musculoskeletal: Normal range of motion.         General: No deformity. Normal range of motion.      Right lower leg: No edema.      Left lower leg: No edema.   Lymphadenopathy:     She has no cervical adenopathy.   Neurological: She is alert and oriented to person, place, and time. She exhibits normal muscle tone. Coordination normal.    Skin: Skin is warm, dry, intact, not diaphoretic and not pale. Capillary refill takes less than 2 seconds.   Psychiatric: Her speech is normal and behavior is normal. Mood, judgment and thought content normal.   Nursing note and vitals reviewed.      Assessment:     1. Cough, unspecified type    2. Nasal congestion    3. Post covid-19 condition, unspecified    4. Post-nasal drip    5. Body aches      Discussed with patient treatment of symptoms post COVID such as cough nasal congestion.  Patient states she has postnasal drip with cough negative for fever or chills patient states her anxiety level has been up due to her symptoms and was just concerned about COVID status. Supportive care discussed and treatment of symptoms.  Plan:       Cough, unspecified type  -     benzonatate (TESSALON) 100 MG capsule; Take 2 capsules (200 mg total) by mouth 3 (three) times daily as needed for Cough.  Dispense: 60 capsule; Refill: 0  -     promethazine-dextromethorphan (PROMETHAZINE-DM) 6.25-15 mg/5 mL Syrp; Take 5 mLs by mouth every 6 (six) hours as needed (cough).  Dispense: 118 mL; Refill: 0    Nasal congestion  -     guaiFENesin (MUCINEX) 600 mg 12 hr tablet; Take 2 tablets (1,200 mg total) by mouth 2 (two) times daily. for 10 days  Dispense: 40 tablet; Refill: 0  -     cetirizine (ZYRTEC) 10 MG tablet; Take 1 tablet (10 mg total) by mouth once daily.  Dispense: 30 tablet; Refill: 0  -     fluticasone propionate (FLONASE) 50 mcg/actuation nasal spray; 1 spray (50 mcg total) by Each Nostril route once daily. for 7 days  Dispense: 9.9 mL; Refill: 0    Post covid-19 condition, unspecified  -     guaiFENesin (MUCINEX) 600 mg 12 hr tablet; Take 2 tablets (1,200 mg total) by mouth 2 (two) times daily. for 10 days  Dispense: 40 tablet; Refill: 0  -     cetirizine (ZYRTEC) 10 MG tablet; Take 1 tablet (10 mg total) by mouth once daily.  Dispense: 30 tablet; Refill: 0  -     fluticasone propionate (FLONASE) 50 mcg/actuation nasal  "spray; 1 spray (50 mcg total) by Each Nostril route once daily. for 7 days  Dispense: 9.9 mL; Refill: 0  -     acetaminophen tablet 1,000 mg    Post-nasal drip    Body aches  -     acetaminophen tablet 1,000 mg        Patient Instructions   Discharge instructions for Post Covid -19 statues with cough and nasal congestion and post nasal drip.  Start Mucinex as prescribed  Start Zyrtec as prescribed    Drink plenty of fluids so you won't get dehydrated.      Avoid taking Decongestants such as pseudoephedrine (ex. Sudafed) or phenylephrine (ex. Mucinex FastMax, Dayquil, Nyquil, or any combo cold meds that say "cold," "sinus" or "-D").        - Cough recommendations:   Warm tea with honey can help with cough. Honey is a natural cough suppressant.  - Dextromethorphan (DM) is a cough suppressant over the counter (ie. mucinex DM OR delsym).        - Congestion recommendations:      - Mucinex (guaifenesin) twice a day (or as directed) to help loosen mucous.       - Fever/Pain recommendations:  Alternate Tylenol or Ibuprofen as directed for fever/pain.   - Motrin/Ibuprofen every 6-8 hours for pain and inflammation. Do not take ibuprofen if you have a history of GI bleeding, kidney disease, or if you take blood thinners.    - Tylenol/acetaminophen every 6-8 hours for added pain relief.  Avoid tylenol if you have a history of liver disease.       -Sore throat recommendations: Warm fluids, warm salt water gargles, throat lozenges, tea, honey, soup, or drinking something cold or frozen.  Throat lozenges or sprays help reduce pain. Gargling with warm saltwater (1/4 teaspoon of salt in 1/2 cup of warm water) or an OTC anesthetic gargle may be useful for irritation.      - Go to the ER if you develop new or worsening symptoms.      When to seek medical advice  Call your healthcare provider right away if any of these occur:  Fever that is poorly controlled with OTC fever reducing medication  New or worsening ear pain, sinus pain, or " headache  Stiff neck  You can't swallow liquids or you can't open your mouth wide because of throat pain  Signs of dehydration. These include very dark urine or no urine, sunken eyes, and dizziness.  Trouble breathing or noisy breathing  Muffled voice  Rash

## 2025-04-10 ENCOUNTER — OFFICE VISIT (OUTPATIENT)
Dept: SPORTS MEDICINE | Facility: CLINIC | Age: 65
End: 2025-04-10
Payer: COMMERCIAL

## 2025-04-10 VITALS
SYSTOLIC BLOOD PRESSURE: 164 MMHG | BODY MASS INDEX: 29.1 KG/M2 | HEIGHT: 64 IN | WEIGHT: 170.44 LBS | DIASTOLIC BLOOD PRESSURE: 90 MMHG

## 2025-04-10 DIAGNOSIS — G89.29 CHRONIC PAIN OF LEFT KNEE: Primary | ICD-10-CM

## 2025-04-10 DIAGNOSIS — M25.562 CHRONIC PAIN OF LEFT KNEE: Primary | ICD-10-CM

## 2025-04-10 PROCEDURE — 99999 PR PBB SHADOW E&M-EST. PATIENT-LVL V: CPT | Mod: PBBFAC,,, | Performed by: ORTHOPAEDIC SURGERY

## 2025-04-10 PROCEDURE — 1159F MED LIST DOCD IN RCRD: CPT | Mod: CPTII,S$GLB,, | Performed by: ORTHOPAEDIC SURGERY

## 2025-04-10 PROCEDURE — 99214 OFFICE O/P EST MOD 30 MIN: CPT | Mod: S$GLB,,, | Performed by: ORTHOPAEDIC SURGERY

## 2025-04-10 PROCEDURE — 3077F SYST BP >= 140 MM HG: CPT | Mod: CPTII,S$GLB,, | Performed by: ORTHOPAEDIC SURGERY

## 2025-04-10 PROCEDURE — 1160F RVW MEDS BY RX/DR IN RCRD: CPT | Mod: CPTII,S$GLB,, | Performed by: ORTHOPAEDIC SURGERY

## 2025-04-10 PROCEDURE — 3080F DIAST BP >= 90 MM HG: CPT | Mod: CPTII,S$GLB,, | Performed by: ORTHOPAEDIC SURGERY

## 2025-04-10 PROCEDURE — 3008F BODY MASS INDEX DOCD: CPT | Mod: CPTII,S$GLB,, | Performed by: ORTHOPAEDIC SURGERY

## 2025-04-10 RX ORDER — CELECOXIB 200 MG/1
200 CAPSULE ORAL 2 TIMES DAILY
Qty: 60 CAPSULE | Refills: 1 | Status: SHIPPED | OUTPATIENT
Start: 2025-04-10

## 2025-04-10 NOTE — PROGRESS NOTES
CC: left knee pain    Soraya is here today for follow up evaluation of her left knee pain. Patient reports her pain is 2/10 today and states she is feeling 75% improved which she attributes to Celebrex and her HEP. She is very pleased with her improvement at this time and is able to walk up stairs with less pain and feels as though her gait has improved.     Recall from visit on 02/20/2025  64 y.o. Female presents today for evaluation of her left knee pain.  She points to the medial knee when asked where she hurts and describes it as a burning pain.  She denies any numbness or tingling.  When describing the initial injury in April 20, 2024, she states that she was involved in a patient lift/transfer and she suffered a fall due to positioning in the weight of the patient.  She is unsure exactly how she fell, but she was in an extreme amount of pain in her back that she was unable to get off of her knees for approximately 10 minutes, maybe longer.  She was evaluated shortly after this and it was determined that she suffered a spinal fracture.  She denies any other injury or trauma to the knee in between April 20, 2024 and now.  She had x-rays obtained in December of 2024 which ruled out fracture, but does show medial compartment osteoarthritis.    How long:  First noticed about 1 month after her injury to her spine in April 2024.  She does not remember specifically injuring her knee at this time but states that there was intense pain in her back because of the fracture.  What makes it better:  Activity avoidance  What makes it worse:  Going up and downstairs especially down, twisting of the knee  Does it radiate:  Denies radiation of pain  Attempted treatments:  Pain medications (on tramadol for her back), rest  Pain score:  7/10  Any mechanical symptoms:  Denies  Feelings of instability:  Denies  Affect on ADLs:  Yes, difficulty ambulating throughout the house    Occupation:  Patient care technician      PAST  "MEDICAL HISTORY:   Past Medical History:   Diagnosis Date    Gastroenteritis     Glaucoma (increased eye pressure)     Hypertension     Osteoporosis     Prediabetes 06/03/2019    Tinnitus        PAST SURGICAL HISTORY:   Past Surgical History:   Procedure Laterality Date    CHOLECYSTECTOMY  January 8,2021    COLONOSCOPY N/A 06/07/2016    Procedure: COLONOSCOPY;  Surgeon: Katerine Ramsey MD;  Location: [address]);  Service: Endoscopy;  Laterality: N/A;    LAPAROSCOPIC CHOLECYSTECTOMY N/A 01/08/2021    Procedure: CHOLECYSTECTOMY, LAPAROSCOPIC;  Surgeon: Johan Wilkerson Jr., MD;  Location: [facility];  Service: General;  Laterality: N/A;    OOPHORECTOMY  01/01/1999    Unilateral oophorectomy       FAMILY HISTORY:   Family History   Problem Relation Name Age of Onset    Hypertension Mother Jeni     Glaucoma Mother Jeni     Cataracts Mother Jeni     Arthritis Mother Jeni     COPD Mother Jeni     Heart disease Mother Jeni     Diabetes Sister [place]     Glaucoma Sister [place]     Diabetes Sister Lauren     Breast cancer Cousin  67    Asthma Daughter Derraniece     Colon cancer Neg Hx      Ovarian cancer Neg Hx      Stroke Neg Hx      Esophageal cancer Neg Hx      Macular degeneration Neg Hx      Retinal detachment Neg Hx         SOCIAL HISTORY:   Social History[1]    MEDICATIONS:   Current Medications[2]    ALLERGIES:   Review of patient's allergies indicates:   Allergen Reactions    Aspirin         PHYSICAL EXAMINATION:  BP (!) 164/90 (Patient Position: Sitting)   Ht 5' 4" (1.626 m)   Wt 77.3 kg (170 lb 6.7 oz)   LMP 07/21/2006   BMI 29.25 kg/m²   Vitals signs and nursing note have been reviewed.  General: In no acute distress, well developed, well nourished, no diaphoresis  Eyes: EOM full and smooth, no eye redness or discharge  HENT: normocephalic and atraumatic, neck supple, trachea midline, no nasal discharge, no external ear redness or discharge  Cardiovascular: 2+ and symmetric DP pulses " bilaterally, no LE edema  Lungs: respirations non-labored, no conversational dyspnea   Abd: non-distended, no rigidity  MSK: no amputation or deformity, no swelling of extremities  Neuro: AAOx3, CN2-12 grossly intact  Skin: No rashes, warm and dry  Psychiatric: cooperative, pleasant, mood and affect appropriate for age    MUSCULOSKELETAL EXAM:    LEFT KNEE EXAMINATION   Affected side is compared to contralateral knee     Observation:  No edema, erythema, ecchymosis, or effusion noted.  No muscle atrophy of the thighs and calves noted.  No obvious bony deformities noted.   No genu valgus/varum noted. No recurvatum noted.      Tenderness:   Patella - + medial   Lateral joint line - none  Quad tendon - none   Medial joint line - +  Patellar tendon - none  Medial plica - none  Tibial tubercle - none   Lateral plica - none  Pes anserine - none   MCL prox - none  Distal ITB - none   MCL distal - none  MFC - none    LCL prox - none  LFC - none    LCL distal - none  Tibia - none    Fibula - none    No obvious bursae, plicae, popliteal cysts, or tendon derangement palpated.          ROM:  Active extension to 0° on left without hyperextension, lag, crepitus, or patellar J sign.   Active extension to 0° on right without hyperextension, lag, crepitus, or patellar J sign.   Active flexion to 125° on left and 135° on right.  Decreased left due to pain    Strength: (bilaterally) (*pain)  Knee Flexion - 5/5 on left* and 5/5 on right  Knee Extension - 5/5 on left* and 5/5 on right  Hip Flexion - 5/5 on left and 5/5 on right  Hip Extension - 5/5 on left and 5/5 on right  Ankle dorsiflexion - 5/5 on left and 5/5 on right  Ankle Plantarflexion - 5/5 on left and 5/5 on right    Patellofemoral Exam:  Patellar ballottement - negative  Bulge sign - negative  Patellar grind - negative  No patellar laxity with medial and lateral translation   No apprehension with medial and lateral patellar translation.     Meniscus Testing:     No pain with  terminal extension and flexion.  Nubias test - negative   Bounce home test - negative    Ligament Testing:  Lachman's test - negative  No laxity with anterior drawer.  No laxity with posterior drawer.    No laxity with varus testing at 0 and 30 degrees.  No laxity with valgus testing at 0 and 30 degrees.    IT Band Testing:  Juliocesars test - negative   Noble Compression test - negative    Neurovascular Examination:   Left antalgic gait  Sensation intact to light touch in the obturator, lateral/intermediate/medial/posterior femoral cutaneous, saphenous, and common peroneal nerves bilaterally.  Pulses intact at the DP and PT arteries bilaterally.    Capillary refill intact <2 seconds in all toes bilaterally.      IMAGIN. X-ray obtained 2024 due to left knee pain   2. X-ray images were reviewed personally by me and then directly with patient.  3. FINDINGS: X-ray images obtained demonstrate DJD and a varus deformity. No fracture dislocation bone destruction or OCD seen.   4. IMPRESSION: As above.      ASSESSMENT:      ICD-10-CM ICD-9-CM   1. Chronic pain of left knee  M25.562 719.46    G89.29 338.29       PLAN:  1-2.  Chronic knee pain/osteoarthritis - improved    - Soraya presents today for left medial knee pain that she states started approximately 1 month after her injury at work (spinal fracture, 2024).  Since that time she has had persistent pain with going up and down stairs and has medial knee pain certain knee twisting motions.    - She is now feeling 75% improved which she attributes to Celebrex and compliance with her HEP. She has been able to walk up stairs with less pain and feels as though her gait has improved. She is pleased with her progress at this time.     - We reviewed the natural history of osteoarthritis and a multipronged treatment approach. We reviewed the importance of addressing three different aspects to best manage this condition. Controlling the intra-articular immune  reaction through medications and/or injections, improving local stability through bracing and/or injection, and improving functional stability through hip, core, and ankle strength, stability and mobility which may benefit from formal physical therapy.    - Celebrex 200mg BID refilled today to be taken with food and twice daily for 2 as needed. Advised decreasing to once nightly, and then try to go without if symptoms tolerate.    - Continue with HEP for knee OA prescribed at initial visit.       Future planning includes - possible IA CSI if not improved with above, MRI if symptoms worsen, consider bracing for use during work or exercise    All questions were answered to the best of my ability and all concerns were addressed at this time.    Follow up as needed.       This note is dictated using the M*Modal Flu[employer] Direct word recognition program. There are word recognition mistakes that are occasionally missed on review.                 [1]   Social History  Socioeconomic History    Marital status:    Occupational History    Occupation: patient care tech    Tobacco Use    Smoking status: Former     Current packs/day: 0.00     Average packs/day: 0.8 packs/day for 29.0 years (22.5 ttl pk-yrs)     Types: Cigarettes     Start date: 1990     Quit date: 2005     Years since quittin.7     Passive exposure: Past    Smokeless tobacco: Never   Substance and Sexual Activity    Alcohol use: No    Drug use: No    Sexual activity: Not Currently     Partners: Male     Birth control/protection: Abstinence, Post-menopausal     Social Drivers of Health     Financial Resource Strain: Medium Risk (2024)    Overall Financial Resource Strain (CARDIA)     Difficulty of Paying Living Expenses: Somewhat hard   Food Insecurity: Unknown (2024)    Hunger Vital Sign     Worried About Running Out of Food in the Last Year: Patient declined     Ran Out of Food in the Last Year: Never true   Transportation Needs: No  Transportation Needs (1/21/2024)    PRAPARE - Transportation     Lack of Transportation (Medical): No     Lack of Transportation (Non-Medical): No   Physical Activity: Sufficiently Active (1/21/2024)    Exercise Vital Sign     Days of Exercise per Week: 3 days     Minutes of Exercise per Session: 60 min   Stress: Stress Concern Present (1/21/2024)    [employer] - Occupational Stress Questionnaire     Feeling of Stress : To some extent   Housing Stability: Low Risk  (1/21/2024)    Housing Stability Vital Sign     Unable to Pay for Housing in the Last Year: No     Number of Places Lived in the Last Year: 1     Unstable Housing in the Last Year: No   [2]   Current Outpatient Medications:     atorvastatin (LIPITOR) 40 MG tablet, Take 1 tablet (40 mg total) by mouth once daily., Disp: 90 tablet, Rfl: 3    atorvastatin (LIPITOR) 40 MG tablet, 90, Disp: , Rfl:     benzonatate (TESSALON) 100 MG capsule, Take 2 capsules (200 mg total) by mouth 3 (three) times daily as needed for Cough., Disp: 60 capsule, Rfl: 0    Bifidobacterium infantis (ALIGN) 4 mg Cap, Take 1 capsule (4 mg total) by mouth once daily., Disp: 90 capsule, Rfl: 3    calcium carbonate (CALCIUM 500) 500 mg calcium (1,250 mg) chewable tablet, Take 1 tablet (500 mg total) by mouth once daily., Disp: 90 tablet, Rfl: 3    carbamide peroxide (DEBROX) 6.5 % otic solution, Place 5 drops into the right ear 2 (two) times daily., Disp: 15 mL, Rfl: 0    cetirizine (ZYRTEC) 10 MG tablet, Take 1 tablet (10 mg total) by mouth once daily., Disp: 30 tablet, Rfl: 0    cholecalciferol, vitamin D3, 50,000 unit capsule, Take 1 capsule (50,000 Units total) by mouth once a week., Disp: 12 capsule, Rfl: 3    ciclopirox (PENLAC) 8 % Soln, Apply topically nightly., Disp: 6.6 mL, Rfl: 11    diclofenac sodium (VOLTAREN) 1 % Gel, Apply 2 g topically 3 (three) times daily as needed (muscle spasm pain). Apply 2 grams to affected area 3 times daily as needed,  Disp: 100 g, Rfl: 0    dicyclomine (BENTYL) 10 MG capsule, 60, Disp: , Rfl:     fluocinonide (LIDEX) 0.05 % external solution, Apply topically daily as needed (scalp itching)., Disp: 60 mL, Rfl: 6    fluticasone propionate (FLONASE) 50 mcg/actuation nasal spray, 1 spray (50 mcg total) by Each Nostril route once daily. for 7 days, Disp: 9.9 mL, Rfl: 0    guaiFENesin (MUCINEX) 600 mg 12 hr tablet, Take 2 tablets (1,200 mg total) by mouth 2 (two) times daily. for 10 days, Disp: 40 tablet, Rfl: 0    hydrocortisone 2.5 % cream, Apply topically 2 (two) times daily as needed (flaking and itching rash)., Disp: 30 g, Rfl: 6    latanoprost 0.005 % dpem, 7.5, Disp: , Rfl:     latanoprost 0.005 % ophthalmic solution, Place 1 drop into both eyes every evening., Disp: 2.5 mL, Rfl: 6    LIDOcaine (LIDODERM) 5 %, Apply to affected area. Use as directed. May use 4% lidocaine patch as alternative., Disp: 15 patch, Rfl: 1    LIDOcaine 1%, BUFFERED, 10 MG/20 mL, 15, Disp: , Rfl:     linaCLOtide (LINZESS) 145 mcg Cap capsule, 30 capsules., Disp: , Rfl:     methocarbamoL (ROBAXIN) 750 MG Tab, Take 750-1,500 mg by mouth., Disp: , Rfl:     ondansetron (ZOFRAN-ODT) 4 MG TbDL, Take 1 tablet (4 mg total) by mouth every 8 (eight) hours as needed (Nausea)., Disp: 90 tablet, Rfl: 0    ondansetron (ZOFRAN-ODT) 4 MG TbDL, 60, Disp: , Rfl:     pantoprazole (PROTONIX) 20 MG tablet, Take 1 tablet (20 mg total) by mouth once daily., Disp: 30 tablet, Rfl: 10    pantoprazole (PROTONIX) 20 MG tablet, 90, Disp: , Rfl:     promethazine-dextromethorphan (PROMETHAZINE-DM) 6.25-15 mg/5 mL Syrp, Take 5 mLs by mouth every 6 (six) hours as needed (cough)., Disp: 118 mL, Rfl: 0    urea (CARMOL) 40 % Crea, Apply topically once daily., Disp: 85 g, Rfl: 11    celecoxib (CELEBREX) 200 MG capsule, Take 1 capsule (200 mg total) by mouth 2 (two) times daily., Disp: 60 capsule, Rfl: 1

## 2025-04-25 ENCOUNTER — HOSPITAL ENCOUNTER (EMERGENCY)
Facility: OTHER | Age: 65
Discharge: HOME | End: 2025-04-25
Attending: EMERGENCY MEDICINE
Payer: COMMERCIAL

## 2025-04-25 VITALS
HEART RATE: 70 BPM | RESPIRATION RATE: 16 BRPM | WEIGHT: 170 LBS | DIASTOLIC BLOOD PRESSURE: 70 MMHG | HEIGHT: 64 IN | BODY MASS INDEX: 29.02 KG/M2 | SYSTOLIC BLOOD PRESSURE: 159 MMHG | TEMPERATURE: 98 F | OXYGEN SATURATION: 100 %

## 2025-04-25 DIAGNOSIS — R42 LIGHTHEADED: Primary | ICD-10-CM

## 2025-04-25 DIAGNOSIS — R42 DIZZINESS: ICD-10-CM

## 2025-04-25 LAB
ABSOLUTE EOSINOPHIL (OHS): 0.11 K/UL
ABSOLUTE MONOCYTE (OHS): 0.53 K/UL (ref 0.3–1)
ABSOLUTE NEUTROPHIL COUNT (OHS): 2.15 K/UL (ref 1.8–7.7)
ALBUMIN SERPL BCP-MCNC: 4 G/DL (ref 3.5–5.2)
ALP SERPL-CCNC: 88 UNIT/L (ref 40–150)
ALT SERPL W/O P-5'-P-CCNC: 14 UNIT/L (ref 10–44)
ANION GAP (OHS): 7 MMOL/L (ref 8–16)
AST SERPL-CCNC: 18 UNIT/L (ref 11–45)
BASOPHILS # BLD AUTO: 0.05 K/UL
BASOPHILS NFR BLD AUTO: 1 %
BILIRUB SERPL-MCNC: 1 MG/DL (ref 0.1–1)
BUN SERPL-MCNC: 9 MG/DL (ref 8–23)
CALCIUM SERPL-MCNC: 9.3 MG/DL (ref 8.7–10.5)
CHLORIDE SERPL-SCNC: 105 MMOL/L (ref 95–110)
CO2 SERPL-SCNC: 28 MMOL/L (ref 23–29)
CREAT SERPL-MCNC: 0.8 MG/DL (ref 0.5–1.4)
ERYTHROCYTE [DISTWIDTH] IN BLOOD BY AUTOMATED COUNT: 13.6 % (ref 11.5–14.5)
GFR SERPLBLD CREATININE-BSD FMLA CKD-EPI: >60 ML/MIN/1.73/M2
GLUCOSE SERPL-MCNC: 105 MG/DL (ref 70–110)
HCT VFR BLD AUTO: 39.7 % (ref 37–48.5)
HGB BLD-MCNC: 12.9 GM/DL (ref 12–16)
IMM GRANULOCYTES # BLD AUTO: 0.01 K/UL (ref 0–0.04)
IMM GRANULOCYTES NFR BLD AUTO: 0.2 % (ref 0–0.5)
LYMPHOCYTES # BLD AUTO: 2.33 K/UL (ref 1–4.8)
MAGNESIUM SERPL-MCNC: 1.9 MG/DL (ref 1.6–2.6)
MCH RBC QN AUTO: 28.2 PG (ref 27–31)
MCHC RBC AUTO-ENTMCNC: 32.5 G/DL (ref 32–36)
MCV RBC AUTO: 87 FL (ref 82–98)
NUCLEATED RBC (/100WBC) (OHS): 0 /100 WBC
PLATELET # BLD AUTO: 286 K/UL (ref 150–450)
PMV BLD AUTO: 10 FL (ref 9.2–12.9)
POTASSIUM SERPL-SCNC: 4.1 MMOL/L (ref 3.5–5.1)
PROT SERPL-MCNC: 7.4 GM/DL (ref 6–8.4)
RBC # BLD AUTO: 4.57 M/UL (ref 4–5.4)
RELATIVE EOSINOPHIL (OHS): 2.1 %
RELATIVE LYMPHOCYTE (OHS): 45 % (ref 18–48)
RELATIVE MONOCYTE (OHS): 10.2 % (ref 4–15)
RELATIVE NEUTROPHIL (OHS): 41.5 % (ref 38–73)
SODIUM SERPL-SCNC: 140 MMOL/L (ref 136–145)
WBC # BLD AUTO: 5.18 K/UL (ref 3.9–12.7)

## 2025-04-25 PROCEDURE — 93005 ELECTROCARDIOGRAM TRACING: CPT

## 2025-04-25 PROCEDURE — 82040 ASSAY OF SERUM ALBUMIN: CPT | Performed by: EMERGENCY MEDICINE

## 2025-04-25 PROCEDURE — 93010 ELECTROCARDIOGRAM REPORT: CPT | Mod: ,,, | Performed by: INTERNAL MEDICINE

## 2025-04-25 PROCEDURE — 83735 ASSAY OF MAGNESIUM: CPT | Performed by: EMERGENCY MEDICINE

## 2025-04-25 PROCEDURE — 99284 EMERGENCY DEPT VISIT MOD MDM: CPT | Mod: 25

## 2025-04-25 PROCEDURE — 85025 COMPLETE CBC W/AUTO DIFF WBC: CPT | Performed by: EMERGENCY MEDICINE

## 2025-04-25 PROCEDURE — 25000003 PHARM REV CODE 250: Performed by: PHYSICIAN ASSISTANT

## 2025-04-25 RX ORDER — ACETAMINOPHEN 325 MG/1
650 TABLET ORAL
Status: COMPLETED | OUTPATIENT
Start: 2025-04-25 | End: 2025-04-25

## 2025-04-25 RX ADMIN — ACETAMINOPHEN 650 MG: 325 TABLET, FILM COATED ORAL at 02:04

## 2025-04-25 NOTE — ED NOTES
Cleanings and fillings done approximately two hours ago at dentist. Describes tightness in head, dizziness, and nausea when getting into the car from store. Hasn't had a lot of fluid intake today. No medications taken this morning. Pain 3/10 in jaw from dental work. Pt ambulated to room without assistance c/o slight dizziness as described earlier.

## 2025-04-25 NOTE — ED TRIAGE NOTES
Pt reports dizziness for the past two hours. The dizziness is worse with movement. She stated her temples felt tight and she felt flushed with nausea. She had a dental procedure just prior to the event. She states she had the same thing done at the dentist last week with no issues.

## 2025-04-25 NOTE — ED PROVIDER NOTES
Encounter Date: 4/25/2025       History     Chief Complaint   Patient presents with    Dizziness     Pt. Complains of dizziness that started 30 minutes ago. Pt. Also reports head tightness & nausea. Pt. States she had 2 teeth filled at Dentist 2 hours ago and thought it may have been the medication from the dentist. Pt. Is alert and ABC's are intact.      Seen by physician at 2:35PM:    Patient is a 64-year-old female who presents to the emergency department after feeling unwell after a dentist appointment.  Patient states that she had several cavities filled in her left lower mouth.  She did get multiple injections for numbing medications.  After she ran a few errands with her .  She was walking when she suddenly felt a tightness around her head, along with feeling flushed and nauseous.  The sensation lasted about 10 minutes and then has slowly started to resolve.  She feels much better at this time.  She denies any chest pain or shortness breath.  She denies any fevers/chills.  She does admit to really not eating or drinking anything today secondary to her procedure and her mouth is still numb.        Review of patient's allergies indicates:   Allergen Reactions    Aspirin      Past Medical History:   Diagnosis Date    Gastroenteritis     Glaucoma (increased eye pressure)     Hypertension     Osteoporosis     Prediabetes 06/03/2019    Tinnitus      Past Surgical History:   Procedure Laterality Date    CHOLECYSTECTOMY  January 8,2021    COLONOSCOPY N/A 06/07/2016    Procedure: COLONOSCOPY;  Surgeon: Katerine Ramsey MD;  Location: [address];  Service: Endoscopy;  Laterality: N/A;    LAPAROSCOPIC CHOLECYSTECTOMY N/A 01/08/2021    Procedure: CHOLECYSTECTOMY, LAPAROSCOPIC;  Surgeon: Johan Wilkerson Jr., MD;  Location: [facility];  Service: General;  Laterality: N/A;    OOPHORECTOMY  01/01/1999    Unilateral oophorectomy     Family History   Problem Relation Name Age of Onset    Hypertension Mother Jeni      Glaucoma Mother Jeni     Cataracts Mother Jeni     Arthritis Mother Jeni     COPD Mother Jeni     Heart disease Mother Jeni     Diabetes Sister [place]     Glaucoma Sister [place]     Diabetes Sister [place]     Breast cancer Cousin  67    Asthma Daughter Gunjan     Colon cancer Neg Hx      Ovarian cancer Neg Hx      Stroke Neg Hx      Esophageal cancer Neg Hx      Macular degeneration Neg Hx      Retinal detachment Neg Hx       Social History[1]  Review of Systems   Constitutional:  Negative for chills and fever.   HENT:  Negative for congestion and rhinorrhea.    Respiratory:  Negative for chest tightness and shortness of breath.    Cardiovascular:  Negative for chest pain and palpitations.   Gastrointestinal:  Positive for nausea. Negative for abdominal pain, diarrhea and vomiting.   Genitourinary:  Negative for dysuria and flank pain.   Musculoskeletal:  Negative for back pain and neck pain.   Skin:  Negative for color change and wound.   Neurological:  Positive for headaches. Negative for dizziness.       Physical Exam     Initial Vitals [04/25/25 1358]   BP Pulse Resp Temp SpO2   131/74 100 18 98.1 °F (36.7 °C) 98 %      MAP       --         Physical Exam    Nursing note and vitals reviewed.  Constitutional: She appears well-developed and well-nourished.   HENT:   Head: Normocephalic and atraumatic.   Eyes: Conjunctivae are normal.   Neck: Neck supple.   Normal range of motion.  Cardiovascular:  Normal rate, regular rhythm and normal heart sounds.           Pulmonary/Chest: Breath sounds normal. No respiratory distress. She has no wheezes. She has no rales.   Abdominal: Abdomen is soft. Bowel sounds are normal. She exhibits no distension. There is no abdominal tenderness. There is no rebound.   Musculoskeletal:         General: Normal range of motion.      Cervical back: Normal range of motion and neck supple.     Neurological: She is alert and oriented to person, place, and time.   Ambulatory  with steady gait   Skin: Skin is warm and dry. Capillary refill takes less than 2 seconds.         ED Course   Procedures  Labs Reviewed   COMPREHENSIVE METABOLIC PANEL - Abnormal       Result Value    Sodium 140      Potassium 4.1      Chloride 105      CO2 28      Glucose 105      BUN 9      Creatinine 0.8      Calcium 9.3      Protein Total 7.4      Albumin 4.0      Bilirubin Total 1.0      ALP 88      AST 18      ALT 14      Anion Gap 7 (*)     eGFR >60     MAGNESIUM - Normal    Magnesium  1.9     CBC WITH DIFFERENTIAL - Normal    WBC 5.18      RBC 4.57      HGB 12.9      HCT 39.7      MCV 87      MCH 28.2      MCHC 32.5      RDW 13.6      Platelet Count 286      MPV 10.0      Nucleated RBC 0      Neut % 41.5      Lymph % 45.0      Mono % 10.2      Eos % 2.1      Basophil % 1.0      Imm Grans % 0.2      Neut # 2.15      Lymph # 2.33      Mono # 0.53      Eos # 0.11      Baso # 0.05      Imm Grans # 0.01     CBC W/ AUTO DIFFERENTIAL    Narrative:     The following orders were created for panel order CBC auto differential.  Procedure                               Abnormality         Status                     ---------                               -----------         ------                     CBC with Differential[[id number]]       Normal              Final result                 Please view results for these tests on the individual orders.     EKG Readings: (Independently Interpreted)   2:11PM:  Rate of 97.  Normal sinus rhythm.  Normal axis.  Normal intervals.  No ST or ischemic changes.       Imaging Results    None          Medications   acetaminophen tablet 650 mg (650 mg Oral Given 4/25/25 1430)     Medical Decision Making  2:35PM:  Patient is a 64-year-old female who presents to the emergency department after feeling flushed, nauseous, along with a sensation of head tightness.  Patient appears well, nontoxic.  Her symptoms have significantly improved and are almost resolved at this time.  She is otherwise  neurologically intact.  I suspect that her lack of p.o. intake today along with being in the heat may have contributed to her symptoms.  However given her age and risk factors, will an EKG, will continue to follow and reassess.    Amount and/or Complexity of Data Reviewed  Labs: ordered.    4:44 PM:  Patient doing well, remains stable.  She was able to ambulate with no issues.  She feels well and feels ready to go home.  Her labs are otherwise unremarkable.  Given her otherwise reassuring workup, I do not feel that further work up in the ED is indicated at this time.  I updated pt regarding results and I counseled pt regarding supportive care measures.  I have discussed with the pt ED return warnings and need for close PCP f/u.  Pt agreeable to plan and all questions answered.  I feel that pt is stable for discharge and management as an outpatient and no further intervention is needed at this time.  Pt is comfortable returning to the ED if needed.  Will DC home in stable condition.                                    Clinical Impression:  Final diagnoses:  [R42] Lightheaded (Primary)  [R42] Dizziness          ED Disposition Condition    Discharge Stable          ED Prescriptions    None       Follow-up Information       Follow up With Specialties Details Why Contact Info    Jonathan Moya MD Internal Medicine   [id number]  [facility] [address]  [phone]                   [1]   Social History  Tobacco Use    Smoking status: Former     Current packs/day: 0.00     Average packs/day: 0.8 packs/day for 29.0 years (22.5 ttl pk-yrs)     Types: Cigarettes     Start date: 1990     Quit date: 2005     Years since quittin.7     Passive exposure: Past    Smokeless tobacco: Never   Substance Use Topics    Alcohol use: No    Drug use: No        Natalie Hinojosa MD  [phone]

## 2025-04-25 NOTE — FIRST PROVIDER EVALUATION
Emergency Department TeleTriage Encounter Note      CHIEF COMPLAINT    Chief Complaint   Patient presents with    Dizziness     Pt. Complains of dizziness that started 30 minutes ago. Pt. Also reports head tightness & nausea. Pt. States she had 2 teeth filled at Dentist 2 hours ago and thought it may have been the medication from the dentist. Pt. Is alert and ABC's are intact.        VITAL SIGNS   Initial Vitals [04/25/25 1358]   BP Pulse Resp Temp SpO2   131/74 100 18 98.1 °F (36.7 °C) 98 %      MAP       --            ALLERGIES    Review of patient's allergies indicates:   Allergen Reactions    Aspirin        PROVIDER TRIAGE NOTE  Patient had 2 fillings today. She is now having tightness in the temples and feeling lightheaded. No chest pain or SOB. Reports some palpitations.       ORDERS  Labs Reviewed - No data to display    ED Orders (720h ago, onward)      None              Virtual Visit Note: The provider triage portion of this emergency department evaluation and documentation was performed via [employer], a HIPAA-compliant telemedicine application, in concert with a tele-presenter in the room. A face to face patient evaluation with one of my colleagues will occur once the patient is placed in an emergency department room.      DISCLAIMER: This note was prepared with [employer] voice recognition transcription software. Garbled syntax, mangled pronouns, and other bizarre constructions may be attributed to that software system.

## 2025-04-28 DIAGNOSIS — H93.8X1 IRRITATION OF EAR, RIGHT: ICD-10-CM

## 2025-04-28 LAB
OHS QRS DURATION: 86 MS
OHS QTC CALCULATION: 419 MS

## 2025-04-28 NOTE — TELEPHONE ENCOUNTER
Refill Routing Note   Medication(s) are not appropriate for processing by [facility] for the following reason(s):        Outside of protocol    [facility] action(s):  Route               Appointments  past 12m or future 3m with PCP    Date Provider   Last Visit   12/17/2024 Jonathan Moya MD   Next Visit   6/17/2025 Jonathan Moya MD   ED visits in past 90 days: 2        Note composed:2:41 PM 04/28/2025

## 2025-05-27 ENCOUNTER — TELEPHONE (OUTPATIENT)
Dept: INTERNAL MEDICINE | Facility: CLINIC | Age: 65
End: 2025-05-27
Payer: COMMERCIAL

## 2025-05-27 NOTE — TELEPHONE ENCOUNTER
Attempted to reschedule 5/29/2025 appointment with Dr. Moya at 11:40 am as Dr. Moya will be out of the office 5/26/2025 to 7/7/2025 due to early maternity leave.    No answer. Appointment cancelled.

## 2025-05-29 ENCOUNTER — OFFICE VISIT (OUTPATIENT)
Dept: INTERNAL MEDICINE | Facility: CLINIC | Age: 65
End: 2025-05-29
Payer: COMMERCIAL

## 2025-05-29 VITALS
HEIGHT: 64 IN | DIASTOLIC BLOOD PRESSURE: 62 MMHG | SYSTOLIC BLOOD PRESSURE: 126 MMHG | WEIGHT: 166.88 LBS | HEART RATE: 72 BPM | BODY MASS INDEX: 28.49 KG/M2 | OXYGEN SATURATION: 98 %

## 2025-05-29 DIAGNOSIS — F32.A ANXIETY AND DEPRESSION: ICD-10-CM

## 2025-05-29 DIAGNOSIS — F41.9 ANXIETY AND DEPRESSION: ICD-10-CM

## 2025-05-29 DIAGNOSIS — Z09 HOSPITAL DISCHARGE FOLLOW-UP: Primary | ICD-10-CM

## 2025-05-29 PROCEDURE — 99213 OFFICE O/P EST LOW 20 MIN: CPT | Mod: S$GLB,,, | Performed by: NURSE PRACTITIONER

## 2025-05-29 PROCEDURE — 99999 PR PBB SHADOW E&M-EST. PATIENT-LVL V: CPT | Mod: PBBFAC,,, | Performed by: NURSE PRACTITIONER

## 2025-05-29 PROCEDURE — 3074F SYST BP LT 130 MM HG: CPT | Mod: CPTII,S$GLB,, | Performed by: NURSE PRACTITIONER

## 2025-05-29 PROCEDURE — 3008F BODY MASS INDEX DOCD: CPT | Mod: CPTII,S$GLB,, | Performed by: NURSE PRACTITIONER

## 2025-05-29 PROCEDURE — 1159F MED LIST DOCD IN RCRD: CPT | Mod: CPTII,S$GLB,, | Performed by: NURSE PRACTITIONER

## 2025-05-29 PROCEDURE — 3078F DIAST BP <80 MM HG: CPT | Mod: CPTII,S$GLB,, | Performed by: NURSE PRACTITIONER

## 2025-06-10 ENCOUNTER — TELEPHONE (OUTPATIENT)
Dept: OTOLARYNGOLOGY | Facility: CLINIC | Age: 65
End: 2025-06-10
Payer: COMMERCIAL

## 2025-06-12 ENCOUNTER — TELEPHONE (OUTPATIENT)
Dept: OPHTHALMOLOGY | Facility: CLINIC | Age: 65
End: 2025-06-12
Payer: COMMERCIAL

## 2025-06-12 DIAGNOSIS — H40.1131 PRIMARY OPEN ANGLE GLAUCOMA (POAG) OF BOTH EYES, MILD STAGE: ICD-10-CM

## 2025-06-12 RX ORDER — LATANOPROST 50 UG/ML
1 SOLUTION/ DROPS OPHTHALMIC NIGHTLY
Qty: 7.5 ML | Refills: 0 | Status: SHIPPED | OUTPATIENT
Start: 2025-06-12

## 2025-06-14 DIAGNOSIS — H40.1131 PRIMARY OPEN ANGLE GLAUCOMA (POAG) OF BOTH EYES, MILD STAGE: ICD-10-CM

## 2025-06-16 RX ORDER — LATANOPROST 50 UG/ML
1 SOLUTION/ DROPS OPHTHALMIC NIGHTLY
Qty: 7.5 ML | Refills: 3 | Status: SHIPPED | OUTPATIENT
Start: 2025-06-16

## 2025-06-26 ENCOUNTER — OFFICE VISIT (OUTPATIENT)
Dept: OTOLARYNGOLOGY | Facility: CLINIC | Age: 65
End: 2025-06-26
Payer: COMMERCIAL

## 2025-06-26 VITALS
HEART RATE: 90 BPM | SYSTOLIC BLOOD PRESSURE: 136 MMHG | BODY MASS INDEX: 27.84 KG/M2 | DIASTOLIC BLOOD PRESSURE: 77 MMHG | WEIGHT: 162.19 LBS

## 2025-06-26 DIAGNOSIS — H93.A1 PULSATILE TINNITUS, RIGHT EAR: Primary | ICD-10-CM

## 2025-06-26 DIAGNOSIS — R42 EPISODIC LIGHTHEADEDNESS: ICD-10-CM

## 2025-06-26 PROCEDURE — 1159F MED LIST DOCD IN RCRD: CPT | Mod: CPTII,S$GLB,, | Performed by: STUDENT IN AN ORGANIZED HEALTH CARE EDUCATION/TRAINING PROGRAM

## 2025-06-26 PROCEDURE — 3075F SYST BP GE 130 - 139MM HG: CPT | Mod: CPTII,S$GLB,, | Performed by: STUDENT IN AN ORGANIZED HEALTH CARE EDUCATION/TRAINING PROGRAM

## 2025-06-26 PROCEDURE — 3008F BODY MASS INDEX DOCD: CPT | Mod: CPTII,S$GLB,, | Performed by: STUDENT IN AN ORGANIZED HEALTH CARE EDUCATION/TRAINING PROGRAM

## 2025-06-26 PROCEDURE — 99214 OFFICE O/P EST MOD 30 MIN: CPT | Mod: S$GLB,,, | Performed by: STUDENT IN AN ORGANIZED HEALTH CARE EDUCATION/TRAINING PROGRAM

## 2025-06-26 PROCEDURE — 3078F DIAST BP <80 MM HG: CPT | Mod: CPTII,S$GLB,, | Performed by: STUDENT IN AN ORGANIZED HEALTH CARE EDUCATION/TRAINING PROGRAM

## 2025-06-26 RX ORDER — CHLORHEXIDINE GLUCONATE ORAL RINSE 1.2 MG/ML
15 SOLUTION DENTAL 2 TIMES DAILY
COMMUNITY

## 2025-06-26 RX ORDER — AMOXICILLIN 500 MG/1
500 TABLET, FILM COATED ORAL EVERY 12 HOURS
COMMUNITY

## 2025-06-26 RX ORDER — ACETAMINOPHEN AND CODEINE PHOSPHATE 300; 30 MG/1; MG/1
1 TABLET ORAL EVERY 4 HOURS PRN
COMMUNITY

## 2025-06-26 NOTE — PROGRESS NOTES
Ear, Nose, & Throat  Otolaryngology - Head & Neck Surgery        Subjective:     Chief Complaint:   Chief Complaint   Patient presents with    Follow-up     06/26/2025: Recently seen in ED for dizziness. Following dentistry appointment for 2 fillings, developed right temple pain and dizziness. Described a lightheaded episode which lasted around an hour. Reports feeling of palpitations and flush. Had to sit in vehicle for around an hour until it passed. No n/v, cp, dyspnea, LOC. Had another episode this morning after getting up to go to the bathroom. Got lightheaded, palpitations and nausea. Episode lasted around 20 minutes.     Feels a constant mild imbalance which is new to her. This imbalance started roughly a year ago. Recent optometry visit significant only for cataracts. No headaches but feels eye pressure OU.     Still has irritating pulsatile tinnitus on the right which resolves with jugular compression. Sticks cotton in her ear regularly.     HPI:  Soraya Wei is a 64 y.o. female who was referred to me by Dr. Jonathan Moya in consultation for pulsatile tinnitus.    Presents today with 4-5 years bilateral, right worse than left, pulsatile tinnitus.  She reports a whooshing noise.  She is unsure whether this is synchronous with her heartbeat.  She does have a history of bifrontal and occipital headaches and floaters in her vision.  History of glaucoma. Last saw optometry 08/2024. No noted papilledema. She also reports some issues with imbalance; however, these are difficult for her to describe.  She denies any room spinning vertigo.  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  Denies any loss of consciousness.    She has not have a significant otologic history.  Denies any subjective HL, otalgia, otorrhea, hyperacusis, or autophony.  Past Medical History  Active Ambulatory Problems     Diagnosis Date Noted    Nuclear sclerosis of both eyes 05/10/2017     Prediabetes 06/03/2019    Plantar fasciitis 08/21/2020    Foot pain, right 08/21/2020    Equinus contracture of ankle 08/21/2020    Gastroesophageal reflux disease without esophagitis 06/08/2023    Vitamin D deficiency 06/08/2023    Pain in left hip 06/09/2023    Acute pain of left knee 06/09/2023    T12 compression fracture, sequela 04/08/2024    Primary hypertension 04/08/2024    Normocytic anemia 04/08/2024    Polyp of colon 04/08/2024    Osteoporosis of lumbar spine 05/13/2024    Osteoporosis with current pathological fracture with routine healing 05/13/2024     Resolved Ambulatory Problems     Diagnosis Date Noted    Annual physical exam     Decreased range of motion of right ankle 08/21/2020    Preop testing 01/08/2021    Gall bladder disease 01/08/2021    Impingement syndrome of left shoulder 04/19/2022    Decreased strength, endurance, and mobility 04/19/2022     Past Medical History:   Diagnosis Date    Gastroenteritis     Glaucoma (increased eye pressure)     Hypertension     Osteoporosis     Tinnitus        Past Surgical History  She has a past surgical history that includes Oophorectomy (01/01/1999); Colonoscopy (N/A, 06/07/2016); Laparoscopic cholecystectomy (N/A, 01/08/2021); and Cholecystectomy (January 8,2021).    Past Surgical History:   Procedure Laterality Date    CHOLECYSTECTOMY  January 8,2021    COLONOSCOPY N/A 06/07/2016    Procedure: COLONOSCOPY;  Surgeon: Katerine Ramsey MD;  Location: [address];  Service: Endoscopy;  Laterality: N/A;    LAPAROSCOPIC CHOLECYSTECTOMY N/A 01/08/2021    Procedure: CHOLECYSTECTOMY, LAPAROSCOPIC;  Surgeon: Johan Wilkerson Jr., MD;  Location: [facility];  Service: General;  Laterality: N/A;    OOPHORECTOMY  01/01/1999    Unilateral oophorectomy        Family History  Her family history includes Arthritis in her mother; Asthma in her daughter; Breast cancer (age of onset: 67) in her cousin; COPD in her mother; Cataracts in her mother; Diabetes in her sister  and sister; Glaucoma in her mother and sister; Heart disease in her mother; Hypertension in her mother.    Social History  She reports that she quit smoking about 19 years ago. Her smoking use included cigarettes. She started smoking about 34 years ago. She has a 22.5 pack-year smoking history. She has been exposed to tobacco smoke. She has never used smokeless tobacco. She reports that she does not drink alcohol and does not use drugs.    Allergies  She is allergic to aspirin.    Medications  She has a current medication list which includes the following prescription(s): acetaminophen-codeine 300-30mg, atorvastatin, atorvastatin, bifidobacterium infantis, carbamide peroxide, celecoxib, cholecalciferol (vitamin d3), ciclopirox, diclofenac sodium, dicyclomine, fluocinonide, hydrocortisone, latanoprost, lidocaine, lidocaine 1% (buffered), linaclotide, methocarbamol, ondansetron, ondansetron, urea, amoxicillin, calcium carbonate, cetirizine, chlorhexidine, pantoprazole, and pantoprazole.    ROS:  Pertinent positive and negative review of systems as noted in HPI.     Objective:     /77 (BP Location: Left arm, Patient Position: Sitting)   Pulse 90   Wt 73.6 kg (162 lb 3.2 oz)   LMP 07/21/2006   BMI 27.84 kg/m²    Physical Exam  Constitutional: Well appearing, communicating. No acute distress  Voice: Euphonic  Eyes: Conjunctiva WNL, Pupils reactive  Head/Face: House Brackmann I Bilaterally.  Right Ear:    Auricle normally developed   EAC: normal   Tympanic membrane: intact   Middle Ear: No effusion present and Ossicles in normal position  Left Ear:    Auricle normally developed   EAC: normal   Tympanic membrane: intact   Middle Ear: No effusion present and Ossicles in normal position  Vestibular:   Spontaneous Nystagmus: none   Smooth Pursuit: grossly unremarkable   Saccades: grossly unremarkable     Gaze-Evoked Nystagmus: None   Head-Impulse: DNT   Fixation Suppression: DNT  Gait: Normal    Tanesha-Hallpike: Normal  bilaterally    Passive Head Shake: No Nystagmus    Test of Skew: DNT   Fakuda Step Test: DNT  Neuro/Psychiatric:     Affect: Normal   Cognition: Possible MCI  Cerebellar   Alternating Finger to Nose: Normal   Rapid Alternating Movements: DNT  Proprioception   Romberg: Normal   Neuro/Psychiatric:     Affect: Appropriate   Eyes: EOMI intact  Respiratory: No increased WOB, no stridor    Data Review:   LABS  Hemoglobin (g/dL)   Date Value   12/09/2024 12.7     HGB (gm/dL)   Date Value   04/25/2025 12.9     WBC (K/uL)   Date Value   04/25/2025 5.18     Platelet Count (K/uL)   Date Value   04/25/2025 286     Platelets (K/uL)   Date Value   12/09/2024 262     Creatinine (mg/dL)   Date Value   04/25/2025 0.8     Calcium (mg/dL)   Date Value   04/25/2025 9.3   12/09/2024 9.1     INR (no units)   Date Value   06/04/2020 0.9    (HGB:1,HCT:1,WBC:1,PLT:1,NA:1,K:1,CREATININE:1,BUN:1,GLU:1,POCTGLUCOSE:1,CALCIUM:1, A1C:1)@    I have independently reviewed the lab results shown above. Findings significant for the conditions being treated include:  Normal    IMAGING    No pertinent imaging available    AUDIO    I have independently reviewed the following audiogram and reviewed the report. Findings as follows:     Pure Tone Audiometry: Symmetric  Right - Normal (0-25 dB) to Mild (26-40 dB) high frequency sensorineural hearing loss   Left - Normal (0-25 dB) to Mild (26-40 dB) high frequency sensorineural hearing loss     Tympanometry  Right: Type A  Left: Type A    Word Discrimination Score  Right: 100%  Left 100%    Acoustic Reflexes  Right Stimulation - Right reflex: DNT   Left Reflex: DNT  Left Stimulation - Right reflex: DNT   Left Reflex: DNT    Procedures:       Assessment:     1. Pulsatile tinnitus, right ear    2. Episodic lightheadedness      Plan:     I had a long discussion with the patient regarding her condition and the further workup and management options.      Right worse than left mild pulsatile tinnitus which resolves  with jugular compression.  Previously ordered MRV but she was unable to afford it at the time. Offered a CT temporal bone to screen for diverticulum or sigmoid plate thinning. No reported papilledema on recent optometry exam.     Episodes of lightheadedness, palpitation and flush sensation also bring into question cardiogenic etiologies. Consider cardio referral for event monitoring.     Voice recognition software was used in the creation of this note/communication and any sound-alike errors which may have occurred from its use should be taken in context when interpreting. If such errors prevent a clear understanding of the note/communication, please contact the office for clarification.     Orders Placed This Encounter   Procedures    CT Temporal Bone without contrast

## 2025-07-10 ENCOUNTER — HOSPITAL ENCOUNTER (OUTPATIENT)
Dept: RADIOLOGY | Facility: OTHER | Age: 65
Discharge: HOME | End: 2025-07-10
Attending: STUDENT IN AN ORGANIZED HEALTH CARE EDUCATION/TRAINING PROGRAM
Payer: COMMERCIAL

## 2025-07-10 DIAGNOSIS — G89.29 CHRONIC PAIN OF LEFT KNEE: ICD-10-CM

## 2025-07-10 DIAGNOSIS — H93.A1 PULSATILE TINNITUS, RIGHT EAR: ICD-10-CM

## 2025-07-10 DIAGNOSIS — M25.562 CHRONIC PAIN OF LEFT KNEE: ICD-10-CM

## 2025-07-10 PROCEDURE — 70480 CT ORBIT/EAR/FOSSA W/O DYE: CPT | Mod: TC

## 2025-07-10 PROCEDURE — 70480 CT ORBIT/EAR/FOSSA W/O DYE: CPT | Mod: 26,,, | Performed by: RADIOLOGY

## 2025-07-14 RX ORDER — CELECOXIB 200 MG/1
200 CAPSULE ORAL 2 TIMES DAILY
Qty: 60 CAPSULE | Refills: 1 | Status: SHIPPED | OUTPATIENT
Start: 2025-07-14

## 2025-07-25 DIAGNOSIS — G93.2 IIH (IDIOPATHIC INTRACRANIAL HYPERTENSION): ICD-10-CM

## 2025-07-25 DIAGNOSIS — R51.9 NONINTRACTABLE EPISODIC HEADACHE, UNSPECIFIED HEADACHE TYPE: Primary | ICD-10-CM

## (undated) DEVICE — EVACUATOR WOUND BULB 100CC

## (undated) DEVICE — PENCIL ELECTROSURG HOLST W/BLD

## (undated) DEVICE — ELECTRODE REM PLYHSV RETURN 9

## (undated) DEVICE — TROCAR KII FIOS 5MM X 100MM

## (undated) DEVICE — TROCAR KII FIOS 11MM X 100MM

## (undated) DEVICE — KIT WING PAD POSITIONING

## (undated) DEVICE — SEE MEDLINE ITEM 156925

## (undated) DEVICE — SUT VICRYL 0 27 CT-2

## (undated) DEVICE — APPLIER CLIP EPIX UNIV 5X34

## (undated) DEVICE — IRRIGATOR ENDOSCOPY DISP.

## (undated) DEVICE — NDL HYPO REG 25G X 1 1/2

## (undated) DEVICE — SOL CLEARIFY VISUALIZATION LAP

## (undated) DEVICE — SYR B-D DISP CONTROL 10CC100/C

## (undated) DEVICE — SOL 9P NACL IRR PIC IL

## (undated) DEVICE — ADHESIVE DERMABOND MINI HV

## (undated) DEVICE — SUT MCRYL PLUS 4-0 PS2 27IN

## (undated) DEVICE — NDL INSUF ULTRA VERESS 120MM

## (undated) DEVICE — SOL NS 1000CC

## (undated) DEVICE — CLOSURE SKIN STERI STRIP 1/4X3

## (undated) DEVICE — DRAIN WND 15FRX3/16X4.7MM TRCR

## (undated) DEVICE — ADHESIVE DERMABOND ADVANCED